# Patient Record
Sex: FEMALE | Race: WHITE | NOT HISPANIC OR LATINO | Employment: OTHER | URBAN - METROPOLITAN AREA
[De-identification: names, ages, dates, MRNs, and addresses within clinical notes are randomized per-mention and may not be internally consistent; named-entity substitution may affect disease eponyms.]

---

## 2023-01-17 ENCOUNTER — OFFICE VISIT (OUTPATIENT)
Dept: INTERNAL MEDICINE | Facility: CLINIC | Age: 68
End: 2023-01-17
Payer: MEDICARE

## 2023-01-17 VITALS
DIASTOLIC BLOOD PRESSURE: 80 MMHG | BODY MASS INDEX: 31.28 KG/M2 | TEMPERATURE: 97.8 F | RESPIRATION RATE: 20 BRPM | OXYGEN SATURATION: 98 % | HEART RATE: 87 BPM | HEIGHT: 64 IN | SYSTOLIC BLOOD PRESSURE: 130 MMHG | WEIGHT: 183.25 LBS

## 2023-01-17 DIAGNOSIS — M85.80 OSTEOPENIA, UNSPECIFIED LOCATION: ICD-10-CM

## 2023-01-17 DIAGNOSIS — Z00.00 ROUTINE HEALTH MAINTENANCE: Primary | ICD-10-CM

## 2023-01-17 DIAGNOSIS — Z12.31 BREAST CANCER SCREENING BY MAMMOGRAM: ICD-10-CM

## 2023-01-17 DIAGNOSIS — F33.41 RECURRENT MAJOR DEPRESSIVE DISORDER, IN PARTIAL REMISSION: ICD-10-CM

## 2023-01-17 DIAGNOSIS — Z13.6 SCREENING FOR ISCHEMIC HEART DISEASE: ICD-10-CM

## 2023-01-17 DIAGNOSIS — Z12.11 ENCOUNTER FOR COLORECTAL CANCER SCREENING: ICD-10-CM

## 2023-01-17 DIAGNOSIS — K21.9 GASTROESOPHAGEAL REFLUX DISEASE WITHOUT ESOPHAGITIS: ICD-10-CM

## 2023-01-17 DIAGNOSIS — Z23 ENCOUNTER FOR VACCINATION: ICD-10-CM

## 2023-01-17 DIAGNOSIS — I25.10 CORONARY ARTERY DISEASE INVOLVING NATIVE HEART WITHOUT ANGINA PECTORIS, UNSPECIFIED VESSEL OR LESION TYPE: ICD-10-CM

## 2023-01-17 DIAGNOSIS — Z78.0 ASYMPTOMATIC POSTMENOPAUSAL STATE: ICD-10-CM

## 2023-01-17 DIAGNOSIS — Z12.12 ENCOUNTER FOR COLORECTAL CANCER SCREENING: ICD-10-CM

## 2023-01-17 PROCEDURE — 2014F MENTAL STATUS ASSESS: CPT | Performed by: STUDENT IN AN ORGANIZED HEALTH CARE EDUCATION/TRAINING PROGRAM

## 2023-01-17 PROCEDURE — 99387 INIT PM E/M NEW PAT 65+ YRS: CPT | Performed by: STUDENT IN AN ORGANIZED HEALTH CARE EDUCATION/TRAINING PROGRAM

## 2023-01-17 PROCEDURE — 3008F BODY MASS INDEX DOCD: CPT | Performed by: STUDENT IN AN ORGANIZED HEALTH CARE EDUCATION/TRAINING PROGRAM

## 2023-01-17 RX ORDER — ASPIRIN 81 MG/1
81 TABLET ORAL DAILY
Qty: 90 TABLET | Refills: 3 | Status: SHIPPED | OUTPATIENT
Start: 2023-01-17

## 2023-01-17 RX ORDER — ESCITALOPRAM OXALATE 10 MG/1
10 TABLET ORAL DAILY
COMMUNITY
Start: 2022-10-25 | End: 2023-02-28 | Stop reason: SDUPTHER

## 2023-01-17 RX ORDER — PANTOPRAZOLE SODIUM 40 MG/1
40 TABLET, DELAYED RELEASE ORAL DAILY
COMMUNITY
Start: 2022-10-18 | End: 2023-02-28 | Stop reason: SDUPTHER

## 2023-01-17 RX ORDER — ATORVASTATIN CALCIUM 20 MG/1
20 TABLET, FILM COATED ORAL DAILY
COMMUNITY
Start: 2022-10-17 | End: 2023-02-28 | Stop reason: SDUPTHER

## 2023-01-17 RX ORDER — LATANOPROST 50 UG/ML
SOLUTION/ DROPS OPHTHALMIC
COMMUNITY
Start: 2022-12-27

## 2023-01-17 RX ORDER — DULOXETIN HYDROCHLORIDE 60 MG/1
60 CAPSULE, DELAYED RELEASE ORAL DAILY
COMMUNITY
Start: 2022-12-24

## 2023-01-17 RX ORDER — BENZONATATE 200 MG/1
CAPSULE ORAL
COMMUNITY
Start: 2023-01-10 | End: 2023-01-17

## 2023-01-17 RX ORDER — METHYLPREDNISOLONE 4 MG/1
TABLET ORAL
COMMUNITY
Start: 2023-01-10 | End: 2023-01-17

## 2023-01-17 RX ORDER — TRAZODONE HYDROCHLORIDE 150 MG/1
150 TABLET ORAL DAILY
COMMUNITY
Start: 2022-12-03 | End: 2023-02-28 | Stop reason: SDUPTHER

## 2023-01-17 RX ORDER — DEXTROMETHORPHAN HYDROBROMIDE AND PROMETHAZINE HYDROCHLORIDE 15; 6.25 MG/5ML; MG/5ML
SYRUP ORAL
COMMUNITY
Start: 2023-01-10 | End: 2023-01-17

## 2023-01-17 RX ORDER — ZOSTER VACCINE RECOMBINANT, ADJUVANTED 50 MCG/0.5
0.5 KIT INTRAMUSCULAR ONCE
Qty: 1 EACH | Refills: 1 | Status: CANCELLED | OUTPATIENT
Start: 2023-01-17 | End: 2023-01-17

## 2023-01-17 RX ORDER — CARVEDILOL 3.12 MG/1
3.12 TABLET ORAL 2 TIMES DAILY WITH MEALS
COMMUNITY
End: 2023-03-07

## 2023-01-17 NOTE — PATIENT INSTRUCTIONS
Health Maintenance, Female  Adopting a healthy lifestyle and getting preventive care can go a long way to promote health and wellness. Talk with your health care provider about what schedule of regular examinations is right for you. This is a good chance for you to check in with your provider about disease prevention and staying healthy.  In between checkups, there are plenty of things you can do on your own. Experts have done a lot of research about which lifestyle changes and preventive measures are most likely to keep you healthy. Ask your health care provider for more information.  Weight and diet  Eat a healthy diet  Be sure to include plenty of vegetables, fruits, low-fat dairy products, and lean protein.  Do not eat a lot of foods high in solid fats, added sugars, or salt.  Get regular exercise. This is one of the most important things you can do for your health.  Most adults should exercise for at least 150 minutes each week. The exercise should increase your heart rate and make you sweat (moderate-intensity exercise).  Most adults should also do strengthening exercises at least twice a week. This is in addition to the moderate-intensity exercise.     Maintain a healthy weight  Body mass index (BMI) is a measurement that can be used to identify possible weight problems. It estimates body fat based on height and weight. Your health care provider can help determine your BMI and help you achieve or maintain a healthy weight.  For females 20 years of age and older:  A BMI below 18.5 is considered underweight.  A BMI of 18.5 to 24.9 is normal.  A BMI of 25 to 29.9 is considered overweight.  A BMI of 30 and above is considered obese.     Watch levels of cholesterol and blood lipids  You should start having your blood tested for lipids and cholesterol at 20 years of age, then have this test every 5 years.  You may need to have your cholesterol levels checked more often if:  Your lipid or cholesterol levels are  high.  You are older than 50 years of age.  You are at high risk for heart disease.     Cancer screening  Lung Cancer  Lung cancer screening is recommended for adults 55-80 years old who are at high risk for lung cancer because of a history of smoking.  A yearly low-dose CT scan of the lungs is recommended for people who:  Currently smoke.  Have quit within the past 15 years.  Have at least a 30-pack-year history of smoking. A pack year is smoking an average of one pack of cigarettes a day for 1 year.  Yearly screening should continue until it has been 15 years since you quit.  Yearly screening should stop if you develop a health problem that would prevent you from having lung cancer treatment.     Breast Cancer  Practice breast self-awareness. This means understanding how your breasts normally appear and feel.  It also means doing regular breast self-exams. Let your health care provider know about any changes, no matter how small.  If you are in your 20s or 30s, you should have a clinical breast exam (CBE) by a health care provider every 1-3 years as part of a regular health exam.  If you are 40 or older, have a CBE every year. Also consider having a breast X-ray (mammogram) every year.  If you have a family history of breast cancer, talk to your health care provider about genetic screening.  If you are at high risk for breast cancer, talk to your health care provider about having an MRI and a mammogram every year.  Breast cancer gene (BRCA) assessment is recommended for women who have family members with BRCA-related cancers. BRCA-related cancers include:  Breast.  Ovarian.  Tubal.  Peritoneal cancers.  Results of the assessment will determine the need for genetic counseling and BRCA1 and BRCA2 testing.     Cervical Cancer  Your health care provider may recommend that you be screened regularly for cancer of the pelvic organs (ovaries, uterus, and vagina). This screening involves a pelvic examination, including  checking for microscopic changes to the surface of your cervix (Pap test). You may be encouraged to have this screening done every 3 years, beginning at age 21.  For women ages 30-65, health care providers may recommend pelvic exams and Pap testing every 3 years, or they may recommend the Pap and pelvic exam, combined with testing for human papilloma virus (HPV), every 5 years. Some types of HPV increase your risk of cervical cancer. Testing for HPV may also be done on women of any age with unclear Pap test results.  Other health care providers may not recommend any screening for nonpregnant women who are considered low risk for pelvic cancer and who do not have symptoms. Ask your health care provider if a screening pelvic exam is right for you.  If you have had past treatment for cervical cancer or a condition that could lead to cancer, you need Pap tests and screening for cancer for at least 20 years after your treatment. If Pap tests have been discontinued, your risk factors (such as having a new sexual partner) need to be reassessed to determine if screening should resume. Some women have medical problems that increase the chance of getting cervical cancer. In these cases, your health care provider may recommend more frequent screening and Pap tests.     Colorectal Cancer  This type of cancer can be detected and often prevented.  Routine colorectal cancer screening usually begins at 50 years of age and continues through 75 years of age.  Your health care provider may recommend screening at an earlier age if you have risk factors for colon cancer.  Your health care provider may also recommend using home test kits to check for hidden blood in the stool.  A small camera at the end of a tube can be used to examine your colon directly (sigmoidoscopy or colonoscopy). This is done to check for the earliest forms of colorectal cancer.  Routine screening usually begins at age 50.  Direct examination of the colon should  be repeated every 5-10 years through 75 years of age. However, you may need to be screened more often if early forms of precancerous polyps or small growths are found.     Skin Cancer  Check your skin from head to toe regularly.  Tell your health care provider about any new moles or changes in moles, especially if there is a change in a mole's shape or color.  Also tell your health care provider if you have a mole that is larger than the size of a pencil eraser.  Always use sunscreen. Apply sunscreen liberally and repeatedly throughout the day.  Protect yourself by wearing long sleeves, pants, a wide-brimmed hat, and sunglasses whenever you are outside.     Heart disease, diabetes, and high blood pressure  High blood pressure causes heart disease and increases the risk of stroke. High blood pressure is more likely to develop in:  People who have blood pressure in the high end of the normal range (130-139/85-89 mm Hg).  People who are overweight or obese.  People who are .  If you are 18-39 years of age, have your blood pressure checked every 3-5 years. If you are 40 years of age or older, have your blood pressure checked every year. You should have your blood pressure measured twice--once when you are at a hospital or clinic, and once when you are not at a hospital or clinic. Record the average of the two measurements. To check your blood pressure when you are not at a hospital or clinic, you can use:  An automated blood pressure machine at a pharmacy.  A home blood pressure monitor.  If you are between 55 years and 79 years old, ask your health care provider if you should take aspirin to prevent strokes.  Have regular diabetes screenings. This involves taking a blood sample to check your fasting blood sugar level.  If you are at a normal weight and have a low risk for diabetes, have this test once every three years after 45 years of age.  If you are overweight and have a high risk for diabetes,  consider being tested at a younger age or more often.  Preventing infection  Hepatitis B  If you have a higher risk for hepatitis B, you should be screened for this virus. You are considered at high risk for hepatitis B if:  You were born in a country where hepatitis B is common. Ask your health care provider which countries are considered high risk.  Your parents were born in a high-risk country, and you have not been immunized against hepatitis B (hepatitis B vaccine).  You have HIV or AIDS.  You use needles to inject street drugs.  You live with someone who has hepatitis B.  You have had sex with someone who has hepatitis B.  You get hemodialysis treatment.  You take certain medicines for conditions, including cancer, organ transplantation, and autoimmune conditions.     Hepatitis C  Blood testing is recommended for:  Everyone born from 1945 through 1965.  Anyone with known risk factors for hepatitis C.     Sexually transmitted infections (STIs)  You should be screened for sexually transmitted infections (STIs) including gonorrhea and chlamydia if:  You are sexually active and are younger than 24 years of age.  You are older than 24 years of age and your health care provider tells you that you are at risk for this type of infection.  Your sexual activity has changed since you were last screened and you are at an increased risk for chlamydia or gonorrhea. Ask your health care provider if you are at risk.  If you do not have HIV, but are at risk, it may be recommended that you take a prescription medicine daily to prevent HIV infection. This is called pre-exposure prophylaxis (PrEP). You are considered at risk if:  You are sexually active and do not regularly use condoms or know the HIV status of your partner(s).  You take drugs by injection.  You are sexually active with a partner who has HIV.     Talk with your health care provider about whether you are at high risk of being infected with HIV. If you choose to  begin PrEP, you should first be tested for HIV. You should then be tested every 3 months for as long as you are taking PrEP.  Pregnancy  If you are premenopausal and you may become pregnant, ask your health care provider about preconception counseling.  If you may become pregnant, take 400 to 800 micrograms (mcg) of folic acid every day.  If you want to prevent pregnancy, talk to your health care provider about birth control (contraception).  Osteoporosis and menopause  Osteoporosis is a disease in which the bones lose minerals and strength with aging. This can result in serious bone fractures. Your risk for osteoporosis can be identified using a bone density scan.  If you are 65 years of age or older, or if you are at risk for osteoporosis and fractures, ask your health care provider if you should be screened.  Ask your health care provider whether you should take a calcium or vitamin D supplement to lower your risk for osteoporosis.  Menopause may have certain physical symptoms and risks.  Hormone replacement therapy may reduce some of these symptoms and risks.  Talk to your health care provider about whether hormone replacement therapy is right for you.  Follow these instructions at home:  Schedule regular health, dental, and eye exams.  Stay current with your immunizations.  Do not use any tobacco products including cigarettes, chewing tobacco, or electronic cigarettes.  If you are pregnant, do not drink alcohol.  If you are breastfeeding, limit how much and how often you drink alcohol.  Limit alcohol intake to no more than 1 drink per day for nonpregnant women. One drink equals 12 ounces of beer, 5 ounces of wine, or 1½ ounces of hard liquor.  Do not use street drugs.  Do not share needles.  Ask your health care provider for help if you need support or information about quitting drugs.  Tell your health care provider if you often feel depressed.  Tell your health care provider if you have ever been abused or do  not feel safe at home.  This information is not intended to replace advice given to you by your health care provider. Make sure you discuss any questions you have with your health care provider.  Document Released: 07/02/2012 Document Revised: 05/25/2017 Document Reviewed: 09/20/2016  [employer] Interactive Patient Education © 2018 [employer] Healthy Eating  Following a healthy eating pattern may help you to achieve and maintain a healthy body weight, reduce the risk of chronic disease, and live a long and productive life. It is important to follow a healthy eating pattern at an appropriate calorie level for your body. Your nutritional needs should be met primarily through food by choosing a variety of nutrient-rich foods.  What are tips for following this plan?  Reading food labels  Read labels and choose the following:  Reduced or low sodium.  Juices with 100% fruit juice.  Foods with low saturated fats and high polyunsaturated and monounsaturated fats.  Foods with whole grains, such as whole wheat, cracked wheat, brown rice, and wild rice.  Whole grains that are fortified with folic acid. This is recommended for women who are pregnant or who want to become pregnant.  Read labels and avoid the following:  Foods with a lot of added sugars. These include foods that contain brown sugar, corn sweetener, corn syrup, dextrose, fructose, glucose, high-fructose corn syrup, honey, invert sugar, lactose, malt syrup, maltose, molasses, raw sugar, sucrose, trehalose, or turbinado sugar.  Do not eat more than the following amounts of added sugar per day:  6 teaspoons (25 g) for women.  9 teaspoons (38 g) for men.  Foods that contain processed or refined starches and grains.  Refined grain products, such as white flour, degermed cornmeal, white bread, and white rice.  Shopping  Choose nutrient-rich snacks, such as vegetables, whole fruits, and nuts. Avoid high-calorie and high-sugar snacks, such as potato chips, fruit snacks,  "and candy.  Use oil-based dressings and spreads on foods instead of solid fats such as butter, stick margarine, or cream cheese.  Limit pre-made sauces, mixes, and \"instant\" products such as flavored rice, instant noodles, and ready-made pasta.  Try more plant-protein sources, such as tofu, tempeh, black beans, edamame, lentils, nuts, and seeds.  Explore eating plans such as the Mediterranean diet or vegetarian diet.  Cooking  Use oil to sauté or stir-martinez foods instead of solid fats such as butter, stick margarine, or lard.  Try baking, boiling, grilling, or broiling instead of frying.  Remove the fatty part of meats before cooking.  Steam vegetables in water or broth.  Meal planning    At meals, imagine dividing your plate into fourths:  One-half of your plate is fruits and vegetables.  One-fourth of your plate is whole grains.  One-fourth of your plate is protein, especially lean meats, poultry, eggs, tofu, beans, or nuts.  Include low-fat dairy as part of your daily diet.     Lifestyle  Choose healthy options in all settings, including home, work, school, restaurants, or stores.  Prepare your food safely:  Wash your hands after handling raw meats.  Keep food preparation surfaces clean by regularly washing with hot, soapy water.  Keep raw meats separate from ready-to-eat foods, such as fruits and vegetables.  , meat, poultry, and eggs to the recommended internal temperature.  Store foods at safe temperatures. In general:  Keep cold foods at 40°F (4.4°C) or below.  Keep hot foods at 140°F (60°C) or above.  Keep your freezer at 0°F (-17.8°C) or below.  Foods are no longer safe to eat when they have been between the temperatures of 40°-140°F (4.4-60°C) for more than 2 hours.  What foods should I eat?  Fruits  Aim to eat 2 cup-equivalents of fresh, canned (in natural juice), or frozen fruits each day. Examples of 1 cup-equivalent of fruit include 1 small apple, 8 large strawberries, 1 cup canned fruit, ½ " cup dried fruit, or 1 cup 100% juice.  Vegetables  Aim to eat 2½-3 cup-equivalents of fresh and frozen vegetables each day, including different varieties and colors. Examples of 1 cup-equivalent of vegetables include 2 medium carrots, 2 cups raw, leafy greens, 1 cup chopped vegetable (raw or cooked), or 1 medium baked potato.  Grains  Aim to eat 6 ounce-equivalents of whole grains each day. Examples of 1 ounce-equivalent of grains include 1 slice of bread, 1 cup ready-to-eat cereal, 3 cups popcorn, or ½ cup cooked rice, pasta, or cereal.  Meats and other proteins  Aim to eat 5-6 ounce-equivalents of protein each day. Examples of 1 ounce-equivalent of protein include 1 egg, 1/2 cup nuts or seeds, or 1 tablespoon (16 g) peanut butter. A cut of meat or fish that is the size of a deck of cards is about 3-4 ounce-equivalents.  Of the protein you eat each week, try to have at least 8 ounces come from seafood. This includes salmon, trout, herring, and anchovies.  Dairy  Aim to eat 3 cup-equivalents of fat-free or low-fat dairy each day. Examples of 1 cup-equivalent of dairy include 1 cup (240 mL) milk, 8 ounces (250 g) yogurt, 1½ ounces (44 g) natural cheese, or 1 cup (240 mL) fortified soy milk.  Fats and oils  Aim for about 5 teaspoons (21 g) per day. Choose monounsaturated fats, such as canola and olive oils, avocados, peanut butter, and most nuts, or polyunsaturated fats, such as sunflower, corn, and soybean oils, walnuts, pine nuts, sesame seeds, sunflower seeds, and flaxseed.  Beverages  Aim for six 8-oz glasses of water per day. Limit coffee to three to five 8-oz cups per day.  Limit caffeinated beverages that have added calories, such as soda and energy drinks.  Limit alcohol intake to no more than 1 drink a day for nonpregnant women and 2 drinks a day for men. One drink equals 12 oz of beer (355 mL), 5 oz of wine (148 mL), or 1½ oz of hard liquor (44 mL).  Seasoning and other foods  Avoid adding excess amounts of  salt to your foods. Try flavoring foods with herbs and spices instead of salt.  Avoid adding sugar to foods.  Try using oil-based dressings, sauces, and spreads instead of solid fats.  This information is based on general [place] nutrition guidelines. For more information, visit choosemyplate.gov. Exact amounts may vary based on your nutrition needs.  Summary  A healthy eating plan may help you to maintain a healthy weight, reduce the risk of chronic diseases, and stay active throughout your life.  Plan your meals. Make sure you eat the right portions of a variety of nutrient-rich foods.  Try baking, boiling, grilling, or broiling instead of frying.  Choose healthy options in all settings, including home, work, school, restaurants, or stores.  This information is not intended to replace advice given to you by your health care provider. Make sure you discuss any questions you have with your health care provider.  Document Revised: 04/01/2019 Document Reviewed: 04/01/2019  [employer] Patient Education © 2021 [employer]    Exercising to Stay Healthy  To become healthy and stay healthy, it is recommended that you do moderate-intensity and vigorous-intensity exercise. You can tell that you are exercising at a moderate intensity if your heart starts beating faster and you start breathing faster but can still hold a conversation. You can tell that you are exercising at a vigorous intensity if you are breathing much harder and faster and cannot hold a conversation while exercising.  Exercising regularly is important. It has many health benefits, such as:  Improving overall fitness, flexibility, and endurance.  Increasing bone density.  Helping with weight control.  Decreasing body fat.  Increasing muscle strength.  Reducing stress and tension.  Improving overall health.  How often should I exercise?  Choose an activity that you enjoy, and set realistic goals. Your health care provider can help you make an activity plan that  works for you.  Exercise regularly as told by your health care provider. This may include:  Doing strength training two times a week, such as:  Lifting weights.  Using resistance bands.  Push-ups.  Sit-ups.  Yoga.  Doing a certain intensity of exercise for a given amount of time. Choose from these options:  A total of 150 minutes of moderate-intensity exercise every week.  A total of 75 minutes of vigorous-intensity exercise every week.  A mix of moderate-intensity and vigorous-intensity exercise every week.  Children, pregnant women, people who have not exercised regularly, people who are overweight, and older adults may need to talk with a health care provider about what activities are safe to do. If you have a medical condition, be sure to talk with your health care provider before you start a new exercise program.  What are some exercise ideas?    Moderate-intensity exercise ideas include:  Walking 1 mile (1.6 km) in about 15 minutes.  Biking.  Hiking.  Golfing.  Dancing.  Water aerobics.  Vigorous-intensity exercise ideas include:  Walking 4.5 miles (7.2 km) or more in about 1 hour.  Jogging or running 5 miles (8 km) in about 1 hour.  Biking 10 miles (16.1 km) or more in about 1 hour.  Lap swimming.  Roller-skating or in-line skating.  Cross-country skiing.  Vigorous competitive sports, such as football, basketball, and soccer.  Jumping rope.  Aerobic dancing.  What are some everyday activities that can help me to get exercise?  Yard work, such as:  Pushing a .  Raking and bagging leaves.  Washing your car.  Pushing a stroller.  Shoveling snow.  Gardening.  Washing windows or floors.  How can I be more active in my day-to-day activities?  Use stairs instead of an elevator.  Take a walk during your lunch break.  If you drive, park your car farther away from your work or school.  If you take public transportation, get off one stop early and walk the rest of the way.  Stand up or walk around during all  of your indoor phone calls.  Get up, stretch, and walk around every 30 minutes throughout the day.  Enjoy exercise with a friend. Support to continue exercising will help you keep a regular routine of activity.  What guidelines can I follow while exercising?  Before you start a new exercise program, talk with your health care provider.  Do not exercise so much that you hurt yourself, feel dizzy, or get very short of breath.  Wear comfortable clothes and wear shoes with good support.  Drink plenty of water while you exercise to prevent dehydration or heat stroke.  Work out until your breathing and your heartbeat get faster.  Where to find more information  [place] Department of Health and Human Services: www.hhs.gov  Centers for Disease Control and Prevention (CDC): www.cdc.gov  Summary  Exercising regularly is important. It will improve your overall fitness, flexibility, and endurance.  Regular exercise also will improve your overall health. It can help you control your weight, reduce stress, and improve your bone density.  Do not exercise so much that you hurt yourself, feel dizzy, or get very short of breath.  Before you start a new exercise program, talk with your health care provider.  This information is not intended to replace advice given to you by your health care provider. Make sure you discuss any questions you have with your health care provider.  Document Revised: 11/30/2018 Document Reviewed: 11/08/2018  [employer] Patient Education © 2021 [employer]       Advance Care Planning and Advance Directives      You make decisions on a daily basis - decisions about where you want to live, your career, your home, your life. Perhaps one of the most important decisions you face is your choice for future medical care. Take time to talk with your family and your healthcare team and start planning today.  Advance Care Planning is a process that can help you:  Understand possible future healthcare decisions in light of  your own experiences  Reflect on those decision in light of your goals and values  Discuss your decisions with those closest to you and the healthcare professionals that care for you  Make a plan by creating a document that reflects your wishes     Surrogate Decision Maker  In the event of a medical emergency, which has left you unable to communicate or to make your own decisions, you would need someone to make decisions for you.  It is important to discuss your preferences for medical treatment with this person while you are in good health.      Qualities of a surrogate decision maker:  Willing to take on this role and responsibility  Knows what you want for future medical care  Willing to follow your wishes even if they don't agree with them  Able to make difficult medical decisions under stressful circumstances     Advance Directives  These are legal documents you can create that will guide your healthcare team and decision maker(s) when needed. These documents can be stored in the electronic medical record.     Living Will - a legal document to guide your care if you have a terminal condition or a serious illness and are unable to communicate. States vary by statute in document names/types, but most forms may include one or more of the following:              -  Directions regarding life-prolonging treatments              -  Directions regarding artificially provided nutrition/hydration              -  Choosing a healthcare decision maker              -  Direction regarding organ/tissue donation     Durable Power of  for Healthcare - this document names an -in-fact to make medical decisions for you, but it may also allow this person to make personal and financial decisions for you. Please seek the advice of an  if you need this type of document.     **Advance Directives are not required and no one may discriminate against you if you do not sign one.     Medical Orders  Many states allow  specific forms/orders signed by your physician to record your wishes for medical treatment in your current state of health. This form, signed in personal communication with your physician, addresses resuscitation and other medical interventions that you may or may not want.        For more information or to schedule a time with a [facility] Advance Care Planning Facilitator contact: [facility].[email or web address]/ACP or call [phone] and someone will contact you directly.

## 2023-01-17 NOTE — PROGRESS NOTES
"    New Patient Office Visit      Date: 2023   Patient Name: Sarah Howard  : 1955   MRN: [id number]     Chief Complaint:    Chief Complaint   Patient presents with   • Establish Care       History of Present Illness: Sarah Howard is a 67 y.o. female who is here today to establish care.    HPI    Mrs. Howard is a 67-year-old female who presents as a new patient.    Congestion  The patient reports they changed insurance this year. She states her  was diagnosed with COVID-19 a10-days ago, 2023. She notes she started feeling bad a few days after, and then started feeling worse. She reports she did a at home test and it was negative. The patient states she has been sick for a week with \"head stuff.\" She notes another office did a telehealth visit, and prescribed her prednisone and cough syrup. The patient reports it did not really help. She states she was still coughing, but it has come up since.    History of breast cancer  She was diagnosed with breast cancer in , and was treated with chemotherapy and radiation. She states she did not have a mastectomy, but had 2 lumpectomies. She reports she had a reduction at the time. She states she had to have a complete abdominal hysterectomy. She reports they thought her cancer had spread, but this was a fibroid. She notes she received her chemotherapy at the [facility] in [place].  She states her chemotherapy  medications were Cytoxan and Taxol. She notes she does not follow with a oncologist anymore. She reports she does still have an annual mammogram. The patient states she went to have her mammogram done in at [facility], because she could barely feel it. She notes her last mammogram was 2022.     Coronary heart disease  The patient has a history of coronary heart disease. She reports she has not had a heart attack previously. She states she had to go the emergency room while she was in [place]. She notes " "she was informed she had failed her stress test. She reports she had to have a catheterization, and no blockages were found. She denies stents. She is on carvedilol 3.125mg  2 times a day and atorvastatin 20 mg 1 tablet a day. She states she just recently started  the atorvastatin in the summer, 2022.  She denies taking baby aspirin.      Depression  She notes she takes Cymbalta. The patient reports the Cymbalta has been working well for her. She states the last week she has felt \"poorly.\" She notes it is common this time of year. She reports she has taken other medication previously a while back.  She is taking Lexapro 10 mg. She has not tried talk therapy previously.    Gastroesophageal reflux disease  The patient takes pantoprazole  40 mg daily. She states it works well for her. She denies trouble swallowing. She notes she started gastroesophageal reflux disease in the last year. She reports she was having trouble swallowing, and it was painful. She had an upper endoscopy and colonoscopy done around 2017 or 2018. She denies any changes. She cannot remember where she had them done. She states she does not mind doing the fecal test, but does not want another colonoscopy. She notes she cannot take the prep.    Glaucoma  The patient reports she sees an ophthalmologist yearly. She states she see Dr. Woodard. She notes she has narrow angle glaucoma. She reports she takes latanoprost at nighttime. She notes she has an appointment on 02/09/2023. She had laser irrigotomies, one in each eye to remove fluid.    Osteopenia  The patient reports she had a DEXA scan 2 to 3 years ago. She denies taking vitamin D or calcium. She states she takes so many medications, she worries about interactions. Reports taking Centrum MV daily    Insomnia  She notes she takes Trazodone to help her sleep. Current dose is 150mg nightly as needed. Feels this workds well.     Synovial cyst s/p surgical removal  The patient reports she had a  " synovial cyst on her spine around her L5. She states it was pressing against her sciatic nerve. She notes she did physical therapy. She reports she tried injections, and it did not help. She notes she was needing to use a cane, so she had ACDF surgery [place] ..    Family history   She reports her mother had colon cancer. Her mother was diagnosed with colon cancer around the age of 48. Her father  of  heart valves. She notes he had mitral valve prolapse. She notes her mother had an enlarged heart. She reports her older brother has a pacemaker. She notes her children seem to be healthy.     Health Maintenance  She states she ad COVID-19 vaccine and 2 boosters. She notes she had her pneumococcal vaccine, shingles vaccine, and Tdap. She denies a history of abnormal pap smears. She is unsure is she has been screened for hepatitis C. She does not follow a dermatologist regularly. She just got new dental coverage. She notes she does not have the motivation to exercise. She states her  walks. She denies refills at this time. She is taking Centrum silver for women.    Subjective      Review of Systems:   Review of Systems    Past Medical History:   Past Medical History:   Diagnosis Date   • Cancer (HCC) Breast       • Coronary artery disease 10/2020   • Depression    • GERD (gastroesophageal reflux disease)    • Glaucoma    • Hyperlipidemia    • Hypertension    • Osteopenia        Past Surgical History:   Past Surgical History:   Procedure Laterality Date   • ADENOIDECTOMY  1972   • BREAST SURGERY  2 x 2010   • CARDIAC CATHETERIZATION     • COLONOSCOPY     • COSMETIC SURGERY  breast reduction       • EYE SURGERY  laser iridotomies       • HYSTERECTOMY     • SPINE SURGERY  2019   • TONSILLECTOMY     • TUBAL ABDOMINAL LIGATION         Family History:   Family History   Problem Relation Age of Onset   • Colon cancer Mother 48   • Stroke Mother    • Thyroid  disease Mother    • Heart disease Father         mitral valve disease   • Atrial fibrillation Brother        Social History:   Social History     Socioeconomic History   • Marital status:    Tobacco Use   • Smoking status: Never   • Smokeless tobacco: Never   Substance and Sexual Activity   • Alcohol use: Yes     Alcohol/week: 8.0 standard drinks     Types: 8 Glasses of wine per week   • Drug use: Not Currently     Types: Marijuana   • Sexual activity: Yes     Partners: Male       Medications:     Current Outpatient Medications:   •  atorvastatin (LIPITOR) 20 MG tablet, Take 20 mg by mouth Daily., Disp: , Rfl:   •  carvedilol (Coreg) 3.125 MG tablet, Take 3.125 mg by mouth 2 (Two) Times a Day With Meals., Disp: , Rfl:   •  DULoxetine (CYMBALTA) 60 MG capsule, Take 60 mg by mouth Daily., Disp: , Rfl:   •  escitalopram (LEXAPRO) 10 MG tablet, Take 10 mg by mouth Daily., Disp: , Rfl:   •  latanoprost (XALATAN) 0.005 % ophthalmic solution, , Disp: , Rfl:   •  pantoprazole (PROTONIX) 40 MG EC tablet, Take 40 mg by mouth Daily., Disp: , Rfl:   •  traZODone (DESYREL) 150 MG tablet, Take 150 mg by mouth Daily., Disp: , Rfl:   •  aspirin 81 MG EC tablet, Take 1 tablet by mouth Daily., Disp: 90 tablet, Rfl: 3    Allergies:   No Known Allergies    Immunizations:  Td/Tdap(Booster Q 10 yrs):  utd  Flu (Yearly):  utd  Pneumonia:   Immunization History   Administered Date(s) Administered   • COVID-19 (MODERNA) 1st, 2nd, 3rd Dose Only 11/08/2021   • Covid-19 (Pfizer) Gray Cap 06/24/2022   • Fluad Quad 65+ 10/25/2022   • Fluzone High-Dose 65+yrs 10/13/2021   • Pneumococcal Conjugate 13-Valent (PCV13) 10/13/2021   • Td 07/17/2022       Colorectal Screening:   due  Last Completed Colonoscopy     This patient has no relevant Health Maintenance data.        Pap:  NA s/p hysterectoomy  Last Completed Pap Smear     This patient has no relevant Health Maintenance data.        Mammogram:    Last Completed Mammogram           "MAMMOGRAM (Every 2 Years) Next due on 4/1/2024 04/01/2022  Done                 Bone Density/DEXA (Age 65 or high risk): will obtain records from prior, if none available will need dexa in future  Hep C (Age 18-79 once):  due    Lipid panel: due  The ASCVD Risk score (Gavino DALLAS, et al., 2019) failed to calculate for the following reasons:    Cannot find a previous HDL lab    Cannot find a previous total cholesterol lab    Dermatology: doesn't follow  Ophthalmologist: regularly  Dentist: regularly    Tobacco Use: Low Risk    • Smoking Tobacco Use: Never   • Smokeless Tobacco Use: Never   • Passive Exposure: Not on file       Social History     Substance and Sexual Activity   Alcohol Use Yes   • Alcohol/week: 8.0 standard drinks   • Types: 8 Glasses of wine per week       Social History     Substance and Sexual Activity   Drug Use Not Currently   • Types: Marijuana            PHQ-2 Depression Screening  PHQ-9 Total Score: 1     Objective     Physical Exam:  Vital Signs:   Vitals:    01/17/23 1318   BP: 130/80   BP Location: Left arm   Patient Position: Sitting   Cuff Size: Adult   Pulse: 87   Resp: 20   Temp: 97.8 °F (36.6 °C)   TempSrc: Temporal   SpO2: 98%   Weight: 83.1 kg (183 lb 4 oz)   Height: 162.6 cm (64\")   PainSc: 0-No pain     Body mass index is 31.45 kg/m².    Physical Exam  Vitals reviewed.   Constitutional:       General: She is not in acute distress.     Appearance: Normal appearance. She is normal weight. She is not ill-appearing or toxic-appearing.   HENT:      Head: Normocephalic and atraumatic.      Right Ear: External ear normal.      Left Ear: External ear normal.      Nose: Nose normal. No congestion.      Mouth/Throat:      Mouth: Mucous membranes are moist.      Pharynx: No oropharyngeal exudate or posterior oropharyngeal erythema.   Eyes:      General: No scleral icterus.     Extraocular Movements: Extraocular movements intact.      Pupils: Pupils are equal, round, and reactive to light. "   Cardiovascular:      Rate and Rhythm: Normal rate and regular rhythm.      Pulses: Normal pulses.      Heart sounds: Normal heart sounds.   Pulmonary:      Effort: Pulmonary effort is normal.      Breath sounds: Normal breath sounds.   Abdominal:      General: Abdomen is flat. Bowel sounds are normal.      Palpations: Abdomen is soft.   Musculoskeletal:         General: No swelling or tenderness. Normal range of motion.      Cervical back: Normal range of motion. No rigidity.      Right lower leg: No edema.      Left lower leg: No edema.   Lymphadenopathy:      Cervical: No cervical adenopathy.   Skin:     General: Skin is warm and dry.      Capillary Refill: Capillary refill takes less than 2 seconds.      Findings: No erythema or rash.   Neurological:      General: No focal deficit present.      Mental Status: She is alert and oriented to person, place, and time.   Psychiatric:         Mood and Affect: Mood normal.         Behavior: Behavior normal.         Judgment: Judgment normal.         Procedures    Results:     Labs:   No results found for: HGBA1C, CMP, CBCDIFFPANEL, CREAT, TSH     Imaging:   No valid procedures specified.     Assessment / Plan      Assessment/Plan:   Problem List Items Addressed This Visit        Cardiac and Vasculature    Coronary artery disease    Relevant Medications    carvedilol (Coreg) 3.125 MG tablet    aspirin 81 MG EC tablet       Gastrointestinal Abdominal     GERD (gastroesophageal reflux disease)    Relevant Medications    pantoprazole (PROTONIX) 40 MG EC tablet       Mental Health    Depression    Relevant Medications    DULoxetine (CYMBALTA) 60 MG capsule    escitalopram (LEXAPRO) 10 MG tablet    traZODone (DESYREL) 150 MG tablet    Other Relevant Orders    Ambulatory Referral to Behavioral Health       Musculoskeletal and Injuries    Osteopenia   Other Visit Diagnoses     Routine health maintenance    -  Primary     Fasting labs will be obtained at her convivence. She will  return to the clinic in 3 months.    Relevant Orders    CBC Auto Differential    Comprehensive Metabolic Panel    Hepatitis C Antibody    Screening for ischemic heart disease        Relevant Orders    Lipid Panel    Encounter for vaccination        Asymptomatic postmenopausal state        Breast cancer screening by mammogram        She will be referred for a mammogram.    Encounter for colorectal cancer screening        A colonoscopy will be obtained.    Relevant Orders    Ambulatory Referral For Screening Colonoscopy          Healthcare Maintenance:  Counseling provided based on age appropriate USPSTF guidelines.  BMI cannot be calculated due to outdated height or weight values.  Please input a current height/weight in Vitals and re-renter BMIFOLLOWUP in Note to pull in correct documentation based on BMI range.    Sarah Howard voices understanding and acceptance of this advice and will call back with any further questions or concerns. AVS with preventive healthcare tips printed for patient.         Follow Up:   Return in about 3 months (around 4/17/2023) for And schedule subsequent Medicare wellness exam, and 3 month recheck mood..    I spent 59 minutes caring for Sarah on this date of service. This time includes time spent by me in the following activities: preparing for the visit, obtaining and/or reviewing a separately obtained history, performing a medically appropriate examination and/or evaluation, ordering medications, tests, or procedures and documenting information in the medical record  .     She will return to the clinic in 3 months.    [facility] [place] [facility] [place]     Transcribed from ambient dictation for Dao Gomez MD by Geeta Jensen.  01/17/23   17:00 EST    Patient or patient representative verbalized consent to the visit recording.  I have personally performed the services described in this document as transcribed by the above individual, and it is both accurate and complete.

## 2023-01-24 ENCOUNTER — TELEPHONE (OUTPATIENT)
Dept: INTERNAL MEDICINE | Facility: CLINIC | Age: 68
End: 2023-01-24

## 2023-01-24 NOTE — TELEPHONE ENCOUNTER
Hub staff attempted to follow warm transfer process and was unsuccessful     Caller: Sarah Howard    Relationship to patient: Self    Best call back number: [phone]    Patient is needing: PT RETURNING CALL TO SCHEDULE SCOPE. REFERRAL 01/17/23

## 2023-02-03 ENCOUNTER — LAB (OUTPATIENT)
Dept: LAB | Facility: HOSPITAL | Age: 68
End: 2023-02-03
Payer: MEDICARE

## 2023-02-03 DIAGNOSIS — Z00.00 ROUTINE HEALTH MAINTENANCE: ICD-10-CM

## 2023-02-03 DIAGNOSIS — Z13.6 SCREENING FOR ISCHEMIC HEART DISEASE: ICD-10-CM

## 2023-02-03 LAB
ALBUMIN SERPL-MCNC: 4.2 G/DL (ref 3.5–5.2)
ALBUMIN/GLOB SERPL: 1.4 G/DL
ALP SERPL-CCNC: 107 U/L (ref 39–117)
ALT SERPL W P-5'-P-CCNC: 30 U/L (ref 1–33)
ANION GAP SERPL CALCULATED.3IONS-SCNC: 10.9 MMOL/L (ref 5–15)
AST SERPL-CCNC: 30 U/L (ref 1–32)
BILIRUB SERPL-MCNC: 0.5 MG/DL (ref 0–1.2)
BUN SERPL-MCNC: 11 MG/DL (ref 8–23)
BUN/CREAT SERPL: 12.1 (ref 7–25)
CALCIUM SPEC-SCNC: 9.2 MG/DL (ref 8.6–10.5)
CHLORIDE SERPL-SCNC: 105 MMOL/L (ref 98–107)
CHOLEST SERPL-MCNC: 160 MG/DL (ref 0–200)
CO2 SERPL-SCNC: 28.1 MMOL/L (ref 22–29)
CREAT SERPL-MCNC: 0.91 MG/DL (ref 0.57–1)
DEPRECATED RDW RBC AUTO: 39.3 FL (ref 37–54)
EGFRCR SERPLBLD CKD-EPI 2021: 68.9 ML/MIN/1.73
EOSINOPHIL # BLD MANUAL: 0.12 10*3/MM3 (ref 0–0.4)
EOSINOPHIL NFR BLD MANUAL: 2.1 % (ref 0.3–6.2)
ERYTHROCYTE [DISTWIDTH] IN BLOOD BY AUTOMATED COUNT: 11.4 % (ref 12.3–15.4)
GLOBULIN UR ELPH-MCNC: 3 GM/DL
GLUCOSE SERPL-MCNC: 104 MG/DL (ref 65–99)
HCT VFR BLD AUTO: 37.6 % (ref 34–46.6)
HCV AB SER DONR QL: NORMAL
HDLC SERPL-MCNC: 59 MG/DL (ref 40–60)
HGB BLD-MCNC: 13.1 G/DL (ref 12–15.9)
LDLC SERPL CALC-MCNC: 65 MG/DL (ref 0–100)
LDLC/HDLC SERPL: 0.96 {RATIO}
LYMPHOCYTES # BLD MANUAL: 3.04 10*3/MM3 (ref 0.7–3.1)
LYMPHOCYTES NFR BLD MANUAL: 11.5 % (ref 5–12)
MCH RBC QN AUTO: 33.4 PG (ref 26.6–33)
MCHC RBC AUTO-ENTMCNC: 34.8 G/DL (ref 31.5–35.7)
MCV RBC AUTO: 95.9 FL (ref 79–97)
MONOCYTES # BLD: 0.65 10*3/MM3 (ref 0.1–0.9)
NEUTROPHILS # BLD AUTO: 1.81 10*3/MM3 (ref 1.7–7)
NEUTROPHILS NFR BLD MANUAL: 32.3 % (ref 42.7–76)
NRBC SPEC MANUAL: 2.1 /100 WBC (ref 0–0.2)
PLAT MORPH BLD: NORMAL
PLATELET # BLD AUTO: 279 10*3/MM3 (ref 140–450)
PMV BLD AUTO: 11.7 FL (ref 6–12)
POTASSIUM SERPL-SCNC: 4.4 MMOL/L (ref 3.5–5.2)
PROT SERPL-MCNC: 7.2 G/DL (ref 6–8.5)
RBC # BLD AUTO: 3.92 10*6/MM3 (ref 3.77–5.28)
RBC MORPH BLD: NORMAL
SMUDGE CELLS BLD QL SMEAR: ABNORMAL
SODIUM SERPL-SCNC: 144 MMOL/L (ref 136–145)
TRIGL SERPL-MCNC: 222 MG/DL (ref 0–150)
VARIANT LYMPHS NFR BLD MANUAL: 54.2 % (ref 19.6–45.3)
VLDLC SERPL-MCNC: 36 MG/DL (ref 5–40)
WBC NRBC COR # BLD: 5.61 10*3/MM3 (ref 3.4–10.8)

## 2023-02-03 PROCEDURE — 86803 HEPATITIS C AB TEST: CPT

## 2023-02-03 PROCEDURE — 85007 BL SMEAR W/DIFF WBC COUNT: CPT

## 2023-02-03 PROCEDURE — 80061 LIPID PANEL: CPT

## 2023-02-03 PROCEDURE — 85025 COMPLETE CBC W/AUTO DIFF WBC: CPT

## 2023-02-03 PROCEDURE — 80053 COMPREHEN METABOLIC PANEL: CPT

## 2023-02-17 ENCOUNTER — TELEPHONE (OUTPATIENT)
Dept: INTERNAL MEDICINE | Facility: CLINIC | Age: 68
End: 2023-02-17
Payer: MEDICARE

## 2023-02-17 DIAGNOSIS — M85.80 OSTEOPENIA, UNSPECIFIED LOCATION: Primary | ICD-10-CM

## 2023-02-17 DIAGNOSIS — Z78.0 ASYMPTOMATIC POSTMENOPAUSAL STATE: ICD-10-CM

## 2023-02-17 NOTE — TELEPHONE ENCOUNTER
Reached Pt to triage any Sx. Pt stated she is due and PCP had suggested, so she will like for him to order please.

## 2023-02-23 NOTE — TELEPHONE ENCOUNTER
DEQUANM to return call to office #[phone]    Informed referral has been placed and to reach out with any further questions or concerns

## 2023-02-28 RX ORDER — TRAZODONE HYDROCHLORIDE 150 MG/1
150 TABLET ORAL DAILY
Qty: 90 TABLET | Refills: 0 | Status: SHIPPED | OUTPATIENT
Start: 2023-02-28

## 2023-02-28 RX ORDER — PANTOPRAZOLE SODIUM 40 MG/1
40 TABLET, DELAYED RELEASE ORAL DAILY
Qty: 90 TABLET | Refills: 0 | Status: SHIPPED | OUTPATIENT
Start: 2023-02-28

## 2023-02-28 RX ORDER — ESCITALOPRAM OXALATE 10 MG/1
10 TABLET ORAL DAILY
Qty: 90 TABLET | Refills: 0 | Status: SHIPPED | OUTPATIENT
Start: 2023-02-28

## 2023-02-28 RX ORDER — ATORVASTATIN CALCIUM 20 MG/1
20 TABLET, FILM COATED ORAL DAILY
Qty: 90 TABLET | Refills: 0 | Status: SHIPPED | OUTPATIENT
Start: 2023-02-28

## 2023-02-28 NOTE — TELEPHONE ENCOUNTER
Caller: Sarah Howard    Relationship: Self    Best call back number: [phone]    Requested Prescriptions:   Requested Prescriptions     Pending Prescriptions Disp Refills   • traZODone (DESYREL) 150 MG tablet       Sig: Take 1 tablet by mouth Daily.   • escitalopram (LEXAPRO) 10 MG tablet       Sig: Take 1 tablet by mouth Daily.   • atorvastatin (LIPITOR) 20 MG tablet 90 tablet      Sig: Take 1 tablet by mouth Daily.   • pantoprazole (PROTONIX) 40 MG EC tablet       Sig: Take 1 tablet by mouth Daily.        Pharmacy where request should be sent: [facility] - [place], [place] - [address] [place] [address] - [phone] [facility] [phone] FX     Additional details provided by patient: THE carvedilol (Coreg) 3.125 MG tablet IS NOT COVERED BY HER INSURANCE SHE WILL NEED A REPLACEMENT     Does the patient have less than a 3 day supply:  [] Yes  [x] No    Would you like a call back once the refill request has been completed: [] Yes [x] No    If the office needs to give you a call back, can they leave a voicemail: [x] Yes [] No    Venkatesh Alonso, PCT   02/28/23 13:44 EST

## 2023-03-07 ENCOUNTER — TELEPHONE (OUTPATIENT)
Dept: INTERNAL MEDICINE | Facility: CLINIC | Age: 68
End: 2023-03-07
Payer: MEDICARE

## 2023-03-07 DIAGNOSIS — I25.10 CORONARY ARTERY DISEASE INVOLVING NATIVE HEART WITHOUT ANGINA PECTORIS, UNSPECIFIED VESSEL OR LESION TYPE: Primary | ICD-10-CM

## 2023-03-07 RX ORDER — METOPROLOL SUCCINATE 25 MG/1
25 TABLET, EXTENDED RELEASE ORAL DAILY
Qty: 90 TABLET | Refills: 1 | Status: SHIPPED | OUTPATIENT
Start: 2023-03-07

## 2023-03-07 NOTE — TELEPHONE ENCOUNTER
Caller: Lee Sarah    Relationship: Self    Best call back number: [phone]    What medication are you requesting: SOMETHING TO REPLACE carvedilol (Coreg) 3.125 MG tablet    Have you had these symptoms before:    [x] Yes  [] No    Have you been treated for these symptoms before:   [x] Yes  [] No    If a prescription is needed, what is your preferred pharmacy and phone number:    [facility] - [place], [place] - [address] [facility] - [phone] [facility] [phone] FX     Additional notes: PATIENT SAID SHE SPOKE TO SOMEONE IN OFFICE ABOUT THIS PRESCRIPTION. SHE HAS NOT HEARD ANYTHING BACK. THE PATIENT'S INSURANCE WILL NOT COVER THIS MEDICATION AND IS NEEDING SOMETHING SENT IN TO REPLACE IT. 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     PLEASE CALL PATIENT TO LET HER KNOW WHAT MEDICATION IS SENT IN.

## 2023-03-21 ENCOUNTER — TELEPHONE (OUTPATIENT)
Dept: INTERNAL MEDICINE | Facility: CLINIC | Age: 68
End: 2023-03-21
Payer: MEDICARE

## 2023-03-21 DIAGNOSIS — Z12.31 ENCOUNTER FOR SCREENING MAMMOGRAM FOR MALIGNANT NEOPLASM OF BREAST: Primary | ICD-10-CM

## 2023-04-03 ENCOUNTER — TELEPHONE (OUTPATIENT)
Dept: INTERNAL MEDICINE | Facility: CLINIC | Age: 68
End: 2023-04-03

## 2023-04-03 ENCOUNTER — HOSPITAL ENCOUNTER (OUTPATIENT)
Facility: HOSPITAL | Age: 68
Setting detail: OBSERVATION
Discharge: HOME | End: 2023-04-05
Attending: EMERGENCY MEDICINE | Admitting: STUDENT IN AN ORGANIZED HEALTH CARE EDUCATION/TRAINING PROGRAM
Payer: MEDICARE

## 2023-04-03 ENCOUNTER — APPOINTMENT (OUTPATIENT)
Dept: GENERAL RADIOLOGY | Facility: HOSPITAL | Age: 68
End: 2023-04-03
Payer: MEDICARE

## 2023-04-03 DIAGNOSIS — Z86.79 HISTORY OF CORONARY ARTERY DISEASE: ICD-10-CM

## 2023-04-03 DIAGNOSIS — Z86.79 HISTORY OF HYPERTENSION: ICD-10-CM

## 2023-04-03 DIAGNOSIS — R07.9 CHEST PAIN, UNSPECIFIED TYPE: Primary | ICD-10-CM

## 2023-04-03 PROBLEM — R42 DIZZINESS: Status: ACTIVE | Noted: 2023-04-03

## 2023-04-03 PROBLEM — H40.9 GLAUCOMA: Status: ACTIVE | Noted: 2023-04-03

## 2023-04-03 PROBLEM — R06.09 DYSPNEA ON EXERTION: Status: ACTIVE | Noted: 2023-04-03

## 2023-04-03 PROBLEM — Z85.3 HISTORY OF BREAST CANCER: Status: ACTIVE | Noted: 2023-04-03

## 2023-04-03 LAB
ALBUMIN SERPL-MCNC: 4.3 G/DL (ref 3.5–5.2)
ALBUMIN/GLOB SERPL: 1.4 G/DL
ALP SERPL-CCNC: 98 U/L (ref 39–117)
ALT SERPL W P-5'-P-CCNC: 27 U/L (ref 1–33)
ANION GAP SERPL CALCULATED.3IONS-SCNC: 10 MMOL/L (ref 5–15)
AST SERPL-CCNC: 30 U/L (ref 1–32)
BASOPHILS # BLD AUTO: 0.05 10*3/MM3 (ref 0–0.2)
BASOPHILS NFR BLD AUTO: 0.7 % (ref 0–1.5)
BILIRUB SERPL-MCNC: 0.7 MG/DL (ref 0–1.2)
BUN SERPL-MCNC: 13 MG/DL (ref 8–23)
BUN/CREAT SERPL: 13.4 (ref 7–25)
CALCIUM SPEC-SCNC: 9.1 MG/DL (ref 8.6–10.5)
CHLORIDE SERPL-SCNC: 105 MMOL/L (ref 98–107)
CO2 SERPL-SCNC: 27 MMOL/L (ref 22–29)
CREAT SERPL-MCNC: 0.97 MG/DL (ref 0.57–1)
D DIMER PPP FEU-MCNC: <0.27 MCGFEU/ML (ref 0–0.68)
DEPRECATED RDW RBC AUTO: 42.5 FL (ref 37–54)
EGFRCR SERPLBLD CKD-EPI 2021: 63.8 ML/MIN/1.73
EOSINOPHIL # BLD AUTO: 0.09 10*3/MM3 (ref 0–0.4)
EOSINOPHIL NFR BLD AUTO: 1.3 % (ref 0.3–6.2)
ERYTHROCYTE [DISTWIDTH] IN BLOOD BY AUTOMATED COUNT: 11.4 % (ref 12.3–15.4)
GEN 5 2HR TROPONIN T REFLEX: <6 NG/L
GLOBULIN UR ELPH-MCNC: 3.1 GM/DL
GLUCOSE SERPL-MCNC: 119 MG/DL (ref 65–99)
HBA1C MFR BLD: 5.4 % (ref 4.8–5.6)
HCT VFR BLD AUTO: 41.9 % (ref 34–46.6)
HGB BLD-MCNC: 13.9 G/DL (ref 12–15.9)
HOLD SPECIMEN: NORMAL
IMM GRANULOCYTES # BLD AUTO: 0.01 10*3/MM3 (ref 0–0.05)
IMM GRANULOCYTES NFR BLD AUTO: 0.1 % (ref 0–0.5)
LIPASE SERPL-CCNC: 27 U/L (ref 13–60)
LYMPHOCYTES # BLD AUTO: 4.11 10*3/MM3 (ref 0.7–3.1)
LYMPHOCYTES NFR BLD AUTO: 57.5 % (ref 19.6–45.3)
MAGNESIUM SERPL-MCNC: 1.9 MG/DL (ref 1.6–2.4)
MCH RBC QN AUTO: 33.8 PG (ref 26.6–33)
MCHC RBC AUTO-ENTMCNC: 33.2 G/DL (ref 31.5–35.7)
MCV RBC AUTO: 101.9 FL (ref 79–97)
MONOCYTES # BLD AUTO: 0.79 10*3/MM3 (ref 0.1–0.9)
MONOCYTES NFR BLD AUTO: 11 % (ref 5–12)
NEUTROPHILS NFR BLD AUTO: 2.1 10*3/MM3 (ref 1.7–7)
NEUTROPHILS NFR BLD AUTO: 29.4 % (ref 42.7–76)
NRBC BLD AUTO-RTO: 0 /100 WBC (ref 0–0.2)
NT-PROBNP SERPL-MCNC: 40.5 PG/ML (ref 0–900)
PLAT MORPH BLD: NORMAL
PLATELET # BLD AUTO: 263 10*3/MM3 (ref 140–450)
PMV BLD AUTO: 10.9 FL (ref 6–12)
POTASSIUM SERPL-SCNC: 4.2 MMOL/L (ref 3.5–5.2)
PROT SERPL-MCNC: 7.4 G/DL (ref 6–8.5)
QT INTERVAL: 370 MS
QTC INTERVAL: 421 MS
RBC # BLD AUTO: 4.11 10*6/MM3 (ref 3.77–5.28)
RBC MORPH BLD: NORMAL
SODIUM SERPL-SCNC: 142 MMOL/L (ref 136–145)
TROPONIN T DELTA: NORMAL
TROPONIN T SERPL HS-MCNC: <6 NG/L
TROPONIN T SERPL HS-MCNC: <6 NG/L
TSH SERPL DL<=0.05 MIU/L-ACNC: 3 UIU/ML (ref 0.27–4.2)
WBC MORPH BLD: NORMAL
WBC NRBC COR # BLD: 7.15 10*3/MM3 (ref 3.4–10.8)
WHOLE BLOOD HOLD COAG: NORMAL
WHOLE BLOOD HOLD SPECIMEN: NORMAL

## 2023-04-03 PROCEDURE — 71045 X-RAY EXAM CHEST 1 VIEW: CPT

## 2023-04-03 PROCEDURE — 93005 ELECTROCARDIOGRAM TRACING: CPT | Performed by: EMERGENCY MEDICINE

## 2023-04-03 PROCEDURE — 99222 1ST HOSP IP/OBS MODERATE 55: CPT | Performed by: NURSE PRACTITIONER

## 2023-04-03 PROCEDURE — G0378 HOSPITAL OBSERVATION PER HR: HCPCS

## 2023-04-03 PROCEDURE — 84484 ASSAY OF TROPONIN QUANT: CPT | Performed by: EMERGENCY MEDICINE

## 2023-04-03 PROCEDURE — 84484 ASSAY OF TROPONIN QUANT: CPT | Performed by: NURSE PRACTITIONER

## 2023-04-03 PROCEDURE — 85025 COMPLETE CBC W/AUTO DIFF WBC: CPT | Performed by: EMERGENCY MEDICINE

## 2023-04-03 PROCEDURE — 25010000002 HEPARIN (PORCINE) PER 1000 UNITS: Performed by: NURSE PRACTITIONER

## 2023-04-03 PROCEDURE — 83036 HEMOGLOBIN GLYCOSYLATED A1C: CPT | Performed by: NURSE PRACTITIONER

## 2023-04-03 PROCEDURE — 96372 THER/PROPH/DIAG INJ SC/IM: CPT

## 2023-04-03 PROCEDURE — 85007 BL SMEAR W/DIFF WBC COUNT: CPT | Performed by: EMERGENCY MEDICINE

## 2023-04-03 PROCEDURE — 83880 ASSAY OF NATRIURETIC PEPTIDE: CPT | Performed by: EMERGENCY MEDICINE

## 2023-04-03 PROCEDURE — 93005 ELECTROCARDIOGRAM TRACING: CPT | Performed by: NURSE PRACTITIONER

## 2023-04-03 PROCEDURE — 93005 ELECTROCARDIOGRAM TRACING: CPT

## 2023-04-03 PROCEDURE — 84443 ASSAY THYROID STIM HORMONE: CPT | Performed by: NURSE PRACTITIONER

## 2023-04-03 PROCEDURE — 83735 ASSAY OF MAGNESIUM: CPT | Performed by: NURSE PRACTITIONER

## 2023-04-03 PROCEDURE — 85379 FIBRIN DEGRADATION QUANT: CPT | Performed by: NURSE PRACTITIONER

## 2023-04-03 PROCEDURE — 80053 COMPREHEN METABOLIC PANEL: CPT | Performed by: EMERGENCY MEDICINE

## 2023-04-03 PROCEDURE — 83690 ASSAY OF LIPASE: CPT | Performed by: EMERGENCY MEDICINE

## 2023-04-03 PROCEDURE — 36415 COLL VENOUS BLD VENIPUNCTURE: CPT

## 2023-04-03 PROCEDURE — 99284 EMERGENCY DEPT VISIT MOD MDM: CPT

## 2023-04-03 RX ORDER — BISACODYL 10 MG
10 SUPPOSITORY, RECTAL RECTAL DAILY PRN
Status: DISCONTINUED | OUTPATIENT
Start: 2023-04-03 | End: 2023-04-05 | Stop reason: HOSPADM

## 2023-04-03 RX ORDER — SODIUM CHLORIDE 9 MG/ML
40 INJECTION, SOLUTION INTRAVENOUS AS NEEDED
Status: DISCONTINUED | OUTPATIENT
Start: 2023-04-03 | End: 2023-04-05 | Stop reason: HOSPADM

## 2023-04-03 RX ORDER — ACETAMINOPHEN 650 MG/1
650 SUPPOSITORY RECTAL EVERY 4 HOURS PRN
Status: DISCONTINUED | OUTPATIENT
Start: 2023-04-03 | End: 2023-04-05 | Stop reason: HOSPADM

## 2023-04-03 RX ORDER — METOPROLOL SUCCINATE 25 MG/1
25 TABLET, EXTENDED RELEASE ORAL DAILY
Status: DISCONTINUED | OUTPATIENT
Start: 2023-04-04 | End: 2023-04-05 | Stop reason: HOSPADM

## 2023-04-03 RX ORDER — POLYETHYLENE GLYCOL 3350 17 G/17G
17 POWDER, FOR SOLUTION ORAL DAILY PRN
Status: DISCONTINUED | OUTPATIENT
Start: 2023-04-03 | End: 2023-04-05 | Stop reason: HOSPADM

## 2023-04-03 RX ORDER — LORAZEPAM 2 MG/ML
1 INJECTION INTRAMUSCULAR
Status: DISCONTINUED | OUTPATIENT
Start: 2023-04-03 | End: 2023-04-05 | Stop reason: HOSPADM

## 2023-04-03 RX ORDER — ATORVASTATIN CALCIUM 20 MG/1
20 TABLET, FILM COATED ORAL NIGHTLY
Status: DISCONTINUED | OUTPATIENT
Start: 2023-04-03 | End: 2023-04-05 | Stop reason: HOSPADM

## 2023-04-03 RX ORDER — ESCITALOPRAM OXALATE 10 MG/1
10 TABLET ORAL DAILY
Status: DISCONTINUED | OUTPATIENT
Start: 2023-04-04 | End: 2023-04-05 | Stop reason: HOSPADM

## 2023-04-03 RX ORDER — ACETAMINOPHEN 325 MG/1
650 TABLET ORAL EVERY 4 HOURS PRN
Status: DISCONTINUED | OUTPATIENT
Start: 2023-04-03 | End: 2023-04-05 | Stop reason: HOSPADM

## 2023-04-03 RX ORDER — ASPIRIN 81 MG/1
324 TABLET, CHEWABLE ORAL ONCE
Status: DISCONTINUED | OUTPATIENT
Start: 2023-04-03 | End: 2023-04-05 | Stop reason: HOSPADM

## 2023-04-03 RX ORDER — HEPARIN SODIUM 5000 [USP'U]/ML
5000 INJECTION, SOLUTION INTRAVENOUS; SUBCUTANEOUS EVERY 8 HOURS SCHEDULED
Status: DISCONTINUED | OUTPATIENT
Start: 2023-04-03 | End: 2023-04-05 | Stop reason: HOSPADM

## 2023-04-03 RX ORDER — LATANOPROST 50 UG/ML
1 SOLUTION/ DROPS OPHTHALMIC NIGHTLY
Status: DISCONTINUED | OUTPATIENT
Start: 2023-04-03 | End: 2023-04-05 | Stop reason: HOSPADM

## 2023-04-03 RX ORDER — BISACODYL 5 MG/1
5 TABLET, DELAYED RELEASE ORAL DAILY PRN
Status: DISCONTINUED | OUTPATIENT
Start: 2023-04-03 | End: 2023-04-05 | Stop reason: HOSPADM

## 2023-04-03 RX ORDER — ACETAMINOPHEN 160 MG/5ML
650 SOLUTION ORAL EVERY 4 HOURS PRN
Status: DISCONTINUED | OUTPATIENT
Start: 2023-04-03 | End: 2023-04-05 | Stop reason: HOSPADM

## 2023-04-03 RX ORDER — ASPIRIN 81 MG/1
81 TABLET ORAL DAILY
Status: DISCONTINUED | OUTPATIENT
Start: 2023-04-04 | End: 2023-04-05 | Stop reason: HOSPADM

## 2023-04-03 RX ORDER — LORAZEPAM 0.5 MG/1
0.5 TABLET ORAL
Status: DISCONTINUED | OUTPATIENT
Start: 2023-04-03 | End: 2023-04-05 | Stop reason: HOSPADM

## 2023-04-03 RX ORDER — DIPHENOXYLATE HYDROCHLORIDE AND ATROPINE SULFATE 2.5; .025 MG/1; MG/1
1 TABLET ORAL DAILY
Status: DISCONTINUED | OUTPATIENT
Start: 2023-04-04 | End: 2023-04-05 | Stop reason: HOSPADM

## 2023-04-03 RX ORDER — LORAZEPAM 1 MG/1
1 TABLET ORAL
Status: DISCONTINUED | OUTPATIENT
Start: 2023-04-03 | End: 2023-04-05 | Stop reason: HOSPADM

## 2023-04-03 RX ORDER — ATORVASTATIN CALCIUM 20 MG/1
20 TABLET, FILM COATED ORAL DAILY
Status: DISCONTINUED | OUTPATIENT
Start: 2023-04-04 | End: 2023-04-03

## 2023-04-03 RX ORDER — SODIUM CHLORIDE 0.9 % (FLUSH) 0.9 %
10 SYRINGE (ML) INJECTION AS NEEDED
Status: DISCONTINUED | OUTPATIENT
Start: 2023-04-03 | End: 2023-04-05 | Stop reason: HOSPADM

## 2023-04-03 RX ORDER — LORAZEPAM 2 MG/ML
0.5 INJECTION INTRAMUSCULAR
Status: DISCONTINUED | OUTPATIENT
Start: 2023-04-03 | End: 2023-04-05 | Stop reason: HOSPADM

## 2023-04-03 RX ORDER — SODIUM CHLORIDE 0.9 % (FLUSH) 0.9 %
10 SYRINGE (ML) INJECTION EVERY 12 HOURS SCHEDULED
Status: DISCONTINUED | OUTPATIENT
Start: 2023-04-03 | End: 2023-04-05 | Stop reason: HOSPADM

## 2023-04-03 RX ORDER — DULOXETIN HYDROCHLORIDE 60 MG/1
60 CAPSULE, DELAYED RELEASE ORAL DAILY
Status: DISCONTINUED | OUTPATIENT
Start: 2023-04-03 | End: 2023-04-05 | Stop reason: HOSPADM

## 2023-04-03 RX ORDER — ONDANSETRON 4 MG/1
4 TABLET, FILM COATED ORAL ONCE
Status: DISCONTINUED | OUTPATIENT
Start: 2023-04-03 | End: 2023-04-05 | Stop reason: HOSPADM

## 2023-04-03 RX ORDER — PANTOPRAZOLE SODIUM 40 MG/1
40 TABLET, DELAYED RELEASE ORAL
Status: DISCONTINUED | OUTPATIENT
Start: 2023-04-04 | End: 2023-04-05 | Stop reason: HOSPADM

## 2023-04-03 RX ORDER — FOLIC ACID 1 MG/1
1 TABLET ORAL DAILY
Status: DISCONTINUED | OUTPATIENT
Start: 2023-04-04 | End: 2023-04-05 | Stop reason: HOSPADM

## 2023-04-03 RX ORDER — AMOXICILLIN 250 MG
2 CAPSULE ORAL 2 TIMES DAILY
Status: DISCONTINUED | OUTPATIENT
Start: 2023-04-03 | End: 2023-04-05 | Stop reason: HOSPADM

## 2023-04-03 RX ADMIN — SENNOSIDES AND DOCUSATE SODIUM 2 TABLET: 50; 8.6 TABLET ORAL at 21:11

## 2023-04-03 RX ADMIN — ATORVASTATIN CALCIUM 20 MG: 20 TABLET, FILM COATED ORAL at 21:11

## 2023-04-03 RX ADMIN — TRAZODONE HYDROCHLORIDE 150 MG: 100 TABLET ORAL at 21:11

## 2023-04-03 RX ADMIN — HEPARIN SODIUM 5000 UNITS: 5000 INJECTION INTRAVENOUS; SUBCUTANEOUS at 21:10

## 2023-04-03 RX ADMIN — DULOXETINE 60 MG: 60 CAPSULE, DELAYED RELEASE ORAL at 21:11

## 2023-04-03 RX ADMIN — Medication 10 ML: at 21:13

## 2023-04-03 NOTE — Clinical Note
Level of Care: Telemetry [5]   Diagnosis: Chest pain, unspecified type [[id number]]   Admitting Physician: GYPSY HYLTON [[id number]]   Attending Physician: GYPSY HYLTON [[id number]]

## 2023-04-03 NOTE — ED PROVIDER NOTES
" EMERGENCY DEPARTMENT ENCOUNTER    Pt Name: Sarah Howard  MRN: [id number]  Pt :   1955  Room Number:    Date of encounter:  4/3/2023  PCP: Dao Gomez MD  ED Provider: MARCOS Starr    Historian: Patient      HPI:  Chief Complaint: Chest pain        Context: Sarah Howard is a 67 y.o. female who presents to the ED c/o awakening with chest pain this morning radiating to her anterior neck.  Patient has known history of coronary artery disease determined by heart cath imaging in  while in [place].  Patient has no history of stenting or heart surgery.  She has a history of hypertension which is controlled.  No history of diabetes or CVA or clots.  She does have a history of acid reflux which is well controlled on medication.  Her greatest pain experienced since awakening this morning is 8 out of 10.  Her current pain is 0 out of 10.  Patient states \"I just do not feel well.    Review of systems is negative for fever chills or recent illness.  Positive for chest pain and shortness of breath with acid reflux.  \"I cough a lot because of acid reflux\".  Patient denies any vomiting or diarrhea.  Positive for some nausea without diaphoresis.  No abdominal pain.  Positive for generalized weakness without orthostatic dizziness or syncope.  No neurosensory complaint of focal weakness      PAST MEDICAL HISTORY  Past Medical History:   Diagnosis Date   • Cancer Breast       • Coronary artery disease 10/2020   • Depression    • GERD (gastroesophageal reflux disease) 2016   • Glaucoma 2016   • Hyperlipidemia    • Hypertension    • Osteopenia          PAST SURGICAL HISTORY  Past Surgical History:   Procedure Laterality Date   • ADENOIDECTOMY  1972   • BREAST SURGERY  2 x 2010   • CARDIAC CATHETERIZATION     • COLONOSCOPY     • COSMETIC SURGERY  breast reduction       • EYE SURGERY  laser iridotomies    2019   • HYSTERECTOMY  2011   • SPINE SURGERY  2019   • TONSILLECTOMY  " 1972   • TUBAL ABDOMINAL LIGATION  1982         FAMILY HISTORY  Family History   Problem Relation Age of Onset   • Colon cancer Mother 48   • Stroke Mother    • Thyroid disease Mother    • Heart disease Father         mitral valve disease   • Atrial fibrillation Brother          SOCIAL HISTORY  Social History     Socioeconomic History   • Marital status:    Tobacco Use   • Smoking status: Never   • Smokeless tobacco: Never   Substance and Sexual Activity   • Alcohol use: Yes     Alcohol/week: 8.0 standard drinks     Types: 8 Glasses of wine per week   • Drug use: Not Currently     Types: Marijuana   • Sexual activity: Yes     Partners: Male         ALLERGIES  Patient has no known allergies.        REVIEW OF SYSTEMS  Review of Systems     All systems reviewed and negative except for those discussed in HPI.       PHYSICAL EXAM    I have reviewed the triage vital signs and nursing notes.    ED Triage Vitals [04/03/23 1430]   Temp Heart Rate Resp BP SpO2   98.7 °F (37.1 °C) 78 18 (!) 157/101 97 %      Temp src Heart Rate Source Patient Position BP Location FiO2 (%)   Oral Monitor Sitting Left arm --       Physical Exam  GENERAL:   Appears in no acute distress.  She is a very good historian.  She is slightly hypertensive.  HENT: Nares patent.  EYES: No scleral icterus.  CV: Regular rhythm, regular rate.  No JVD.  No peripheral edema  RESPIRATORY: Normal effort.  No audible wheezes, rales or rhonchi.  ABDOMEN: Soft, nontender  MUSCULOSKELETAL: No deformities.   NEURO: Alert, moves all extremities, follows commands.  SKIN: Warm, dry, no rash visualized.      LAB RESULTS  Recent Results (from the past 24 hour(s))   ECG 12 Lead ED Triage Standing Order; Chest Pain    Collection Time: 04/03/23  2:35 PM   Result Value Ref Range    QT Interval 370 ms    QTC Interval 421 ms   High Sensitivity Troponin T    Collection Time: 04/03/23  2:41 PM    Specimen: Blood   Result Value Ref Range    HS Troponin T <6 <10 ng/L    Comprehensive Metabolic Panel    Collection Time: 04/03/23  2:41 PM    Specimen: Blood   Result Value Ref Range    Glucose 119 (H) 65 - 99 mg/dL    BUN 13 8 - 23 mg/dL    Creatinine 0.97 0.57 - 1.00 mg/dL    Sodium 142 136 - 145 mmol/L    Potassium 4.2 3.5 - 5.2 mmol/L    Chloride 105 98 - 107 mmol/L    CO2 27.0 22.0 - 29.0 mmol/L    Calcium 9.1 8.6 - 10.5 mg/dL    Total Protein 7.4 6.0 - 8.5 g/dL    Albumin 4.3 3.5 - 5.2 g/dL    ALT (SGPT) 27 1 - 33 U/L    AST (SGOT) 30 1 - 32 U/L    Alkaline Phosphatase 98 39 - 117 U/L    Total Bilirubin 0.7 0.0 - 1.2 mg/dL    Globulin 3.1 gm/dL    A/G Ratio 1.4 g/dL    BUN/Creatinine Ratio 13.4 7.0 - 25.0    Anion Gap 10.0 5.0 - 15.0 mmol/L    eGFR 63.8 >60.0 mL/min/1.73   Lipase    Collection Time: 04/03/23  2:41 PM    Specimen: Blood   Result Value Ref Range    Lipase 27 13 - 60 U/L   BNP    Collection Time: 04/03/23  2:41 PM    Specimen: Blood   Result Value Ref Range    proBNP 40.5 0.0 - 900.0 pg/mL   Green Top (Gel)    Collection Time: 04/03/23  2:41 PM   Result Value Ref Range    Extra Tube Hold for add-ons.    Lavender Top    Collection Time: 04/03/23  2:41 PM   Result Value Ref Range    Extra Tube hold for add-on    Gold Top - SST    Collection Time: 04/03/23  2:41 PM   Result Value Ref Range    Extra Tube Hold for add-ons.    Light Blue Top    Collection Time: 04/03/23  2:41 PM   Result Value Ref Range    Extra Tube Hold for add-ons.    CBC Auto Differential    Collection Time: 04/03/23  2:41 PM    Specimen: Blood   Result Value Ref Range    WBC 7.15 3.40 - 10.80 10*3/mm3    RBC 4.11 3.77 - 5.28 10*6/mm3    Hemoglobin 13.9 12.0 - 15.9 g/dL    Hematocrit 41.9 34.0 - 46.6 %    .9 (H) 79.0 - 97.0 fL    MCH 33.8 (H) 26.6 - 33.0 pg    MCHC 33.2 31.5 - 35.7 g/dL    RDW 11.4 (L) 12.3 - 15.4 %    RDW-SD 42.5 37.0 - 54.0 fl    MPV 10.9 6.0 - 12.0 fL    Platelets 263 140 - 450 10*3/mm3    Neutrophil % 29.4 (L) 42.7 - 76.0 %    Lymphocyte % 57.5 (H) 19.6 - 45.3 %     Monocyte % 11.0 5.0 - 12.0 %    Eosinophil % 1.3 0.3 - 6.2 %    Basophil % 0.7 0.0 - 1.5 %    Immature Grans % 0.1 0.0 - 0.5 %    Neutrophils, Absolute 2.10 1.70 - 7.00 10*3/mm3    Lymphocytes, Absolute 4.11 (H) 0.70 - 3.10 10*3/mm3    Monocytes, Absolute 0.79 0.10 - 0.90 10*3/mm3    Eosinophils, Absolute 0.09 0.00 - 0.40 10*3/mm3    Basophils, Absolute 0.05 0.00 - 0.20 10*3/mm3    Immature Grans, Absolute 0.01 0.00 - 0.05 10*3/mm3    nRBC 0.0 0.0 - 0.2 /100 WBC   Scan Slide    Collection Time: 04/03/23  2:41 PM    Specimen: Blood   Result Value Ref Range    RBC Morphology Normal Normal    WBC Morphology Normal Normal    Platelet Morphology Normal Normal   ECG 12 Lead ED Triage Standing Order; Chest Pain    Collection Time: 04/03/23  4:38 PM   Result Value Ref Range    QT Interval 402 ms    QTC Interval 434 ms   High Sensitivity Troponin T 2Hr    Collection Time: 04/03/23  4:41 PM    Specimen: Blood   Result Value Ref Range    HS Troponin T <6 <10 ng/L    Troponin T Delta         If labs were ordered, I independently reviewed the results and considered them in treating the patient.        RADIOLOGY  XR Chest 1 View    Result Date: 4/3/2023  XR CHEST 1 VW Date of Exam: 4/3/2023 3:22 PM EDT Indication: Chest Pain Triage Protocol. Comparison: None available. FINDINGS: Mild chronic changes of the lung fields are present without focal airspace opacity. There is no significant pleural effusion or distinct pneumothorax. Normal heart and mediastinal contours. Lower cervical fusion hardware noted.     Mild chronic changes of the lung fields without evidence of acute cardiopulmonary abnormality. Electronically Signed: Nura Cote  4/3/2023 3:50 PM EDT  Workstation ID: [id number]      PROCEDURES    Procedures    ECG 12 Lead ED Triage Standing Order; Chest Pain   Preliminary Result   Test Reason : ED Triage Standing Order~   Blood Pressure :   */*   mmHG   Vent. Rate :  70 BPM     Atrial Rate :  70 BPM      P-R Int : 142 ms           QRS Dur :  86 ms       QT Int : 402 ms       P-R-T Axes :   8  14  20 degrees      QTc Int : 434 ms      Normal sinus rhythm   Normal ECG   When compared with ECG of 03-APR-2023 14:35, (Unconfirmed)   No significant change was found      Referred By: FRANTZ           Confirmed By:       ECG 12 Lead ED Triage Standing Order; Chest Pain   Final Result   Test Reason : ED Triage Standing Order~   Blood Pressure :   */*   mmHG   Vent. Rate :  78 BPM     Atrial Rate :  78 BPM      P-R Int : 152 ms          QRS Dur :  76 ms       QT Int : 370 ms       P-R-T Axes :   6   1  25 degrees      QTc Int : 421 ms      Normal sinus rhythm with sinus arrhythmia   no stemi   No previous ECGs available   Confirmed by LOUISA ANDRADE MD ([id number]) on 4/3/2023 5:35:24 PM      Referred By: ED MD           Confirmed By: LOUISA ANDRADE MD          MEDICATIONS GIVEN IN ER    Medications   sodium chloride 0.9 % flush 10 mL (has no administration in time range)   aspirin chewable tablet 324 mg (has no administration in time range)   ondansetron (ZOFRAN) tablet 4 mg (has no administration in time range)         MEDICAL DECISION MAKING, PROGRESS, and CONSULTS    All labs have been independently reviewed by me.  All radiology studies have been reviewed by me and the radiologist dictating the report.  All EKG's have been independently viewed and interpreted by me.        Orders placed during this visit:  Orders Placed This Encounter   Procedures   • XR Chest 1 View   • [place] Draw   • High Sensitivity Troponin T   • Comprehensive Metabolic Panel   • Lipase   • BNP   • CBC Auto Differential   • Scan Slide   • High Sensitivity Troponin T 2Hr   • NPO Diet NPO Type: Strict NPO   • Undress & Gown   • Cardiac Monitoring   • Continuous Pulse Oximetry   • Oxygen Therapy- Nasal Cannula; 2 LPM; Titrate for SPO2: equal to or greater than, 92%   • ECG 12 Lead ED Triage Standing Order; Chest Pain   • ECG 12 Lead ED Triage Standing Order; Chest Pain   • Insert  Peripheral IV   • Initiate Observation Status   • Tele Bed Request (RAPHAEL / MIMA ONLY)   • Tele Bed Request (RAPHAEL / MIMA ONLY)   • CBC & Differential   • Green Top (Gel)   • Lavender Top   • Gold Top - SST   • Gray Top   • Light Blue Top         Additional orders considered but not ordered:      ED Course:    Consultants:      ED Course as of 04/03/23 1750   Mon Apr 03, 2023   1723 Patient has a heart score of 5.  She has had 2 negative troponin.  Her chest pain is currently absent but she continues to feel unusually weary without other explanation. [MS]      ED Course User Index  [MS] Louise Esquivel, MARCOS                  AS OF 17:50 EDT VITALS:    BP - 145/85  HR - 77  TEMP - 98.7 °F (37.1 °C) (Oral)  O2 SATS - 97%                  DIAGNOSIS  Final diagnoses:   Chest pain, unspecified type   History of hypertension   History of coronary artery disease         DISPOSITION    Admitted        Please note that portions of this document were completed with voice recognition software.      Louise Esquivel APRN  04/03/23 1750

## 2023-04-03 NOTE — TELEPHONE ENCOUNTER
Pt called.     She said chest pain woke her up this morning.     She said the pain is traveling to her jaw. She said she has a history of coronary artery disease.    I advised patient to go to ER. [facility] will go to [facility].     Call her later to check on her.    Call patient  [phone]

## 2023-04-03 NOTE — H&P
[facility] Medicine Services  HISTORY AND PHYSICAL    Patient Name: Sarah Howard  : 1955  MRN: [id number]  Primary Care Physician: Dao Gomez MD  Date of admission: 4/3/2023    Subjective   Subjective     Chief Complaint:  Chest pain, dyspnea with exertion, dizziness    HPI:  Sarah Howard is a 67 y.o. female PMH CAD (ht cath 2020 in [place] reported clear), GERD, depression, regular alcohol use,  hx of breast cancer, glaucoma presenting with chest pain, dyspnea with exertion, dizziness that started today. Chest pain/pressure woke her from sleep this morning and lasted briefly. Pt rested in the bed for about another hour before getting up because she felt fatigued. She states she had fatigue and dyspnea with exertion yesterday. She reports dizziness and slight nauseated today.     Review of Systems   Constitutional: Positive for activity change and fatigue.   HENT: Negative.    Eyes: Negative.    Respiratory: Positive for shortness of breath. Negative for cough.    Cardiovascular: Positive for chest pain. Negative for palpitations and leg swelling.   Gastrointestinal: Positive for nausea. Negative for abdominal pain, constipation, diarrhea and vomiting.   Endocrine: Negative.    Genitourinary: Negative.    Musculoskeletal: Negative.    Skin: Negative.    Allergic/Immunologic: Negative.    Neurological: Positive for dizziness.   Hematological: Negative.    Psychiatric/Behavioral: Negative.       Personal History     Past Medical History:   Diagnosis Date   • Cancer Breast       • Coronary artery disease 10/2020   • Depression    • GERD (gastroesophageal reflux disease)    • Glaucoma    • Hyperlipidemia    • Hypertension    • Osteopenia              Past Surgical History:   Procedure Laterality Date   • ADENOIDECTOMY  1972   • BREAST SURGERY  2 x 2010   • CARDIAC CATHETERIZATION     • COLONOSCOPY     • COSMETIC SURGERY  breast reduction        • EYE SURGERY  laser iridotomies    2019   • HYSTERECTOMY  2011   • SPINE SURGERY  2019   • TONSILLECTOMY  1972   • TUBAL ABDOMINAL LIGATION  1982       Family History:  family history includes Atrial fibrillation in her brother; Colon cancer (age of onset: 48) in her mother; Heart disease in her father; Stroke in her mother; Thyroid disease in her mother.     Social History:  reports that she has never smoked. She has never used smokeless tobacco. She reports current alcohol use of about 8.0 standard drinks per week. She reports that she does not currently use drugs after having used the following drugs: Marijuana.  Social History     Social History Narrative   • Not on file       Medications:  DULoxetine, aspirin, atorvastatin, escitalopram, latanoprost, metoprolol succinate XL, pantoprazole, and traZODone    No Known Allergies    Objective   Objective     Vital Signs:   Temp:  [98.7 °F (37.1 °C)] 98.7 °F (37.1 °C)  Heart Rate:  [73-78] 75  Resp:  [18] 18  BP: (145-161)/() 161/93    Physical Exam  Vitals reviewed.   Constitutional:       General: She is not in acute distress.     Appearance: She is not ill-appearing.   HENT:      Head: Normocephalic and atraumatic.      Mouth/Throat:      Mouth: Mucous membranes are moist.   Eyes:      General: No scleral icterus.     Conjunctiva/sclera: Conjunctivae normal.   Cardiovascular:      Rate and Rhythm: Normal rate and regular rhythm.      Heart sounds: No murmur heard.    No friction rub. No gallop.   Pulmonary:      Breath sounds: Normal breath sounds.   Abdominal:      General: Bowel sounds are normal. There is no distension.      Tenderness: There is no abdominal tenderness.   Musculoskeletal:         General: Normal range of motion.      Cervical back: Neck supple.   Skin:     General: Skin is warm and dry.   Neurological:      General: No focal deficit present.      Mental Status: She is alert and oriented to person, place, and time.   Psychiatric:          Mood and Affect: Mood normal.         Behavior: Behavior normal.        Result Review:  I have personally reviewed the results from the time of this admission to 4/3/2023 18:21 EDT and agree with these findings:  [x]  Laboratory list / accordion  []  Microbiology  [x]  Radiology  [x]  EKG/Telemetry   []  Cardiology/Vascular   []  Pathology  []  Old records  []  Other:  Most notable findings include:   LAB RESULTS:      Lab 04/03/23  1441   WBC 7.15   HEMOGLOBIN 13.9   HEMATOCRIT 41.9   PLATELETS 263   NEUTROS ABS 2.10   IMMATURE GRANS (ABS) 0.01   LYMPHS ABS 4.11*   MONOS ABS 0.79   EOS ABS 0.09   .9*         Lab 04/03/23  1441   SODIUM 142   POTASSIUM 4.2   CHLORIDE 105   CO2 27.0   ANION GAP 10.0   BUN 13   CREATININE 0.97   EGFR 63.8   GLUCOSE 119*   CALCIUM 9.1         Lab 04/03/23  1441   TOTAL PROTEIN 7.4   ALBUMIN 4.3   GLOBULIN 3.1   ALT (SGPT) 27   AST (SGOT) 30   BILIRUBIN 0.7   ALK PHOS 98   LIPASE 27         Lab 04/03/23  1641 04/03/23  1441   PROBNP  --  40.5   HSTROP T <6 <6                 Brief Urine Lab Results     None        Microbiology Results (last 10 days)     ** No results found for the last 240 hours. **          XR Chest 1 View    Result Date: 4/3/2023  XR CHEST 1 VW Date of Exam: 4/3/2023 3:22 PM EDT Indication: Chest Pain Triage Protocol. Comparison: None available. FINDINGS: Mild chronic changes of the lung fields are present without focal airspace opacity. There is no significant pleural effusion or distinct pneumothorax. Normal heart and mediastinal contours. Lower cervical fusion hardware noted.     Impression: Mild chronic changes of the lung fields without evidence of acute cardiopulmonary abnormality. Electronically Signed: Nura Cote  4/3/2023 3:50 PM EDT  Workstation ID: [id number]          Assessment & Plan   Assessment & Plan       Chest pain, unspecified type    Mild depression    GERD without esophagitis    Coronary artery disease involving native coronary artery     Dyspnea on exertion    Dizziness    History of breast cancer    Glaucoma    Hospital Course to date:  Sarah Howard is a 67 y.o. female PMH CAD (ht cath 2020 in [place] reported clear), GERD, depression, regular alcohol use,  hx of breast cancer, glaucoma presenting with chest pain, dyspnea with exertion, dizziness, fatigue that started today.     Chest pain   Dizziness  - Troponin neg x2 with EKG NSR X2  - ECHO  - stress test  - Mg+, Lipid panel, TSH  - received  mg in the ED  - orthostatic Blood pressure   - consider cardiology consult if deemed necessary.   - Ht Cath in [place] 2020 pt reported as clear.     Dyspnea with exertion  - D-Dimer    CAD  HTN  - ASA, lipitor, metoprolol    Alcohol use  - CIWA protocol  - vitamins    Depression  Insomnia  - trazodone, duloxetine, lexapro    Glaucoma  - Xalantan gtt    DVT prophylaxis:  [facility]    CODE STATUS:    Level Of Support Discussed With: Patient  Code Status (Patient has no pulse and is not breathing): CPR (Attempt to Resuscitate)  Medical Interventions (Patient has pulse or is breathing): Full Support    Total time spent: 55 min  Time spent includes time reviewing chart, face-to-face time, counseling patient/family/caregiver, ordering medications/tests/procedures, communicating with other health care professionals, documenting clinical information in the electronic health record, and coordination of care.       Expected Discharge 04/05/2023    This note has been completed as part of a split-shared workflow.     Electronically signed by MARCOS Yo, 04/03/23, 6:18 PM EDT.

## 2023-04-04 ENCOUNTER — APPOINTMENT (OUTPATIENT)
Dept: CARDIOLOGY | Facility: HOSPITAL | Age: 68
End: 2023-04-04
Payer: MEDICARE

## 2023-04-04 LAB
ANION GAP SERPL CALCULATED.3IONS-SCNC: 13 MMOL/L (ref 5–15)
BH CV ECHO MEAS - AO MAX PG: 8.2 MMHG
BH CV ECHO MEAS - AO MEAN PG: 5 MMHG
BH CV ECHO MEAS - AO ROOT DIAM: 3 CM
BH CV ECHO MEAS - AO V2 MAX: 143 CM/SEC
BH CV ECHO MEAS - AO V2 VTI: 31.5 CM
BH CV ECHO MEAS - AVA(I,D): 2.21 CM2
BH CV ECHO MEAS - EDV(CUBED): 59.3 ML
BH CV ECHO MEAS - EDV(MOD-SP2): 83.7 ML
BH CV ECHO MEAS - EDV(MOD-SP4): 68.5 ML
BH CV ECHO MEAS - EF(MOD-BP): 56.8 %
BH CV ECHO MEAS - EF(MOD-SP2): 61.9 %
BH CV ECHO MEAS - EF(MOD-SP4): 57.1 %
BH CV ECHO MEAS - ESV(CUBED): 13.8 ML
BH CV ECHO MEAS - ESV(MOD-SP2): 31.9 ML
BH CV ECHO MEAS - ESV(MOD-SP4): 29.4 ML
BH CV ECHO MEAS - FS: 38.5 %
BH CV ECHO MEAS - IVS/LVPW: 1 CM
BH CV ECHO MEAS - IVSD: 0.9 CM
BH CV ECHO MEAS - LA DIMENSION: 3.8 CM
BH CV ECHO MEAS - LV MASS(C)D: 105.3 GRAMS
BH CV ECHO MEAS - LV MAX PG: 4.1 MMHG
BH CV ECHO MEAS - LV MEAN PG: 2 MMHG
BH CV ECHO MEAS - LV V1 MAX: 101 CM/SEC
BH CV ECHO MEAS - LV V1 VTI: 22.2 CM
BH CV ECHO MEAS - LVIDD: 3.9 CM
BH CV ECHO MEAS - LVIDS: 2.4 CM
BH CV ECHO MEAS - LVOT AREA: 3.1 CM2
BH CV ECHO MEAS - LVOT DIAM: 2 CM
BH CV ECHO MEAS - LVPWD: 0.9 CM
BH CV ECHO MEAS - MV A MAX VEL: 96.4 CM/SEC
BH CV ECHO MEAS - MV DEC TIME: 0.25 MSEC
BH CV ECHO MEAS - MV E MAX VEL: 81.8 CM/SEC
BH CV ECHO MEAS - MV E/A: 0.85
BH CV ECHO MEAS - PA ACC TIME: 0.18 SEC
BH CV ECHO MEAS - PA PR(ACCEL): -2 MMHG
BH CV ECHO MEAS - PA V2 MAX: 101 CM/SEC
BH CV ECHO MEAS - RAP SYSTOLE: 3 MMHG
BH CV ECHO MEAS - RVSP: 22 MMHG
BH CV ECHO MEAS - SV(LVOT): 69.7 ML
BH CV ECHO MEAS - SV(MOD-SP2): 51.8 ML
BH CV ECHO MEAS - SV(MOD-SP4): 39.1 ML
BH CV ECHO MEAS - TAPSE (>1.6): 1.7 CM
BH CV ECHO MEAS - TR MAX PG: 18.8 MMHG
BH CV ECHO MEAS - TR MAX VEL: 217 CM/SEC
BH CV XLRA - RV BASE: 2.5 CM
BH CV XLRA - RV LENGTH: 4.8 CM
BH CV XLRA - RV MID: 1.81 CM
BUN SERPL-MCNC: 12 MG/DL (ref 8–23)
BUN/CREAT SERPL: 15.2 (ref 7–25)
CALCIUM SPEC-SCNC: 9 MG/DL (ref 8.6–10.5)
CHLORIDE SERPL-SCNC: 105 MMOL/L (ref 98–107)
CHOLEST SERPL-MCNC: 151 MG/DL (ref 0–200)
CO2 SERPL-SCNC: 23 MMOL/L (ref 22–29)
CREAT SERPL-MCNC: 0.79 MG/DL (ref 0.57–1)
DEPRECATED RDW RBC AUTO: 42 FL (ref 37–54)
EGFRCR SERPLBLD CKD-EPI 2021: 81.6 ML/MIN/1.73
ERYTHROCYTE [DISTWIDTH] IN BLOOD BY AUTOMATED COUNT: 11.3 % (ref 12.3–15.4)
GLUCOSE SERPL-MCNC: 121 MG/DL (ref 65–99)
HCT VFR BLD AUTO: 40.9 % (ref 34–46.6)
HDLC SERPL-MCNC: 54 MG/DL (ref 40–60)
HGB BLD-MCNC: 13.5 G/DL (ref 12–15.9)
LDLC SERPL CALC-MCNC: 61 MG/DL (ref 0–100)
LDLC/HDLC SERPL: 0.97 {RATIO}
LEFT ATRIUM VOLUME INDEX: 25.7 ML/M2
MAXIMAL PREDICTED HEART RATE: 152 BPM
MCH RBC QN AUTO: 33.7 PG (ref 26.6–33)
MCHC RBC AUTO-ENTMCNC: 33 G/DL (ref 31.5–35.7)
MCV RBC AUTO: 102 FL (ref 79–97)
PLATELET # BLD AUTO: 225 10*3/MM3 (ref 140–450)
PMV BLD AUTO: 11.4 FL (ref 6–12)
POTASSIUM SERPL-SCNC: 3.9 MMOL/L (ref 3.5–5.2)
QT INTERVAL: 402 MS
QTC INTERVAL: 434 MS
RBC # BLD AUTO: 4.01 10*6/MM3 (ref 3.77–5.28)
SODIUM SERPL-SCNC: 141 MMOL/L (ref 136–145)
STRESS TARGET HR: 129 BPM
TRIGL SERPL-MCNC: 223 MG/DL (ref 0–150)
VLDLC SERPL-MCNC: 36 MG/DL (ref 5–40)
WBC NRBC COR # BLD: 6.11 10*3/MM3 (ref 3.4–10.8)

## 2023-04-04 PROCEDURE — G0378 HOSPITAL OBSERVATION PER HR: HCPCS

## 2023-04-04 PROCEDURE — 25010000002 HEPARIN (PORCINE) PER 1000 UNITS: Performed by: NURSE PRACTITIONER

## 2023-04-04 PROCEDURE — 93306 TTE W/DOPPLER COMPLETE: CPT | Performed by: INTERNAL MEDICINE

## 2023-04-04 PROCEDURE — 93010 ELECTROCARDIOGRAM REPORT: CPT | Performed by: INTERNAL MEDICINE

## 2023-04-04 PROCEDURE — 93306 TTE W/DOPPLER COMPLETE: CPT

## 2023-04-04 PROCEDURE — 85027 COMPLETE CBC AUTOMATED: CPT | Performed by: NURSE PRACTITIONER

## 2023-04-04 PROCEDURE — 96372 THER/PROPH/DIAG INJ SC/IM: CPT

## 2023-04-04 PROCEDURE — 99232 SBSQ HOSP IP/OBS MODERATE 35: CPT | Performed by: STUDENT IN AN ORGANIZED HEALTH CARE EDUCATION/TRAINING PROGRAM

## 2023-04-04 PROCEDURE — 80048 BASIC METABOLIC PNL TOTAL CA: CPT | Performed by: NURSE PRACTITIONER

## 2023-04-04 PROCEDURE — 80061 LIPID PANEL: CPT | Performed by: NURSE PRACTITIONER

## 2023-04-04 RX ADMIN — Medication 10 ML: at 09:00

## 2023-04-04 RX ADMIN — MULTIVITAMIN TABLET 1 TABLET: TABLET at 08:58

## 2023-04-04 RX ADMIN — ACETAMINOPHEN 325MG 650 MG: 325 TABLET ORAL at 06:39

## 2023-04-04 RX ADMIN — HEPARIN SODIUM 5000 UNITS: 5000 INJECTION INTRAVENOUS; SUBCUTANEOUS at 06:38

## 2023-04-04 RX ADMIN — METOPROLOL SUCCINATE 25 MG: 25 TABLET, EXTENDED RELEASE ORAL at 08:58

## 2023-04-04 RX ADMIN — SENNOSIDES AND DOCUSATE SODIUM 2 TABLET: 50; 8.6 TABLET ORAL at 20:38

## 2023-04-04 RX ADMIN — Medication 10 ML: at 20:39

## 2023-04-04 RX ADMIN — ATORVASTATIN CALCIUM 20 MG: 20 TABLET, FILM COATED ORAL at 20:38

## 2023-04-04 RX ADMIN — LATANOPROST 1 DROP: 50 SOLUTION OPHTHALMIC at 20:38

## 2023-04-04 RX ADMIN — ASPIRIN 81 MG: 81 TABLET, COATED ORAL at 08:59

## 2023-04-04 RX ADMIN — SENNOSIDES AND DOCUSATE SODIUM 2 TABLET: 50; 8.6 TABLET ORAL at 08:58

## 2023-04-04 RX ADMIN — PANTOPRAZOLE SODIUM 40 MG: 40 TABLET, DELAYED RELEASE ORAL at 06:38

## 2023-04-04 RX ADMIN — LATANOPROST 1 DROP: 50 SOLUTION OPHTHALMIC at 00:51

## 2023-04-04 RX ADMIN — HEPARIN SODIUM 5000 UNITS: 5000 INJECTION INTRAVENOUS; SUBCUTANEOUS at 21:11

## 2023-04-04 RX ADMIN — FOLIC ACID 1 MG: 1 TABLET ORAL at 08:59

## 2023-04-04 RX ADMIN — TRAZODONE HYDROCHLORIDE 150 MG: 100 TABLET ORAL at 20:38

## 2023-04-04 RX ADMIN — ESCITALOPRAM OXALATE 10 MG: 10 TABLET ORAL at 20:38

## 2023-04-04 RX ADMIN — HEPARIN SODIUM 5000 UNITS: 5000 INJECTION INTRAVENOUS; SUBCUTANEOUS at 16:16

## 2023-04-04 RX ADMIN — THIAMINE HCL TAB 100 MG 100 MG: 100 TAB at 08:58

## 2023-04-04 NOTE — PLAN OF CARE
Goal Outcome Evaluation:  Plan of Care Reviewed With: patient        Progress: improving    No c/o chest pain this shift. VSS, room air. Patient sates she just feels unwell at this time. Rest promoted. Standby assist to BR, steady on feet.

## 2023-04-04 NOTE — TELEPHONE ENCOUNTER
Spoke with the patient and she is having a stress test tomorrow morning.  She had an echo performed today.  Her lab and EKG has been normal.  She is currently admitted to the hospital.    She states she is feeling better and her chest pain has improved.  Her blood pressure has also decreased.  Patient will schedule a hospital follow up upon discharge.

## 2023-04-04 NOTE — PROGRESS NOTES
[facility] Medicine Services  PROGRESS NOTE    Patient Name: Sarah Howard  : 1955  MRN: [id number]    Date of Admission: 4/3/2023  Primary Care Physician: Dao Gomez MD    Subjective   Subjective     CC:  F/u CP    HPI:  Could not have stress test today bc of caffeine this am. Currently no reports of CP/dyspnea    ROS:  Gen- No fevers, chills  CV- No chest pain, palpitations  Resp- No cough, dyspnea  GI- No N/V/D, abd pain         Objective   Objective     Vital Signs:   Temp:  [97.7 °F (36.5 °C)-98.3 °F (36.8 °C)] 98.3 °F (36.8 °C)  Heart Rate:  [67-90] 69  Resp:  [11-18] 11  BP: (114-178)/() 139/93     Physical Exam:  Constitutional: No acute distress, awake, alert  HENT: NCAT, mucous membranes moist  Respiratory: Clear to auscultation bilaterally, respiratory effort normal   Cardiovascular: RRR, no murmurs, rubs, or gallops  Gastrointestinal: Positive bowel sounds, soft, nontender, nondistended  Musculoskeletal: No bilateral ankle edema  Psychiatric: Appropriate affect, cooperative  Neurologic: Oriented x 3, strength symmetric in all extremities, Cranial Nerves grossly intact to confrontation, speech clear  Skin: No rashes      Results Reviewed:  LAB RESULTS:      Lab 23  0655 23  1441   WBC 6.11 7.15   HEMOGLOBIN 13.5 13.9   HEMATOCRIT 40.9 41.9   PLATELETS 225 263   NEUTROS ABS  --  2.10   IMMATURE GRANS (ABS)  --  0.01   LYMPHS ABS  --  4.11*   MONOS ABS  --  0.79   EOS ABS  --  0.09   .0* 101.9*   D DIMER QUANT  --  <0.27         Lab 23  0655 23  1641 23  1441   SODIUM 141  --  142   POTASSIUM 3.9  --  4.2   CHLORIDE 105  --  105   CO2 23.0  --  27.0   ANION GAP 13.0  --  10.0   BUN 12  --  13   CREATININE 0.79  --  0.97   EGFR 81.6  --  63.8   GLUCOSE 121*  --  119*   CALCIUM 9.0  --  9.1   MAGNESIUM  --  1.9  --    HEMOGLOBIN A1C  --   --  5.40   TSH  --  3.000  --          Lab 23  1441   TOTAL PROTEIN 7.4   ALBUMIN  4.3   GLOBULIN 3.1   ALT (SGPT) 27   AST (SGOT) 30   BILIRUBIN 0.7   ALK PHOS 98   LIPASE 27         Lab 04/03/23  2224 04/03/23  1641 04/03/23  1441   PROBNP  --   --  40.5   HSTROP T <6 <6 <6         Lab 04/04/23  0655   CHOLESTEROL 151   LDL CHOL 61   HDL CHOL 54   TRIGLYCERIDES 223*             Brief Urine Lab Results     None          Microbiology Results Abnormal     None          XR Chest 1 View    Result Date: 4/3/2023  XR CHEST 1 VW Date of Exam: 4/3/2023 3:22 PM EDT Indication: Chest Pain Triage Protocol. Comparison: None available. FINDINGS: Mild chronic changes of the lung fields are present without focal airspace opacity. There is no significant pleural effusion or distinct pneumothorax. Normal heart and mediastinal contours. Lower cervical fusion hardware noted.     Impression: Mild chronic changes of the lung fields without evidence of acute cardiopulmonary abnormality. Electronically Signed: Nura Cote  4/3/2023 3:50 PM EDT  Workstation ID: [id number]          Current medications:  Scheduled Meds:aspirin, 324 mg, Oral, Once  aspirin, 81 mg, Oral, Daily  atorvastatin, 20 mg, Oral, Nightly  DULoxetine, 60 mg, Oral, Daily  escitalopram, 10 mg, Oral, Daily  thiamine, 100 mg, Oral, Daily   And  multivitamin, 1 tablet, Oral, Daily   And  folic acid, 1 mg, Oral, Daily  heparin (porcine), 5,000 Units, Subcutaneous, Q8H  latanoprost, 1 drop, Both Eyes, Nightly  metoprolol succinate XL, 25 mg, Oral, Daily  ondansetron, 4 mg, Oral, Once  pantoprazole, 40 mg, Oral, QAM AC  senna-docusate sodium, 2 tablet, Oral, BID  sodium chloride, 10 mL, Intravenous, Q12H  traZODone, 150 mg, Oral, Nightly      Continuous Infusions:   PRN Meds:.•  acetaminophen **OR** acetaminophen **OR** acetaminophen  •  senna-docusate sodium **AND** polyethylene glycol **AND** bisacodyl **AND** bisacodyl  •  LORazepam **OR** LORazepam **OR** LORazepam **OR** LORazepam **OR** LORazepam **OR** LORazepam  •  sodium chloride  •  sodium  chloride  •  sodium chloride    Assessment & Plan   Assessment & Plan     Active Hospital Problems    Diagnosis  POA   • **Chest pain, unspecified type [R07.9]  Yes   • Dyspnea on exertion [R06.09]  Unknown   • Dizziness [R42]  Unknown   • History of breast cancer [Z85.3]  Not Applicable   • Glaucoma [H40.9]  Unknown   • Mild depression [F32.A]  Unknown   • Coronary artery disease involving native coronary artery [I25.10]  Unknown   • GERD without esophagitis [K21.9]  Unknown      Resolved Hospital Problems   No resolved problems to display.        Brief Hospital Course to date:  Sarah Howard is a 67 y.o. female w PMH CAD (ht cath 2020 in [place] reported clear), GERD, depression, regular alcohol use,  hx of breast cancer, glaucoma presenting with chest pain, dyspnea with exertion, dizziness, fatigue. Admitted for chest pain r/o. Awaiting stress test     Chest pain   Dizziness  - Troponin neg x2 with EKG NSR X2  - ECHO shows normal EF, no significant valve stenosis or abnormality  - stress test tmrw am  - Mg+, Lipid panel shows LDL at goal, TSH is within normal limits  - received  mg in the ED  - orthostatic Blood pressure negative  - consider cardiology consult if deemed necessary.   - Ht Cath in [place] 2020 pt reported as clear.      Dyspnea with exertion  - D-Dimer is negative     CAD  HTN  - ASA, lipitor, metoprolol     Alcohol use  - CIWA protocol  - vitamins     Depression  Insomnia  - trazodone, duloxetine, lexapro     Glaucoma  - Xalantan   Expected Discharge Location and Transportation: home  Expected Discharge   Expected Discharge Date and Time     Expected Discharge Date Expected Discharge Time    Apr 5, 2023            DVT prophylaxis:  Medical DVT prophylaxis orders are present.          CODE STATUS:   Code Status and Medical Interventions:   Ordered at: 04/03/23 1813     Level Of Support Discussed With:    Patient     Code Status (Patient has no pulse and is not breathing):    CPR (Attempt to  Resuscitate)     Medical Interventions (Patient has pulse or is breathing):    Full Support       Maryanne Rodriguez MD  04/04/23

## 2023-04-04 NOTE — CASE MANAGEMENT/SOCIAL WORK
Discharge Planning Assessment  [facility]     Patient Name: Sarah Howard  MRN: [id number]  Today's Date: 4/4/2023    Admit Date: 4/3/2023    Plan: home   Discharge Needs Assessment     Row Name 04/04/23 1335       Living Environment    People in Home spouse    Current Living Arrangements home    Primary Care Provided by self    Provides Primary Care For no one    Family Caregiver if Needed spouse    Quality of Family Relationships helpful;involved;supportive    Able to Return to Prior Arrangements yes       Transition Planning    Patient/Family Anticipates Transition to home with family    Transportation Anticipated family or friend will provide       Discharge Needs Assessment    Equipment Currently Used at Home none               Discharge Plan     Row Name 04/04/23 1335       Plan    Plan home    Patient/Family in Agreement with Plan yes    Plan Comments I spoke with Mrs. Howard in room regarding discharge planning.  She resides in [facility] with her spouse.  She is independent with ADL's and does not use any DME.  She is not current with home health or PT.  I verified her insurance and she has prescription coverage.  Plan is home at discharge.    Final Discharge Disposition Code 01 - home or self-care              Continued Care and Services - Admitted Since 4/3/2023    Coordination has not been started for this encounter.       Expected Discharge Date and Time     Expected Discharge Date Expected Discharge Time    Apr 5, 2023          Demographic Summary     Row Name 04/04/23 1335       General Information    Admission Type inpatient    Reason for Consult discharge planning    Preferred Language English    General Information Comments PCP Dao Gomez               Functional Status     Row Name 04/04/23 1336       Functional Status    Usual Activity Tolerance good    Current Activity Tolerance good       Functional Status, IADL    Medications independent    Meal Preparation independent     Housekeeping independent    Laundry independent    Shopping independent               Psychosocial    No documentation.                Abuse/Neglect    No documentation.                Legal    No documentation.                Substance Abuse    No documentation.                Patient Forms    No documentation.                   Pretty Mccollum RN

## 2023-04-05 ENCOUNTER — APPOINTMENT (OUTPATIENT)
Dept: CARDIOLOGY | Facility: HOSPITAL | Age: 68
End: 2023-04-05
Payer: MEDICARE

## 2023-04-05 ENCOUNTER — READMISSION MANAGEMENT (OUTPATIENT)
Dept: CALL CENTER | Facility: HOSPITAL | Age: 68
End: 2023-04-05
Payer: MEDICARE

## 2023-04-05 VITALS
TEMPERATURE: 98.1 F | OXYGEN SATURATION: 98 % | BODY MASS INDEX: 30.99 KG/M2 | HEIGHT: 64 IN | DIASTOLIC BLOOD PRESSURE: 88 MMHG | RESPIRATION RATE: 18 BRPM | SYSTOLIC BLOOD PRESSURE: 138 MMHG | WEIGHT: 181.5 LBS | HEART RATE: 61 BPM

## 2023-04-05 PROBLEM — R42 DIZZINESS: Status: RESOLVED | Noted: 2023-04-03 | Resolved: 2023-04-05

## 2023-04-05 PROBLEM — R07.9 CHEST PAIN, UNSPECIFIED TYPE: Status: RESOLVED | Noted: 2023-04-03 | Resolved: 2023-04-05

## 2023-04-05 PROBLEM — R06.09 DYSPNEA ON EXERTION: Status: RESOLVED | Noted: 2023-04-03 | Resolved: 2023-04-05

## 2023-04-05 LAB
BH CV REST NUCLEAR ISOTOPE DOSE: 29.9 MCI
BH CV STRESS BP STAGE 1: NORMAL
BH CV STRESS BP STAGE 2: NORMAL
BH CV STRESS BP STAGE 4: NORMAL
BH CV STRESS COMMENTS STAGE 1: NORMAL
BH CV STRESS DOSE REGADENOSON STAGE 1: 0.4
BH CV STRESS DURATION MIN STAGE 1: 1
BH CV STRESS DURATION MIN STAGE 2: 1
BH CV STRESS DURATION MIN STAGE 3: 1
BH CV STRESS DURATION MIN STAGE 4: 1
BH CV STRESS DURATION SEC STAGE 1: 0
BH CV STRESS DURATION SEC STAGE 2: 0
BH CV STRESS DURATION SEC STAGE 3: 0
BH CV STRESS DURATION SEC STAGE 4: 0
BH CV STRESS HR STAGE 1: 65
BH CV STRESS HR STAGE 2: 88
BH CV STRESS HR STAGE 3: 84
BH CV STRESS HR STAGE 4: 85
BH CV STRESS NUCLEAR ISOTOPE DOSE: 29.8 MCI
BH CV STRESS O2 STAGE 1: 98
BH CV STRESS O2 STAGE 2: 98
BH CV STRESS O2 STAGE 3: 100
BH CV STRESS O2 STAGE 4: 100
BH CV STRESS PROTOCOL 1: NORMAL
BH CV STRESS RECOVERY BP: NORMAL MMHG
BH CV STRESS RECOVERY HR: 75 BPM
BH CV STRESS RECOVERY O2: 100 %
BH CV STRESS STAGE 1: 1
BH CV STRESS STAGE 2: 2
BH CV STRESS STAGE 3: 3
BH CV STRESS STAGE 4: 4
LV EF NUC BP: 71 %
MAXIMAL PREDICTED HEART RATE: 152 BPM
PERCENT MAX PREDICTED HR: 57.89 %
STRESS BASELINE BP: NORMAL MMHG
STRESS BASELINE HR: 62 BPM
STRESS O2 SAT REST: 99 %
STRESS PERCENT HR: 68 %
STRESS POST ESTIMATED WORKLOAD: 1 METS
STRESS POST EXERCISE DUR MIN: 4 MIN
STRESS POST EXERCISE DUR SEC: 0 SEC
STRESS POST O2 SAT PEAK: 100 %
STRESS POST PEAK BP: NORMAL MMHG
STRESS POST PEAK HR: 88 BPM
STRESS TARGET HR: 129 BPM

## 2023-04-05 PROCEDURE — 96372 THER/PROPH/DIAG INJ SC/IM: CPT

## 2023-04-05 PROCEDURE — 99239 HOSP IP/OBS DSCHRG MGMT >30: CPT | Performed by: STUDENT IN AN ORGANIZED HEALTH CARE EDUCATION/TRAINING PROGRAM

## 2023-04-05 PROCEDURE — 93018 CV STRESS TEST I&R ONLY: CPT | Performed by: INTERNAL MEDICINE

## 2023-04-05 PROCEDURE — G0378 HOSPITAL OBSERVATION PER HR: HCPCS

## 2023-04-05 PROCEDURE — 78431 MYOCRD IMG PET RST&STRS CT: CPT | Performed by: INTERNAL MEDICINE

## 2023-04-05 PROCEDURE — 25010000002 REGADENOSON 0.4 MG/5ML SOLUTION: Performed by: NURSE PRACTITIONER

## 2023-04-05 PROCEDURE — 25010000002 HEPARIN (PORCINE) PER 1000 UNITS: Performed by: NURSE PRACTITIONER

## 2023-04-05 PROCEDURE — A9555 RB82 RUBIDIUM: HCPCS | Performed by: NURSE PRACTITIONER

## 2023-04-05 PROCEDURE — 0 RUBIDIUM CHLORIDE: Performed by: NURSE PRACTITIONER

## 2023-04-05 PROCEDURE — 78431 MYOCRD IMG PET RST&STRS CT: CPT

## 2023-04-05 PROCEDURE — 93017 CV STRESS TEST TRACING ONLY: CPT

## 2023-04-05 RX ORDER — CAFFEINE CITRATE 20 MG/ML
60 SOLUTION INTRAVENOUS ONCE
Status: COMPLETED | OUTPATIENT
Start: 2023-04-05 | End: 2023-04-05

## 2023-04-05 RX ADMIN — MULTIVITAMIN TABLET 1 TABLET: TABLET at 09:42

## 2023-04-05 RX ADMIN — REGADENOSON 0.4 MG: 0.08 INJECTION, SOLUTION INTRAVENOUS at 08:58

## 2023-04-05 RX ADMIN — RUBIDIUM CHLORIDE RB-82 1 DOSE: 150 INJECTION, SOLUTION INTRAVENOUS at 08:59

## 2023-04-05 RX ADMIN — CAFFEINE CITRATE 60 MG: 20 INJECTION, SOLUTION INTRAVENOUS at 09:15

## 2023-04-05 RX ADMIN — DULOXETINE 60 MG: 60 CAPSULE, DELAYED RELEASE ORAL at 09:42

## 2023-04-05 RX ADMIN — ASPIRIN 81 MG: 81 TABLET, COATED ORAL at 09:41

## 2023-04-05 RX ADMIN — THIAMINE HCL TAB 100 MG 100 MG: 100 TAB at 09:41

## 2023-04-05 RX ADMIN — METOPROLOL SUCCINATE 25 MG: 25 TABLET, EXTENDED RELEASE ORAL at 09:42

## 2023-04-05 RX ADMIN — Medication 10 ML: at 09:43

## 2023-04-05 RX ADMIN — SENNOSIDES AND DOCUSATE SODIUM 2 TABLET: 50; 8.6 TABLET ORAL at 09:42

## 2023-04-05 RX ADMIN — PANTOPRAZOLE SODIUM 40 MG: 40 TABLET, DELAYED RELEASE ORAL at 07:30

## 2023-04-05 RX ADMIN — FOLIC ACID 1 MG: 1 TABLET ORAL at 09:41

## 2023-04-05 RX ADMIN — RUBIDIUM CHLORIDE RB-82 1 DOSE: 150 INJECTION, SOLUTION INTRAVENOUS at 08:47

## 2023-04-05 RX ADMIN — HEPARIN SODIUM 5000 UNITS: 5000 INJECTION INTRAVENOUS; SUBCUTANEOUS at 06:03

## 2023-04-05 NOTE — OUTREACH NOTE
Prep Survey    Flowsheet Row Responses   [facility] patient discharged from? [place]   Is LACE score < 7 ? Yes   Eligibility [facility]   Date of Admission 04/03/23   Date of Discharge 04/05/23   Discharge Disposition Home or Self Care   Discharge diagnosis Chest pain, unspecified type   Does the patient have one of the following disease processes/diagnoses(primary or secondary)? Other   Does the patient have Home health ordered? No   Is there a DME ordered? No   Prep survey completed? Yes          Saniya FITZPATRICK - Registered Nurse

## 2023-04-05 NOTE — DISCHARGE SUMMARY
[facility] Medicine Services  DISCHARGE SUMMARY    Patient Name: Sarah Howard  : 1955  MRN: [id number]    Date of Admission: 4/3/2023  4:10 PM  Date of Discharge:  2023  Primary Care Physician: Dao Gomez MD    Consults     No orders found from 3/5/2023 to 2023.          Hospital Course     Presenting Problem:   Chest pain, unspecified type [R07.9]    Active Hospital Problems    Diagnosis  POA   • **Chest pain, unspecified type [R07.9]  Yes   • Dyspnea on exertion [R06.09]  Unknown   • Dizziness [R42]  Unknown   • History of breast cancer [Z85.3]  Not Applicable   • Glaucoma [H40.9]  Unknown   • Mild depression [F32.A]  Unknown   • Coronary artery disease involving native coronary artery [I25.10]  Unknown   • GERD without esophagitis [K21.9]  Unknown      Resolved Hospital Problems   No resolved problems to display.          Hospital Course:  Sarah Howard is a 67 y.o. female  w PMH CAD (ht cath  in [place] reported clear), GERD, depression, regular alcohol use,  hx of breast cancer, glaucoma presenting with chest pain, dyspnea with exertion, dizziness, fatigue. Admitted for chest pain r/o. Had PET stress test on .     Chest pain   Dizziness  - Troponin neg x2 with EKG NSR X2  - ECHO shows normal EF, no significant valve stenosis or abnormality  - stress test was consistent w low risk study  - Mg+, Lipid panel shows LDL at goal, TSH is within normal limits  - continue asa, atorvastatin  - orthostatic Blood pressure negative  --f/u w PCP. Discussed controlling underlying HTN and other risk factors best for prevention of CAD. She has been having some elevated readings at home, suspect that this may be contributing       Dyspnea with exertion  - D-Dimer is negative     CAD  HTN  - ASA, lipitor, metoprolol     Alcohol use  - CIWA protocol  - vitamins     Depression  Insomnia  - trazodone, duloxetine, lexapro     Glaucoma  - Xalantan       Discharge Follow Up  Recommendations for outpatient labs/diagnostics:   PCP    Day of Discharge     HPI:   No acute overnight events    Review of Systems  Gen- No fevers, chills  CV- No chest pain, palpitations  Resp- No cough, dyspnea  GI- No N/V/D, abd pain        Vital Signs:   Temp:  [97.6 °F (36.4 °C)-98.3 °F (36.8 °C)] 97.6 °F (36.4 °C)  Heart Rate:  [60-76] 62  Resp:  [11-14] 14  BP: (126-147)/(81-93) 126/81      Physical Exam:  Constitutional: No acute distress, awake, alert  HENT: NCAT, mucous membranes moist  Respiratory: Clear to auscultation bilaterally, respiratory effort normal   Cardiovascular: RRR, no murmurs, rubs, or gallops  Gastrointestinal: Positive bowel sounds, soft, nontender, nondistended  Musculoskeletal: No bilateral ankle edema  Psychiatric: Appropriate affect, cooperative  Neurologic: Oriented x 3, strength symmetric in all extremities, Cranial Nerves grossly intact to confrontation, speech clear  Skin: No rashes      Pertinent  and/or Most Recent Results     LAB RESULTS:      Lab 04/04/23  0655 04/03/23  1441   WBC 6.11 7.15   HEMOGLOBIN 13.5 13.9   HEMATOCRIT 40.9 41.9   PLATELETS 225 263   NEUTROS ABS  --  2.10   IMMATURE GRANS (ABS)  --  0.01   LYMPHS ABS  --  4.11*   MONOS ABS  --  0.79   EOS ABS  --  0.09   .0* 101.9*   D DIMER QUANT  --  <0.27         Lab 04/04/23  0655 04/03/23  1641 04/03/23  1441   SODIUM 141  --  142   POTASSIUM 3.9  --  4.2   CHLORIDE 105  --  105   CO2 23.0  --  27.0   ANION GAP 13.0  --  10.0   BUN 12  --  13   CREATININE 0.79  --  0.97   EGFR 81.6  --  63.8   GLUCOSE 121*  --  119*   CALCIUM 9.0  --  9.1   MAGNESIUM  --  1.9  --    HEMOGLOBIN A1C  --   --  5.40   TSH  --  3.000  --          Lab 04/03/23  1441   TOTAL PROTEIN 7.4   ALBUMIN 4.3   GLOBULIN 3.1   ALT (SGPT) 27   AST (SGOT) 30   BILIRUBIN 0.7   ALK PHOS 98   LIPASE 27         Lab 04/03/23  2224 04/03/23  1641 04/03/23  1441   PROBNP  --   --  40.5   HSTROP T <6 <6 <6         Lab 04/04/23  0655   CHOLESTEROL  151   LDL CHOL 61   HDL CHOL 54   TRIGLYCERIDES 223*             Brief Urine Lab Results     None        Microbiology Results (last 10 days)     ** No results found for the last 240 hours. **          Adult Transthoracic Echo Complete W/ Cont if Necessary Per Protocol    Result Date: 4/4/2023  •  Left ventricular systolic function is normal. Calculated left ventricular EF = 56.8% •  The aortic valve exhibits sclerosis.     XR Chest 1 View    Result Date: 4/3/2023  XR CHEST 1 VW Date of Exam: 4/3/2023 3:22 PM EDT Indication: Chest Pain Triage Protocol. Comparison: None available. FINDINGS: Mild chronic changes of the lung fields are present without focal airspace opacity. There is no significant pleural effusion or distinct pneumothorax. Normal heart and mediastinal contours. Lower cervical fusion hardware noted.     Mild chronic changes of the lung fields without evidence of acute cardiopulmonary abnormality. Electronically Signed: Nura Cote  4/3/2023 3:50 PM EDT  Workstation ID: [id number]              Results for orders placed during the hospital encounter of 04/03/23    Adult Transthoracic Echo Complete W/ Cont if Necessary Per Protocol    Interpretation Summary  •  Left ventricular systolic function is normal. Calculated left ventricular EF = 56.8%  •  The aortic valve exhibits sclerosis.      Plan for Follow-up of Pending Labs/Results: no pending results    Discharge Details        Discharge Medications      ASK your doctor about these medications      Instructions Start Date   aspirin 81 MG EC tablet   81 mg, Oral, Daily      atorvastatin 20 MG tablet  Commonly known as: LIPITOR   20 mg, Oral, Daily      DULoxetine 60 MG capsule  Commonly known as: CYMBALTA   60 mg, Oral, Daily      escitalopram 10 MG tablet  Commonly known as: LEXAPRO   10 mg, Oral, Daily      latanoprost 0.005 % ophthalmic solution  Commonly known as: XALATAN   No dose, route, or frequency recorded.      metoprolol succinate XL 25 MG 24 hr  tablet  Commonly known as: Toprol XL   25 mg, Oral, Daily      pantoprazole 40 MG EC tablet  Commonly known as: PROTONIX   40 mg, Oral, Daily      traZODone 150 MG tablet  Commonly known as: DESYREL   150 mg, Oral, Daily             No Known Allergies      Discharge Disposition:  home    Diet:  Hospital:  Diet Order   Procedures   • Diet: Cardiac Diets; Healthy Heart (2-3 Na+); Texture: Regular Texture (IDDSI 7); Fluid Consistency: Thin (IDDSI 0)       Activity:  as tolerated    Restrictions or Other Recommendations:  none       CODE STATUS:    Code Status and Medical Interventions:   Ordered at: 04/03/23 1813     Level Of Support Discussed With:    Patient     Code Status (Patient has no pulse and is not breathing):    CPR (Attempt to Resuscitate)     Medical Interventions (Patient has pulse or is breathing):    Full Support       Future Appointments   Date Time Provider Department Center   4/13/2023  3:00 PM Josefa Francisco APRN [facility] COR   4/19/2023  2:00 PM [facility] D[facility] 1  [facility] DX BE [facility]   4/27/2023  2:15 PM Dao Gomez MD [facility] BRNCR [facility]   5/1/2023  2:00 PM [facility] MAMM 1  [facility] BR BE [place]   5/18/2023 11:30 AM Michael Magallon MD [facility]                 Maryanne Rodriguez MD  04/05/23      Time Spent on Discharge:  I spent  45  minutes on this discharge activity which included: face-to-face encounter with the patient, reviewing the data in the system, coordination of the care with the nursing staff as well as consultants, documentation, and entering orders.

## 2023-04-05 NOTE — CASE MANAGEMENT/SOCIAL WORK
Case Management Discharge Note      Final Note: CM met w/pt and spouse in room. Plan is home @ d/c. Spouse will transport. No d/c needs verbalized @ this time.         Selected Continued Care - Admitted Since 4/3/2023     Destination    No services have been selected for the patient.              Durable Medical Equipment    No services have been selected for the patient.              Dialysis/Infusion    No services have been selected for the patient.              Home Medical Care    No services have been selected for the patient.              Therapy    No services have been selected for the patient.              Community Resources    No services have been selected for the patient.              Community & DME    No services have been selected for the patient.                       Final Discharge Disposition Code: 01 - home or self-care

## 2023-04-06 ENCOUNTER — TRANSITIONAL CARE MANAGEMENT TELEPHONE ENCOUNTER (OUTPATIENT)
Dept: CALL CENTER | Facility: HOSPITAL | Age: 68
End: 2023-04-06
Payer: MEDICARE

## 2023-04-06 NOTE — OUTREACH NOTE
Call Center TCM Note    Flowsheet Row Responses   [facility] patient discharged from? [place]   Does the patient have one of the following disease processes/diagnoses(primary or secondary)? Other   TCM attempt successful? No   Unsuccessful attempts Attempt 1  [attempted patient and spouse per verbal release]          Minal Spivey RN    4/6/2023, 13:46 EDT

## 2023-04-06 NOTE — OUTREACH NOTE
Call Center TCM Note    Flowsheet Row Responses   [facility] patient discharged from? [place]   Does the patient have one of the following disease processes/diagnoses(primary or secondary)? Other   TCM attempt successful? No   Unsuccessful attempts Attempt 2  [attempted patient and spouse per verbal release]          Minal Spivey RN    4/6/2023, 14:28 EDT

## 2023-04-07 ENCOUNTER — TRANSITIONAL CARE MANAGEMENT TELEPHONE ENCOUNTER (OUTPATIENT)
Dept: CALL CENTER | Facility: HOSPITAL | Age: 68
End: 2023-04-07
Payer: MEDICARE

## 2023-04-07 LAB
QT INTERVAL: 428 MS
QTC INTERVAL: 455 MS

## 2023-04-07 NOTE — OUTREACH NOTE
Call Center TCM Note    Flowsheet Row Responses   [facility] patient discharged from? [place]   Does the patient have one of the following disease processes/diagnoses(primary or secondary)? Other   TCM attempt successful? Yes   Call start time 1418   Call end time 1419   Discharge diagnosis Chest pain, unspecified type   Meds reviewed with patient/caregiver? Yes   Does the patient have all medications ordered at discharge? N/A   Is the patient taking all medications as directed (includes completed medication regime)? Yes   Does the patient have an appointment with their PCP within 7 days of discharge? No   Nursing Interventions Patient declined scheduling/rescheduling appointment at this time   Has home health visited the patient within 72 hours of discharge? N/A   Psychosocial issues? No   Did the patient receive a copy of their discharge instructions? Yes   Nursing interventions Reviewed instructions with patient   What is the patient's perception of their health status since discharge? Improving   Is the patient/caregiver able to teach back signs and symptoms related to disease process for when to call PCP? Yes   Is the patient/caregiver able to teach back signs and symptoms related to disease process for when to call 911? Yes   Is the patient/caregiver able to teach back the hierarchy of who to call/visit for symptoms/problems? PCP, Specialist, Home health nurse, Urgent Care, ED, 911 Yes   If the patient is a current smoker, are they able to teach back resources for cessation? Not a smoker   TCM call completed? Yes   Call end time 1419   Would this patient benefit from a Referral to Amb Social Work? No   Is the patient interested in additional calls from an ambulatory ?  NOTE:  applies to high risk patients requiring additional follow-up. No          Kaitlynn Mason LPN    4/7/2023, 14:20 EDT

## 2023-04-13 ENCOUNTER — TELEMEDICINE (OUTPATIENT)
Dept: PSYCHIATRY | Facility: CLINIC | Age: 68
End: 2023-04-13
Payer: MEDICARE

## 2023-04-13 DIAGNOSIS — F33.0 MILD EPISODE OF RECURRENT MAJOR DEPRESSIVE DISORDER: Primary | ICD-10-CM

## 2023-04-13 PROCEDURE — 1160F RVW MEDS BY RX/DR IN RCRD: CPT | Performed by: NURSE PRACTITIONER

## 2023-04-13 PROCEDURE — 1159F MED LIST DOCD IN RCRD: CPT | Performed by: NURSE PRACTITIONER

## 2023-04-13 PROCEDURE — 90792 PSYCH DIAG EVAL W/MED SRVCS: CPT | Performed by: NURSE PRACTITIONER

## 2023-04-13 NOTE — PROGRESS NOTES
"This provider is located at the [facility] (through [facility]), [address], [address] using a secure [employer] Video Visit through [employer]. Patient is being seen remotely via telehealth at their home address [address] [place] [address], and stated they are in a secure environment for this session. The patient's condition being diagnosed/treated is appropriate for telemedicine. The provider identified herself as well as her credentials.   The patient, and/or patients guardian, consent to be seen remotely, and when consent is given they understand that the consent allows for patient identifiable information to be sent to a third party as needed.   They may refuse to be seen remotely at any time. The electronic data is encrypted and password protected, and the patient and/or guardian has been advised of the potential risks to privacy not withstanding such measures.    You have chosen to receive care through a telehealth visit.  Do you consent to use a video/audio connection for your medical care today? Yes    Patient identifiers utilized: Name and date of birth.    Patient verbally confirmed consent for today's encounter:  April 13, 2023    Telma Howard is a 67 y.o. female who presents today for initial evaluation     Chief Complaint:    Chief Complaint   Patient presents with   • Psychiatric Evaluation        History of Present Illness:    History of Present Illness          Psychosocial history:  Born: [facility]. She has been in [place] for 1 ½ years  Raised by: parents  Siblings: one brother; \"pretty good\" relationship but states it's also \"odd\" because they are so different and he is almost 10 years older than her and has been in [place]. Hasn't seen him since 1987.   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  Developmental delays/Special education classes: needed extra help but never received it. Reports she was not a good student. Her parents did not complete high school. She was very shy.   Impulsive or risky behavior: denies  Legal history/previous chargers or incarcerations: denies  Marital status:  x 47 years  Number of children: two   Self-harming behavior: denies  Suicidal attempts: attempted to OD in 2008, felt hopeless at the time with her daughter's TREVOR issues. She was drinking vodka at the time. Reports she was not addicted to alcohol at the time.   Currently drinking a bottle of wine per day and an occasional mixed drink. Has been doing so for the past 4 years.   Social support:    Medical hx: breast cancer survivor    Previous diagnoses: depression/anxiety  Inpatient psychiatric admissions: [facility] in [place], [place] after the suicide attempt  History of psychotherapy: never, but open to referral for psychotherapy  History of behavioral health treatment: by PCP, only saw a psychiatrist for a few weeks after the suicide attempt  Medications trialed: Paxil, Zoloft, Lexapro, Cymbalta, Trazodone, Ambien  Family mental health: denies    Symptom burden:  Sleep: 8-9 hours, does not feel rested always  Appetite: eats okay  Anxiety: denies currently   Paranoia: denies  Hallucinations: denies  Mood fluctuations: denies               The following portions of the patient's history were reviewed and updated as appropriate: allergies, current medications, past family history, past medical history, past social history, past surgical history and problem list.    Past Psychiatric History:  Began Treatment:treated for years by her PCP  Diagnoses:Depression  Psychiatrist:Denies  Therapist:open to  referral today  Admission History:see HPI  Medication Trials:see HPI  Self Harm: Denies  Suicide Attempts:one in , see HPI   Psychosis, Anxiety, Depression: Denies    Past Medical History:  Past Medical History:   Diagnosis Date   • Cancer Breast       • Coronary artery disease 10/2020   • Depression    • GERD (gastroesophageal reflux disease)    • Glaucoma    • Hyperlipidemia    • Hypertension    • Osteopenia        Substance Abuse History:   Types:Denies all, including illicit  Withdrawal Symptoms:Denies  Longest Period Sober:Not Applicable   AA: Not applicable     Social History:  Social History     Socioeconomic History   • Marital status:    Tobacco Use   • Smoking status: Never     Passive exposure: Never   • Smokeless tobacco: Never   Vaping Use   • Vaping Use: Never used   Substance and Sexual Activity   • Alcohol use: Yes     Alcohol/week: 8.0 standard drinks     Types: 8 Glasses of wine per week   • Drug use: Not Currently     Types: Marijuana   • Sexual activity: Yes     Partners: Male       Family History:  Family History   Problem Relation Age of Onset   • Colon cancer Mother 48   • Stroke Mother    • Thyroid disease Mother    • Heart disease Father         mitral valve disease   • Atrial fibrillation Brother        Past Surgical History:  Past Surgical History:   Procedure Laterality Date   • ADENOIDECTOMY     • BREAST SURGERY  2 x 2010   • CARDIAC CATHETERIZATION     • COLONOSCOPY     • COSMETIC SURGERY  breast reduction       • EYE SURGERY  laser iridotomies       • HYSTERECTOMY     • SPINE SURGERY     • TONSILLECTOMY     • TUBAL ABDOMINAL LIGATION         Problem List:  Patient Active Problem List   Diagnosis   • Mild depression   • Osteopenia   • GERD without esophagitis   • Coronary artery disease involving native coronary artery   • History of breast cancer   • Glaucoma       Allergy:   No Known Allergies     Current  Medications:   Current Outpatient Medications   Medication Sig Dispense Refill   • aspirin 81 MG EC tablet Take 1 tablet by mouth Daily. 90 tablet 3   • atorvastatin (LIPITOR) 20 MG tablet Take 1 tablet by mouth Daily. 90 tablet 0   • DULoxetine (CYMBALTA) 60 MG capsule Take 1 capsule by mouth Daily.     • escitalopram (LEXAPRO) 10 MG tablet Take 1 tablet by mouth Daily. 90 tablet 0   • latanoprost (XALATAN) 0.005 % ophthalmic solution      • metoprolol succinate XL (Toprol XL) 25 MG 24 hr tablet Take 1 tablet by mouth Daily. 90 tablet 1   • pantoprazole (PROTONIX) 40 MG EC tablet Take 1 tablet by mouth Daily. 90 tablet 0   • traZODone (DESYREL) 150 MG tablet Take 1 tablet by mouth Daily. 90 tablet 0     No current facility-administered medications for this visit.       Review of Systems:    Review of Systems   Psychiatric/Behavioral: Negative.    All other systems reviewed and are negative.        Physical Exam:   Physical Exam  Constitutional:       General: She is awake.      Appearance: Normal appearance. She is well-developed, well-groomed and normal weight.   Neurological:      Mental Status: She is alert and oriented to person, place, and time.   Psychiatric:         Attention and Perception: Attention and perception normal.         Mood and Affect: Mood and affect normal.         Speech: Speech normal.         Behavior: Behavior normal. Behavior is cooperative.         Thought Content: Thought content normal.         Cognition and Memory: Cognition and memory normal.         Judgment: Judgment normal.         Vitals:  There were no vitals taken for this visit. There is no height or weight on file to calculate BMI.  Due to extenuating circumstances and possible current health risks associated with the patient being present in a clinical setting (with current health restrictions in place in regards to possible COVID 19 transmission/exposure), the patient was seen remotely today via a [facility] Video Visit  through [employer].  Unable to obtain vital signs due to nature of remote visit.  Height stated at 64 inches.  Weight stated at 181 pounds.    Last 3 Blood Pressure Readings:  BP Readings from Last 3 Encounters:   04/05/23 138/88   01/17/23 130/80       PHQ-9 Score:   PHQ-9 Total Score:      DERICK-7 Score:   Feeling nervous, anxious or on edge: (P) Not at all  Not being able to stop or control worrying: (P) Several days  Worrying too much about different things: (P) Several days  Trouble Relaxing: (P) Not at all  Being so restless that it is hard to sit still: (P) Not at all  Feeling afraid as if something awful might happen: (P) Not at all  Becoming easily annoyed or irritable: (P) Not at all  DERICK 7 Total Score: (P) 2  If you checked any problems, how difficult have these problems made it for you to do your work, take care of things at home, or get along with other people: (P) Not difficult at all     Mental Status Exam:   Hygiene:   good  Cooperation:  Cooperative  Eye Contact:  Good  Psychomotor Behavior:  Appropriate  Affect:  Appropriate  Mood: normal  Hopelessness: Denies  Speech:  Normal  Thought Process:  Goal directed and Linear  Thought Content:  Mood congruent  Suicidal:  None  Homicidal:  None  Hallucinations:  None  Delusion:  None  Memory:  Intact  Orientation:  Person, Place, Time and Situation  Reliability:  good  Insight:  Good  Judgement:  Good  Impulse Control:  Good  Physical/Medical Issues:  No        Lab Results:   Admission on 04/03/2023, Discharged on 04/05/2023   Component Date Value Ref Range Status   • QT Interval 04/03/2023 370  ms Final   • QTC Interval 04/03/2023 421  ms Final   • QT Interval 04/03/2023 402  ms Final   • QTC Interval 04/03/2023 434  ms Final   • HS Troponin T 04/03/2023 <6  <10 ng/L Final   • Glucose 04/03/2023 119 (H)  65 - 99 mg/dL Final   • BUN 04/03/2023 13  8 - 23 mg/dL Final   • Creatinine 04/03/2023 0.97  0.57 - 1.00 mg/dL Final   • Sodium 04/03/2023 142  136 - 145  mmol/L Final   • Potassium 04/03/2023 4.2  3.5 - 5.2 mmol/L Final    Slight hemolysis detected by analyzer. Results may be affected.   • Chloride 04/03/2023 105  98 - 107 mmol/L Final   • CO2 04/03/2023 27.0  22.0 - 29.0 mmol/L Final   • Calcium 04/03/2023 9.1  8.6 - 10.5 mg/dL Final   • Total Protein 04/03/2023 7.4  6.0 - 8.5 g/dL Final   • Albumin 04/03/2023 4.3  3.5 - 5.2 g/dL Final   • ALT (SGPT) 04/03/2023 27  1 - 33 U/L Final   • AST (SGOT) 04/03/2023 30  1 - 32 U/L Final   • Alkaline Phosphatase 04/03/2023 98  39 - 117 U/L Final   • Total Bilirubin 04/03/2023 0.7  0.0 - 1.2 mg/dL Final   • Globulin 04/03/2023 3.1  gm/dL Final    Calculated Result   • A/G Ratio 04/03/2023 1.4  g/dL Final   • BUN/Creatinine Ratio 04/03/2023 13.4  7.0 - 25.0 Final   • Anion Gap 04/03/2023 10.0  5.0 - 15.0 mmol/L Final   • eGFR 04/03/2023 63.8  >60.0 mL/min/1.73 Final   • Lipase 04/03/2023 27  13 - 60 U/L Final   • proBNP 04/03/2023 40.5  0.0 - 900.0 pg/mL Final   • Extra Tube 04/03/2023 Hold for add-ons.   Final    Auto resulted.   • Extra Tube 04/03/2023 hold for add-on   Final    Auto resulted   • Extra Tube 04/03/2023 Hold for add-ons.   Final    Auto resulted.   • Extra Tube 04/03/2023 Hold for add-ons.   Final    Auto resulted.   • Extra Tube 04/03/2023 Hold for add-ons.   Final    Auto resulted   • WBC 04/03/2023 7.15  3.40 - 10.80 10*3/mm3 Final   • RBC 04/03/2023 4.11  3.77 - 5.28 10*6/mm3 Final   • Hemoglobin 04/03/2023 13.9  12.0 - 15.9 g/dL Final   • Hematocrit 04/03/2023 41.9  34.0 - 46.6 % Final   • MCV 04/03/2023 101.9 (H)  79.0 - 97.0 fL Final   • MCH 04/03/2023 33.8 (H)  26.6 - 33.0 pg Final   • MCHC 04/03/2023 33.2  31.5 - 35.7 g/dL Final   • RDW 04/03/2023 11.4 (L)  12.3 - 15.4 % Final   • RDW-SD 04/03/2023 42.5  37.0 - 54.0 fl Final   • MPV 04/03/2023 10.9  6.0 - 12.0 fL Final   • Platelets 04/03/2023 263  140 - 450 10*3/mm3 Final   • Neutrophil % 04/03/2023 29.4 (L)  42.7 - 76.0 % Final   • Lymphocyte %  04/03/2023 57.5 (H)  19.6 - 45.3 % Final   • Monocyte % 04/03/2023 11.0  5.0 - 12.0 % Final   • Eosinophil % 04/03/2023 1.3  0.3 - 6.2 % Final   • Basophil % 04/03/2023 0.7  0.0 - 1.5 % Final   • Immature Grans % 04/03/2023 0.1  0.0 - 0.5 % Final   • Neutrophils, Absolute 04/03/2023 2.10  1.70 - 7.00 10*3/mm3 Final   • Lymphocytes, Absolute 04/03/2023 4.11 (H)  0.70 - 3.10 10*3/mm3 Final   • Monocytes, Absolute 04/03/2023 0.79  0.10 - 0.90 10*3/mm3 Final   • Eosinophils, Absolute 04/03/2023 0.09  0.00 - 0.40 10*3/mm3 Final   • Basophils, Absolute 04/03/2023 0.05  0.00 - 0.20 10*3/mm3 Final   • Immature Grans, Absolute 04/03/2023 0.01  0.00 - 0.05 10*3/mm3 Final   • nRBC 04/03/2023 0.0  0.0 - 0.2 /100 WBC Final   • RBC Morphology 04/03/2023 Normal  Normal Final   • WBC Morphology 04/03/2023 Normal  Normal Final   • Platelet Morphology 04/03/2023 Normal  Normal Final   • HS Troponin T 04/03/2023 <6  <10 ng/L Final   • Troponin T Delta 04/03/2023    Final    Unable to calculate.   • TSH 04/03/2023 3.000  0.270 - 4.200 uIU/mL Final   • Magnesium 04/03/2023 1.9  1.6 - 2.4 mg/dL Final   • Hemoglobin A1C 04/03/2023 5.40  4.80 - 5.60 % Final   • D-Dimer, Quantitative 04/03/2023 <0.27  0.00 - 0.68 MCGFEU/mL Final   • HS Troponin T 04/03/2023 <6  <10 ng/L Final   • QT Interval 04/04/2023 428  ms Final   • QTC Interval 04/04/2023 455  ms Final   • Target HR (85%) 04/04/2023 129  bpm Final   • Max. Pred. HR (100%) 04/04/2023 152  bpm Final   • EF(MOD-bp) 04/04/2023 56.8  % Final   • LVIDd 04/04/2023 3.9  cm Final   • LVIDs 04/04/2023 2.40  cm Final   • IVSd 04/04/2023 0.90  cm Final   • LVPWd 04/04/2023 0.90  cm Final   • FS 04/04/2023 38.5  % Final   • IVS/LVPW 04/04/2023 1.00  cm Final   • ESV(cubed) 04/04/2023 13.8  ml Final   • EDV(cubed) 04/04/2023 59.3  ml Final   • LVOT area 04/04/2023 3.1  cm2 Final   • LV mass(C)d 04/04/2023 105.3  grams Final   • LVOT diam 04/04/2023 2.00  cm Final   • EDV(MOD-sp2) 04/04/2023 83.7  ml  Final   • EDV(MOD-sp4) 04/04/2023 68.5  ml Final   • ESV(MOD-sp2) 04/04/2023 31.9  ml Final   • ESV(MOD-sp4) 04/04/2023 29.4  ml Final   • SV(MOD-sp2) 04/04/2023 51.8  ml Final   • SV(MOD-sp4) 04/04/2023 39.1  ml Final   • EF(MOD-sp2) 04/04/2023 61.9  % Final   • EF(MOD-sp4) 04/04/2023 57.1  % Final   • MV E max brittany 04/04/2023 81.8  cm/sec Final   • MV A max brittany 04/04/2023 96.4  cm/sec Final   • MV dec time 04/04/2023 0.25  msec Final   • MV E/A 04/04/2023 0.85   Final   • LA ESV Index (BP) 04/04/2023 25.7  ml/m2 Final   • SV(LVOT) 04/04/2023 69.7  ml Final   • TAPSE (>1.6) 04/04/2023 1.70  cm Final   • LA dimension (2D)  04/04/2023 3.8  cm Final   • LV V1 max 04/04/2023 101.0  cm/sec Final   • LV V1 max PG 04/04/2023 4.1  mmHg Final   • LV V1 mean PG 04/04/2023 2.00  mmHg Final   • LV V1 VTI 04/04/2023 22.2  cm Final   • Ao pk brittany 04/04/2023 143.0  cm/sec Final   • Ao max PG 04/04/2023 8.2  mmHg Final   • Ao mean PG 04/04/2023 5.0  mmHg Final   • Ao V2 VTI 04/04/2023 31.5  cm Final   • TRICIA(I,D) 04/04/2023 2.21  cm2 Final   • TR max brittany 04/04/2023 217.0  cm/sec Final   • TR max PG 04/04/2023 18.8  mmHg Final   • PA V2 max 04/04/2023 101.0  cm/sec Final   • PA acc time 04/04/2023 0.18  sec Final   • PA pr(Accel) 04/04/2023 -2.00  mmHg Final   • Ao root diam 04/04/2023 3.0  cm Final   • RV Base 04/04/2023 2.50  cm Final   • RV Mid 04/04/2023 1.81  cm Final   • RV Length 04/04/2023 4.8  cm Final   • RVSP(TR) 04/04/2023 22  mmHg Final   • RAP systole 04/04/2023 3  mmHg Final   • Glucose 04/04/2023 121 (H)  65 - 99 mg/dL Final   • BUN 04/04/2023 12  8 - 23 mg/dL Final   • Creatinine 04/04/2023 0.79  0.57 - 1.00 mg/dL Final   • Sodium 04/04/2023 141  136 - 145 mmol/L Final   • Potassium 04/04/2023 3.9  3.5 - 5.2 mmol/L Final   • Chloride 04/04/2023 105  98 - 107 mmol/L Final   • CO2 04/04/2023 23.0  22.0 - 29.0 mmol/L Final   • Calcium 04/04/2023 9.0  8.6 - 10.5 mg/dL Final   • BUN/Creatinine Ratio 04/04/2023 15.2  7.0 -  25.0 Final   • Anion Gap 04/04/2023 13.0  5.0 - 15.0 mmol/L Final   • eGFR 04/04/2023 81.6  >60.0 mL/min/1.73 Final   • WBC 04/04/2023 6.11  3.40 - 10.80 10*3/mm3 Final   • RBC 04/04/2023 4.01  3.77 - 5.28 10*6/mm3 Final   • Hemoglobin 04/04/2023 13.5  12.0 - 15.9 g/dL Final   • Hematocrit 04/04/2023 40.9  34.0 - 46.6 % Final   • MCV 04/04/2023 102.0 (H)  79.0 - 97.0 fL Final   • MCH 04/04/2023 33.7 (H)  26.6 - 33.0 pg Final   • MCHC 04/04/2023 33.0  31.5 - 35.7 g/dL Final   • RDW 04/04/2023 11.3 (L)  12.3 - 15.4 % Final   • RDW-SD 04/04/2023 42.0  37.0 - 54.0 fl Final   • MPV 04/04/2023 11.4  6.0 - 12.0 fL Final   • Platelets 04/04/2023 225  140 - 450 10*3/mm3 Final   • Total Cholesterol 04/04/2023 151  0 - 200 mg/dL Final   • Triglycerides 04/04/2023 223 (H)  0 - 150 mg/dL Final   • HDL Cholesterol 04/04/2023 54  40 - 60 mg/dL Final   • LDL Cholesterol  04/04/2023 61  0 - 100 mg/dL Final   • VLDL Cholesterol 04/04/2023 36  5 - 40 mg/dL Final   • LDL/HDL Ratio 04/04/2023 0.97   Final   •  CV STRESS PROTOCOL 1 04/05/2023 Pharmacologic   Final   • Stage 1 04/05/2023 1   Final   • Duration Min Stage 1 04/05/2023 1   Final   • Duration Sec Stage 1 04/05/2023 0   Final   • Stress Dose Regadenoson Stage 1 04/05/2023 0.4   Final   • Stress Comments Stage 1 04/05/2023 10 sec bolus injection   Final   • Stage 2 04/05/2023 2   Final   • Duration Min Stage 2 04/05/2023 1   Final   • Duration Sec Stage 2 04/05/2023 0   Final   • Stage 3 04/05/2023 3   Final   • Duration Min Stage 3 04/05/2023 1   Final   • Duration Sec Stage 3 04/05/2023 0   Final   • Stage 4 04/05/2023 4   Final   • Duration Min Stage 4 04/05/2023 1   Final   • Duration Sec Stage 4 04/05/2023 0   Final   • Target HR (85%) 04/05/2023 129  bpm Final   • Max. Pred. HR (100%) 04/05/2023 152  bpm Final   • Exercise duration (min) 04/05/2023 4  min Final   • Exercise duration (sec) 04/05/2023 0  sec Final   • Estimated workload 04/05/2023 1.0  METS Final   •  Baseline HR 04/05/2023 62  bpm Final   • Baseline BP 04/05/2023 112/64  mmHg Final   • O2 sat rest 04/05/2023 99  % Final   • HR Stage 1 04/05/2023 65   Final   • BP Stage 1 04/05/2023 129/96   Final   • O2 Stage 1 04/05/2023 98   Final   • HR Stage 2 04/05/2023 88   Final   • BP Stage 2 04/05/2023 145/80   Final   • O2 Stage 2 04/05/2023 98   Final   • HR Stage 3 04/05/2023 84   Final   • O2 Stage 3 04/05/2023 100   Final   • HR Stage 4 04/05/2023 85   Final   • BP Stage 4 04/05/2023 161/102   Final   • O2 Stage 4 04/05/2023 100   Final   • O2 sat peak 04/05/2023 100  % Final   • Recovery HR 04/05/2023 75  bpm Final   • Recovery BP 04/05/2023 174/86  mmHg Final   • Recovery O2 04/05/2023 100  % Final   • Peak HR 04/05/2023 88  bpm Final   • Percent Max Pred HR 04/05/2023 57.89  % Final   • Percent Target HR 04/05/2023 68  % Final   • Peak BP 04/05/2023 176/70  mmHg Final   • Nuc Stress EF 04/05/2023 71  % Final   • BH CV REST NUCLEAR ISOTOPE DOSE 04/05/2023 29.9  mCi Final   • BH CV STRESS NUCLEAR ISOTOPE DOSE 04/05/2023 29.8  mCi Final   Lab on 02/03/2023   Component Date Value Ref Range Status   • WBC 02/03/2023 5.61  3.40 - 10.80 10*3/mm3 Final   • RBC 02/03/2023 3.92  3.77 - 5.28 10*6/mm3 Final   • Hemoglobin 02/03/2023 13.1  12.0 - 15.9 g/dL Final   • Hematocrit 02/03/2023 37.6  34.0 - 46.6 % Final   • MCV 02/03/2023 95.9  79.0 - 97.0 fL Final   • MCH 02/03/2023 33.4 (H)  26.6 - 33.0 pg Final   • MCHC 02/03/2023 34.8  31.5 - 35.7 g/dL Final   • RDW 02/03/2023 11.4 (L)  12.3 - 15.4 % Final   • RDW-SD 02/03/2023 39.3  37.0 - 54.0 fl Final   • MPV 02/03/2023 11.7  6.0 - 12.0 fL Final   • Platelets 02/03/2023 279  140 - 450 10*3/mm3 Final   • Glucose 02/03/2023 104 (H)  65 - 99 mg/dL Final   • BUN 02/03/2023 11  8 - 23 mg/dL Final   • Creatinine 02/03/2023 0.91  0.57 - 1.00 mg/dL Final   • Sodium 02/03/2023 144  136 - 145 mmol/L Final   • Potassium 02/03/2023 4.4  3.5 - 5.2 mmol/L Final   • Chloride 02/03/2023 105   98 - 107 mmol/L Final   • CO2 02/03/2023 28.1  22.0 - 29.0 mmol/L Final   • Calcium 02/03/2023 9.2  8.6 - 10.5 mg/dL Final   • Total Protein 02/03/2023 7.2  6.0 - 8.5 g/dL Final   • Albumin 02/03/2023 4.2  3.5 - 5.2 g/dL Final   • ALT (SGPT) 02/03/2023 30  1 - 33 U/L Final   • AST (SGOT) 02/03/2023 30  1 - 32 U/L Final   • Alkaline Phosphatase 02/03/2023 107  39 - 117 U/L Final   • Total Bilirubin 02/03/2023 0.5  0.0 - 1.2 mg/dL Final   • Globulin 02/03/2023 3.0  gm/dL Final   • A/G Ratio 02/03/2023 1.4  g/dL Final   • BUN/Creatinine Ratio 02/03/2023 12.1  7.0 - 25.0 Final   • Anion Gap 02/03/2023 10.9  5.0 - 15.0 mmol/L Final   • eGFR 02/03/2023 68.9  >60.0 mL/min/1.73 Final   • Total Cholesterol 02/03/2023 160  0 - 200 mg/dL Final   • Triglycerides 02/03/2023 222 (H)  0 - 150 mg/dL Final   • HDL Cholesterol 02/03/2023 59  40 - 60 mg/dL Final   • LDL Cholesterol  02/03/2023 65  0 - 100 mg/dL Final   • VLDL Cholesterol 02/03/2023 36  5 - 40 mg/dL Final   • LDL/HDL Ratio 02/03/2023 0.96   Final   • Hepatitis C Ab 02/03/2023 Non-Reactive  Non-Reactive Final   • Neutrophil % 02/03/2023 32.3 (L)  42.7 - 76.0 % Final   • Lymphocyte % 02/03/2023 54.2 (H)  19.6 - 45.3 % Final   • Monocyte % 02/03/2023 11.5  5.0 - 12.0 % Final   • Eosinophil % 02/03/2023 2.1  0.3 - 6.2 % Final   • Neutrophils Absolute 02/03/2023 1.81  1.70 - 7.00 10*3/mm3 Final   • Lymphocytes Absolute 02/03/2023 3.04  0.70 - 3.10 10*3/mm3 Final   • Monocytes Absolute 02/03/2023 0.65  0.10 - 0.90 10*3/mm3 Final   • Eosinophils Absolute 02/03/2023 0.12  0.00 - 0.40 10*3/mm3 Final   • nRBC 02/03/2023 2.1 (H)  0.0 - 0.2 /100 WBC Final   • RBC Morphology 02/03/2023 Normal  Normal Final   • Smudge Cells 02/03/2023 Slight/1+  None Seen Final   • Platelet Morphology 02/03/2023 Normal  Normal Final         Assessment & Plan   Assessment     ICD-10-CM ICD-9-CM   1. Mild episode of recurrent major depressive disorder  F33.0 296.31         Continue medications per  PCP  Patient reports medications do not need adjusted. She would like a referral for therapy as suggested by her PCP.     Ambulatory referral for therapy initiated today. Patient prefers in person sessions.   Patient is always welcome to see this provider in the future should she require medication adjustments or changes.     GOALS:  Short Term Goals: Patient will be compliant with medication, and patient will have no significant medication related side effects.  Patient will be engaged in psychotherapy as indicated.  Patient will report subjective improvement of symptoms.  Long term goals: To stabilize mood and treat/improve subjective symptoms, the patient will stay out of the hospital, the patient will be at an optimal level of functioning, and the patient will take all medications as prescribed.  The patient/guardian verbalized understanding and agreement with goals that were mutually set.      TREATMENT PLAN: Continue supportive psychotherapy efforts and medications as indicated.  Pharmacological and Non-Pharmacological treatment options discussed during today's visit. Patient/Guardian acknowledged and verbally consented with current treatment plan and was educated on the importance of compliance with treatment and follow-up appointments.      MEDICATION ISSUES:  Discussed medication options and treatment plan of prescribed medication as well as the risks, benefits, any black box warnings, and side effects including potential falls, possible impaired driving, and metabolic adversities among others. Patient is agreeable to call the office with any worsening of symptoms or onset of side effects, or if any concerns or questions arise.  The contact information for the office is made available to the patient. Patient is agreeable to call 911 or go to the nearest ER should they begin having any SI/HI, or if any urgent concerns arise. No medication side effects or related complaints today.     Medication Changes  During Visit:   There are no discontinued medications.   No orders of the defined types were placed in this encounter.      Follow Up Appointment:   Return if symptoms worsen or fail to improve.           This document has been electronically signed by MARCOS Marte  April 13, 2023 15:29 EDT    Dictated Utilizing Dragon Dictation: Part of this note may be an electronic transcription/translation of spoken language to printed text using the Dragon Dictation System.

## 2023-04-19 ENCOUNTER — HOSPITAL ENCOUNTER (OUTPATIENT)
Dept: BONE DENSITY | Facility: HOSPITAL | Age: 68
Discharge: HOME | End: 2023-04-19
Payer: MEDICARE

## 2023-04-19 DIAGNOSIS — Z78.0 ASYMPTOMATIC POSTMENOPAUSAL STATE: ICD-10-CM

## 2023-04-19 DIAGNOSIS — M85.80 OSTEOPENIA, UNSPECIFIED LOCATION: ICD-10-CM

## 2023-04-19 PROCEDURE — 77080 DXA BONE DENSITY AXIAL: CPT

## 2023-04-27 ENCOUNTER — OFFICE VISIT (OUTPATIENT)
Dept: INTERNAL MEDICINE | Facility: CLINIC | Age: 68
End: 2023-04-27
Payer: MEDICARE

## 2023-04-27 VITALS
WEIGHT: 180.25 LBS | BODY MASS INDEX: 30.77 KG/M2 | DIASTOLIC BLOOD PRESSURE: 80 MMHG | TEMPERATURE: 97.1 F | RESPIRATION RATE: 20 BRPM | OXYGEN SATURATION: 99 % | HEIGHT: 64 IN | SYSTOLIC BLOOD PRESSURE: 140 MMHG | HEART RATE: 72 BPM

## 2023-04-27 DIAGNOSIS — Z00.00 MEDICARE ANNUAL WELLNESS VISIT, SUBSEQUENT: Primary | ICD-10-CM

## 2023-04-27 DIAGNOSIS — R06.02 SHORTNESS OF BREATH: ICD-10-CM

## 2023-04-27 DIAGNOSIS — F10.90 ALCOHOL USE DISORDER: ICD-10-CM

## 2023-04-27 DIAGNOSIS — I10 PRIMARY HYPERTENSION: ICD-10-CM

## 2023-04-27 RX ORDER — NALTREXONE HYDROCHLORIDE 50 MG/1
50 TABLET, FILM COATED ORAL DAILY
Qty: 30 TABLET | Refills: 1 | Status: SHIPPED | OUTPATIENT
Start: 2023-04-27

## 2023-04-27 RX ORDER — ALBUTEROL SULFATE 90 UG/1
2 AEROSOL, METERED RESPIRATORY (INHALATION) EVERY 4 HOURS PRN
Qty: 18 G | Refills: 1 | Status: SHIPPED | OUTPATIENT
Start: 2023-04-27

## 2023-04-27 RX ORDER — LISINOPRIL 10 MG/1
10 TABLET ORAL DAILY
Qty: 90 TABLET | Refills: 0 | Status: SHIPPED | OUTPATIENT
Start: 2023-04-27

## 2023-04-27 RX ORDER — CYCLOSPORINE 0.5 MG/ML
EMULSION OPHTHALMIC
COMMUNITY
Start: 2023-02-16

## 2023-04-27 NOTE — PROGRESS NOTES
The ABCs of the Annual Wellness Visit  Subsequent Medicare Wellness Visit    Subjective    Sarah Howard is a 67 y.o. female who presents for a Subsequent Medicare Wellness Visit.    The following portions of the patient's history were reviewed and   updated as appropriate: allergies, current medications, past family history, past medical history, past social history, past surgical history and problem list.    Compared to one year ago, the patient feels her physical   health is worse.    Compared to one year ago, the patient feels her mental   health is worse.    Mood  Last visit continued on cymbalta, lexapro, trazodone, referred to behavioral health    Recent Hospitalizations:  This patient has had a [facility] admission record on file within the last 365 days.  Admitted 4/3-4/5/23 for chest pain, had low risk stress test. Echocardiogram normal (EF 56.8%), AV sclerosis without significant stenosis.    Current Medical Providers:  Patient Care Team:  Dao Gomez MD as PCP - General (Internal Medicine)   Josefa Francisco APRN: Behavioral Health    Outpatient Medications Prior to Visit   Medication Sig Dispense Refill   • aspirin 81 MG EC tablet Take 1 tablet by mouth Daily. 90 tablet 3   • atorvastatin (LIPITOR) 20 MG tablet Take 1 tablet by mouth Daily. 90 tablet 0   • DULoxetine (CYMBALTA) 60 MG capsule Take 1 capsule by mouth Daily.     • escitalopram (LEXAPRO) 10 MG tablet Take 1 tablet by mouth Daily. 90 tablet 0   • latanoprost (XALATAN) 0.005 % ophthalmic solution      • metoprolol succinate XL (Toprol XL) 25 MG 24 hr tablet Take 1 tablet by mouth Daily. 90 tablet 1   • pantoprazole (PROTONIX) 40 MG EC tablet Take 1 tablet by mouth Daily. 90 tablet 0   • Restasis 0.05 % ophthalmic emulsion      • traZODone (DESYREL) 150 MG tablet Take 1 tablet by mouth Daily. 90 tablet 0     No facility-administered medications prior to visit.       No opioid medication identified on active medication list. I have  "reviewed chart for other potential  high risk medication/s and harmful drug interactions in the elderly.          Aspirin is on active medication list. Aspirin use is indicated based on review of current medical condition/s. Pros and cons of this therapy have been discussed today. Benefits of this medication outweigh potential harm.  Patient has been encouraged to continue taking this medication.  .      Patient Active Problem List   Diagnosis   • Mild depression   • Osteopenia   • GERD without esophagitis   • Coronary artery disease involving native coronary artery   • History of breast cancer   • Glaucoma   • Primary hypertension   • Alcohol use disorder     Advance Care Planning   Advance Care Planning     Advance Directive is not on file.  ACP discussion was held with the patient during this visit. Patient does not have an advance directive, information provided.     Objective    Vitals:    04/27/23 1401 04/27/23 1453   BP: 160/70 140/80   BP Location: Left arm    Patient Position: Sitting    Cuff Size: Adult    Pulse: 72    Resp: 20    Temp: 97.1 °F (36.2 °C)    TempSrc: Temporal    SpO2:  99%   Weight: 81.8 kg (180 lb 4 oz)    Height: 162.6 cm (64\")    PainSc: 0-No pain      Estimated body mass index is 30.94 kg/m² as calculated from the following:    Height as of this encounter: 162.6 cm (64\").    Weight as of this encounter: 81.8 kg (180 lb 4 oz).    BMI is >= 30 and <35. (Class 1 Obesity). The following options were offered after discussion;: exercise counseling/recommendations and nutrition counseling/recommendations      Does the patient have evidence of cognitive impairment? No    Lab Results   Component Value Date    TRIG 223 (H) 04/04/2023    HDL 54 04/04/2023    LDL 61 04/04/2023    VLDL 36 04/04/2023    HGBA1C 5.40 04/03/2023        HEALTH RISK ASSESSMENT    Smoking Status:  Social History     Tobacco Use   Smoking Status Never   • Passive exposure: Never   Smokeless Tobacco Never     Alcohol " Consumption:  Social History     Substance and Sexual Activity   Alcohol Use Yes   • Alcohol/week: 14.0 standard drinks   • Types: 14 Glasses of wine per week     Fall Risk Screen:    DAWN Fall Risk Assessment was completed, and patient is at HIGH risk for falls. Assessment completed on:2023    Depression Screenin/27/2023     2:06 PM   PHQ-2/PHQ-9 Depression Screening   Little Interest or Pleasure in Doing Things 1-->several days   Feeling Down, Depressed or Hopeless 1-->several days   Trouble Falling or Staying Asleep, or Sleeping Too Much 2-->more than half the days   Feeling Tired or Having Little Energy 0-->not at all   Poor Appetite or Overeating 1-->several days   Feeling Bad about Yourself - or that You are a Failure or Have Let Yourself or Your Family Down 0-->not at all   Trouble Concentrating on Things, Such as Reading the Newspaper or Watching Television 1-->several days   Moving or Speaking So Slowly that Other People Could Have Noticed? Or the Opposite - Being So Fidgety 1-->several days   Thoughts that You Would be Better Off Dead or of Hurting Yourself in Some Way 0-->not at all   PHQ-9: Brief Depression Severity Measure Score 7   If You Checked Off Any Problems, How Difficult Have These Problems Made It For You to Do Your Work, Take Care of Things at Home, or Get Along with Other People? not difficult at all       Health Habits and Functional and Cognitive Screenin/27/2023     2:04 PM   Functional & Cognitive Status   Do you have difficulty preparing food and eating? No   Do you have difficulty bathing yourself, getting dressed or grooming yourself? No   Do you have difficulty using the toilet? No   Do you have difficulty moving around from place to place? No   Do you have trouble with steps or getting out of a bed or a chair? No   Current Diet Unhealthy Diet   Dental Exam Not up to date   Eye Exam Up to date   Exercise (times per week) 1 times per week   Current Exercises  Include Walking   Do you need help using the phone?  No   Are you deaf or do you have serious difficulty hearing?  No   Do you need help with transportation? No   Do you need help shopping? No   Do you need help preparing meals?  No   Do you need help with housework?  No   Do you need help with laundry? No   Do you need help taking your medications? No   Do you need help managing money? No   Do you ever drive or ride in a car without wearing a seat belt? No   Have you felt unusual stress, anger or loneliness in the last month? No   Who do you live with? Spouse   If you need help, do you have trouble finding someone available to you? No   Have you been bothered in the last four weeks by sexual problems? No   Do you have difficulty concentrating, remembering or making decisions? Yes       Age-appropriate Screening Schedule:  Refer to the list below for future screening recommendations based on patient's age, sex and/or medical conditions. Orders for these recommended tests are listed in the plan section. The patient has been provided with a written plan.    Health Maintenance   Topic Date Due   • COLORECTAL CANCER SCREENING  Never done   • COVID-19 Vaccine (5 - Booster for Moderna series) 04/29/2023 (Originally 8/19/2022)   • MAMMOGRAM  07/06/2023   • INFLUENZA VACCINE  08/01/2023   • LIPID PANEL  04/04/2024   • ANNUAL WELLNESS VISIT  04/27/2024   • DXA SCAN  04/19/2025   • TDAP/TD VACCINES (2 - Tdap) 07/17/2032   • HEPATITIS C SCREENING  Completed   • Pneumococcal Vaccine 65+  Completed   • ZOSTER VACCINE  Discontinued                  CMS Preventative Services Quick Reference  Risk Factors Identified During Encounter  Alcohol Misuse: Patient encouraged to limit alcohol use to no more than 1 standard alcoholic beverage per day. (12 ounce beer, 6 ounce wine, one shot liquor).   Depression/Dysphoria: Current medication is effective, no change recommended  Fall Risk-High or Moderate: Discussed Fall Prevention in the  "home and Information on Fall Prevention Shared in After Visit Summary  Immunizations Discussed/Encouraged: COVID19  Dental Screening Recommended  The above risks/problems have been discussed with the patient.  Pertinent information has been shared with the patient in the After Visit Summary.  An After Visit Summary and PPPS were made available to the patient.    Follow Up:   Next Medicare Wellness visit to be scheduled in 1 year.       Additional E&M Note during same encounter follows:  Patient has multiple medical problems which are significant and separately identifiable that require additional work above and beyond the Medicare Wellness Visit.      Chief Complaint  Medicare Wellness-subsequent    Subjective        The patient is here today for Medicare Wellness. She has agreed to use [facility] for today's visit.     Shortness of breath/Fatigue   The patient reports she has been experiencing dizziness and shortness of breath, onset 04/23/2023. She adds she feels \"really tired and weak\". The patient states she experiences hot flashes in addition to this. She states she cannot \"go on like this\". She adds she coughs \"all the time\". She denies a productive cough. The patient states she did receive radiation and chemotherapy when she was diagnosed with breast cancer. She denies experiencing shortness of breath prior. She denies wheezing, fever, chills, chest pain, rhinorrhea, and sore throat. She denies feeling anxious or stressed. She adds she has to catch her breath while brushing her teeth. She reports hot flashes, but is postmenopausal.     Hospital encounter  The patient notes she has had a recent hospital encounter from 04/03/2023 to 04/05/2023. She notes that her heart is \"fine\". She states she does not know \"what is wrong with her\", as she is unable to do \"anything\". She denied feeling these symptoms previously.     Laboratory work   The patient addressed some concerns regarding recent laboratory work. She reports " "she has been having high leukocytes, and low neutrophils, which has gone on for \"months\".     COVID-19 exposure   The patient's  had COVID-19 in 01/2023, but the patient denies ever receiving a diagnosis.     Allergies   The patient notes she has allergies but does not take medication for this.     Elevated blood pressure  The patient reports she has been receiving elevated blood pressure readings at the hospital and at her residence. She adds she thinks she has previously taken medication or this before. She denies previously taking lisinopril.     Alcohol abuse   The patient notes she has a \"big issue with alcohol\". She states she has done things in her residence she does not remember. She notes her  is beginning to become concerned regarding her health. She notes she is attempting to cut back on her alcohol use, but she feels happier when she is drinking. She adds she feels she is dependent on alcohol. She notes she averaged a bottle of wine and a couple of vodka mixed drinks. She notes in the last week, she has cut back on her alcohol use, and has drank a couple of bottles of wine in the last week. She denies attempting to cease alcohol use in the past, but notes she was not as dependent as she is now.     Health maintenance   The patient notes she has an upcoming mammogram on 05/01/2023. She notes she has an upcoming colonoscopy. She adds she recently had a DEXA scan performed. She refuses the new COVID-19 vaccination currently.         Sarah Howard is also being seen today for shortness of breath and dizziness.     Review of Systems   Constitutional: Positive for fatigue. Negative for chills and fever.   HENT: Negative for rhinorrhea and sore throat.    Respiratory: Positive for shortness of breath. Negative for cough and wheezing.    Cardiovascular: Negative for chest pain.   Endocrine: Positive for heat intolerance.   Neurological: Positive for dizziness.   Psychiatric/Behavioral: The patient " "is not nervous/anxious.        Objective   Vital Signs:  /70 (BP Location: Left arm, Patient Position: Sitting, Cuff Size: Adult)   Pulse 72   Temp 97.1 °F (36.2 °C) (Temporal)   Resp 20   Ht 162.6 cm (64\")   Wt 81.8 kg (180 lb 4 oz)   BMI 30.94 kg/m²     Physical Exam  Vitals reviewed.   Constitutional:       General: She is not in acute distress.     Appearance: Normal appearance. She is obese. She is not ill-appearing or toxic-appearing.   HENT:      Head: Normocephalic and atraumatic.      Right Ear: External ear normal.      Left Ear: External ear normal.      Nose: Nose normal. No congestion.      Mouth/Throat:      Mouth: Mucous membranes are moist.      Pharynx: No oropharyngeal exudate or posterior oropharyngeal erythema.   Eyes:      General: No scleral icterus.     Extraocular Movements: Extraocular movements intact.      Pupils: Pupils are equal, round, and reactive to light.   Cardiovascular:      Rate and Rhythm: Normal rate and regular rhythm.      Pulses: Normal pulses.      Heart sounds: Normal heart sounds. No murmur heard.    No friction rub. No gallop.   Pulmonary:      Effort: Pulmonary effort is normal. No respiratory distress.      Breath sounds: Normal breath sounds. No stridor. No wheezing, rhonchi or rales.      Comments: Patient's pulse ox was 99% at rest.  We walked for approximately 2 to 3 minutes and upon completion pulse ox remained 98%.  There is still no wheezing rhonchi or crackles after exertion.  Abdominal:      General: Abdomen is flat. There is no distension.      Palpations: Abdomen is soft.   Musculoskeletal:         General: No swelling or tenderness. Normal range of motion.      Cervical back: Normal range of motion. No rigidity.   Skin:     General: Skin is warm and dry.      Capillary Refill: Capillary refill takes less than 2 seconds.      Findings: No erythema or rash.   Neurological:      General: No focal deficit present.      Mental Status: She is alert " and oriented to person, place, and time.   Psychiatric:         Mood and Affect: Mood normal.         Behavior: Behavior normal.         Judgment: Judgment normal.          The following data was reviewed by: Dao Gomez MD on 04/27/2023:   I reviewed lipid panel, CBC, BMP, TSH, A1c, BNP, dated 4/3/2023 and 4/4/2023. I reviewed stress test dated 4/5/2023 which was normal stress test and echocardiogram dated 4/4/2023 with normal ejection fraction 56.8%. And aortic valve sclerosis. No evidence of aortic valve stenosis.       Assessment and Plan   Diagnoses and all orders for this visit:    1. Medicare annual wellness visit, subsequent (Primary)    2. Primary hypertension  -     lisinopril (PRINIVIL,ZESTRIL) 10 MG tablet; Take 1 tablet by mouth Daily. If BP remains elevated (>140/90) after one week, increase to two pills (20mg) daily.  Dispense: 90 tablet; Refill: 0    3. Alcohol use disorder  -     naltrexone (DEPADE) 50 MG tablet; Take 1 tablet by mouth Daily.  Dispense: 30 tablet; Refill: 1  -     Ambulatory Referral to Social Care Services (Amb Case Mgmt)    4. Shortness of breath  -     albuterol sulfate  (90 Base) MCG/ACT inhaler; Inhale 2 puffs Every 4 (Four) Hours As Needed for Wheezing.  Dispense: 18 g; Refill: 1      - Lab work performed on 04/03/2023 were reviewed with the patient during today's visit. All questions the patient had were answered. The patient will begin taking lisinopril. She was advised if she is receiving readings over 140/90 mmHg, to increase the dosage to 2 tablets, then recheck in 1 week. She will send me a message if she does not hear from Josefa Caballero in 1 to 2 weeks for talk therapy for depression and alcohol dependnce. The patient was advised on AA and the benefits of this program. She is willing to do the program, so I will refer her to a . The patient was counseled on risks of excessive drinking. She is interested in medication. I will prescribe naltrexone.  Patient was counseled on this medication. She will follow-up in 1 month. Patient voiced understanding.     Transcribed from ambient dictation for Dao Gomez MD by Misty Mejía.  04/27/23   17:01 EDT    Patient or patient representative verbalized consent to the visit recording.  I have personally performed the services described in this document as transcribed by the above individual, and it is both accurate and complete.    I spent 40 minutes caring for Sarah on this date of service. This time includes time spent by me in the following activities: preparing for the visit, reviewing tests, performing a medically appropriate examination and/or evaluation, counseling and educating the patient/family/caregiver, ordering medications, tests, or procedures and documenting information in the medical record    I spent 15 minutes on the separately reported service of . This time is not included in the time used to support the E/M service also reported today.         Follow Up   No follow-ups on file.  Patient was given instructions and counseling regarding her condition or for health maintenance advice. Please see specific information pulled into the AVS if appropriate.       Dao Gomez MD

## 2023-04-27 NOTE — PATIENT INSTRUCTIONS
Recommend taking 800IU of Vitamin D daily, 1000mg calcium daily.    Health Maintenance, Female  Adopting a healthy lifestyle and getting preventive care can go a long way to promote health and wellness. Talk with your health care provider about what schedule of regular examinations is right for you. This is a good chance for you to check in with your provider about disease prevention and staying healthy.  In between checkups, there are plenty of things you can do on your own. Experts have done a lot of research about which lifestyle changes and preventive measures are most likely to keep you healthy. Ask your health care provider for more information.  Weight and diet  Eat a healthy diet  Be sure to include plenty of vegetables, fruits, low-fat dairy products, and lean protein.  Do not eat a lot of foods high in solid fats, added sugars, or salt.  Get regular exercise. This is one of the most important things you can do for your health.  Most adults should exercise for at least 150 minutes each week. The exercise should increase your heart rate and make you sweat (moderate-intensity exercise).  Most adults should also do strengthening exercises at least twice a week. This is in addition to the moderate-intensity exercise.     Maintain a healthy weight  Body mass index (BMI) is a measurement that can be used to identify possible weight problems. It estimates body fat based on height and weight. Your health care provider can help determine your BMI and help you achieve or maintain a healthy weight.  For females 20 years of age and older:  A BMI below 18.5 is considered underweight.  A BMI of 18.5 to 24.9 is normal.  A BMI of 25 to 29.9 is considered overweight.  A BMI of 30 and above is considered obese.     Watch levels of cholesterol and blood lipids  You should start having your blood tested for lipids and cholesterol at 20 years of age, then have this test every 5 years.  You may need to have your cholesterol  levels checked more often if:  Your lipid or cholesterol levels are high.  You are older than 50 years of age.  You are at high risk for heart disease.     Cancer screening  Lung Cancer  Lung cancer screening is recommended for adults 55-80 years old who are at high risk for lung cancer because of a history of smoking.  A yearly low-dose CT scan of the lungs is recommended for people who:  Currently smoke.  Have quit within the past 15 years.  Have at least a 30-pack-year history of smoking. A pack year is smoking an average of one pack of cigarettes a day for 1 year.  Yearly screening should continue until it has been 15 years since you quit.  Yearly screening should stop if you develop a health problem that would prevent you from having lung cancer treatment.     Breast Cancer  Practice breast self-awareness. This means understanding how your breasts normally appear and feel.  It also means doing regular breast self-exams. Let your health care provider know about any changes, no matter how small.  If you are in your 20s or 30s, you should have a clinical breast exam (CBE) by a health care provider every 1-3 years as part of a regular health exam.  If you are 40 or older, have a CBE every year. Also consider having a breast X-ray (mammogram) every year.  If you have a family history of breast cancer, talk to your health care provider about genetic screening.  If you are at high risk for breast cancer, talk to your health care provider about having an MRI and a mammogram every year.  Breast cancer gene (BRCA) assessment is recommended for women who have family members with BRCA-related cancers. BRCA-related cancers include:  Breast.  Ovarian.  Tubal.  Peritoneal cancers.  Results of the assessment will determine the need for genetic counseling and BRCA1 and BRCA2 testing.     Cervical Cancer  Your health care provider may recommend that you be screened regularly for cancer of the pelvic organs (ovaries, uterus, and  vagina). This screening involves a pelvic examination, including checking for microscopic changes to the surface of your cervix (Pap test). You may be encouraged to have this screening done every 3 years, beginning at age 21.  For women ages 30-65, health care providers may recommend pelvic exams and Pap testing every 3 years, or they may recommend the Pap and pelvic exam, combined with testing for human papilloma virus (HPV), every 5 years. Some types of HPV increase your risk of cervical cancer. Testing for HPV may also be done on women of any age with unclear Pap test results.  Other health care providers may not recommend any screening for nonpregnant women who are considered low risk for pelvic cancer and who do not have symptoms. Ask your health care provider if a screening pelvic exam is right for you.  If you have had past treatment for cervical cancer or a condition that could lead to cancer, you need Pap tests and screening for cancer for at least 20 years after your treatment. If Pap tests have been discontinued, your risk factors (such as having a new sexual partner) need to be reassessed to determine if screening should resume. Some women have medical problems that increase the chance of getting cervical cancer. In these cases, your health care provider may recommend more frequent screening and Pap tests.     Colorectal Cancer  This type of cancer can be detected and often prevented.  Routine colorectal cancer screening usually begins at 50 years of age and continues through 75 years of age.  Your health care provider may recommend screening at an earlier age if you have risk factors for colon cancer.  Your health care provider may also recommend using home test kits to check for hidden blood in the stool.  A small camera at the end of a tube can be used to examine your colon directly (sigmoidoscopy or colonoscopy). This is done to check for the earliest forms of colorectal cancer.  Routine screening  usually begins at age 50.  Direct examination of the colon should be repeated every 5-10 years through 75 years of age. However, you may need to be screened more often if early forms of precancerous polyps or small growths are found.     Skin Cancer  Check your skin from head to toe regularly.  Tell your health care provider about any new moles or changes in moles, especially if there is a change in a mole's shape or color.  Also tell your health care provider if you have a mole that is larger than the size of a pencil eraser.  Always use sunscreen. Apply sunscreen liberally and repeatedly throughout the day.  Protect yourself by wearing long sleeves, pants, a wide-brimmed hat, and sunglasses whenever you are outside.     Heart disease, diabetes, and high blood pressure  High blood pressure causes heart disease and increases the risk of stroke. High blood pressure is more likely to develop in:  People who have blood pressure in the high end of the normal range (130-139/85-89 mm Hg).  People who are overweight or obese.  People who are .  If you are 18-39 years of age, have your blood pressure checked every 3-5 years. If you are 40 years of age or older, have your blood pressure checked every year. You should have your blood pressure measured twice--once when you are at a hospital or clinic, and once when you are not at a hospital or clinic. Record the average of the two measurements. To check your blood pressure when you are not at a hospital or clinic, you can use:  An automated blood pressure machine at a pharmacy.  A home blood pressure monitor.  If you are between 55 years and 79 years old, ask your health care provider if you should take aspirin to prevent strokes.  Have regular diabetes screenings. This involves taking a blood sample to check your fasting blood sugar level.  If you are at a normal weight and have a low risk for diabetes, have this test once every three years after 45 years of  age.  If you are overweight and have a high risk for diabetes, consider being tested at a younger age or more often.  Preventing infection  Hepatitis B  If you have a higher risk for hepatitis B, you should be screened for this virus. You are considered at high risk for hepatitis B if:  You were born in a country where hepatitis B is common. Ask your health care provider which countries are considered high risk.  Your parents were born in a high-risk country, and you have not been immunized against hepatitis B (hepatitis B vaccine).  You have HIV or AIDS.  You use needles to inject street drugs.  You live with someone who has hepatitis B.  You have had sex with someone who has hepatitis B.  You get hemodialysis treatment.  You take certain medicines for conditions, including cancer, organ transplantation, and autoimmune conditions.     Hepatitis C  Blood testing is recommended for:  Everyone born from 1945 through 1965.  Anyone with known risk factors for hepatitis C.     Sexually transmitted infections (STIs)  You should be screened for sexually transmitted infections (STIs) including gonorrhea and chlamydia if:  You are sexually active and are younger than 24 years of age.  You are older than 24 years of age and your health care provider tells you that you are at risk for this type of infection.  Your sexual activity has changed since you were last screened and you are at an increased risk for chlamydia or gonorrhea. Ask your health care provider if you are at risk.  If you do not have HIV, but are at risk, it may be recommended that you take a prescription medicine daily to prevent HIV infection. This is called pre-exposure prophylaxis (PrEP). You are considered at risk if:  You are sexually active and do not regularly use condoms or know the HIV status of your partner(s).  You take drugs by injection.  You are sexually active with a partner who has HIV.     Talk with your health care provider about whether you  are at high risk of being infected with HIV. If you choose to begin PrEP, you should first be tested for HIV. You should then be tested every 3 months for as long as you are taking PrEP.  Pregnancy  If you are premenopausal and you may become pregnant, ask your health care provider about preconception counseling.  If you may become pregnant, take 400 to 800 micrograms (mcg) of folic acid every day.  If you want to prevent pregnancy, talk to your health care provider about birth control (contraception).  Osteoporosis and menopause  Osteoporosis is a disease in which the bones lose minerals and strength with aging. This can result in serious bone fractures. Your risk for osteoporosis can be identified using a bone density scan.  If you are 65 years of age or older, or if you are at risk for osteoporosis and fractures, ask your health care provider if you should be screened.  Ask your health care provider whether you should take a calcium or vitamin D supplement to lower your risk for osteoporosis.  Menopause may have certain physical symptoms and risks.  Hormone replacement therapy may reduce some of these symptoms and risks.  Talk to your health care provider about whether hormone replacement therapy is right for you.  Follow these instructions at home:  Schedule regular health, dental, and eye exams.  Stay current with your immunizations.  Do not use any tobacco products including cigarettes, chewing tobacco, or electronic cigarettes.  If you are pregnant, do not drink alcohol.  If you are breastfeeding, limit how much and how often you drink alcohol.  Limit alcohol intake to no more than 1 drink per day for nonpregnant women. One drink equals 12 ounces of beer, 5 ounces of wine, or 1½ ounces of hard liquor.  Do not use street drugs.  Do not share needles.  Ask your health care provider for help if you need support or information about quitting drugs.  Tell your health care provider if you often feel  depressed.  Tell your health care provider if you have ever been abused or do not feel safe at home.  This information is not intended to replace advice given to you by your health care provider. Make sure you discuss any questions you have with your health care provider.  Document Released: 07/02/2012 Document Revised: 05/25/2017 Document Reviewed: 09/20/2016  [employer] Patient Education © 2018 [employer]    Advance Care Planning and Advance Directives      You make decisions on a daily basis - decisions about where you want to live, your career, your home, your life. Perhaps one of the most important decisions you face is your choice for future medical care. Take time to talk with your family and your healthcare team and start planning today.  Advance Care Planning is a process that can help you:  Understand possible future healthcare decisions in light of your own experiences  Reflect on those decision in light of your goals and values  Discuss your decisions with those closest to you and the healthcare professionals that care for you  Make a plan by creating a document that reflects your wishes     Surrogate Decision Maker  In the event of a medical emergency, which has left you unable to communicate or to make your own decisions, you would need someone to make decisions for you.  It is important to discuss your preferences for medical treatment with this person while you are in good health.      Qualities of a surrogate decision maker:  Willing to take on this role and responsibility  Knows what you want for future medical care  Willing to follow your wishes even if they don't agree with them  Able to make difficult medical decisions under stressful circumstances     Advance Directives  These are legal documents you can create that will guide your healthcare team and decision maker(s) when needed. These documents can be stored in the electronic medical record.     Living Will - a legal document to  guide your care if you have a terminal condition or a serious illness and are unable to communicate. States vary by statute in document names/types, but most forms may include one or more of the following:              -  Directions regarding life-prolonging treatments              -  Directions regarding artificially provided nutrition/hydration              -  Choosing a healthcare decision maker              -  Direction regarding organ/tissue donation     Durable Power of  for Healthcare - this document names an -in-fact to make medical decisions for you, but it may also allow this person to make personal and financial decisions for you. Please seek the advice of an  if you need this type of document.     **Advance Directives are not required and no one may discriminate against you if you do not sign one.     Medical Orders  Many states allow specific forms/orders signed by your physician to record your wishes for medical treatment in your current state of health. This form, signed in personal communication with your physician, addresses resuscitation and other medical interventions that you may or may not want.        For more information or to schedule a time with a [facility] Advance Care Planning Facilitator contact: [facility]/ACP or call [phone] and someone will contact you directly.     Fall Prevention in the Home, Adult  Falls can cause injuries and can happen to people of all ages. There are many things you can do to make your home safe and to help prevent falls. Ask for help when making these changes.  What actions can I take to prevent falls?  General Instructions  Use good lighting in all rooms. Replace any light bulbs that burn out.  Turn on the lights in dark areas. Use night-lights.  Keep items that you use often in easy-to-reach places. Lower the shelves around your home if needed.  Set up your furniture so you have a clear path. Avoid moving your furniture  around.  Do not have throw rugs or other things on the floor that can make you trip.  Avoid walking on wet floors.  If any of your floors are uneven, fix them.  Add color or contrast paint or tape to clearly brynn and help you see:  Grab bars or handrails.  First and last steps of staircases.  Where the edge of each step is.  If you use a stepladder:  Make sure that it is fully opened. Do not climb a closed stepladder.  Make sure the sides of the stepladder are locked in place.  Ask someone to hold the stepladder while you use it.  Know where your pets are when moving through your home.  What can I do in the bathroom?         Keep the floor dry. Clean up any water on the floor right away.  Remove soap buildup in the tub or shower.  Use nonskid mats or decals on the floor of the tub or shower.  Attach bath mats securely with double-sided, nonslip rug tape.  If you need to sit down in the shower, use a plastic, nonslip stool.  Install grab bars by the toilet and in the tub and shower. Do not use towel bars as grab bars.  What can I do in the bedroom?  Make sure that you have a light by your bed that is easy to reach.  Do not use any sheets or blankets for your bed that hang to the floor.  Have a firm chair with side arms that you can use for support when you get dressed.  What can I do in the kitchen?  Clean up any spills right away.  If you need to reach something above you, use a step stool with a grab bar.  Keep electrical cords out of the way.  Do not use floor polish or wax that makes floors slippery.  What can I do with my stairs?  Do not leave any items on the stairs.  Make sure that you have a light switch at the top and the bottom of the stairs.  Make sure that there are handrails on both sides of the stairs. Fix handrails that are broken or loose.  Install nonslip stair treads on all your stairs.  Avoid having throw rugs at the top or bottom of the stairs.  Choose a carpet that does not hide the edge of the  steps on the stairs.  Check carpeting to make sure that it is firmly attached to the stairs. Fix carpet that is loose or worn.  What can I do on the outside of my home?  Use bright outdoor lighting.  Fix the edges of walkways and driveways and fix any cracks.  Remove anything that might make you trip as you walk through a door, such as a raised step or threshold.  Trim any bushes or trees on paths to your home.  Check to see if handrails are loose or broken and that both sides of all steps have handrails.  Install guardrails along the edges of any raised decks and porches.  Clear paths of anything that can make you trip, such as tools or rocks.  Have leaves, snow, or ice cleared regularly.  Use sand or salt on paths during winter.  Clean up any spills in your garage right away. This includes grease or oil spills.  What other actions can I take?  Wear shoes that:  Have a low heel. Do not wear high heels.  Have rubber bottoms.  Feel good on your feet and fit well.  Are closed at the toe. Do not wear open-toe sandals.  Use tools that help you move around if needed. These include:  Canes.  Walkers.  Scooters.  Crutches.  Review your medicines with your doctor. Some medicines can make you feel dizzy. This can increase your chance of falling.  Ask your doctor what else you can do to help prevent falls.  Where to find more information  Centers for Disease Control and PreventionDAWN: www.cdc.gov  National [employer] on Aging: www.scott.nih.gov  Contact a doctor if:  You are afraid of falling at home.  You feel weak, drowsy, or dizzy at home.  You fall at home.  Summary  There are many simple things that you can do to make your home safe and to help prevent falls.  Ways to make your home safe include removing things that can make you trip and installing grab bars in the bathroom.  Ask for help when making these changes in your home.  This information is not intended to replace advice given to you by your health care  provider. Make sure you discuss any questions you have with your health care provider.  Document Revised: 07/21/2021 Document Reviewed: 07/21/2021  [employer] Patient Education © 2021 [employer]

## 2023-04-28 ENCOUNTER — REFERRAL TRIAGE (OUTPATIENT)
Dept: CASE MANAGEMENT | Facility: OTHER | Age: 68
End: 2023-04-28
Payer: MEDICARE

## 2023-05-01 ENCOUNTER — APPOINTMENT (OUTPATIENT)
Dept: OTHER | Facility: HOSPITAL | Age: 68
End: 2023-05-01
Payer: MEDICARE

## 2023-05-01 ENCOUNTER — HOSPITAL ENCOUNTER (OUTPATIENT)
Dept: MAMMOGRAPHY | Facility: HOSPITAL | Age: 68
Discharge: HOME | End: 2023-05-01
Payer: MEDICARE

## 2023-05-01 ENCOUNTER — PATIENT OUTREACH (OUTPATIENT)
Dept: CASE MANAGEMENT | Facility: OTHER | Age: 68
End: 2023-05-01
Payer: MEDICARE

## 2023-05-01 DIAGNOSIS — Z12.31 ENCOUNTER FOR SCREENING MAMMOGRAM FOR MALIGNANT NEOPLASM OF BREAST: ICD-10-CM

## 2023-05-01 PROCEDURE — 77063 BREAST TOMOSYNTHESIS BI: CPT

## 2023-05-01 PROCEDURE — 77067 SCR MAMMO BI INCL CAD: CPT

## 2023-05-01 NOTE — OUTREACH NOTE
Social Work Assessment  Questions/Answers    Flowsheet Row Most Recent Value   Referral Source physician   Reason for Consult community resources   Preferred Language English   People in Home spouse   Current Living Arrangements home   Primary Care Provided by self   Provides Primary Care For no one   Family Caregiver if Needed spouse   Employment Status retired   Source of Income none   Medications independent   Meal Preparation independent   Housekeeping independent   Laundry independent   Shopping independent      SDOH updated and reviewed with the patient during this program:  Financial Resource Strain: Low Risk    • Difficulty of Paying Living Expenses: Not hard at all      Food Insecurity: No Food Insecurity   • Worried About Running Out of Food in the Last Year: Never true   • Ran Out of Food in the Last Year: Never true      Transportation Needs: No Transportation Needs   • Lack of Transportation (Medical): No   • Lack of Transportation (Non-Medical): No      Housing Stability: Low Risk    • Unable to Pay for Housing in the Last Year: No   • Number of Places Lived in the Last Year: 1   • Unstable Housing in the Last Year: No   Patient Outreach    MAICOL contacted pt to discuss resources. Pt   interested in finding an Alcoholics Anonymous meeting. At pt request, MAICOL sent pt a link to find meetings near her. No other needs were expressed, so SW will close referral.    Chanelle GUTIERREZ -   Ambulatory Case Management    5/1/2023, 16:35 EDT

## 2023-05-12 RX ORDER — DULOXETIN HYDROCHLORIDE 60 MG/1
60 CAPSULE, DELAYED RELEASE ORAL DAILY
Qty: 90 CAPSULE | Refills: 1 | Status: SHIPPED | OUTPATIENT
Start: 2023-05-12

## 2023-05-15 RX ORDER — SODIUM PICOSULFATE, MAGNESIUM OXIDE, AND ANHYDROUS CITRIC ACID 10; 3.5; 12 MG/160ML; G/160ML; G/160ML
350 LIQUID ORAL TAKE AS DIRECTED
Qty: 320 ML | Refills: 0 | Status: SHIPPED | OUTPATIENT
Start: 2023-05-15

## 2023-05-18 ENCOUNTER — OUTSIDE FACILITY SERVICE (OUTPATIENT)
Dept: GASTROENTEROLOGY | Facility: CLINIC | Age: 68
End: 2023-05-18
Payer: MEDICARE

## 2023-05-18 ENCOUNTER — LAB REQUISITION (OUTPATIENT)
Dept: LAB | Facility: HOSPITAL | Age: 68
End: 2023-05-18
Payer: MEDICARE

## 2023-05-18 DIAGNOSIS — Z12.11 ENCOUNTER FOR SCREENING FOR MALIGNANT NEOPLASM OF COLON: ICD-10-CM

## 2023-05-18 PROCEDURE — 45385 COLONOSCOPY W/LESION REMOVAL: CPT | Performed by: INTERNAL MEDICINE

## 2023-05-18 PROCEDURE — 88305 TISSUE EXAM BY PATHOLOGIST: CPT

## 2023-05-19 LAB — REF LAB TEST METHOD: NORMAL

## 2023-05-25 ENCOUNTER — OFFICE VISIT (OUTPATIENT)
Dept: INTERNAL MEDICINE | Facility: CLINIC | Age: 68
End: 2023-05-25
Payer: MEDICARE

## 2023-05-25 VITALS
SYSTOLIC BLOOD PRESSURE: 130 MMHG | WEIGHT: 182.25 LBS | HEART RATE: 75 BPM | DIASTOLIC BLOOD PRESSURE: 80 MMHG | TEMPERATURE: 97.8 F | RESPIRATION RATE: 20 BRPM | BODY MASS INDEX: 31.28 KG/M2

## 2023-05-25 DIAGNOSIS — F10.90 ALCOHOL USE DISORDER: ICD-10-CM

## 2023-05-25 DIAGNOSIS — I10 PRIMARY HYPERTENSION: Primary | ICD-10-CM

## 2023-05-25 DIAGNOSIS — J30.9 ALLERGIC RHINITIS, UNSPECIFIED SEASONALITY, UNSPECIFIED TRIGGER: ICD-10-CM

## 2023-05-25 PROCEDURE — 80053 COMPREHEN METABOLIC PANEL: CPT | Performed by: STUDENT IN AN ORGANIZED HEALTH CARE EDUCATION/TRAINING PROGRAM

## 2023-05-25 RX ORDER — PEG-3350, SODIUM SULFATE, SODIUM CHLORIDE, POTASSIUM CHLORIDE, SODIUM ASCORBATE AND ASCORBIC ACID 7.5-2.691G
KIT ORAL
COMMUNITY
Start: 2023-05-16

## 2023-05-25 RX ORDER — FLUTICASONE PROPIONATE 50 MCG
2 SPRAY, SUSPENSION (ML) NASAL DAILY
Qty: 16 G | Refills: 5 | Status: SHIPPED | OUTPATIENT
Start: 2023-05-25 | End: 2023-05-25

## 2023-05-25 RX ORDER — LEVOCETIRIZINE DIHYDROCHLORIDE 5 MG/1
5 TABLET, FILM COATED ORAL EVERY EVENING
Qty: 90 TABLET | Refills: 1 | Status: SHIPPED | OUTPATIENT
Start: 2023-05-25

## 2023-05-25 RX ORDER — LEVOCETIRIZINE DIHYDROCHLORIDE 5 MG/1
5 TABLET, FILM COATED ORAL EVERY EVENING
Qty: 90 TABLET | Refills: 1 | Status: SHIPPED | OUTPATIENT
Start: 2023-05-25 | End: 2023-05-25

## 2023-05-25 RX ORDER — FLUTICASONE PROPIONATE 50 MCG
2 SPRAY, SUSPENSION (ML) NASAL DAILY
Qty: 16 G | Refills: 5 | Status: SHIPPED | OUTPATIENT
Start: 2023-05-25

## 2023-05-25 NOTE — PROGRESS NOTES
Follow Up Office Visit      Date: 2023   Patient Name: Sarah Howard  : 1955   MRN: [id number]     Chief Complaint:    Chief Complaint   Patient presents with   • Follow-up     4 week hypertension, SOB       History of Present Illness: Sarah Howard is a 67 y.o. female who is here today to follow up with the following problems.    HPI    Alcohol use  She reports that she is well. She has started going to alcohol anonymous meetings, and she is on day 24 of abstinence. She does not have a sponsor yet. She notes that this is a complete lifestyle change. She takes naltrexone and it makes her sleepy.     Allergic rhinitis  She endorses postnasal drainage, congestion, and cough. She has coughing spells during the day and is unable to sleep at night due to coughing. She has a tickle in her throat that makes her cough. She is taking Sinex and cough drops. She denies any sneezing, fever, or chills. She had issues with allergies years ago. She is unsure of what medication to take for her allergies. She tried Flonase in the past. She has a history of watery eyes, and she follows her ophthalmologist for this issue and has an appointment in the fall . She takes Restasis for her eyes.     Hypertension  Her blood pressure is 130/80 mmHg today. She is not checking it at home. She reports that she can feel when her blood pressure elevates. She takes lisinopril 1 tablet daily and tolerates it well. She denies any chest pain or palpitations.     Health maintenance  She had her colonoscopy last week. She is up to date with her bone density scan and mammogram. She takes aspirin and vitamin supplements daily.       Subjective      Review of Systems:   Review of Systems    I have reviewed the patients family history, social history, past medical history, past surgical history and have updated it as appropriate.     Medications:     Current Outpatient Medications:   •  albuterol sulfate  (90 Base) MCG/ACT  inhaler, Inhale 2 puffs Every 4 (Four) Hours As Needed for Wheezing., Disp: 18 g, Rfl: 1  •  aspirin 81 MG EC tablet, Take 1 tablet by mouth Daily., Disp: 90 tablet, Rfl: 3  •  atorvastatin (LIPITOR) 20 MG tablet, Take 1 tablet by mouth Daily., Disp: 90 tablet, Rfl: 0  •  DULoxetine (CYMBALTA) 60 MG capsule, Take 1 capsule by mouth Daily., Disp: 90 capsule, Rfl: 1  •  escitalopram (LEXAPRO) 10 MG tablet, Take 1 tablet by mouth Daily., Disp: 90 tablet, Rfl: 0  •  fluticasone (FLONASE) 50 MCG/ACT nasal spray, 2 sprays into the nostril(s) as directed by provider Daily., Disp: 16 g, Rfl: 5  •  latanoprost (XALATAN) 0.005 % ophthalmic solution, , Disp: , Rfl:   •  levocetirizine (XYZAL) 5 MG tablet, Take 1 tablet by mouth Every Evening., Disp: 90 tablet, Rfl: 1  •  lisinopril (PRINIVIL,ZESTRIL) 10 MG tablet, Take 1 tablet by mouth Daily. If BP remains elevated (>140/90) after one week, increase to two pills (20mg) daily., Disp: 90 tablet, Rfl: 0  •  metoprolol succinate XL (Toprol XL) 25 MG 24 hr tablet, Take 1 tablet by mouth Daily., Disp: 90 tablet, Rfl: 1  •  pantoprazole (PROTONIX) 40 MG EC tablet, Take 1 tablet by mouth Daily., Disp: 90 tablet, Rfl: 0  •  PEG-KCl-NaCl-NaSulf-Na Asc-C (MOVIPREP) 100 g reconstituted solution powder, , Disp: , Rfl:   •  Restasis 0.05 % ophthalmic emulsion, , Disp: , Rfl:   •  traZODone (DESYREL) 150 MG tablet, Take 1 tablet by mouth Daily., Disp: 90 tablet, Rfl: 0    Allergies:   No Known Allergies    Objective     Physical Exam: Please see above  Vital Signs:   Vitals:    05/25/23 1414   BP: 130/80   BP Location: Left arm   Patient Position: Sitting   Cuff Size: Adult   Pulse: 75   Resp: 20   Temp: 97.8 °F (36.6 °C)   TempSrc: Temporal   Weight: 82.7 kg (182 lb 4 oz)   PainSc: 0-No pain     Body mass index is 31.28 kg/m².  BMI is >= 30 and <35. (Class 1 Obesity). The following options were offered after discussion;: nutrition counseling/recommendations       Physical Exam  Vitals  reviewed.   Constitutional:       General: She is not in acute distress.     Appearance: Normal appearance. She is obese. She is not ill-appearing or toxic-appearing.   HENT:      Head: Normocephalic and atraumatic.      Right Ear: Tympanic membrane and external ear normal.      Left Ear: Tympanic membrane and external ear normal.      Nose: Congestion present. No rhinorrhea.      Comments: Inflamed nasal turbinates     Mouth/Throat:      Mouth: Mucous membranes are moist.      Pharynx: No oropharyngeal exudate or posterior oropharyngeal erythema.      Comments: Post nasal drip  Eyes:      General: No scleral icterus.     Extraocular Movements: Extraocular movements intact.      Pupils: Pupils are equal, round, and reactive to light.   Cardiovascular:      Rate and Rhythm: Normal rate and regular rhythm.      Pulses: Normal pulses.      Heart sounds: Normal heart sounds. No murmur heard.    No friction rub. No gallop.   Pulmonary:      Effort: Pulmonary effort is normal. No respiratory distress.      Breath sounds: Normal breath sounds. No stridor. No wheezing, rhonchi or rales.   Abdominal:      General: Abdomen is flat. There is no distension.   Musculoskeletal:         General: No swelling or tenderness. Normal range of motion.      Cervical back: Normal range of motion. No rigidity.   Lymphadenopathy:      Cervical: No cervical adenopathy.   Skin:     General: Skin is warm and dry.      Capillary Refill: Capillary refill takes less than 2 seconds.      Findings: No erythema or rash.   Neurological:      General: No focal deficit present.      Mental Status: She is alert and oriented to person, place, and time.   Psychiatric:         Mood and Affect: Mood normal.         Behavior: Behavior normal.         Judgment: Judgment normal.         Procedures    Results:   Labs:   Hemoglobin A1C   Date Value Ref Range Status   04/03/2023 5.40 4.80 - 5.60 % Final     TSH   Date Value Ref Range Status   04/03/2023 3.000  0.270 - 4.200 uIU/mL Final        Imaging:   No valid procedures specified.     Assessment / Plan      Assessment/Plan:   Problem List Items Addressed This Visit        Cardiac and Vasculature    Primary hypertension - Primary    Current Assessment & Plan     Hypertension is improving with treatment.  Continue current treatment regimen.  Blood pressure will be reassessed in 3 months.         Relevant Orders    Comprehensive metabolic panel       Mental Health    Alcohol use disorder    Current Assessment & Plan     Improving. Has not noticed change with naltrexone other than sleepiness. Continue AA. Ok to stop naltrexone at this time, but can resume if she notes increased cravings.        Other Visit Diagnoses     Allergic rhinitis, unspecified seasonality, unspecified trigger        Flonase and Xyzal were prescribed.       Relevant Medications    fluticasone (FLONASE) 50 MCG/ACT nasal spray    levocetirizine (XYZAL) 5 MG tablet            Follow Up:   Return in about 3 months (around 8/25/2023) for Recheck blood pressure, alcohol use..    Jaxon Serrano       Transcribed from ambient dictation for Dao Gomez MD by Jaxon Serrano.  05/25/23   15:26 EDT    Patient or patient representative verbalized consent to the visit recording.  I have personally performed the services described in this document as transcribed by the above individual, and it is both accurate and complete.

## 2023-05-25 NOTE — PATIENT INSTRUCTIONS
"Healthy Eating  Following a healthy eating pattern may help you to achieve and maintain a healthy body weight, reduce the risk of chronic disease, and live a long and productive life. It is important to follow a healthy eating pattern at an appropriate calorie level for your body. Your nutritional needs should be met primarily through food by choosing a variety of nutrient-rich foods.  What are tips for following this plan?  Reading food labels  Read labels and choose the following:  Reduced or low sodium.  Juices with 100% fruit juice.  Foods with low saturated fats and high polyunsaturated and monounsaturated fats.  Foods with whole grains, such as whole wheat, cracked wheat, brown rice, and wild rice.  Whole grains that are fortified with folic acid. This is recommended for women who are pregnant or who want to become pregnant.  Read labels and avoid the following:  Foods with a lot of added sugars. These include foods that contain brown sugar, corn sweetener, corn syrup, dextrose, fructose, glucose, high-fructose corn syrup, honey, invert sugar, lactose, malt syrup, maltose, molasses, raw sugar, sucrose, trehalose, or turbinado sugar.  Do not eat more than the following amounts of added sugar per day:  6 teaspoons (25 g) for women.  9 teaspoons (38 g) for men.  Foods that contain processed or refined starches and grains.  Refined grain products, such as white flour, degermed cornmeal, white bread, and white rice.  Shopping  Choose nutrient-rich snacks, such as vegetables, whole fruits, and nuts. Avoid high-calorie and high-sugar snacks, such as potato chips, fruit snacks, and candy.  Use oil-based dressings and spreads on foods instead of solid fats such as butter, stick margarine, or cream cheese.  Limit pre-made sauces, mixes, and \"instant\" products such as flavored rice, instant noodles, and ready-made pasta.  Try more plant-protein sources, such as tofu, tempeh, black beans, edamame, lentils, nuts, and " seeds.  Explore eating plans such as the Mediterranean diet or vegetarian diet.  Cooking  Use oil to sauté or stir-martinez foods instead of solid fats such as butter, stick margarine, or lard.  Try baking, boiling, grilling, or broiling instead of frying.  Remove the fatty part of meats before cooking.  Steam vegetables in water or broth.  Meal planning    At meals, imagine dividing your plate into fourths:  One-half of your plate is fruits and vegetables.  One-fourth of your plate is whole grains.  One-fourth of your plate is protein, especially lean meats, poultry, eggs, tofu, beans, or nuts.  Include low-fat dairy as part of your daily diet.     Lifestyle  Choose healthy options in all settings, including home, work, school, restaurants, or stores.  Prepare your food safely:  Wash your hands after handling raw meats.  Keep food preparation surfaces clean by regularly washing with hot, soapy water.  Keep raw meats separate from ready-to-eat foods, such as fruits and vegetables.  , meat, poultry, and eggs to the recommended internal temperature.  Store foods at safe temperatures. In general:  Keep cold foods at 40°F (4.4°C) or below.  Keep hot foods at 140°F (60°C) or above.  Keep your freezer at 0°F (-17.8°C) or below.  Foods are no longer safe to eat when they have been between the temperatures of 40°-140°F (4.4-60°C) for more than 2 hours.  What foods should I eat?  Fruits  Aim to eat 2 cup-equivalents of fresh, canned (in natural juice), or frozen fruits each day. Examples of 1 cup-equivalent of fruit include 1 small apple, 8 large strawberries, 1 cup canned fruit, ½ cup dried fruit, or 1 cup 100% juice.  Vegetables  Aim to eat 2½-3 cup-equivalents of fresh and frozen vegetables each day, including different varieties and colors. Examples of 1 cup-equivalent of vegetables include 2 medium carrots, 2 cups raw, leafy greens, 1 cup chopped vegetable (raw or cooked), or 1 medium baked potato.  Grains  Aim  to eat 6 ounce-equivalents of whole grains each day. Examples of 1 ounce-equivalent of grains include 1 slice of bread, 1 cup ready-to-eat cereal, 3 cups popcorn, or ½ cup cooked rice, pasta, or cereal.  Meats and other proteins  Aim to eat 5-6 ounce-equivalents of protein each day. Examples of 1 ounce-equivalent of protein include 1 egg, 1/2 cup nuts or seeds, or 1 tablespoon (16 g) peanut butter. A cut of meat or fish that is the size of a deck of cards is about 3-4 ounce-equivalents.  Of the protein you eat each week, try to have at least 8 ounces come from seafood. This includes salmon, trout, herring, and anchovies.  Dairy  Aim to eat 3 cup-equivalents of fat-free or low-fat dairy each day. Examples of 1 cup-equivalent of dairy include 1 cup (240 mL) milk, 8 ounces (250 g) yogurt, 1½ ounces (44 g) natural cheese, or 1 cup (240 mL) fortified soy milk.  Fats and oils  Aim for about 5 teaspoons (21 g) per day. Choose monounsaturated fats, such as canola and olive oils, avocados, peanut butter, and most nuts, or polyunsaturated fats, such as sunflower, corn, and soybean oils, walnuts, pine nuts, sesame seeds, sunflower seeds, and flaxseed.  Beverages  Aim for six 8-oz glasses of water per day. Limit coffee to three to five 8-oz cups per day.  Limit caffeinated beverages that have added calories, such as soda and energy drinks.  Limit alcohol intake to no more than 1 drink a day for nonpregnant women and 2 drinks a day for men. One drink equals 12 oz of beer (355 mL), 5 oz of wine (148 mL), or 1½ oz of hard liquor (44 mL).  Seasoning and other foods  Avoid adding excess amounts of salt to your foods. Try flavoring foods with herbs and spices instead of salt.  Avoid adding sugar to foods.  Try using oil-based dressings, sauces, and spreads instead of solid fats.  This information is based on general [place] nutrition guidelines. For more information, visit choosemyplate.gov. Exact amounts may vary based on your  nutrition needs.  Summary  A healthy eating plan may help you to maintain a healthy weight, reduce the risk of chronic diseases, and stay active throughout your life.  Plan your meals. Make sure you eat the right portions of a variety of nutrient-rich foods.  Try baking, boiling, grilling, or broiling instead of frying.  Choose healthy options in all settings, including home, work, school, restaurants, or stores.  This information is not intended to replace advice given to you by your health care provider. Make sure you discuss any questions you have with your health care provider.  Document Revised: 04/01/2019 Document Reviewed: 04/01/2019  Elsevier Patient Education © 2021 [employer]

## 2023-05-25 NOTE — ASSESSMENT & PLAN NOTE
Improving. Has not noticed change with naltrexone other than sleepiness. Continue AA. Ok to stop naltrexone at this time, but can resume if she notes increased cravings.

## 2023-05-26 LAB
ALBUMIN SERPL-MCNC: 4.5 G/DL (ref 3.5–5.2)
ALBUMIN/GLOB SERPL: 1.5 G/DL
ALP SERPL-CCNC: 108 U/L (ref 39–117)
ALT SERPL W P-5'-P-CCNC: 33 U/L (ref 1–33)
ANION GAP SERPL CALCULATED.3IONS-SCNC: 13 MMOL/L (ref 5–15)
AST SERPL-CCNC: 30 U/L (ref 1–32)
BILIRUB SERPL-MCNC: 0.6 MG/DL (ref 0–1.2)
BUN SERPL-MCNC: 15 MG/DL (ref 8–23)
BUN/CREAT SERPL: 16.7 (ref 7–25)
CALCIUM SPEC-SCNC: 9 MG/DL (ref 8.6–10.5)
CHLORIDE SERPL-SCNC: 103 MMOL/L (ref 98–107)
CO2 SERPL-SCNC: 25 MMOL/L (ref 22–29)
CREAT SERPL-MCNC: 0.9 MG/DL (ref 0.57–1)
EGFRCR SERPLBLD CKD-EPI 2021: 69.8 ML/MIN/1.73
GLOBULIN UR ELPH-MCNC: 3.1 GM/DL
GLUCOSE SERPL-MCNC: 98 MG/DL (ref 65–99)
POTASSIUM SERPL-SCNC: 4.2 MMOL/L (ref 3.5–5.2)
PROT SERPL-MCNC: 7.6 G/DL (ref 6–8.5)
SODIUM SERPL-SCNC: 141 MMOL/L (ref 136–145)

## 2023-06-02 ENCOUNTER — TELEPHONE (OUTPATIENT)
Dept: INTERNAL MEDICINE | Facility: CLINIC | Age: 68
End: 2023-06-02

## 2023-06-02 DIAGNOSIS — J01.00 ACUTE NON-RECURRENT MAXILLARY SINUSITIS: Primary | ICD-10-CM

## 2023-06-02 RX ORDER — AZITHROMYCIN 250 MG/1
TABLET, FILM COATED ORAL
Qty: 6 TABLET | Refills: 0 | Status: SHIPPED | OUTPATIENT
Start: 2023-06-02

## 2023-06-02 NOTE — TELEPHONE ENCOUNTER
Patient with persistent sinus congestion, fullness, drainage and cough. Has tried xyzal and flonase with no improvement. Due to duration of symptoms over 10 days will treat with Azithromycin for 5 days. Counseled on red flags and indications to return to clinic. All questions answered.     Discussed potential for singulair if symptoms not resolved with abx.    Dr. Gomez

## 2023-06-02 NOTE — TELEPHONE ENCOUNTER
Caller: Sarah Howard    Relationship: Self    Best call back number: [phone]    What medications are you currently taking:   Current Outpatient Medications on File Prior to Visit   Medication Sig Dispense Refill   • albuterol sulfate  (90 Base) MCG/ACT inhaler Inhale 2 puffs Every 4 (Four) Hours As Needed for Wheezing. 18 g 1   • aspirin 81 MG EC tablet Take 1 tablet by mouth Daily. 90 tablet 3   • atorvastatin (LIPITOR) 20 MG tablet Take 1 tablet by mouth Daily. 90 tablet 0   • DULoxetine (CYMBALTA) 60 MG capsule Take 1 capsule by mouth Daily. 90 capsule 1   • escitalopram (LEXAPRO) 10 MG tablet Take 1 tablet by mouth Daily. 90 tablet 0   • fluticasone (FLONASE) 50 MCG/ACT nasal spray 2 sprays into the nostril(s) as directed by provider Daily. 16 g 5   • latanoprost (XALATAN) 0.005 % ophthalmic solution      • levocetirizine (XYZAL) 5 MG tablet Take 1 tablet by mouth Every Evening. 90 tablet 1   • lisinopril (PRINIVIL,ZESTRIL) 10 MG tablet Take 1 tablet by mouth Daily. If BP remains elevated (>140/90) after one week, increase to two pills (20mg) daily. 90 tablet 0   • metoprolol succinate XL (Toprol XL) 25 MG 24 hr tablet Take 1 tablet by mouth Daily. 90 tablet 1   • pantoprazole (PROTONIX) 40 MG EC tablet Take 1 tablet by mouth Daily. 90 tablet 0   • PEG-KCl-NaCl-NaSulf-Na Asc-C (MOVIPREP) 100 g reconstituted solution powder      • Restasis 0.05 % ophthalmic emulsion      • traZODone (DESYREL) 150 MG tablet Take 1 tablet by mouth Daily. 90 tablet 0     No current facility-administered medications on file prior to visit.          When did you start taking these medications: [id number]    Which medication are you concerned about: levocetirizine (XYZAL) 5 MG tablet  fluticasone (FLONASE) 50 MCG/ACT nasal spray      Who prescribed you this medication: DR CHAVEZ    What are your concerns: PATIENT ADVISED SHE IS STILL COUGHING AND THIS IS KEEPING HER UP AT NIGHT; ALSO SHE IS STILL HAVING DRAINAGE AND ADVISED  THESE 2 MEDICATIONS ARE NOT REALLY HELPING HER. CAN SHE HAVE SOMETHING ELSE CALLED IN?     JOSR HUNTER [facility]    PLEASE CALL TO ADVISE

## 2023-06-13 ENCOUNTER — OFFICE VISIT (OUTPATIENT)
Dept: INTERNAL MEDICINE | Facility: CLINIC | Age: 68
End: 2023-06-13
Payer: MEDICARE

## 2023-06-13 VITALS
SYSTOLIC BLOOD PRESSURE: 130 MMHG | WEIGHT: 182.5 LBS | TEMPERATURE: 98.7 F | HEART RATE: 70 BPM | DIASTOLIC BLOOD PRESSURE: 70 MMHG | RESPIRATION RATE: 20 BRPM | BODY MASS INDEX: 31.33 KG/M2

## 2023-06-13 DIAGNOSIS — J30.9 ALLERGIC RHINITIS, UNSPECIFIED SEASONALITY, UNSPECIFIED TRIGGER: ICD-10-CM

## 2023-06-13 DIAGNOSIS — R05.3 CHRONIC COUGH: Primary | ICD-10-CM

## 2023-06-13 DIAGNOSIS — I10 PRIMARY HYPERTENSION: ICD-10-CM

## 2023-06-13 PROCEDURE — 99214 OFFICE O/P EST MOD 30 MIN: CPT | Performed by: STUDENT IN AN ORGANIZED HEALTH CARE EDUCATION/TRAINING PROGRAM

## 2023-06-13 PROCEDURE — 3078F DIAST BP <80 MM HG: CPT | Performed by: STUDENT IN AN ORGANIZED HEALTH CARE EDUCATION/TRAINING PROGRAM

## 2023-06-13 PROCEDURE — 1159F MED LIST DOCD IN RCRD: CPT | Performed by: STUDENT IN AN ORGANIZED HEALTH CARE EDUCATION/TRAINING PROGRAM

## 2023-06-13 PROCEDURE — 1160F RVW MEDS BY RX/DR IN RCRD: CPT | Performed by: STUDENT IN AN ORGANIZED HEALTH CARE EDUCATION/TRAINING PROGRAM

## 2023-06-13 PROCEDURE — 3075F SYST BP GE 130 - 139MM HG: CPT | Performed by: STUDENT IN AN ORGANIZED HEALTH CARE EDUCATION/TRAINING PROGRAM

## 2023-06-13 RX ORDER — OLMESARTAN MEDOXOMIL 20 MG/1
20 TABLET ORAL DAILY
Qty: 90 TABLET | Refills: 0 | Status: SHIPPED | OUTPATIENT
Start: 2023-06-13

## 2023-06-13 NOTE — ASSESSMENT & PLAN NOTE
This is chronic, this is worsening. Will obtain CXR today. Will change lisinopril to ARB. Will send to ENT for eval. If no improvement will consider CT chest, PFT's, sputum testing (MAC?)

## 2023-06-13 NOTE — ASSESSMENT & PLAN NOTE
Hypertension is improving with treatment.  Medication changes per orders.  Blood pressure will be reassessed at the next regular appointment. Start olmesartan 20mg daily in place of lisinopril due to concern for ACE-I induced cough

## 2023-06-13 NOTE — PROGRESS NOTES
"    Follow Up Office Visit      Date: 2023   Patient Name: Sarah Howard  : 1955   MRN: [id number]     Chief Complaint:    Chief Complaint   Patient presents with    Cough     Persistent        History of Present Illness: Sarah Howard is a 67 y.o. female who is here today for cough.    Cough  The patient reports cough onset since Winter 2022. She states she has taken Mucinex and Flonase. She states she visited ENT \"a long time ago\". She denies fever, chills, productive cough, and wheezing. The patient denies waking with a sore taste in her mouth. The patient is compliant with taking lisinopril and Protonix. She reports fatigue. The patient reports secondhand smoke exposure during her adolescent, and that her  smokes cigars.     Patient was seen in clinic on 2023 at which time she endorsed postnasal drainage, congestion and cough.  At that time she was started on Flonase and Xyzal.  She currently takes pantoprazole 40 mg daily for reflux.  Ws previously treated with Zpack.      Subjective      Review of Systems:   Review of Systems   Constitutional:  Positive for fatigue. Negative for chills and fever.   Respiratory:  Positive for cough. Negative for wheezing.      I have reviewed the patients family history, social history, past medical history, past surgical history and have updated it as appropriate.     Medications:     Current Outpatient Medications:     albuterol sulfate  (90 Base) MCG/ACT inhaler, Inhale 2 puffs Every 4 (Four) Hours As Needed for Wheezing., Disp: 18 g, Rfl: 1    aspirin 81 MG EC tablet, Take 1 tablet by mouth Daily., Disp: 90 tablet, Rfl: 3    atorvastatin (LIPITOR) 20 MG tablet, Take 1 tablet by mouth Daily., Disp: 90 tablet, Rfl: 0    DULoxetine (CYMBALTA) 60 MG capsule, Take 1 capsule by mouth Daily., Disp: 90 capsule, Rfl: 1    escitalopram (LEXAPRO) 10 MG tablet, Take 1 tablet by mouth Daily., Disp: 90 tablet, Rfl: 0    fluticasone (FLONASE) 50 MCG/ACT " nasal spray, 2 sprays into the nostril(s) as directed by provider Daily., Disp: 16 g, Rfl: 5    latanoprost (XALATAN) 0.005 % ophthalmic solution, , Disp: , Rfl:     levocetirizine (XYZAL) 5 MG tablet, Take 1 tablet by mouth Every Evening., Disp: 90 tablet, Rfl: 1    metoprolol succinate XL (Toprol XL) 25 MG 24 hr tablet, Take 1 tablet by mouth Daily., Disp: 90 tablet, Rfl: 1    pantoprazole (PROTONIX) 40 MG EC tablet, Take 1 tablet by mouth Daily., Disp: 90 tablet, Rfl: 0    PEG-KCl-NaCl-NaSulf-Na Asc-C (MOVIPREP) 100 g reconstituted solution powder, , Disp: , Rfl:     Restasis 0.05 % ophthalmic emulsion, , Disp: , Rfl:     traZODone (DESYREL) 150 MG tablet, Take 1 tablet by mouth Daily., Disp: 90 tablet, Rfl: 0    olmesartan (BENICAR) 20 MG tablet, Take 1 tablet by mouth Daily., Disp: 90 tablet, Rfl: 0    Allergies:   No Known Allergies    Objective     Physical Exam: Please see above  Vital Signs:   Vitals:    06/13/23 1402   BP: 130/70   BP Location: Left arm   Patient Position: Sitting   Cuff Size: Adult   Pulse: 70   Resp: 20   Temp: 98.7 °F (37.1 °C)   TempSrc: Temporal   Weight: 82.8 kg (182 lb 8 oz)   PainSc: 0-No pain     Body mass index is 31.33 kg/m².    Physical Exam  Vitals reviewed.   Constitutional:       General: She is not in acute distress.     Appearance: Normal appearance. She is obese. She is not ill-appearing or toxic-appearing.   HENT:      Head: Normocephalic and atraumatic.      Right Ear: External ear normal.      Left Ear: External ear normal.      Nose: Congestion present. No rhinorrhea.      Comments: Inflamed nasal turbinates, similar to prior     Mouth/Throat:      Mouth: Mucous membranes are moist.      Pharynx: No oropharyngeal exudate or posterior oropharyngeal erythema.   Eyes:      General: No scleral icterus.     Extraocular Movements: Extraocular movements intact.      Pupils: Pupils are equal, round, and reactive to light.   Cardiovascular:      Rate and Rhythm: Normal rate  and regular rhythm.      Pulses: Normal pulses.      Heart sounds: Normal heart sounds. No murmur heard.    No friction rub. No gallop.   Pulmonary:      Effort: Pulmonary effort is normal. No respiratory distress.      Breath sounds: Normal breath sounds. No stridor. No wheezing, rhonchi or rales.   Abdominal:      General: Abdomen is flat. There is no distension.   Musculoskeletal:         General: No swelling or tenderness. Normal range of motion.      Cervical back: Normal range of motion. No rigidity.   Lymphadenopathy:      Cervical: No cervical adenopathy.   Skin:     General: Skin is warm and dry.      Capillary Refill: Capillary refill takes less than 2 seconds.      Findings: No erythema or rash.   Neurological:      General: No focal deficit present.      Mental Status: She is alert and oriented to person, place, and time.   Psychiatric:         Mood and Affect: Mood normal.         Behavior: Behavior normal.         Judgment: Judgment normal.       Procedures    Results:   Labs:   Hemoglobin A1C   Date Value Ref Range Status   04/03/2023 5.40 4.80 - 5.60 % Final     TSH   Date Value Ref Range Status   04/03/2023 3.000 0.270 - 4.200 uIU/mL Final        Imaging:   No valid procedures specified.     Assessment / Plan      Assessment/Plan:   Problem List Items Addressed This Visit          Cardiac and Vasculature    Primary hypertension    Overview     Lisinopril stopped 6/13/23 due to cough         Current Assessment & Plan     Hypertension is improving with treatment.  Medication changes per orders.  Blood pressure will be reassessed at the next regular appointment. Start olmesartan 20mg daily in place of lisinopril due to concern for ACE-I induced cough         Relevant Medications    olmesartan (BENICAR) 20 MG tablet       Pulmonary and Pneumonias    Chronic cough - Primary    Overview     Present since 1-2/2023. Treated with PPI, failed albuterol trial, treated with zpack. Optimized allergies with xyzal  and flonase 5/2023.         Current Assessment & Plan     This is chronic, this is worsening. Will obtain CXR today. Will change lisinopril to ARB. Will send to ENT for eval. If no improvement will consider CT chest, PFT's, sputum testing (MAC?)         Relevant Orders    XR Chest PA & Lateral     Other Visit Diagnoses       Allergic rhinitis, unspecified seasonality, unspecified trigger        Relevant Orders    Ambulatory Referral to ENT (Otolaryngology)          - Patient was advised to discontinue use of lisinopril. She was advised to begin Benicar. I will place an order for a chest x-ray. I will refer the patient to ENT. I will prescribe the patient an albuterol inhaler.     Transcribed from ambient dictation for Dao Gomez MD by Misty Mejía.  06/13/23   14:57 EDT    Patient or patient representative verbalized consent to the visit recording.  I have personally performed the services described in this document as transcribed by the above individual, and it is both accurate and complete.      Follow Up:   Return if symptoms worsen or fail to improve.    Dao Gomez MD  [facility] [place]

## 2023-06-14 RX ORDER — TRAZODONE HYDROCHLORIDE 150 MG/1
150 TABLET ORAL DAILY
Qty: 90 TABLET | Refills: 0 | Status: SHIPPED | OUTPATIENT
Start: 2023-06-14

## 2023-06-15 ENCOUNTER — HOSPITAL ENCOUNTER (OUTPATIENT)
Dept: GENERAL RADIOLOGY | Facility: HOSPITAL | Age: 68
Discharge: HOME | End: 2023-06-15
Admitting: STUDENT IN AN ORGANIZED HEALTH CARE EDUCATION/TRAINING PROGRAM
Payer: MEDICARE

## 2023-06-15 DIAGNOSIS — R05.3 CHRONIC COUGH: ICD-10-CM

## 2023-06-15 PROCEDURE — 71046 X-RAY EXAM CHEST 2 VIEWS: CPT

## 2023-07-23 DIAGNOSIS — I10 PRIMARY HYPERTENSION: ICD-10-CM

## 2023-07-24 RX ORDER — LISINOPRIL 10 MG/1
TABLET ORAL
Qty: 90 TABLET | Refills: 0 | Status: SHIPPED | OUTPATIENT
Start: 2023-07-24

## 2023-08-08 DIAGNOSIS — I25.10 CORONARY ARTERY DISEASE INVOLVING NATIVE HEART WITHOUT ANGINA PECTORIS, UNSPECIFIED VESSEL OR LESION TYPE: ICD-10-CM

## 2023-08-09 RX ORDER — METOPROLOL SUCCINATE 25 MG/1
25 TABLET, EXTENDED RELEASE ORAL DAILY
Qty: 90 TABLET | Refills: 1 | Status: SHIPPED | OUTPATIENT
Start: 2023-08-09

## 2023-08-25 ENCOUNTER — OFFICE VISIT (OUTPATIENT)
Dept: INTERNAL MEDICINE | Facility: CLINIC | Age: 68
End: 2023-08-25
Payer: MEDICARE

## 2023-08-25 VITALS
RESPIRATION RATE: 18 BRPM | HEART RATE: 72 BPM | DIASTOLIC BLOOD PRESSURE: 56 MMHG | WEIGHT: 194.8 LBS | HEIGHT: 64 IN | TEMPERATURE: 97.8 F | BODY MASS INDEX: 33.26 KG/M2 | SYSTOLIC BLOOD PRESSURE: 112 MMHG

## 2023-08-25 DIAGNOSIS — J01.00 ACUTE NON-RECURRENT MAXILLARY SINUSITIS: ICD-10-CM

## 2023-08-25 DIAGNOSIS — R73.9 HYPERGLYCEMIA, UNSPECIFIED: ICD-10-CM

## 2023-08-25 DIAGNOSIS — E66.09 CLASS 1 OBESITY DUE TO EXCESS CALORIES WITHOUT SERIOUS COMORBIDITY WITH BODY MASS INDEX (BMI) OF 33.0 TO 33.9 IN ADULT: ICD-10-CM

## 2023-08-25 DIAGNOSIS — K21.9 GERD WITHOUT ESOPHAGITIS: ICD-10-CM

## 2023-08-25 DIAGNOSIS — R05.3 CHRONIC COUGH: Primary | ICD-10-CM

## 2023-08-25 DIAGNOSIS — I10 PRIMARY HYPERTENSION: ICD-10-CM

## 2023-08-25 PROBLEM — E66.811 CLASS 1 OBESITY DUE TO EXCESS CALORIES WITHOUT SERIOUS COMORBIDITY WITH BODY MASS INDEX (BMI) OF 33.0 TO 33.9 IN ADULT: Status: ACTIVE | Noted: 2023-08-25

## 2023-08-25 LAB
ALBUMIN SERPL-MCNC: 4.1 G/DL (ref 3.5–5.2)
ALBUMIN/GLOB SERPL: 1.3 G/DL
ALP SERPL-CCNC: 107 U/L (ref 39–117)
ALT SERPL W P-5'-P-CCNC: 27 U/L (ref 1–33)
ANION GAP SERPL CALCULATED.3IONS-SCNC: 13.5 MMOL/L (ref 5–15)
AST SERPL-CCNC: 23 U/L (ref 1–32)
BASOPHILS # BLD AUTO: 0.05 10*3/MM3 (ref 0–0.2)
BASOPHILS NFR BLD AUTO: 0.7 % (ref 0–1.5)
BILIRUB SERPL-MCNC: 0.5 MG/DL (ref 0–1.2)
BUN SERPL-MCNC: 15 MG/DL (ref 8–23)
BUN/CREAT SERPL: 14.9 (ref 7–25)
CALCIUM SPEC-SCNC: 9.2 MG/DL (ref 8.6–10.5)
CHLORIDE SERPL-SCNC: 106 MMOL/L (ref 98–107)
CO2 SERPL-SCNC: 21.5 MMOL/L (ref 22–29)
CREAT SERPL-MCNC: 1.01 MG/DL (ref 0.57–1)
DEPRECATED RDW RBC AUTO: 40.3 FL (ref 37–54)
EGFRCR SERPLBLD CKD-EPI 2021: 60.8 ML/MIN/1.73
EOSINOPHIL # BLD AUTO: 0.18 10*3/MM3 (ref 0–0.4)
EOSINOPHIL NFR BLD AUTO: 2.5 % (ref 0.3–6.2)
ERYTHROCYTE [DISTWIDTH] IN BLOOD BY AUTOMATED COUNT: 11.3 % (ref 12.3–15.4)
GLOBULIN UR ELPH-MCNC: 3.2 GM/DL
GLUCOSE SERPL-MCNC: 109 MG/DL (ref 65–99)
HCT VFR BLD AUTO: 36.4 % (ref 34–46.6)
HGB BLD-MCNC: 12.5 G/DL (ref 12–15.9)
IMM GRANULOCYTES # BLD AUTO: 0.02 10*3/MM3 (ref 0–0.05)
IMM GRANULOCYTES NFR BLD AUTO: 0.3 % (ref 0–0.5)
LYMPHOCYTES # BLD AUTO: 3.96 10*3/MM3 (ref 0.7–3.1)
LYMPHOCYTES NFR BLD AUTO: 54.2 % (ref 19.6–45.3)
MCH RBC QN AUTO: 33.4 PG (ref 26.6–33)
MCHC RBC AUTO-ENTMCNC: 34.3 G/DL (ref 31.5–35.7)
MCV RBC AUTO: 97.3 FL (ref 79–97)
MONOCYTES # BLD AUTO: 0.72 10*3/MM3 (ref 0.1–0.9)
MONOCYTES NFR BLD AUTO: 9.8 % (ref 5–12)
NEUTROPHILS NFR BLD AUTO: 2.38 10*3/MM3 (ref 1.7–7)
NEUTROPHILS NFR BLD AUTO: 32.5 % (ref 42.7–76)
NRBC BLD AUTO-RTO: 0.1 /100 WBC (ref 0–0.2)
PLATELET # BLD AUTO: 267 10*3/MM3 (ref 140–450)
PMV BLD AUTO: 12.1 FL (ref 6–12)
POTASSIUM SERPL-SCNC: 4.5 MMOL/L (ref 3.5–5.2)
PROT SERPL-MCNC: 7.3 G/DL (ref 6–8.5)
RBC # BLD AUTO: 3.74 10*6/MM3 (ref 3.77–5.28)
SODIUM SERPL-SCNC: 141 MMOL/L (ref 136–145)
WBC NRBC COR # BLD: 7.31 10*3/MM3 (ref 3.4–10.8)

## 2023-08-25 PROCEDURE — 85025 COMPLETE CBC W/AUTO DIFF WBC: CPT | Performed by: STUDENT IN AN ORGANIZED HEALTH CARE EDUCATION/TRAINING PROGRAM

## 2023-08-25 PROCEDURE — 80053 COMPREHEN METABOLIC PANEL: CPT | Performed by: STUDENT IN AN ORGANIZED HEALTH CARE EDUCATION/TRAINING PROGRAM

## 2023-08-25 PROCEDURE — 83036 HEMOGLOBIN GLYCOSYLATED A1C: CPT | Performed by: STUDENT IN AN ORGANIZED HEALTH CARE EDUCATION/TRAINING PROGRAM

## 2023-08-25 RX ORDER — AMOXICILLIN AND CLAVULANATE POTASSIUM 875; 125 MG/1; MG/1
1 TABLET, FILM COATED ORAL 2 TIMES DAILY
Qty: 20 TABLET | Refills: 0 | Status: SHIPPED | OUTPATIENT
Start: 2023-08-25 | End: 2023-09-04

## 2023-08-25 RX ORDER — PANTOPRAZOLE SODIUM 40 MG/1
40 TABLET, DELAYED RELEASE ORAL 2 TIMES DAILY
Qty: 90 TABLET | Refills: 0 | Status: SHIPPED | OUTPATIENT
Start: 2023-08-25

## 2023-08-25 RX ORDER — IPRATROPIUM BROMIDE 42 UG/1
SPRAY, METERED NASAL
COMMUNITY
Start: 2023-07-27

## 2023-08-25 NOTE — ASSESSMENT & PLAN NOTE
Hypertension is improving with treatment.  Continue current treatment regimen.  Weight loss.  Regular aerobic exercise.  Blood pressure will be reassessed in 3 months.  Counseled to NOT take lisinopril, discontinued previously. Continue metoprolol and olmesartan.

## 2023-08-25 NOTE — PATIENT INSTRUCTIONS
Healthy Delicious Recipes from [employer]: https://Bionaturispt.org/  [place] Plant Based Meal Recipes: https://Tres Amigas/  Heart Healthy Recipes from [employer]: https://abcardio.org/wp-content/uploads/2020/06/ABC_Cookbook.pdf  [place] Healthy Living Guide: https://www.[facility].[place].[facility]/nutritionsource/2022/01/06/healthy-living-guide-4345-4670/  SNAP Cookbook for Budgets: http://onpaymio.net/dss/documents/good-and-cheap.pdf         Healthy Eating  Following a healthy eating pattern may help you to achieve and maintain a healthy body weight, reduce the risk of chronic disease, and live a long and productive life. It is important to follow a healthy eating pattern at an appropriate calorie level for your body. Your nutritional needs should be met primarily through food by choosing a variety of nutrient-rich foods.  What are tips for following this plan?  Reading food labels  Read labels and choose the following:  Reduced or low sodium.  Juices with 100% fruit juice.  Foods with low saturated fats and high polyunsaturated and monounsaturated fats.  Foods with whole grains, such as whole wheat, cracked wheat, brown rice, and wild rice.  Whole grains that are fortified with folic acid. This is recommended for women who are pregnant or who want to become pregnant.  Read labels and avoid the following:  Foods with a lot of added sugars. These include foods that contain brown sugar, corn sweetener, corn syrup, dextrose, fructose, glucose, high-fructose corn syrup, honey, invert sugar, lactose, malt syrup, maltose, molasses, raw sugar, sucrose, trehalose, or turbinado sugar.  Do not eat more than the following amounts of added sugar per day:  6 teaspoons (25 g) for women.  9 teaspoons (38 g) for men.  Foods that contain processed or refined starches and grains.  Refined grain products, such as white flour, degermed cornmeal, white bread, and white rice.  Shopping  Choose nutrient-rich  "snacks, such as vegetables, whole fruits, and nuts. Avoid high-calorie and high-sugar snacks, such as potato chips, fruit snacks, and candy.  Use oil-based dressings and spreads on foods instead of solid fats such as butter, stick margarine, or cream cheese.  Limit pre-made sauces, mixes, and \"instant\" products such as flavored rice, instant noodles, and ready-made pasta.  Try more plant-protein sources, such as tofu, tempeh, black beans, edamame, lentils, nuts, and seeds.  Explore eating plans such as the Mediterranean diet or vegetarian diet.  Cooking  Use oil to saut‚ or stir-martinez foods instead of solid fats such as butter, stick margarine, or lard.  Try baking, boiling, grilling, or broiling instead of frying.  Remove the fatty part of meats before cooking.  Steam vegetables in water or broth.  Meal planning    At meals, imagine dividing your plate into fourths:  One-half of your plate is fruits and vegetables.  One-fourth of your plate is whole grains.  One-fourth of your plate is protein, especially lean meats, poultry, eggs, tofu, beans, or nuts.  Include low-fat dairy as part of your daily diet.     Lifestyle  Choose healthy options in all settings, including home, work, school, restaurants, or stores.  Prepare your food safely:  Wash your hands after handling raw meats.  Keep food preparation surfaces clean by regularly washing with hot, soapy water.  Keep raw meats separate from ready-to-eat foods, such as fruits and vegetables.  , meat, poultry, and eggs to the recommended internal temperature.  Store foods at safe temperatures. In general:  Keep cold foods at 40øF (4.4øC) or below.  Keep hot foods at 140øF (60øC) or above.  Keep your freezer at 0øF (-17.8øC) or below.  Foods are no longer safe to eat when they have been between the temperatures of 40ø-140øF (4.4-60øC) for more than 2 hours.  What foods should I eat?  Fruits  Aim to eat 2 cup-equivalents of fresh, canned (in natural juice), or " frozen fruits each day. Examples of 1 cup-equivalent of fruit include 1 small apple, 8 large strawberries, 1 cup canned fruit, « cup dried fruit, or 1 cup 100% juice.  Vegetables  Aim to eat 2«-3 cup-equivalents of fresh and frozen vegetables each day, including different varieties and colors. Examples of 1 cup-equivalent of vegetables include 2 medium carrots, 2 cups raw, leafy greens, 1 cup chopped vegetable (raw or cooked), or 1 medium baked potato.  Grains  Aim to eat 6 ounce-equivalents of whole grains each day. Examples of 1 ounce-equivalent of grains include 1 slice of bread, 1 cup ready-to-eat cereal, 3 cups popcorn, or « cup cooked rice, pasta, or cereal.  Meats and other proteins  Aim to eat 5-6 ounce-equivalents of protein each day. Examples of 1 ounce-equivalent of protein include 1 egg, 1/2 cup nuts or seeds, or 1 tablespoon (16 g) peanut butter. A cut of meat or fish that is the size of a deck of cards is about 3-4 ounce-equivalents.  Of the protein you eat each week, try to have at least 8 ounces come from seafood. This includes salmon, trout, herring, and anchovies.  Dairy  Aim to eat 3 cup-equivalents of fat-free or low-fat dairy each day. Examples of 1 cup-equivalent of dairy include 1 cup (240 mL) milk, 8 ounces (250 g) yogurt, 1« ounces (44 g) natural cheese, or 1 cup (240 mL) fortified soy milk.  Fats and oils  Aim for about 5 teaspoons (21 g) per day. Choose monounsaturated fats, such as canola and olive oils, avocados, peanut butter, and most nuts, or polyunsaturated fats, such as sunflower, corn, and soybean oils, walnuts, pine nuts, sesame seeds, sunflower seeds, and flaxseed.  Beverages  Aim for six 8-oz glasses of water per day. Limit coffee to three to five 8-oz cups per day.  Limit caffeinated beverages that have added calories, such as soda and energy drinks.  Limit alcohol intake to no more than 1 drink a day for nonpregnant women and 2 drinks a day for men. One drink equals 12 oz of  beer (355 mL), 5 oz of wine (148 mL), or 1« oz of hard liquor (44 mL).  Seasoning and other foods  Avoid adding excess amounts of salt to your foods. Try flavoring foods with herbs and spices instead of salt.  Avoid adding sugar to foods.  Try using oil-based dressings, sauces, and spreads instead of solid fats.  This information is based on general [place] nutrition guidelines. For more information, visit choosemyplate.gov. Exact amounts may vary based on your nutrition needs.  Summary  A healthy eating plan may help you to maintain a healthy weight, reduce the risk of chronic diseases, and stay active throughout your life.  Plan your meals. Make sure you eat the right portions of a variety of nutrient-rich foods.  Try baking, boiling, grilling, or broiling instead of frying.  Choose healthy options in all settings, including home, work, school, restaurants, or stores.  This information is not intended to replace advice given to you by your health care provider. Make sure you discuss any questions you have with your health care provider.  Document Revised: 04/01/2019 Document Reviewed: 04/01/2019  Elsevier Patient Education c 2021 [employer]

## 2023-08-25 NOTE — PROGRESS NOTES
Follow Up Office Visit      Date: 2023   Patient Name: Sarah Howard  : 1955   MRN: [id number]     Chief Complaint:    Chief Complaint   Patient presents with    Hypertension     Follow up       History of Present Illness: Sarah Howard is a 67 y.o. female who is here today to follow up with the following.    HPI  Chronic cough  Patient last seen in clinic on 2023 for chronic cough.  At that time symptoms were only a dry cough.  Denied fever chills productive cough or wheezing.  Currently taking pantoprazole 40 mg daily for reflux.  Previous prescribed Flonase and Xyzal.  Previously treated with Z-Luisito.  Switch from lisinopril to olmesartan.  Chest x-ray obtained at last clinic appointment, which was within normal limits.  Referred to ENT.    She was recently seen in clinic on 2023 by Dr. Greg House.  I personally reviewed this note.  She had a CT scan of her sinuses on 2023 which showed a moderately large middle turbinate carlos a bullosa with no evidence of chronic sinusitis.  She had IV AT allergy testing which showed total IgE of 74, testing was negative.  Mycoplasma antibodies were ordered.  It was recommended that she start taking her pantoprazole 1 hour before her evening meal.  Recommended starting Atrovent nasal spray twice daily.  Recommended follow-up in 2 months.    Cough  The patient reports seeing Dr. Mayes and Dr. House with ENT with no improvement. She was told that she has vasomotor rhinitis. She thinks she has a sinus infection. Her symptoms started on 2023 with a sore throat, frequent cough, sinus pressure, and postnasal drainage. Her cough worsens at night. She denies any fever, chills, productive cough, wheezing, or dyspnea. Despite taking cough drops, her cough was consistent throughout the day on 2023. Currently she takes Atrovent and discontinued Flonase and Xyzal. She had a chest x-ray that was normal.     Hypertension  She reports  discontinuing lisinopril due to a cough. She denies any improvement with her cough. She currently takes olmesartan. Not taking lisionpril, confirmed.    GERD  She notes that she forgot to take pantoprazole for a few days, and during that time she was experiencing heartburn. She is unsure if her cough worsened during that time.     Fatigue  The patient complains of constant fatigue. She reports not feeling well for this entire year. She denies having any motivation to do things. She gained weight since her last visit. She discontinued consuming alcohol and reports consuming more sweets.       Subjective      Review of Systems:   Review of Systems    I have reviewed the patients family history, social history, past medical history, past surgical history and have updated it as appropriate.     Medications:     Current Outpatient Medications:     albuterol sulfate  (90 Base) MCG/ACT inhaler, Inhale 2 puffs Every 4 (Four) Hours As Needed for Wheezing., Disp: 18 g, Rfl: 1    aspirin 81 MG EC tablet, Take 1 tablet by mouth Daily., Disp: 90 tablet, Rfl: 3    atorvastatin (LIPITOR) 20 MG tablet, Take 1 tablet by mouth Daily., Disp: 90 tablet, Rfl: 0    DULoxetine (CYMBALTA) 60 MG capsule, Take 1 capsule by mouth Daily., Disp: 90 capsule, Rfl: 1    escitalopram (LEXAPRO) 10 MG tablet, Take 1 tablet by mouth Daily., Disp: 90 tablet, Rfl: 0    ipratropium (ATROVENT) 0.06 % nasal spray, , Disp: , Rfl:     latanoprost (XALATAN) 0.005 % ophthalmic solution, , Disp: , Rfl:     metoprolol succinate XL (Toprol XL) 25 MG 24 hr tablet, Take 1 tablet by mouth Daily., Disp: 90 tablet, Rfl: 1    olmesartan (BENICAR) 20 MG tablet, Take 1 tablet by mouth Daily., Disp: 90 tablet, Rfl: 0    pantoprazole (PROTONIX) 40 MG EC tablet, Take 1 tablet by mouth 2 (Two) Times a Day. Take 2 times daily for 30 days, then decrease to one time daily., Disp: 90 tablet, Rfl: 0    traZODone (DESYREL) 150 MG tablet, Take 1 tablet by mouth Daily., Disp:  "90 tablet, Rfl: 0    amoxicillin-clavulanate (AUGMENTIN) 875-125 MG per tablet, Take 1 tablet by mouth 2 (Two) Times a Day for 10 days., Disp: 20 tablet, Rfl: 0    Allergies:   No Known Allergies    Objective     Physical Exam: Please see above  Vital Signs:   Vitals:    08/25/23 1315   BP: 112/56   BP Location: Right arm   Patient Position: Sitting   Cuff Size: Adult   Pulse: 72   Resp: 18   Temp: 97.8 øF (36.6 øC)   TempSrc: Infrared   Weight: 88.4 kg (194 lb 12.8 oz)   Height: 162.6 cm (64\")   PainSc: 0-No pain     Body mass index is 33.44 kg/mý.          Physical Exam  Vitals reviewed.   Constitutional:       General: She is not in acute distress.     Appearance: Normal appearance. She is obese. She is not ill-appearing or toxic-appearing.   HENT:      Head: Normocephalic and atraumatic.      Right Ear: External ear normal.      Left Ear: External ear normal.      Nose: Nose normal. Congestion present.      Mouth/Throat:      Mouth: Mucous membranes are moist.      Pharynx: No oropharyngeal exudate or posterior oropharyngeal erythema.   Eyes:      General: No scleral icterus.     Extraocular Movements: Extraocular movements intact.      Pupils: Pupils are equal, round, and reactive to light.   Cardiovascular:      Rate and Rhythm: Normal rate and regular rhythm.      Pulses: Normal pulses.      Heart sounds: Normal heart sounds. No murmur heard.    No friction rub. No gallop.   Pulmonary:      Effort: Pulmonary effort is normal. No respiratory distress.      Breath sounds: Normal breath sounds. No stridor. No wheezing, rhonchi or rales.   Abdominal:      General: Abdomen is flat.      Palpations: Abdomen is soft.   Musculoskeletal:         General: No swelling or tenderness. Normal range of motion.      Cervical back: Normal range of motion. No rigidity.   Lymphadenopathy:      Cervical: No cervical adenopathy.   Skin:     General: Skin is warm and dry.      Capillary Refill: Capillary refill takes less than 2 " seconds.      Findings: No erythema or rash.   Neurological:      General: No focal deficit present.      Mental Status: She is alert and oriented to person, place, and time.   Psychiatric:         Mood and Affect: Mood normal.         Behavior: Behavior normal.         Judgment: Judgment normal.       Procedures    Results:   Labs:   Hemoglobin A1C   Date Value Ref Range Status   04/03/2023 5.40 4.80 - 5.60 % Final     TSH   Date Value Ref Range Status   04/03/2023 3.000 0.270 - 4.200 uIU/mL Final        Imaging:   No valid procedures specified.     Assessment / Plan      Assessment/Plan:   Problem List Items Addressed This Visit          Cardiac and Vasculature    Primary hypertension    Overview     Lisinopril stopped 6/13/23 due to cough         Current Assessment & Plan     Hypertension is improving with treatment.  Continue current treatment regimen.  Weight loss.  Regular aerobic exercise.  Blood pressure will be reassessed in 3 months.  Counseled to NOT take lisinopril, discontinued previously. Continue metoprolol and olmesartan.          Relevant Orders    Comprehensive metabolic panel (Completed)       Endocrine and Metabolic    Class 1 obesity due to excess calories without serious comorbidity with body mass index (BMI) of 33.0 to 33.9 in adult    Relevant Orders    Hemoglobin A1c (Completed)       Gastrointestinal Abdominal     GERD without esophagitis    Relevant Medications    pantoprazole (PROTONIX) 40 MG EC tablet       Pulmonary and Pneumonias    Chronic cough - Primary    Overview     Present since 1-2/2023. Treated with PPI, failed albuterol trial, treated with zpack. Optimized allergies with xyzal and flonase 5/2023.  - CXR wnl on 6/13/23  - Saw ENT (Dr. House) CT sinus normal, negative IV AT allergy testing, Mycoplasma Ab pending. Recommended changing to atrovent nasal spray  - felt symptoms worsened after stopping protonix by accident         Current Assessment & Plan     This is chronic,  this is unchagned. Will obtain CT chest today.  Will send to PUlmonology for eval. If no improvement will consider sputum testing (MAC?). Will treat with augmentin x10 due to sinus infection symptoms.   - increase PPI to BID and monitor for improvement         Relevant Medications    pantoprazole (PROTONIX) 40 MG EC tablet    Other Relevant Orders    CT Chest Without Contrast    Ambulatory Referral to Pulmonology    CBC w AUTO Differential (Completed)     Other Visit Diagnoses       Hyperglycemia, unspecified        Relevant Orders    Hemoglobin A1c (Completed)    Acute non-recurrent maxillary sinusitis        Relevant Medications    amoxicillin-clavulanate (AUGMENTIN) 875-125 MG per tablet              Follow Up:   Return in about 3 months (around 11/25/2023) for Recheck cough and high blood pressure..        Dao Gomez MD   [facility] [place]    Transcribed from ambient dictation for Dao Gomez MD by Jaxon Serrano.  08/25/23   16:02 EDT    Patient or patient representative verbalized consent to the visit recording.  I have personally performed the services described in this document as transcribed by the above individual, and it is both accurate and complete.

## 2023-08-25 NOTE — ASSESSMENT & PLAN NOTE
This is chronic, this is unchagned. Will obtain CT chest today.  Will send to PUlmonology for eval. If no improvement will consider sputum testing (MAC?). Will treat with augmentin x10 due to sinus infection symptoms.   - increase PPI to BID and monitor for improvement

## 2023-08-26 LAB — HBA1C MFR BLD: 5.5 % (ref 4.8–5.6)

## 2023-08-28 RX ORDER — DULOXETIN HYDROCHLORIDE 60 MG/1
60 CAPSULE, DELAYED RELEASE ORAL DAILY
Qty: 90 CAPSULE | Refills: 1 | Status: SHIPPED | OUTPATIENT
Start: 2023-08-28

## 2023-09-09 DIAGNOSIS — I10 PRIMARY HYPERTENSION: ICD-10-CM

## 2023-09-11 RX ORDER — OLMESARTAN MEDOXOMIL 20 MG/1
20 TABLET ORAL DAILY
Qty: 90 TABLET | Refills: 0 | Status: SHIPPED | OUTPATIENT
Start: 2023-09-11

## 2023-09-14 ENCOUNTER — HOSPITAL ENCOUNTER (OUTPATIENT)
Dept: CT IMAGING | Facility: HOSPITAL | Age: 68
Discharge: HOME | End: 2023-09-14
Admitting: STUDENT IN AN ORGANIZED HEALTH CARE EDUCATION/TRAINING PROGRAM
Payer: MEDICARE

## 2023-09-14 DIAGNOSIS — R05.3 CHRONIC COUGH: ICD-10-CM

## 2023-09-14 PROCEDURE — 71250 CT THORAX DX C-: CPT

## 2023-09-15 NOTE — PROGRESS NOTES
Your results are either normal or show only some minor abnormalities. 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 Good news, but also doesn't give us an answer for why this cough is still lingering.  Please continue your care as discussed at your last visit.      Dr. Gomez

## 2023-09-28 RX ORDER — TRAZODONE HYDROCHLORIDE 150 MG/1
150 TABLET ORAL DAILY
Qty: 90 TABLET | Refills: 0 | Status: SHIPPED | OUTPATIENT
Start: 2023-09-28

## 2023-10-09 ENCOUNTER — OFFICE VISIT (OUTPATIENT)
Dept: PULMONOLOGY | Facility: CLINIC | Age: 68
End: 2023-10-09
Payer: MEDICARE

## 2023-10-09 VITALS
HEIGHT: 64 IN | HEART RATE: 97 BPM | DIASTOLIC BLOOD PRESSURE: 74 MMHG | OXYGEN SATURATION: 97 % | SYSTOLIC BLOOD PRESSURE: 108 MMHG | WEIGHT: 196 LBS | TEMPERATURE: 98.2 F | BODY MASS INDEX: 33.46 KG/M2

## 2023-10-09 DIAGNOSIS — R05.3 CHRONIC COUGH: Primary | ICD-10-CM

## 2023-10-09 DIAGNOSIS — K21.9 GERD WITHOUT ESOPHAGITIS: ICD-10-CM

## 2023-10-09 NOTE — PROGRESS NOTES
New Patient Pulmonary Office Visit      Patient Name: Sarah Howard    Referring Physician: Dao Gomez MD    Chief Complaint:    Chief Complaint   Patient presents with    Cough       History of Present Illness: Sarah Howard is a 67 y.o. female who is here today to establish care with Pulmonary.  Patient has a past medical history significant for depression, hypertension, and GERD.  He was referred to pulmonary for evaluation of a chronic cough.  She notes that cough has been present since January.  Dry in nature.  Worse at night.  They have already switched her off her lisinopril.  She did find that the reflux medications have actually made some benefit.  She has also tried multiple allergy medicines without any benefit.  Was seen by ENT.  No other acute complaints.    Review of Systems:   Review of Systems   Constitutional:  Negative for chills, fatigue and fever.   HENT:  Negative for congestion and voice change.    Eyes:  Negative for blurred vision.   Respiratory:  Negative for cough, shortness of breath and wheezing.    Cardiovascular:  Negative for chest pain.   Skin:  Negative for dry skin.   Hematological:  Negative for adenopathy.   Psychiatric/Behavioral:  Negative for agitation and depressed mood.        Past Medical History:   Past Medical History:   Diagnosis Date    Breast cancer     3/2010, right breast    Cancer Breast    2010    Coronary artery disease 10/2020    Depression     Drug therapy     2010    GERD (gastroesophageal reflux disease) 2016    Glaucoma 2016    Hx of radiation therapy     2010    Hyperlipidemia 2022    Hypertension 2020    Osteopenia 2009       Past Surgical History:   Past Surgical History:   Procedure Laterality Date    ADENOIDECTOMY  1972    BREAST BIOPSY Right     3/2010    BREAST LUMPECTOMY Right     x2, 2010    BREAST SURGERY  2 x 2010    CARDIAC CATHETERIZATION  2020    COLONOSCOPY  2018    COSMETIC SURGERY  breast reduction    2010    EYE SURGERY  laser  iridotomies    2019    HYSTERECTOMY  2011    REDUCTION MAMMAPLASTY Bilateral     2010 at time of lumpectomy    SPINE SURGERY  2019    TONSILLECTOMY  1972    TUBAL ABDOMINAL LIGATION  1982       Family History:   Family History   Problem Relation Age of Onset    Colon cancer Mother 48    Stroke Mother     Thyroid disease Mother     Anxiety disorder Mother     Depression Mother     Heart disease Father         mitral valve disease    Atrial fibrillation Brother     Breast cancer Neg Hx     Ovarian cancer Neg Hx        Social History:   Social History     Socioeconomic History    Marital status:    Tobacco Use    Smoking status: Never     Passive exposure: Never    Smokeless tobacco: Never   Vaping Use    Vaping Use: Never used   Substance and Sexual Activity    Alcohol use: Not Currently    Drug use: Never     Types: Marijuana    Sexual activity: Not Currently     Partners: Male     Birth control/protection: Post-menopausal, Tubal ligation, Hysterectomy       Medications:     Current Outpatient Medications:     atorvastatin (LIPITOR) 20 MG tablet, Take 1 tablet by mouth Daily., Disp: 90 tablet, Rfl: 0    DULoxetine (CYMBALTA) 60 MG capsule, Take 1 capsule by mouth Daily., Disp: 90 capsule, Rfl: 1    escitalopram (LEXAPRO) 10 MG tablet, Take 1 tablet by mouth Daily., Disp: 90 tablet, Rfl: 0    latanoprost (XALATAN) 0.005 % ophthalmic solution, , Disp: , Rfl:     metoprolol succinate XL (Toprol XL) 25 MG 24 hr tablet, Take 1 tablet by mouth Daily., Disp: 90 tablet, Rfl: 1    olmesartan (BENICAR) 20 MG tablet, TAKE 1 TABLET BY MOUTH DAILY, Disp: 90 tablet, Rfl: 0    pantoprazole (PROTONIX) 40 MG EC tablet, Take 1 tablet by mouth 2 (Two) Times a Day. Take 2 times daily for 30 days, then decrease to one time daily., Disp: 90 tablet, Rfl: 0    traZODone (DESYREL) 150 MG tablet, Take 1 tablet by mouth Daily., Disp: 90 tablet, Rfl: 0    aspirin 81 MG EC tablet, Take 1 tablet by mouth Daily., Disp: 90 tablet, Rfl:  "3    Allergies:   No Known Allergies    Physical Exam:  Vital Signs:   Vitals:    10/09/23 1125   BP: 108/74   BP Location: Left arm   Patient Position: Sitting   Cuff Size: Adult   Pulse: 97   Temp: 98.2 øF (36.8 øC)   TempSrc: Infrared   SpO2: 97%  Comment: room air at rest   Weight: 88.9 kg (196 lb)   Height: 162.6 cm (64.02\")       Physical Exam  Vitals and nursing note reviewed.   Constitutional:       General: She is not in acute distress.     Appearance: She is well-developed and normal weight. She is not ill-appearing or toxic-appearing.   HENT:      Head: Normocephalic and atraumatic.   Cardiovascular:      Rate and Rhythm: Normal rate and regular rhythm.      Pulses: Normal pulses.      Heart sounds: Normal heart sounds. No murmur heard.     No friction rub. No gallop.   Pulmonary:      Effort: Pulmonary effort is normal. No respiratory distress.      Breath sounds: Normal breath sounds. No wheezing, rhonchi or rales.   Musculoskeletal:      Right lower leg: No edema.      Left lower leg: No edema.   Skin:     General: Skin is warm and dry.   Neurological:      Mental Status: She is alert and oriented to person, place, and time.         Immunization History   Administered Date(s) Administered    COVID-19 (MODERNA) 1st,2nd,3rd Dose Monovalent 03/07/2021, 04/04/2021, 11/08/2021    Covid-19 (Pfizer) Gray Cap Monovalent 06/24/2022    Fluad Quad 65+ 10/25/2022    Fluzone High-Dose 65+yrs 10/13/2021    Pneumococcal Conjugate 13-Valent (PCV13) 10/13/2021    Pneumococcal Polysaccharide (PPSV23) 01/01/2020    Td (TDVAX) 07/17/2022       Results Review:   - I personally reviewed the pts imaging from CT scan of the chest from 9/14/2023 which shows no concerning nodules, masses, or infiltrates.  Normal CT scan of the chest.  - I personally reviewed the pts PFT from 10/9/2023 shows no obstruction or restriction with a normal DLCO  - I personally reviewed the pts Echo report from 4/4/2023 which showed a normal EF  - I " personally reviewed the pts stress test result from 4/5/2023 which showed normal myocardial perfusion with a normal EF and considered to be a low risk study.    Assessment / Plan:   Diagnoses and all orders for this visit:    1. Chronic cough (Primary)  2. GERD without esophagitis  -We discussed that the most common causes of chronic cough being asthma, allergy, and reflux.  The CT of the chest shows no signs of any causes of cough.  PFTs are entirely normal.  She has no other signs of asthma.  I did discuss with her doing a methacholine challenge but she does not want to pursue that at this time.  We will focus on reflux as noted below for now.  - I will go ahead and start the patient on Protonix 40 mg twice daily.    - I instructed the patient to take it on an empty stomach 30 minutes prior to eating.  Patient was also given education sheet on dietary and lifestyle modifications.  I will have the patient implement the instructions for the next 6 weeks.    - If the symptoms do not resolve, then we will consider further testing with a esophageal pH probe study, modified barium swallow and possible GI evaluation.      Follow Up:   Return in about 2 months (around 12/9/2023).     MARILYN Pinto, DO  Pulmonary and Critical Care Medicine  Note Electronically Signed    Part of this note may be an electronic transcription/translation of spoken language to printed text using the Dragon Dictation System.

## 2023-10-12 RX ORDER — ESCITALOPRAM OXALATE 10 MG/1
10 TABLET ORAL DAILY
Qty: 90 TABLET | Refills: 0 | Status: SHIPPED | OUTPATIENT
Start: 2023-10-12

## 2023-10-12 RX ORDER — ATORVASTATIN CALCIUM 20 MG/1
20 TABLET, FILM COATED ORAL DAILY
Qty: 90 TABLET | Refills: 0 | Status: SHIPPED | OUTPATIENT
Start: 2023-10-12

## 2023-11-07 ENCOUNTER — HOSPITAL ENCOUNTER (OUTPATIENT)
Dept: GENERAL RADIOLOGY | Facility: HOSPITAL | Age: 68
Discharge: HOME | End: 2023-11-07
Admitting: STUDENT IN AN ORGANIZED HEALTH CARE EDUCATION/TRAINING PROGRAM
Payer: MEDICARE

## 2023-11-07 ENCOUNTER — OFFICE VISIT (OUTPATIENT)
Dept: INTERNAL MEDICINE | Facility: CLINIC | Age: 68
End: 2023-11-07
Payer: MEDICARE

## 2023-11-07 VITALS
DIASTOLIC BLOOD PRESSURE: 80 MMHG | SYSTOLIC BLOOD PRESSURE: 128 MMHG | TEMPERATURE: 97.8 F | WEIGHT: 201 LBS | BODY MASS INDEX: 34.48 KG/M2 | RESPIRATION RATE: 20 BRPM

## 2023-11-07 DIAGNOSIS — M25.562 ACUTE PAIN OF LEFT KNEE: Primary | ICD-10-CM

## 2023-11-07 DIAGNOSIS — M25.562 ACUTE PAIN OF LEFT KNEE: ICD-10-CM

## 2023-11-07 PROCEDURE — 1159F MED LIST DOCD IN RCRD: CPT | Performed by: STUDENT IN AN ORGANIZED HEALTH CARE EDUCATION/TRAINING PROGRAM

## 2023-11-07 PROCEDURE — 73560 X-RAY EXAM OF KNEE 1 OR 2: CPT

## 2023-11-07 PROCEDURE — 1160F RVW MEDS BY RX/DR IN RCRD: CPT | Performed by: STUDENT IN AN ORGANIZED HEALTH CARE EDUCATION/TRAINING PROGRAM

## 2023-11-07 PROCEDURE — 3074F SYST BP LT 130 MM HG: CPT | Performed by: STUDENT IN AN ORGANIZED HEALTH CARE EDUCATION/TRAINING PROGRAM

## 2023-11-07 PROCEDURE — 99214 OFFICE O/P EST MOD 30 MIN: CPT | Performed by: STUDENT IN AN ORGANIZED HEALTH CARE EDUCATION/TRAINING PROGRAM

## 2023-11-07 PROCEDURE — 3079F DIAST BP 80-89 MM HG: CPT | Performed by: STUDENT IN AN ORGANIZED HEALTH CARE EDUCATION/TRAINING PROGRAM

## 2023-11-07 RX ORDER — IPRATROPIUM BROMIDE 42 UG/1
SPRAY, METERED NASAL
COMMUNITY
Start: 2023-10-12

## 2023-11-07 RX ORDER — MELOXICAM 7.5 MG/1
7.5 TABLET ORAL DAILY
Qty: 30 TABLET | Refills: 0 | Status: SHIPPED | OUTPATIENT
Start: 2023-11-07

## 2023-11-07 NOTE — PROGRESS NOTES
"    Follow Up Office Visit      Date: 2023   Patient Name: Sarah Howard  : 1955   MRN: [id number]     Chief Complaint:    Chief Complaint   Patient presents with    Knee Pain     Left knee        History of Present Illness: Sarah Howard is a 68 y.o. female who is here today for knee pain.    HPI    Left knee pain  The patient has a history of a left torn meniscus. Surgery was not an option due to \"decreased blood flow\". Patient states that her bilateral knees are weakening, and she experiences \"clicking and popping\". She endorses mild swelling as well as pain in her middle-left knee and shin bone that started on 2023. Ambulating and bending her left knee are painful. She has intermittent pain behind her left knee. She is unsure of any injury or trauma and denies any bruising or using ice therapy. Bending her left knee increases her pain. Patient is taking ibuprofen for pain. Compression sleeves worsened her pain. Denies her left knee \"giving out\". Her right knee locks up when she is driving. Reports that her right hip is also bothersome. Laying on her sides is painful, and she sleeps in a recliner.     Cough  Patient recently saw Dr. Pinto due to a cough. She reports that her cough has improved.     Subjective      Review of Systems:   Review of Systems    I have reviewed the patients family history, social history, past medical history, past surgical history and have updated it as appropriate.     Medications:     Current Outpatient Medications:     aspirin 81 MG EC tablet, Take 1 tablet by mouth Daily., Disp: 90 tablet, Rfl: 3    atorvastatin (LIPITOR) 20 MG tablet, Take 1 tablet by mouth Daily., Disp: 90 tablet, Rfl: 0    DULoxetine (CYMBALTA) 60 MG capsule, Take 1 capsule by mouth Daily., Disp: 90 capsule, Rfl: 1    escitalopram (LEXAPRO) 10 MG tablet, Take 1 tablet by mouth Daily., Disp: 90 tablet, Rfl: 0    ipratropium (ATROVENT) 0.06 % nasal spray, , Disp: , Rfl:     latanoprost " (XALATAN) 0.005 % ophthalmic solution, , Disp: , Rfl:     metoprolol succinate XL (Toprol XL) 25 MG 24 hr tablet, Take 1 tablet by mouth Daily., Disp: 90 tablet, Rfl: 1    olmesartan (BENICAR) 20 MG tablet, TAKE 1 TABLET BY MOUTH DAILY, Disp: 90 tablet, Rfl: 0    pantoprazole (PROTONIX) 40 MG EC tablet, Take 1 tablet by mouth 2 (Two) Times a Day. Take 2 times daily for 30 days, then decrease to one time daily., Disp: 90 tablet, Rfl: 0    traZODone (DESYREL) 150 MG tablet, Take 1 tablet by mouth Daily., Disp: 90 tablet, Rfl: 0    meloxicam (Mobic) 7.5 MG tablet, Take 1 tablet by mouth Daily., Disp: 30 tablet, Rfl: 0    Allergies:   No Known Allergies    Objective     Physical Exam: Please see above  Vital Signs:   Vitals:    11/07/23 1502   BP: 128/80   BP Location: Left arm   Patient Position: Sitting   Cuff Size: Adult   Resp: 20   Temp: 97.8 °F (36.6 °C)   TempSrc: Temporal   Weight: 91.2 kg (201 lb)   PainSc:   6   PainLoc: Knee     Body mass index is 34.48 kg/m².    Physical Exam  Vitals reviewed.   Constitutional:       General: She is not in acute distress.     Appearance: Normal appearance. She is obese. She is not ill-appearing or toxic-appearing.   HENT:      Head: Normocephalic and atraumatic.      Right Ear: External ear normal.      Left Ear: External ear normal.      Nose: Nose normal. No congestion.      Mouth/Throat:      Mouth: Mucous membranes are moist.   Eyes:      General: No scleral icterus.     Extraocular Movements: Extraocular movements intact.      Pupils: Pupils are equal, round, and reactive to light.   Cardiovascular:      Rate and Rhythm: Normal rate and regular rhythm.      Pulses: Normal pulses.   Pulmonary:      Effort: Pulmonary effort is normal. No respiratory distress.   Abdominal:      General: Abdomen is flat. There is no distension.   Musculoskeletal:         General: Swelling present. No tenderness, deformity or signs of injury.      Cervical back: Normal range of motion. No  rigidity.      Right knee: No swelling, effusion, erythema, ecchymosis or lacerations. Normal range of motion. No tenderness. No medial joint line, lateral joint line, MCL, LCL, ACL or PCL tenderness.      Instability Tests: Anterior Lachman test negative.      Left knee: Swelling and effusion (medial > Latera) present. No erythema, ecchymosis, lacerations, bony tenderness or crepitus. Decreased range of motion (2/2 pain, able to flex to 90 degress. full extension). Tenderness (along medial joint line) present over the medial joint line. No lateral joint line, MCL, LCL, ACL, PCL or patellar tendon tenderness. Abnormal meniscus. Normal alignment.      Instability Tests: Anterior Lachman test negative. Medial Maximilian test positive. Lateral Maximilian test negative.      Right lower leg: Normal.      Comments: Click with medial Maximilian   Skin:     General: Skin is warm and dry.      Capillary Refill: Capillary refill takes less than 2 seconds.      Findings: No bruising, erythema or rash.   Neurological:      General: No focal deficit present.      Mental Status: She is alert and oriented to person, place, and time.   Psychiatric:         Mood and Affect: Mood normal.         Behavior: Behavior normal.         Judgment: Judgment normal.         Procedures    Results:   Labs:   Hemoglobin A1C   Date Value Ref Range Status   08/25/2023 5.50 4.80 - 5.60 % Final     TSH   Date Value Ref Range Status   04/03/2023 3.000 0.270 - 4.200 uIU/mL Final        Imaging:   No valid procedures specified.     Assessment / Plan      Assessment/Plan:   Problem List Items Addressed This Visit    None  Visit Diagnoses       Acute pain of left knee    -  Primary    Advised to rest, keep the left knee elevated, use ice therapy, and try compression sleeves. Patient can alternate Tylenol and meloxicam for pain.    Relevant Medications    meloxicam (Mobic) 7.5 MG tablet    Other Relevant Orders    XR Knee 1 or 2 View Bilateral    Ambulatory  Referral to Orthopedic Surgery        This is acute on chronic right knee pain. This is worsening. Will obtain plain films today to rule out any fractures and evaluate for OA. Will treat symptomatically with mobic daily prn. Recommend tylenol as well. Discussed RICE therapy. Will refer to orthopedics for further evaluation and consideration of steroid injection.       Follow Up:   Return if symptoms worsen or fail to improve.    Dao Gomez MD  [facility]    Transcribed from ambient dictation for Dao Gomez MD by Jaxon Serrano.  11/07/23   16:10 EST    Patient or patient representative verbalized consent to the visit recording.  I have personally performed the services described in this document as transcribed by the above individual, and it is both accurate and complete.

## 2023-11-08 NOTE — PROGRESS NOTES
"Good news, no fractures. There is some evidence of moderate osteoarthritis, aka \"wear and tear\" arthritis. Please continue your care as discussed at your last visit.      Hope you feel better!  Dr. Gomez"

## 2023-11-09 ENCOUNTER — OFFICE VISIT (OUTPATIENT)
Age: 68
End: 2023-11-09
Payer: MEDICARE

## 2023-11-09 VITALS
WEIGHT: 196 LBS | HEIGHT: 65 IN | SYSTOLIC BLOOD PRESSURE: 122 MMHG | BODY MASS INDEX: 32.65 KG/M2 | DIASTOLIC BLOOD PRESSURE: 90 MMHG

## 2023-11-09 DIAGNOSIS — M17.12 PRIMARY OSTEOARTHRITIS OF LEFT KNEE: Primary | ICD-10-CM

## 2023-11-09 DIAGNOSIS — S83.242A ACUTE MEDIAL MENISCUS TEAR OF LEFT KNEE, INITIAL ENCOUNTER: ICD-10-CM

## 2023-11-09 DIAGNOSIS — M25.462 EFFUSION OF LEFT KNEE: ICD-10-CM

## 2023-11-09 RX ORDER — TRIAMCINOLONE ACETONIDE 40 MG/ML
80 INJECTION, SUSPENSION INTRA-ARTICULAR; INTRAMUSCULAR
Status: COMPLETED | OUTPATIENT
Start: 2023-11-09 | End: 2023-11-09

## 2023-11-09 RX ORDER — BUPIVACAINE HYDROCHLORIDE 5 MG/ML
4 INJECTION, SOLUTION EPIDURAL; INTRACAUDAL
Status: COMPLETED | OUTPATIENT
Start: 2023-11-09 | End: 2023-11-09

## 2023-11-09 RX ORDER — LIDOCAINE HYDROCHLORIDE 10 MG/ML
4 INJECTION, SOLUTION EPIDURAL; INFILTRATION; INTRACAUDAL; PERINEURAL
Status: COMPLETED | OUTPATIENT
Start: 2023-11-09 | End: 2023-11-09

## 2023-11-09 RX ADMIN — BUPIVACAINE HYDROCHLORIDE 4 ML: 5 INJECTION, SOLUTION EPIDURAL; INTRACAUDAL at 09:21

## 2023-11-09 RX ADMIN — TRIAMCINOLONE ACETONIDE 80 MG: 40 INJECTION, SUSPENSION INTRA-ARTICULAR; INTRAMUSCULAR at 09:21

## 2023-11-09 RX ADMIN — LIDOCAINE HYDROCHLORIDE 4 ML: 10 INJECTION, SOLUTION EPIDURAL; INFILTRATION; INTRACAUDAL; PERINEURAL at 09:21

## 2023-11-09 NOTE — PROGRESS NOTES
Procedure   - Large Joint Arthrocentesis: L knee on 11/9/2023 9:21 AM  Indications: pain  Details: 21 G needle, ultrasound-guided anterolateral approach  Medications: 4 mL bupivacaine (PF) 0.5 %; 4 mL lidocaine PF 1% 1 %; 80 mg triamcinolone acetonide 40 MG/ML  Outcome: tolerated well, no immediate complications  Procedure, treatment alternatives, risks and benefits explained, specific risks discussed. Consent was given by the patient. Immediately prior to procedure a time out was called to verify the correct patient, procedure, equipment, support staff and site/side marked as required. Patient was prepped and draped in the usual sterile fashion.

## 2023-11-09 NOTE — PROGRESS NOTES
[facility] – [place] [facility] Office Visit     Office Visit       Date: 11/09/2023   Patient Name: Sarah Howard  MRN: [id number]  YOB: 1955    Referring Physician: No ref. provider found     Chief Complaint:   Chief Complaint   Patient presents with    Left Knee - Pain       History of Present Illness:   Sarah Howard is a 68 y.o. female who presents with left knee pain for 6 day(s). Onset atraumatic and gradual in nature. Pain is localized to the medial joint line and is a 8/10 on the pain scale. Pain is described as aching and burning. Associated symptoms include pain, swelling, popping, and stiffness. The pain is worse with walking, climbing stairs, sleeping, and lying on affected side; resting and ice make it better. Previous treatments have included: bracing and NSAIDS since symptom onset. Although some transient relief was reported with these interventions, these conservative measures have failed and symptoms have persisted. The patient is limited in daily activities and has had a significant decrease in quality of life as a result. She denies fevers, chills, or constitutional symptoms.    Subjective   Review of Systems: Review of Systems   Constitutional:  Negative for chills, fever, unexpected weight gain and unexpected weight loss.   HENT:  Negative for congestion, postnasal drip and rhinorrhea.    Eyes:  Negative for blurred vision.   Respiratory:  Negative for shortness of breath.    Cardiovascular:  Negative for leg swelling.   Gastrointestinal:  Negative for abdominal pain, nausea and vomiting.   Genitourinary:  Negative for difficulty urinating.   Musculoskeletal:  Positive for arthralgias. Negative for gait problem, joint swelling and myalgias.   Skin:  Negative for skin lesions and wound.   Neurological:  Negative for dizziness, weakness, light-headedness and numbness.   Hematological:  Does not bruise/bleed easily.   Psychiatric/Behavioral:   Negative for depressed mood.         I have reviewed the following portions of the patient's history:History of Present Illness and review of systems.    Past Medical History:   Past Medical History:   Diagnosis Date    Breast cancer     3/2010, right breast    Cancer Breast    2010    Cervical disc disorder ACDF 5/2020    Coronary artery disease 10/2020    Depression     Drug therapy     2010    GERD (gastroesophageal reflux disease) 2016    Glaucoma 2016    Hx of radiation therapy     2010    Hyperlipidemia 2022    Hypertension 2020    Osteopenia 2009    Tear of meniscus of knee        Past Surgical History:   Past Surgical History:   Procedure Laterality Date    ADENOIDECTOMY  1972    BACK SURGERY  2018    BREAST BIOPSY Right     3/2010    BREAST LUMPECTOMY Right     x2, 2010    BREAST SURGERY  2 x 2010    CARDIAC CATHETERIZATION  2020    COLONOSCOPY  2018    COSMETIC SURGERY  breast reduction    2010    EYE SURGERY  laser iridotomies    2019    HYSTERECTOMY  2011    NECK SURGERY      REDUCTION MAMMAPLASTY Bilateral     2010 at time of lumpectomy    SPINE SURGERY  2019    TONSILLECTOMY  1972    TUBAL ABDOMINAL LIGATION  1982       Family History:   Family History   Problem Relation Age of Onset    Colon cancer Mother 48    Stroke Mother     Thyroid disease Mother     Anxiety disorder Mother     Depression Mother     Cancer Mother     Diabetes Mother     Heart disease Father         mitral valve disease    Atrial fibrillation Brother     Breast cancer Neg Hx     Ovarian cancer Neg Hx        Social History:   Social History     Socioeconomic History    Marital status:    Tobacco Use    Smoking status: Never     Passive exposure: Never    Smokeless tobacco: Never   Vaping Use    Vaping Use: Never used   Substance and Sexual Activity    Alcohol use: Not Currently    Drug use: Never     Types: Marijuana    Sexual activity: Not Currently     Partners: Male     Birth control/protection: Post-menopausal, Tubal  "ligation, Hysterectomy       Medications:   Current Outpatient Medications:     aspirin 81 MG EC tablet, Take 1 tablet by mouth Daily., Disp: 90 tablet, Rfl: 3    atorvastatin (LIPITOR) 20 MG tablet, Take 1 tablet by mouth Daily., Disp: 90 tablet, Rfl: 0    DULoxetine (CYMBALTA) 60 MG capsule, Take 1 capsule by mouth Daily., Disp: 90 capsule, Rfl: 1    escitalopram (LEXAPRO) 10 MG tablet, Take 1 tablet by mouth Daily., Disp: 90 tablet, Rfl: 0    ipratropium (ATROVENT) 0.06 % nasal spray, , Disp: , Rfl:     latanoprost (XALATAN) 0.005 % ophthalmic solution, , Disp: , Rfl:     meloxicam (Mobic) 7.5 MG tablet, Take 1 tablet by mouth Daily., Disp: 30 tablet, Rfl: 0    metoprolol succinate XL (Toprol XL) 25 MG 24 hr tablet, Take 1 tablet by mouth Daily., Disp: 90 tablet, Rfl: 1    olmesartan (BENICAR) 20 MG tablet, TAKE 1 TABLET BY MOUTH DAILY, Disp: 90 tablet, Rfl: 0    pantoprazole (PROTONIX) 40 MG EC tablet, Take 1 tablet by mouth 2 (Two) Times a Day. Take 2 times daily for 30 days, then decrease to one time daily., Disp: 90 tablet, Rfl: 0    traZODone (DESYREL) 150 MG tablet, Take 1 tablet by mouth Daily., Disp: 90 tablet, Rfl: 0    Allergies: No Known Allergies    I reviewed the patient's chief complaint, history of present illness, review of systems, past medical history, surgical history, family history, social history, medications and allergy list.     Objective    Vital Signs:   Vitals:    11/09/23 0854   BP: 122/90   BP Location: Left arm   Patient Position: Sitting   Cuff Size: Adult   Weight: 88.9 kg (196 lb)   Height: 165.1 cm (65\")     Body mass index is 32.62 kg/m². BMI is >= 30 and <35. (Class 1 Obesity). The following options were offered after discussion;: exercise counseling/recommendations and nutrition counseling/recommendations     Patient reports that she is a non-smoker and has not ever been a smoker.  This behavior was applauded and she was encouraged to continue in smoking cessation.  We will " continue to monitor at subsequent visits.     Ortho Exam:  Constitutional: General Appearance: healthy-appearing, NAD, and normal body habitus.   Gait and Station: Appearance: limp and antalgic gait and ambulates with no assistive devices.   Skin: Right Lower Extremity: normal. Left Lower Extremity: normal.   Psychiatric: Orientation: oriented to time, place, and person. Mood and Affect: normal mood and affect and active and alert.   Left knee exam:   There is swelling and effusion but no warmth or erythema of the knee.    The skin is clear.    Overall the alignment of the knee is varus   The patient has 5/5 strength with ankle plantar flexion, dorsiflexion, inversion, eversion, and great toe extension.    Sensation is grossly present to light touch in the superficial peroneal, deep peroneal, and tibial nerve distributions.    The dorsalis pedis pulse is 2+ and the foot is well perfused with brisk capillary refill.   Active ROM is 0° to 110°.   Passive ROM is 0° to 110°.   The knee shows no gross instability to varus/valgus stress testing, anterior/posterior drawer sign, and Lachman testing.    There is tenderness to palpation over the medial joint line. Patellar grind test is positive.   Hip ROM is full and painless.  right knee exam:   Normal knee contour.   No evidence of swelling or effusion. Full range of motion.    Results Review:   Imaging Results (Last 24 Hours)       Procedure Component Value Units Date/Time    US Guided Injection [[id number]] Resulted: 11/09/23 0925     Updated: 11/09/23 0925        I personally reviewed the radiographs of the bilateral knees.  No acute fracture or dislocation.  Degenerative changes noted in the medial joint space of the knee.    Procedures    Assessment / Plan    Assessment/Plan:   Diagnoses and all orders for this visit:    1. Primary osteoarthritis of left knee (Primary)  -     US Guided Injection    2. Effusion of left knee    3. Acute medial meniscus tear of left knee,  initial encounter    Other orders  -     - Large Joint Arthrocentesis: L knee    Left knee pain that has worsened over the last 1 week without any known mechanism of injury. She localizes pain along the medial joint line.  She has a previous history in the remote past of meniscus tear that was not operated on.  This is not causing her any problems until recently.  She saw her primary care physician earlier in the week he started her on meloxicam and Tylenol.  This is helped with her pain and swelling.  However, she is still acutely tender and swollen on exam today.  Her  recently had surgery and she will be the primary caretaker.  Therefore, recommend that we inject the knee with corticosteroid today to give her a short-term answer to her problems.  This will allow her to become functional soon as possible.  In the meantime, continue with meloxicam and Tylenol.  She should ice the knee.  Continue to work on her range of motion.    Previous documentation reviewed: 11/7/23-office visit- Dao Gomez MD.    Previous imaging studies reviewed: 11/7/2023-radiographs of the bilateral knees.    Previous laboratory results reviewed: 8/25/2023-hemoglobin A1c 5.50%.    Follow Up:   Return in about 4 weeks (around 12/7/2023) for Recheck.      Roldan Ayers MD  [facility] [place] [facility]

## 2023-11-15 DIAGNOSIS — I25.10 CORONARY ARTERY DISEASE INVOLVING NATIVE HEART WITHOUT ANGINA PECTORIS, UNSPECIFIED VESSEL OR LESION TYPE: ICD-10-CM

## 2023-11-16 RX ORDER — METOPROLOL SUCCINATE 25 MG/1
25 TABLET, EXTENDED RELEASE ORAL DAILY
Qty: 90 TABLET | Refills: 1 | Status: SHIPPED | OUTPATIENT
Start: 2023-11-16

## 2023-11-27 ENCOUNTER — OFFICE VISIT (OUTPATIENT)
Dept: INTERNAL MEDICINE | Facility: CLINIC | Age: 68
End: 2023-11-27
Payer: MEDICARE

## 2023-11-27 VITALS
BODY MASS INDEX: 32.78 KG/M2 | HEART RATE: 72 BPM | SYSTOLIC BLOOD PRESSURE: 128 MMHG | WEIGHT: 197 LBS | TEMPERATURE: 97.8 F | RESPIRATION RATE: 20 BRPM | DIASTOLIC BLOOD PRESSURE: 80 MMHG

## 2023-11-27 DIAGNOSIS — I10 PRIMARY HYPERTENSION: Primary | ICD-10-CM

## 2023-11-27 DIAGNOSIS — R05.3 CHRONIC COUGH: ICD-10-CM

## 2023-11-27 PROCEDURE — 3079F DIAST BP 80-89 MM HG: CPT | Performed by: STUDENT IN AN ORGANIZED HEALTH CARE EDUCATION/TRAINING PROGRAM

## 2023-11-27 PROCEDURE — 1160F RVW MEDS BY RX/DR IN RCRD: CPT | Performed by: STUDENT IN AN ORGANIZED HEALTH CARE EDUCATION/TRAINING PROGRAM

## 2023-11-27 PROCEDURE — 1159F MED LIST DOCD IN RCRD: CPT | Performed by: STUDENT IN AN ORGANIZED HEALTH CARE EDUCATION/TRAINING PROGRAM

## 2023-11-27 PROCEDURE — 3074F SYST BP LT 130 MM HG: CPT | Performed by: STUDENT IN AN ORGANIZED HEALTH CARE EDUCATION/TRAINING PROGRAM

## 2023-11-27 PROCEDURE — 99214 OFFICE O/P EST MOD 30 MIN: CPT | Performed by: STUDENT IN AN ORGANIZED HEALTH CARE EDUCATION/TRAINING PROGRAM

## 2023-11-27 NOTE — ASSESSMENT & PLAN NOTE
Chronic, improving with GERD management. Continue protonix as prescribed and f/u with pulmonology as scheduled.

## 2023-11-27 NOTE — ASSESSMENT & PLAN NOTE
Hypertension is improving with treatment.  Continue current treatment regimen.  Weight loss.  Regular aerobic exercise.  Blood pressure will be reassessed at the next regular appointment.  Continue metoprolol and olmesartan.

## 2023-11-27 NOTE — PROGRESS NOTES
Follow Up Office Visit      Date: 2023   Patient Name: Sarah Howard  : 1955   MRN: [id number]     Chief Complaint:    Chief Complaint   Patient presents with    Follow-up     3 MONTH COUGH, HYPERTENSION        History of Present Illness: Sarah Howard is a 68 y.o. female who is here today to follow up with cough and high blood pressure.    HPI  HTN  Metoprolol 25mg daily  Olmesartan 20mg daily    Cough  I personally reviewed note dated 10/9/2023 by Dr. Donald Pinto of pulmonology.  At that time recommended Protonix 40 mg twice daily.  Recommended follow-up in 2 months.  If no improvement will consider esophageal pH probe modified barium and possible GI evaluation.    Knee pain  Saw Dr. Treviño on 23 for joint injection of left knee. She received a cortisone injection in her knee which has improved her symptoms.  She has a follow-up appointment on 2023. She has 2 bone spurs, osteoarthritis, and a torn meniscus    Hypertension  She has been checking her blood pressure at home. She denies any leg swelling or chest pain. She is on metoprolol 25 mg once a day and Benicar once a day.    Cough  Her cough has improved. She cancelled her appointment with her ENT. She has not taken Protonix 2 times a day.     Medication  She continues to use metoprolol 25 mg a day and Benicar.       Subjective      Review of Systems:   Review of Systems    I have reviewed the patients family history, social history, past medical history, past surgical history and have updated it as appropriate.     Medications:     Current Outpatient Medications:     aspirin 81 MG EC tablet, Take 1 tablet by mouth Daily., Disp: 90 tablet, Rfl: 3    atorvastatin (LIPITOR) 20 MG tablet, Take 1 tablet by mouth Daily., Disp: 90 tablet, Rfl: 0    DULoxetine (CYMBALTA) 60 MG capsule, Take 1 capsule by mouth Daily., Disp: 90 capsule, Rfl: 1    escitalopram (LEXAPRO) 10 MG tablet, Take 1 tablet by mouth Daily., Disp: 90 tablet,  Rfl: 0    ipratropium (ATROVENT) 0.06 % nasal spray, , Disp: , Rfl:     latanoprost (XALATAN) 0.005 % ophthalmic solution, , Disp: , Rfl:     meloxicam (Mobic) 7.5 MG tablet, Take 1 tablet by mouth Daily., Disp: 30 tablet, Rfl: 0    metoprolol succinate XL (Toprol XL) 25 MG 24 hr tablet, Take 1 tablet by mouth Daily., Disp: 90 tablet, Rfl: 1    olmesartan (BENICAR) 20 MG tablet, TAKE 1 TABLET BY MOUTH DAILY, Disp: 90 tablet, Rfl: 0    pantoprazole (PROTONIX) 40 MG EC tablet, Take 1 tablet by mouth 2 (Two) Times a Day. Take 2 times daily for 30 days, then decrease to one time daily., Disp: 90 tablet, Rfl: 0    traZODone (DESYREL) 150 MG tablet, Take 1 tablet by mouth Daily., Disp: 90 tablet, Rfl: 0    Allergies:   No Known Allergies    Objective     Physical Exam: Please see above  Vital Signs:   Vitals:    11/27/23 1130   BP: 128/80   BP Location: Left arm   Patient Position: Sitting   Cuff Size: Adult   Pulse: 72   Resp: 20   Temp: 97.8 °F (36.6 °C)   TempSrc: Temporal   Weight: 89.4 kg (197 lb)   PainSc: 0-No pain     Body mass index is 32.78 kg/m².          Physical Exam  Vitals reviewed.   Constitutional:       General: She is not in acute distress.     Appearance: Normal appearance. She is obese. She is not ill-appearing or toxic-appearing.   HENT:      Head: Normocephalic and atraumatic.      Right Ear: External ear normal.      Left Ear: External ear normal.      Nose: Nose normal. No congestion.      Mouth/Throat:      Mouth: Mucous membranes are moist.   Eyes:      General: No scleral icterus.     Extraocular Movements: Extraocular movements intact.      Pupils: Pupils are equal, round, and reactive to light.   Cardiovascular:      Rate and Rhythm: Normal rate and regular rhythm.      Pulses: Normal pulses.      Heart sounds: Normal heart sounds. No murmur heard.     No friction rub. No gallop.   Pulmonary:      Effort: Pulmonary effort is normal. No respiratory distress.      Breath sounds: Normal breath  sounds. No stridor. No wheezing, rhonchi or rales.   Abdominal:      General: Abdomen is flat. There is no distension.      Palpations: Abdomen is soft.   Musculoskeletal:         General: No swelling or tenderness. Normal range of motion.      Cervical back: Normal range of motion. No rigidity.   Skin:     General: Skin is warm and dry.      Capillary Refill: Capillary refill takes less than 2 seconds.      Findings: No erythema or rash.   Neurological:      General: No focal deficit present.      Mental Status: She is alert and oriented to person, place, and time.   Psychiatric:         Mood and Affect: Mood normal.         Behavior: Behavior normal.         Judgment: Judgment normal.         Procedures    Results:   Labs:   Hemoglobin A1C   Date Value Ref Range Status   08/25/2023 5.50 4.80 - 5.60 % Final     TSH   Date Value Ref Range Status   04/03/2023 3.000 0.270 - 4.200 uIU/mL Final        Imaging:   No valid procedures specified.     Assessment / Plan      Assessment/Plan:   Problem List Items Addressed This Visit       Primary hypertension - Primary    Overview     Lisinopril stopped 6/13/23 due to cough         Current Assessment & Plan     Hypertension is improving with treatment.  Continue current treatment regimen.  Weight loss.  Regular aerobic exercise.  Blood pressure will be reassessed at the next regular appointment.  Continue metoprolol and olmesartan.          Chronic cough    Overview     Present since 1-2/2023. Treated with PPI, failed albuterol trial, treated with zpack. Optimized allergies with xyzal and flonase 5/2023.  - CXR wnl on 6/13/23  - Saw ENT (Dr. House) CT sinus normal, negative IV AT allergy testing, Mycoplasma Ab pending. Recommended changing to atrovent nasal spray  - felt symptoms worsened after stopping protonix by accident         Current Assessment & Plan     Chronic, improving with GERD management. Continue protonix as prescribed and f/u with pulmonology as  scheduled.              Follow Up:   Return in about 5 months (around 5/1/2024) for Medicare Wellness, fasting labs..      Dao Gomez MD   [facility]  Transcribed from ambient dictation for Dao Gomez MD by Renetta Smith.  11/27/23   12:31 EST    Patient or patient representative verbalized consent to the visit recording.  I have personally performed the services described in this document as transcribed by the above individual, and it is both accurate and complete.

## 2023-12-09 DIAGNOSIS — F33.41 RECURRENT MAJOR DEPRESSIVE DISORDER, IN PARTIAL REMISSION: Primary | ICD-10-CM

## 2023-12-11 DIAGNOSIS — I10 PRIMARY HYPERTENSION: ICD-10-CM

## 2023-12-11 RX ORDER — OLMESARTAN MEDOXOMIL 20 MG/1
20 TABLET ORAL DAILY
Qty: 90 TABLET | Refills: 0 | Status: SHIPPED | OUTPATIENT
Start: 2023-12-11

## 2023-12-12 RX ORDER — DULOXETIN HYDROCHLORIDE 60 MG/1
60 CAPSULE, DELAYED RELEASE ORAL DAILY
Qty: 90 CAPSULE | Refills: 1 | Status: SHIPPED | OUTPATIENT
Start: 2023-12-12

## 2023-12-12 RX ORDER — LATANOPROST 50 UG/ML
SOLUTION/ DROPS OPHTHALMIC
OUTPATIENT
Start: 2023-12-12

## 2023-12-14 ENCOUNTER — OFFICE VISIT (OUTPATIENT)
Age: 68
End: 2023-12-14
Payer: MEDICARE

## 2023-12-14 VITALS
HEIGHT: 65 IN | BODY MASS INDEX: 32.84 KG/M2 | SYSTOLIC BLOOD PRESSURE: 138 MMHG | WEIGHT: 197.09 LBS | DIASTOLIC BLOOD PRESSURE: 80 MMHG

## 2023-12-14 DIAGNOSIS — M17.12 PRIMARY OSTEOARTHRITIS OF LEFT KNEE: Primary | ICD-10-CM

## 2023-12-14 DIAGNOSIS — S83.242D ACUTE MEDIAL MENISCUS TEAR OF LEFT KNEE, SUBSEQUENT ENCOUNTER: ICD-10-CM

## 2023-12-14 NOTE — PROGRESS NOTES
[facility] [place] [facility] [place] Up Visit     Office Follow Up      Date: 12/14/2023   Patient Name: Sarah Howard  MRN: [id number]  YOB: 1955    Referring Physician: No ref. provider found     Chief Complaint:   Chief Complaint   Patient presents with    Follow-up     5 week recheck - Primary osteoarthritis of left knee; Left knee effusion; Acute medial meniscus tear of left knee       History of Present Illness: Sarah Howard is a 68 y.o. female who is here today for follow up on left knee pain from primary knee osteoarthritis and medial meniscus tear.  At last visit, we injected the left knee with corticosteroid.  Symptoms have improved.  Pain is down to a 3/10.  She is taking Tylenol and meloxicam intermittently.  No further injury since last visit.    Subjective   Review of Systems: Review of Systems   Constitutional:  Negative for chills, fever, unexpected weight gain and unexpected weight loss.   HENT:  Negative for congestion, postnasal drip and rhinorrhea.    Eyes:  Negative for blurred vision.   Respiratory:  Negative for shortness of breath.    Cardiovascular:  Negative for leg swelling.   Gastrointestinal:  Negative for abdominal pain, nausea and vomiting.   Genitourinary:  Negative for difficulty urinating.   Musculoskeletal:  Positive for arthralgias. Negative for gait problem, joint swelling and myalgias.   Skin:  Negative for skin lesions and wound.   Neurological:  Negative for dizziness, weakness, light-headedness and numbness.   Hematological:  Does not bruise/bleed easily.   Psychiatric/Behavioral:  Negative for depressed mood.    All other systems reviewed and are negative.       Medications:   Current Outpatient Medications:     aspirin 81 MG EC tablet, Take 1 tablet by mouth Daily., Disp: 90 tablet, Rfl: 3    atorvastatin (LIPITOR) 20 MG tablet, Take 1 tablet by mouth Daily., Disp: 90 tablet, Rfl: 0    DULoxetine (CYMBALTA) 60 MG  "capsule, Take 1 capsule by mouth Daily., Disp: 90 capsule, Rfl: 1    escitalopram (LEXAPRO) 10 MG tablet, Take 1 tablet by mouth Daily., Disp: 90 tablet, Rfl: 0    ipratropium (ATROVENT) 0.06 % nasal spray, , Disp: , Rfl:     latanoprost (XALATAN) 0.005 % ophthalmic solution, , Disp: , Rfl:     metoprolol succinate XL (Toprol XL) 25 MG 24 hr tablet, Take 1 tablet by mouth Daily., Disp: 90 tablet, Rfl: 1    olmesartan (BENICAR) 20 MG tablet, TAKE 1 TABLET BY MOUTH DAILY, Disp: 90 tablet, Rfl: 0    pantoprazole (PROTONIX) 40 MG EC tablet, Take 1 tablet by mouth 2 (Two) Times a Day. Take 2 times daily for 30 days, then decrease to one time daily., Disp: 90 tablet, Rfl: 0    traZODone (DESYREL) 150 MG tablet, Take 1 tablet by mouth Daily., Disp: 90 tablet, Rfl: 0    meloxicam (Mobic) 7.5 MG tablet, Take 1 tablet by mouth Daily. (Patient not taking: Reported on 12/14/2023), Disp: 30 tablet, Rfl: 0    Allergies: No Known Allergies    I have reviewed and updated the patient's chief complaint, history of present illness, review of systems, past medical history, surgical history, family history, social history, medications and allergy list as appropriate.     Objective    Vital Signs:   Vitals:    12/14/23 1410   BP: 138/80   Weight: 89.4 kg (197 lb 1.5 oz)   Height: 165.1 cm (65\")     Body mass index is 32.8 kg/m². BMI is >= 30 and <35. (Class 1 Obesity). The following options were offered after discussion;: exercise counseling/recommendations and nutrition counseling/recommendations     Patient reports that she is a non-smoker and has not ever been a smoker.  This behavior was applauded and she was encouraged to continue in smoking cessation.  We will continue to monitor at subsequent visits.    Ortho Exam:  Left knee: No erythema, ecchymosis, swelling.  Tender to palpation over the medial joint line-much improved since last visit.  Full range of motion in flexion extension but pain is experienced with deep knee flexion.  " She can get to 0 degrees extension, 130 degrees in flexion.  Stable to varus and valgus stress.  Negative anterior posterior drawer.  Negative Maximilian's.  Sensation intact to light touch.  5/5 strength.  2+ posterior tibial pulse.    Results Review:   Imaging Results (Last 24 Hours)       ** No results found for the last 24 hours. **            Procedures    Assessment / Plan    Assessment/Plan:   Diagnoses and all orders for this visit:    1. Primary osteoarthritis of left knee (Primary)    2. Acute medial meniscus tear of left knee, subsequent encounter    Interval improvement in symptoms from knee arthritis and meniscus tear after corticosteroid injection.  Has full range of motion and strength.  Ligamentously stable on exam.  Recommend that she continue with activities as desired.  Continue meloxicam as needed.  I gave her a handout on Voltaren gel and recommend that she apply it to her knee before bed since nighttime gives her the most symptoms.  I am happy to see her back in the future if her symptoms flare or for other orthopedic issues but follow-up moving forward will be as needed.    Follow Up:   Return if symptoms worsen or fail to improve.      Roldan Ayers MD  [facility] [place] [facility]

## 2024-01-08 RX ORDER — TRAZODONE HYDROCHLORIDE 150 MG/1
150 TABLET ORAL DAILY
Qty: 90 TABLET | Refills: 0 | Status: SHIPPED | OUTPATIENT
Start: 2024-01-08

## 2024-02-02 ENCOUNTER — TELEMEDICINE (OUTPATIENT)
Dept: PSYCHIATRY | Facility: CLINIC | Age: 69
End: 2024-02-02
Payer: MEDICARE

## 2024-02-02 DIAGNOSIS — F41.1 GENERALIZED ANXIETY DISORDER: ICD-10-CM

## 2024-02-02 DIAGNOSIS — F33.1 MAJOR DEPRESSIVE DISORDER, RECURRENT EPISODE, MODERATE: Primary | ICD-10-CM

## 2024-02-02 NOTE — PROGRESS NOTES
"This provider is located at [facility], [address], [place], [place], [address], using a secure MyChart Video Visit through [employer]. Patient is being seen remotely via telehealth at their home address is located in [place]. Patient stated they are in a secure environment for this session. The patient's condition being diagnosed and treated is appropriate for telemedicine. The provider identified themself as well as their credentials. The patient, or  patient's legal guardian consent to be seen remotely, and when consent is given they understand that the consent allows for patient identifiable information to be sent to a third party as needed. They may refuse to be seen remotely at any time. The electronic data is encrypted and password protected, and the patient's or  legal guardian has been advised of the potential risks to privacy not withstanding such measures.   PT Identifiers used: Name and .    You have chosen to receive care through a telehealth visit.  Do you consent to use a video/audio connection for your medical care today? Yes     Subjective   Sarah Howard is a 68 y.o. female who presents today for initial evaluation .  Client present to this initial evaluation.  She denies any current suicidal thoughts, but expresses that she does have thoughts that if god decided that it was her time to go that she would be \"ok with that\". Therapist explored this further and client adamantly and convincingly denies that she is experiencing thoughts of taking her own life. She states that recently her depression has been \"really bad\".She states that she feels down and sad, finds herself not wanting to do anything.She reports that her  has taken over many of the house hold responsibilities but state that she had open heart surgery and worries about this.She states that she feels very lonely and isolated.She states that in  she did have thoughts of suicide.She described that due to " "some ongoing issues with family she was feeling very overwhelmed and as though things were hopeless, she reports that she began drinking and took trazadone. Her  was not home at the time, and she called friends who were able to get to the house.An ambulance was called and she was taken to the hospital and had her stomach pumped.The physician caring for her asked her if she had the opportunity would she try again and she told him yes, and he sent her to a mental health facility where she stayed for several days.She is adamant that she is not in that space again, but finds herself once again struggling very hard with depression, lack of energy.  \"I just don't want to feel this way anymore, I don't want to die\".  Client shared that there are many things going on that cause her distress, her daughter's best friend just recently  of overdose, her son is currently on probation, her daughter was recently diagnosised with Lupes, and resigned from her job.Client also reports that in May of 2023 she quit drinking, but started drinking again in 2023.  She expresses that drinking makes her feel better.she identifies this as a problem and wants to work on managing her depression with different coping strategies       Time In: 10:50 EST   Time out: 11:45 EST  Name of PCP: Dao Gomez MD   Referral source: Josefa Francisco, APRN  [address] [place] [address]     Chief Complaint: Depression and anxiety       Patient adamantly and convincingly denies current suicidal or homicidal ideation or perceptual disturbance.    Childhood Experiences:   Has patient experienced a major accident or tragic events as a child? no       Has patient experienced any other significant life events or trauma (such as verbal, physical, sexual abuse)? no      Significant Life Events:  Has patient been through or witnessed a divorce? She states that her daughter has been thru a divorce.Client states that she " carries a lot of anger and resentment towards this man because he hurt her daugher      Has patient experienced a death / loss of relationship? Both parents are .Her father  in , he was 67 years old.  Her mom  about 6 years after the client's father       Has patient experienced a major accident or tragic events? yes      Has patient experienced any other significant life events or trauma (such as verbal, physical, sexual abuse)? no    Social History:   Social History     Socioeconomic History    Marital status:    Tobacco Use    Smoking status: Never     Passive exposure: Never    Smokeless tobacco: Never   Vaping Use    Vaping Use: Never used   Substance and Sexual Activity    Alcohol use: Not Currently    Drug use: Never     Types: Marijuana    Sexual activity: Not Currently     Partners: Male     Birth control/protection: Post-menopausal, Tubal ligation, Hysterectomy     Marital Status:     Patient's current living situation: Patient lives with spouse and their cat ( who is 15 years old)    Support system:  her , sister in law    Difficulty getting along with peers: No, she states that she is very much a people pleaser and a nurturer    Difficulty making new friendships: no    Difficulty maintaining friendships: no, but states that many of her close friends live in other states so she does feel a little isolated    Close with family members: yes    Religous: Client reports that she believes in God, but states that she does not feel as close to God as she wants to right now. 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  She is not currently employed      Legal History:  The patient has no significant history of legal issues.    Past Medical History:  Past Medical History:   Diagnosis Date    Breast cancer     3/2010, right breast    Cancer Breast    2010    Cervical disc disorder ACDF 5/2020    Coronary artery disease 10/2020    Depression     Drug therapy     2010    GERD (gastroesophageal reflux disease) 2016    Glaucoma 2016    Hx of radiation therapy     2010    Hyperlipidemia 2022    Hypertension 2020    Osteopenia 2009    Tear of meniscus of knee        Past Surgical History:  Past Surgical History:   Procedure Laterality Date    ADENOIDECTOMY  1972    BACK SURGERY  2018    BREAST BIOPSY Right     3/2010    BREAST LUMPECTOMY Right     x2, 2010    BREAST SURGERY  2 x 2010    CARDIAC CATHETERIZATION  2020    COLONOSCOPY  2018    COSMETIC SURGERY  breast reduction    2010    EYE SURGERY  laser iridotomies    2019    HYSTERECTOMY  2011    NECK SURGERY      REDUCTION MAMMAPLASTY Bilateral     2010 at time of lumpectomy    SPINE SURGERY  2019    TONSILLECTOMY  1972    TUBAL ABDOMINAL LIGATION  1982         History of  psychiatric treatment or hospitalization: Yes, describe: The client reports that she was hospitalized in 2009 due to a suicide attempt.she denies any ongoing outpatient therapy since that time.  Client and therapist discussed referral for medication management curently, client is open to this referrral      Allergy:   No Known Allergies     Current Medications:   Current Outpatient Medications   Medication Sig Dispense Refill    aspirin 81 MG EC tablet Take 1 tablet by mouth Daily. 90 tablet 3    atorvastatin (LIPITOR) 20 MG tablet Take 1 tablet by mouth Daily. 90 tablet 0    DULoxetine (CYMBALTA) 60 MG capsule Take 1 capsule by mouth Daily. 90 capsule 1    escitalopram (LEXAPRO) 10 MG tablet Take 1 tablet by mouth Daily. 90 tablet 0    ipratropium (ATROVENT) 0.06 % nasal  spray       latanoprost (XALATAN) 0.005 % ophthalmic solution       meloxicam (Mobic) 7.5 MG tablet Take 1 tablet by mouth Daily. (Patient not taking: Reported on 12/14/2023) 30 tablet 0    metoprolol succinate XL (Toprol XL) 25 MG 24 hr tablet Take 1 tablet by mouth Daily. 90 tablet 1    olmesartan (BENICAR) 20 MG tablet TAKE 1 TABLET BY MOUTH DAILY 90 tablet 0    pantoprazole (PROTONIX) 40 MG EC tablet Take 1 tablet by mouth 2 (Two) Times a Day. Take 2 times daily for 30 days, then decrease to one time daily. 90 tablet 0    traZODone (DESYREL) 150 MG tablet Take 1 tablet by mouth Daily. 90 tablet 0     No current facility-administered medications for this visit.         Family History:  Family History   Problem Relation Age of Onset    Colon cancer Mother 48    Stroke Mother     Thyroid disease Mother     Anxiety disorder Mother     Depression Mother     Cancer Mother     Diabetes Mother     Heart disease Father         mitral valve disease    Atrial fibrillation Brother     Breast cancer Neg Hx     Ovarian cancer Neg Hx        Problem List:  Patient Active Problem List   Diagnosis    Mild depression    Osteopenia    GERD without esophagitis    Coronary artery disease involving native coronary artery    History of breast cancer    Glaucoma    Primary hypertension    Alcohol use disorder    Chronic cough    Class 1 obesity due to excess calories without serious comorbidity with body mass index (BMI) of 33.0 to 33.9 in adult         History of Substance Use:   Patient answered Yes to experiencing two or more of the following problems related to substance use: using more than intended or over longer period than intended; difficulty quitting or cutting back use; spending a great deal of time obtaining, using, or recovering from using; craving or strong desire or urge to use;  work and/or school problems; financial problems; family problems; using in dangerous situations; physical or mental health problems; relapse;  feelings of guilt or remorse about use; times when used and/or drank alone; needing to use more in order to achieve the desired effect; illness or withdrawal when stopping or cutting back use; using to relieve or avoid getting ill or developing withdrawal symptoms; and black outs and/or memory issues when using.        Substance Age Frequency Amount Method Last use   Nicotine        Alcohol  Daily      Marijuana        Benzo        Pain Pills        Cocaine        Meth        Heroin        Suboxone        Synthetics/Other:            SUICIDE RISK ASSESSMENT/CSSRS  1. Does patient have thoughts of suicide? no  2. Does patient have intent for suicide? no  3. Does patient have a current plan for suicide? no  4. History of suicide attempts: yes  5. Family history of suicide or attempts: no  6. History of violent behaviors towards others or property or thoughts of committing suicide: yes  7. History of sexual aggression toward others: no  8. Access to firearms or weapons: no    PHQ-Score Total:  PHQ-9 Total Score: (P) 18    DERICK-7 Score Total:  Over the last two weeks, how often have you been bothered by the following problems?  Feeling nervous, anxious or on edge: (P) More than half the days  Not being able to stop or control worrying: (P) Several days  Worrying too much about different things: (P) Several days  Trouble Relaxing: (P) Not at all  Being so restless that it is hard to sit still: (P) Not at all  Becoming easily annoyed or irritable: (P) More than half the days  Feeling afraid as if something awful might happen: (P) Not at all  DERICK 7 Total Score: (P) 6  If you checked any problems, how difficult have these problems made it for you to do your work, take care of things at home, or get along with other people: (P) Somewhat difficult        Mental Status Exam:   Hygiene:   good  Cooperation:  Cooperative  Eye Contact:  Good  Psychomotor Behavior:  Appropriate  Affect:  Appropriate  Mood: sad, depressed, and  anxious  Hopelessness: 3  Speech:  Normal  Thought Process:  Goal directed and Linear  Thought Content:  Normal  Suicidal:  None  Homicidal:  None  Hallucinations:  None  Delusion:  None  Memory:  Intact  Orientation:  Person, Place, Time, and Situation  Reliability:  fair  Insight:  Fair  Judgement:  Fair  Impulse Control:  Fair    Impression/Formulation:    Patient appeared alert and oriented.  Patient is voluntarily requesting to begin outpatient therapy at [facility].  Patient is receptive to assistance with maintaining a stable lifestyle.  Patient presents with history of No chief complaint on file.     Patient is agreeable to attend routine therapy sessions.  Patient expressed desire to maintain stability and participate in the therapeutic process.        Assessment and Plan: Client to begin outpatient individual therapy to improve functioning and improve coping strategies.she is also agreeable to referral for medication management    Visit Diagnoses:    ICD-10-CM ICD-9-CM   1. Major depressive disorder, recurrent episode, moderate  F33.1 296.32   2. Generalized anxiety disorder  F41.1 300.02        Functional Status: Moderate impairment     Prognosis: Guarded with Ongoing Treatment    Return in about 1 week (around 2/9/2024).      Treatment Plan: Continue supportive psychotherapy efforts and medications as indicated. Obtain release of information for current treatment team for continuity of care as needed. Patient will adhere to medication regimen as prescribed and report any side effects. Patient will contact this office, call 911 or present to the nearest emergency room should suicidal or homicidal ideations occur.    Short Term Goals: Patient will be compliant with medication, and patient will have no significant medication related side effects.  Patient will be engaged in psychotherapy as indicated.  Patient will report subjective improvement of symptoms.    Long Term  Goals: To stabilize mood and treat/improve subjective symptoms, the patient will stay out of the hospital, the patient will be at an optimal level of functioning, and the patient will take all medications as prescribed.The patient verbalized understanding and agreement with goals that were mutually set.    Crisis Plan:    If symptoms/behaviors persist, patient will present to the nearest hospital for an assessment. Advised patient of [facility] 24/7 assessment services.         This document has been electronically signed by Chanelle Goodson LCSW  February 2, 2024 10:50 EST     Part of this note may be an electronic transcription/translation of spoken language to printed text using the Dragon Dictation System.

## 2024-02-05 ENCOUNTER — TELEMEDICINE (OUTPATIENT)
Dept: PSYCHIATRY | Facility: CLINIC | Age: 69
End: 2024-02-05
Payer: MEDICARE

## 2024-02-05 DIAGNOSIS — F33.1 MAJOR DEPRESSIVE DISORDER, RECURRENT EPISODE, MODERATE: Primary | ICD-10-CM

## 2024-02-05 DIAGNOSIS — F41.1 GENERALIZED ANXIETY DISORDER: ICD-10-CM

## 2024-02-05 PROCEDURE — 90837 PSYTX W PT 60 MINUTES: CPT | Performed by: SOCIAL WORKER

## 2024-02-05 NOTE — PROGRESS NOTES
Date: 2024  Time In: 07:56 EST  Time out: 8:52 EST      This provider is located at [facility], [address] [place] [address], [facility], using a secure A[employer] Video Visit through [employer]. Patient is being seen remotely via telehealth at their home address is located in [place]. Patient stated they are in a secure environment for this session. The patient's condition being diagnosed and treated is appropriate for telemedicine. The provider identified themself as well as their credentials. The patient, or  patient's legal guardian consent to be seen remotely, and when consent is given they understand that the consent allows for patient identifiable information to be sent to a third party as needed. They may refuse to be seen remotely at any time. The electronic data is encrypted and password protected, and the patient's or  legal guardian has been advised of the potential risks to privacy not withstanding such measures.   PT Identifiers used: Name and .    You have chosen to receive care through a telehealth visit.  Do you consent to use a video/audio connection for your medical care today? Yes     Subjective   Sarah Howard is a 68 y.o. female who presents today for follow up    Chief Complaint: Depression and anxiety      History of Present Illness:   Client reports that it was a difficult weekend.She states that in addition to her daughter's friend who  last week, her daughter lost another close friend this weekend. Client expressed that she feels very sad about her daughter's losses.She states that the mother of the one friend has decided not have any type of service so friends will be unable to say their goodbyes.She states that her daughter stayed up late in the night Friday night talking with the client about her situation.She states that she continues to be very worried about her daughter's health and has encouraged her daughter to reach out to the daughter's  physician Client and therapist discussed the ongoing nature of her depression and anxiety and discussed causes (environmental, personality, physical health issues, and genetics) of the depression and anxiety    Clinical Maneuvering/Intervention:  On a scale of 0-10 ( with 10 being the worst)  Anxiety:  5/10  Depression:  6or7/10        Assisted patient in processing above session content; acknowledged and normalized patient’s thoughts, feelings, and concerns.  Rationalized patient thought process regarding concerns presented at session.  Discussed triggers associated with patient's  anxiety , depression , and grief Also discussed coping skills for patient to implement such as  identification and understanding of  triggers and causes     Allowed patient to freely discuss issues without interruption or judgment. Provided safe, confidential environment to facilitate the development of positive therapeutic relationship and encourage open, honest communication. Assisted patient in identifying risk factors which would indicate the need for higher level of care including thoughts to harm self or others and/or self-harming behavior and encouraged patient to contact this office, call 911, or present to the nearest emergency room should any of these events occur. Discussed crisis intervention services and means to access. Patient adamantly and convincingly denies current suicidal or homicidal ideation or perceptual disturbance.    Assessment:     Patient appears to maintain relative stability as compared to their baseline.  However, patient continues to struggle with Depression and anxiety which continues to cause impairment in important areas of functioning.  A result, they can be reasonably expected to continue to benefit from treatment and would likely be at increased risk for decompensation otherwise.      PHQ-Score Total:  PHQ-9 Total Score:      DERICK-7 Score Total:         Mental Status Exam:   Hygiene:    good  Cooperation:  Cooperative  Eye Contact:  Good  Psychomotor Behavior:  Appropriate  Affect:  Full range  Mood: sad, depressed, and anxious  Speech:  Normal  Thought Process:  Goal directed and Linear  Thought Content:  Normal  Suicidal:  None  Homicidal:  None  Hallucinations:  None  Delusion:  None  Memory:  Intact  Orientation:  Person, Place, Time, and Situation  Reliability:  good  Insight:  Fair  Judgement:  Fair  Impulse Control:  Fair  Physical/Medical Issues:   No changes from last session         Patient's Support Network Includes:   and daughter    Functional Status: Moderate impairment     Progress toward goal: Not at goal    Prognosis: Fair with Ongoing Treatment         Plan:    Patient will continue in individual outpatient therapy with focus on improved functioning and coping skills, maintaining stability, and avoiding decompensation and the need for higher level of care.    Patient will adhere to medication regimen as prescribed and report any side effects. Patient will contact this office, call 911 or present to the nearest emergency room should suicidal or homicidal ideations occur. Provide Cognitive Behavioral Therapy and Solution Focused Therapy to improve functioning, maintain stability, and avoid decompensation and the need for higher level of care.     Return in about 2 weeks (around 2/19/2024).      VISIT DIAGNOSIS:    Diagnosis Plan   1. Major depressive disorder, recurrent episode, moderate        2. Generalized anxiety disorder               This document has been electronically signed by Chanelle Goodson LCSW  February 5, 2024      Part of this note may be an electronic transcription/translation of spoken language to printed text using the Dragon Dictation System.

## 2024-02-12 ENCOUNTER — TELEMEDICINE (OUTPATIENT)
Dept: PSYCHIATRY | Facility: CLINIC | Age: 69
End: 2024-02-12
Payer: MEDICARE

## 2024-02-12 DIAGNOSIS — R05.3 CHRONIC COUGH: ICD-10-CM

## 2024-02-12 DIAGNOSIS — F33.1 MAJOR DEPRESSIVE DISORDER, RECURRENT EPISODE, MODERATE: Primary | ICD-10-CM

## 2024-02-12 DIAGNOSIS — F41.1 GENERALIZED ANXIETY DISORDER: ICD-10-CM

## 2024-02-12 DIAGNOSIS — K21.9 GERD WITHOUT ESOPHAGITIS: ICD-10-CM

## 2024-02-12 PROCEDURE — 90792 PSYCH DIAG EVAL W/MED SRVCS: CPT | Performed by: NURSE PRACTITIONER

## 2024-02-12 PROCEDURE — 1160F RVW MEDS BY RX/DR IN RCRD: CPT | Performed by: NURSE PRACTITIONER

## 2024-02-12 PROCEDURE — 1159F MED LIST DOCD IN RCRD: CPT | Performed by: NURSE PRACTITIONER

## 2024-02-12 RX ORDER — ESCITALOPRAM OXALATE 20 MG/1
20 TABLET ORAL DAILY
Qty: 30 TABLET | Refills: 1 | Status: SHIPPED | OUTPATIENT
Start: 2024-02-12

## 2024-02-13 RX ORDER — PANTOPRAZOLE SODIUM 40 MG/1
40 TABLET, DELAYED RELEASE ORAL 2 TIMES DAILY
Qty: 90 TABLET | Refills: 0 | Status: SHIPPED | OUTPATIENT
Start: 2024-02-13

## 2024-02-19 ENCOUNTER — TELEMEDICINE (OUTPATIENT)
Dept: PSYCHIATRY | Facility: CLINIC | Age: 69
End: 2024-02-19
Payer: MEDICARE

## 2024-02-19 DIAGNOSIS — F41.1 GENERALIZED ANXIETY DISORDER: ICD-10-CM

## 2024-02-19 DIAGNOSIS — F33.1 MAJOR DEPRESSIVE DISORDER, RECURRENT EPISODE, MODERATE: Primary | ICD-10-CM

## 2024-02-19 PROCEDURE — 90837 PSYTX W PT 60 MINUTES: CPT | Performed by: SOCIAL WORKER

## 2024-02-19 NOTE — PROGRESS NOTES
"Date: 2024  Time In: 12:28 EST  Time out: 13:22 EST      This provider is located at [facility], [facility], [place], [place], [facility], using a secure [employer] Video Visit through [employer]. Patient is being seen remotely via telehealth at their home address is located in [place]. Patient stated they are in a secure environment for this session. The patient's condition being diagnosed and treated is appropriate for telemedicine. The provider identified themself as well as their credentials. The patient, or  patient's legal guardian consent to be seen remotely, and when consent is given they understand that the consent allows for patient identifiable information to be sent to a third party as needed. They may refuse to be seen remotely at any time. The electronic data is encrypted and password protected, and the patient's or  legal guardian has been advised of the potential risks to privacy not withstanding such measures.   PT Identifiers used: Name and .    You have chosen to receive care through a telehealth visit.  Do you consent to use a video/audio connection for your medical care today? Yes     Subjective   Sarah Howard is a 68 y.o. female who presents today for follow up    Chief Complaint: Depression and Anxiety     History of Present Illness: Client discussed her relationship with her daughter.Client reports that she and her  have always tried to help their kids out. She shared that her sonis currently doing pretty well on his own.However, her daughter is needing a lot of help financially and emotionally from the client and her .Client shared that while she loves her , she sometimes feels overwhelmed by the demands of helping them, and fears that she and her  will always have to support them.She described the impact of these thoughts on her mood.Client discussed that she is aware that she is a \"people pleaser\" and in some ways has enabled " her children.She wants to be able to empower her children to be able to take care of themselves      Clinical Maneuvering/Intervention:  On a scale of 0-10 ( with 10 being the worst)  Anxiety: 3 or 4/10 ( situationally today it has been higher around an 8 or 9 out of 10)  Depression:          Assisted patient in processing above session content; acknowledged and normalized patient’s thoughts, feelings, and concerns.  Rationalized patient thought process regarding concerns presented at session.  Discussed triggers associated with patient's  anxiety  and depression  Also discussed coping skills for patient to implement such as self care  and positive self talk     Allowed patient to freely discuss issues without interruption or judgment. Provided safe, confidential environment to facilitate the development of positive therapeutic relationship and encourage open, honest communication. Assisted patient in identifying risk factors which would indicate the need for higher level of care including thoughts to harm self or others and/or self-harming behavior and encouraged patient to contact this office, call 911, or present to the nearest emergency room should any of these events occur. Discussed crisis intervention services and means to access. Patient adamantly and convincingly denies current suicidal or homicidal ideation or perceptual disturbance.    Assessment:     Patient appears to maintain relative stability as compared to their baseline.  However, patient continues to struggle with Anxiety and Depression which continues to cause impairment in important areas of functioning.  A result, they can be reasonably expected to continue to benefit from treatment and would likely be at increased risk for decompensation otherwise.      PHQ-Score Total:  PHQ-9 Total Score:      DERICK-7 Score Total:         Mental Status Exam:   Hygiene:   good  Cooperation:  Cooperative  Eye Contact:  Good  Psychomotor Behavior:   Appropriate  Affect:  Appropriate  Mood: sad, depressed, and anxious  Speech:  Normal  Thought Process:  Goal directed and Linear  Thought Content:  Normal  Suicidal:  None  Homicidal:  None  Hallucinations:  None  Delusion:  None  Memory:  Intact  Orientation:  Person, Place, Time, and Situation  Reliability:  good  Insight:  Fair  Judgement:  Good  Impulse Control:  Good  Physical/Medical Issues:  Yes ongoing health issues        Patient's Support Network Includes:  , son, children, and friends    Functional Status: Moderate impairment     Progress toward goal: Not at goal    Prognosis: Fair with Ongoing Treatment         Plan:    Patient will continue in individual outpatient therapy with focus on improved functioning and coping skills, maintaining stability, and avoiding decompensation and the need for higher level of care.    Patient will adhere to medication regimen as prescribed and report any side effects. Patient will contact this office, call 911 or present to the nearest emergency room should suicidal or homicidal ideations occur. Provide Cognitive Behavioral Therapy and Solution Focused Therapy to improve functioning, maintain stability, and avoid decompensation and the need for higher level of care.     Return in about 1 week (around 2/26/2024).      VISIT DIAGNOSIS:    Diagnosis Plan   1. Major depressive disorder, recurrent episode, moderate        2. Generalized anxiety disorder               This document has been electronically signed by Chanelle Goodson LCSW  February 19, 2024      Part of this note may be an electronic transcription/translation of spoken language to printed text using the Dragon Dictation System.

## 2024-02-22 RX ORDER — ATORVASTATIN CALCIUM 20 MG/1
20 TABLET, FILM COATED ORAL DAILY
Qty: 90 TABLET | Refills: 0 | Status: SHIPPED | OUTPATIENT
Start: 2024-02-22

## 2024-02-25 DIAGNOSIS — K21.9 GERD WITHOUT ESOPHAGITIS: ICD-10-CM

## 2024-02-25 DIAGNOSIS — R05.3 CHRONIC COUGH: ICD-10-CM

## 2024-02-26 ENCOUNTER — TELEMEDICINE (OUTPATIENT)
Dept: PSYCHIATRY | Facility: CLINIC | Age: 69
End: 2024-02-26
Payer: MEDICARE

## 2024-02-26 DIAGNOSIS — F41.1 GENERALIZED ANXIETY DISORDER: ICD-10-CM

## 2024-02-26 DIAGNOSIS — F33.1 MAJOR DEPRESSIVE DISORDER, RECURRENT EPISODE, MODERATE: Primary | ICD-10-CM

## 2024-02-26 PROCEDURE — 90837 PSYTX W PT 60 MINUTES: CPT | Performed by: SOCIAL WORKER

## 2024-02-26 RX ORDER — PANTOPRAZOLE SODIUM 40 MG/1
40 TABLET, DELAYED RELEASE ORAL 2 TIMES DAILY
Qty: 90 TABLET | Refills: 0 | OUTPATIENT
Start: 2024-02-26

## 2024-02-26 NOTE — PROGRESS NOTES
Date: 2024  Time In: 12:25 EST  Time out: 13:24 EST      This provider is located at [facility], [facility], [place], [place], [facility], using a secure MyChart Video Visit through [employer]. Patient is being seen remotely via telehealth at their home address is located in [place]. Patient stated they are in a secure environment for this session. The patient's condition being diagnosed and treated is appropriate for telemedicine. The provider identified themself as well as their credentials. The patient, or  patient's legal guardian consent to be seen remotely, and when consent is given they understand that the consent allows for patient identifiable information to be sent to a third party as needed. They may refuse to be seen remotely at any time. The electronic data is encrypted and password protected, and the patient's or  legal guardian has been advised of the potential risks to privacy not withstanding such measures.   PT Identifiers used: Name and .    You have chosen to receive care through a telehealth visit.  Do you consent to use a video/audio connection for your medical care today? Yes     Subjective   Sarah Howard is a 68 y.o. female who presents today for follow up. Client reports that she has seen some improvement in her depression, and got out of the house for awhile on Friday to do some things that she enjoys.She states that she was very sore the next day, but still overall enjoyed it.Client and therapist discussed several different areas of self care and discussed boundaries in session     Chief Complaint: Anxiety and Depression     History of Present Illness: Client and therapist discussed how things have been going      Clinical Maneuvering/Intervention:  On a scale of 0-10 ( with 10 being the worst)  Anxiety 5/10  Depression: 5/10 ( but reports that the depression seems to come and go now and feels that she is seeing improvement in this  Sleep:  showing  improvement, reports that she has some chronic pain issues which keep her awake at times  Appetite:  No current changes        Assisted patient in processing above session content; acknowledged and normalized patient’s thoughts, feelings, and concerns.  Rationalized patient thought process regarding concerns presented at session.  Discussed triggers associated with patient's  anxiety  and depression  Also discussed coping skills for patient to implement such as increasing activity , self care , and positive self talk   And some discussion of boundaries  Allowed patient to freely discuss issues without interruption or judgment. Provided safe, confidential environment to facilitate the development of positive therapeutic relationship and encourage open, honest communication. Assisted patient in identifying risk factors which would indicate the need for higher level of care including thoughts to harm self or others and/or self-harming behavior and encouraged patient to contact this office, call 911, or present to the nearest emergency room should any of these events occur. Discussed crisis intervention services and means to access. Patient adamantly and convincingly denies current suicidal or homicidal ideation or perceptual disturbance.    Assessment:     Patient appears to maintain relative stability as compared to their baseline.  However, patient continues to struggle with Anxiety and Depression which continues to cause impairment in important areas of functioning.  A result, they can be reasonably expected to continue to benefit from treatment and would likely be at increased risk for decompensation otherwise.      PHQ-Score Total:  PHQ-9 Total Score:      DERICK-7 Score Total:         Mental Status Exam:   Hygiene:   good  Cooperation:  Cooperative  Eye Contact:  Good  Psychomotor Behavior:  Appropriate  Affect:  Appropriate  Mood: sad, depressed, and anxious  Speech:  Normal  Thought Process:  Goal directed and  Linear  Thought Content:  Normal  Suicidal:  None  Homicidal:  None  Hallucinations:  None  Delusion:  None  Memory:  Intact  Orientation:  Person, Place, Time, and Situation  Reliability:  good  Insight:  Good  Judgement:  Good  Impulse Control:  Good  Physical/Medical Issues:   ongoing health issues        Patient's Support Network Includes:  , daughter, son, and friends    Functional Status: Moderate impairment     Progress toward goal: Not at goal    Prognosis: Fair with Ongoing Treatment         Plan:    Patient will continue in individual outpatient therapy with focus on improved functioning and coping skills, maintaining stability, and avoiding decompensation and the need for higher level of care.    Patient will adhere to medication regimen as prescribed and report any side effects. Patient will contact this office, call 911 or present to the nearest emergency room should suicidal or homicidal ideations occur. Provide Cognitive Behavioral Therapy and Solution Focused Therapy to improve functioning, maintain stability, and avoid decompensation and the need for higher level of care.     Return in about 1 week (around 3/4/2024).      VISIT DIAGNOSIS:    Diagnosis Plan   1. Major depressive disorder, recurrent episode, moderate        2. Generalized anxiety disorder               This document has been electronically signed by Chanelle Goodson LCSW  February 26, 2024      Part of this note may be an electronic transcription/translation of spoken language to printed text using the Dragon Dictation System.

## 2024-02-27 ENCOUNTER — TELEPHONE (OUTPATIENT)
Dept: INTERNAL MEDICINE | Facility: CLINIC | Age: 69
End: 2024-02-27
Payer: MEDICARE

## 2024-02-27 DIAGNOSIS — R05.3 CHRONIC COUGH: ICD-10-CM

## 2024-02-27 DIAGNOSIS — K21.9 GERD WITHOUT ESOPHAGITIS: ICD-10-CM

## 2024-02-27 RX ORDER — PANTOPRAZOLE SODIUM 40 MG/1
40 TABLET, DELAYED RELEASE ORAL 2 TIMES DAILY
Qty: 16 TABLET | Refills: 0 | Status: SHIPPED | OUTPATIENT
Start: 2024-02-27 | End: 2024-03-04

## 2024-02-27 NOTE — TELEPHONE ENCOUNTER
Caller: [facility] [phone] - [place] [address] [place]  AT [facility] [address] - [phone]  - [phone] FX    Relationship: Pharmacy    Best call back number: [phone]     Which medication are you concerned about: pantoprazole (PROTONIX) 40 MG EC tablet     What are your concerns: PHARMACY STATES IT SHOWS TO TAKE ONE TABLET TWICE A DAY FOR 30 DAYS BUT THE MEDICATION WAS ONLY CALLED IN FOR 16 TABLETS SO THEY WOULD LIKE TO KNOW IF THIS CAN BE CHANGED.

## 2024-02-27 NOTE — TELEPHONE ENCOUNTER
Believe this was because her full script is mail in and she requested a few days worth? If this is not the case let me know

## 2024-02-27 NOTE — TELEPHONE ENCOUNTER
Caller: Sarah Howard    Relationship: Self    Best call back number: [phone]     Requested Prescriptions:   -PANTOPRAZOLE (PROTONIX)   SHORT TERM PRESCRIPTION    Pharmacy where request should be sent: [facility] [id number] - [address]  AT [facility] & [address] - [phone] PH - [phone] FX     Last office visit with prescribing clinician: 11/27/2023   Last telemedicine visit with prescribing clinician: Visit date not found   Next office visit with prescribing clinician: 4/29/2024     Additional details provided by patient: PATIENT HAS BEEN COMPLETELY OUT OF THIS MEDICATION FOR OVER A WEEK.    PATIENT ADVISED THAT THERE WAS AN ISSUE WITH [facility] AND THEY ARE JUST NOW MAILING OUT HER FULL PRESCRIPTION.    SINCE THIS WILL TAKE APPROXIMATELY 5-8 DAYS, PATIENT IS REQUESTING A SHORT TERM SUPPLY BE CALLED IN LOCALLY TO [facility].    PLEASE NOTIFY PATIENT WHEN THIS PRESCRIPTION HAS BEEN CALLED IN OR IF THERE ARE ANY QUESTIONS/CONCERNS.     Leidy Harris Rep   02/27/24 12:59 EST

## 2024-03-04 ENCOUNTER — TELEPHONE (OUTPATIENT)
Dept: INTERNAL MEDICINE | Facility: CLINIC | Age: 69
End: 2024-03-04
Payer: MEDICARE

## 2024-03-04 DIAGNOSIS — K21.9 GERD WITHOUT ESOPHAGITIS: ICD-10-CM

## 2024-03-04 DIAGNOSIS — R05.3 CHRONIC COUGH: ICD-10-CM

## 2024-03-04 PROBLEM — H25.13 AGE-RELATED NUCLEAR CATARACT OF BOTH EYES: Status: ACTIVE | Noted: 2024-03-04

## 2024-03-04 RX ORDER — PANTOPRAZOLE SODIUM 40 MG/1
40 TABLET, DELAYED RELEASE ORAL DAILY
Qty: 90 TABLET | Refills: 3 | Status: SHIPPED | OUTPATIENT
Start: 2024-03-04

## 2024-03-04 RX ORDER — PANTOPRAZOLE SODIUM 40 MG/1
TABLET, DELAYED RELEASE ORAL
Qty: 16 TABLET | Refills: 0 | Status: SHIPPED | OUTPATIENT
Start: 2024-03-04 | End: 2024-03-04 | Stop reason: SDUPTHER

## 2024-03-04 NOTE — TELEPHONE ENCOUNTER
Pharmacy called requesting clarification on medication pantoprazole. Script was sent in for 1 tablet twice a day for 30 days and then decrease to 1 daily but was sent with only 16 tablets   [Post-hospitalization from ___ Hospital] : Post-hospitalization from [unfilled] Hospital [Admitted on: ___] : The patient was admitted on [unfilled] [Discharged on ___] : discharged on [unfilled] [Discharge Summary] : discharge summary [Pertinent Labs] : pertinent labs [Radiology Findings] : radiology findings [Discharge Med List] : discharge medication list [Med Reconciliation] : medication reconciliation has been completed [Patient Contacted By: ____] : and contacted by [unfilled] [FreeTextEntry2] : She had extensive surgery in December including hysterectomy, removal of tubes and ovaries, sigmoid and rectal excision, small bowel excision, excision of left ureter and bladder with ureter reimplantation and colostomy.  She had bleeding during the surgery and it had to be stopped and she had extensive blood transfusions.  She was released home on 1/5 with oral diabetes medication.  She hadn't been taking any medication for diabetes prior to the surgery.  Once she was discharge her family noticed that she was acting strangely and her blood sugar was very low and she was readmitted for monitoring.  Since coming home her sugar has been in the  range.  She is having pain radiating into her legs and was prescribed tramadol but didn't start it yet.  She is back on the venlafaxine as per the instructions that I gave to her daughter yesterday.  Her BP has been normal when checked by the vising nurses and she hasn't been taking the lisinopril.

## 2024-03-07 ENCOUNTER — TELEMEDICINE (OUTPATIENT)
Dept: PSYCHIATRY | Facility: CLINIC | Age: 69
End: 2024-03-07
Payer: MEDICARE

## 2024-03-07 DIAGNOSIS — F41.1 GENERALIZED ANXIETY DISORDER: ICD-10-CM

## 2024-03-07 DIAGNOSIS — F33.1 MAJOR DEPRESSIVE DISORDER, RECURRENT EPISODE, MODERATE: Primary | ICD-10-CM

## 2024-03-07 NOTE — PROGRESS NOTES
Date: March 10, 2024  Time In: 15:55 EST  Time out: 16:50 EST      This provider is located at [facility], [facility] [place], [place], [facility], using a secure [address] Video Visit through [employer]. Patient is being seen remotely via telehealth at their home address is located in [place]. Patient stated they are in a secure environment for this session. The patient's condition being diagnosed and treated is appropriate for telemedicine. The provider identified themself as well as their credentials. The patient, or  patient's legal guardian consent to be seen remotely, and when consent is given they understand that the consent allows for patient identifiable information to be sent to a third party as needed. They may refuse to be seen remotely at any time. The electronic data is encrypted and password protected, and the patient's or  legal guardian has been advised of the potential risks to privacy not withstanding such measures.   PT Identifiers used: Name and .    You have chosen to receive care through a telehealth visit.  Do you consent to use a video/audio connection for your medical care today? Yes     Subjective   Sarah Howard is a 68 y.o. female who presents today for follow up    Chief Complaint: Anxiety     History of Present Illness: Client discussed how things have been going since last session. She reports that following her and her  sitting with their daughter and discussed expectations with her, her daughter has shown some progress in looking at things to help herself.  Client reports that this seems to give her hope about her daughter's situation, and helps decrease her sense of anxiety.  However, client reports that she notices that she is falling into a pattern of drinking every evening.  She states that currently it is only one or two drinks each night.  She states that she feels that it breaks up the night, and expresses that she believes that she drinks out of  boredom.  Client and therapist discussed about strategies for managing this and client to consider prior to next session and monitor      Clinical Maneuvering/Intervention:  On a scale of 0-10 ( with 10 being the worst)  Anxiety: 3or4/10  Depression:  4/10  Sleep:  Slightly improved  Appetite:  No current issues reported        Assisted patient in processing above session content; acknowledged and normalized patient’s thoughts, feelings, and concerns.  Rationalized patient thought process regarding concerns presented at session.  Discussed triggers associated with patient's  anxiety  and depression  Also discussed coping skills for patient to implement such as mindfulness , self care , positive self talk , and boundaries    Allowed patient to freely discuss issues without interruption or judgment. Provided safe, confidential environment to facilitate the development of positive therapeutic relationship and encourage open, honest communication. Assisted patient in identifying risk factors which would indicate the need for higher level of care including thoughts to harm self or others and/or self-harming behavior and encouraged patient to contact this office, call 911, or present to the nearest emergency room should any of these events occur. Discussed crisis intervention services and means to access. Patient adamantly and convincingly denies current suicidal or homicidal ideation or perceptual disturbance.    Assessment:     Patient appears to maintain relative stability as compared to their baseline.  However, patient continues to struggle with Anxiety which continues to cause impairment in important areas of functioning.  A result, they can be reasonably expected to continue to benefit from treatment and would likely be at increased risk for decompensation otherwise.      PHQ-Score Total:  PHQ-9 Total Score:      DERICK-7 Score Total:         Mental Status Exam:   Hygiene:   good  Cooperation:  Cooperative  Eye  Contact:  Good  Psychomotor Behavior:  Appropriate  Affect:  Appropriate  Mood: normal  Speech:  Normal  Thought Process:  Goal directed  Thought Content:  Mood congruent  Suicidal:  None  Homicidal:  None  Hallucinations:  None  Delusion:  None  Memory:  Intact  Orientation:  Person, Place, Time, and Situation  Reliability:  fair  Insight:  Fair  Judgement:  Fair  Impulse Control:  Fair  Physical/Medical Issues:   No changes since last session         Patient's Support Network Includes:   and adult children    Functional Status: Moderate impairment     Progress toward goal: Not at goal    Prognosis: Fair with Ongoing Treatment         Plan:    Patient will continue in individual outpatient therapy with focus on improved functioning and coping skills, maintaining stability, and avoiding decompensation and the need for higher level of care.    Patient will adhere to medication regimen as prescribed and report any side effects. Patient will contact this office, call 911 or present to the nearest emergency room should suicidal or homicidal ideations occur. Provide Cognitive Behavioral Therapy and Solution Focused Therapy to improve functioning, maintain stability, and avoid decompensation and the need for higher level of care.     Return in about 1 week (around 3/14/2024).      VISIT DIAGNOSIS:    Diagnosis Plan   1. Major depressive disorder, recurrent episode, moderate        2. Generalized anxiety disorder               This document has been electronically signed by Chanelle Goodson LCSW  March 10, 2024      Part of this note may be an electronic transcription/translation of spoken language to printed text using the Dragon Dictation System.

## 2024-03-11 ENCOUNTER — TELEMEDICINE (OUTPATIENT)
Dept: PSYCHIATRY | Facility: CLINIC | Age: 69
End: 2024-03-11
Payer: MEDICARE

## 2024-03-11 DIAGNOSIS — F33.1 MAJOR DEPRESSIVE DISORDER, RECURRENT EPISODE, MODERATE: Primary | ICD-10-CM

## 2024-03-11 DIAGNOSIS — F41.1 GENERALIZED ANXIETY DISORDER: ICD-10-CM

## 2024-03-11 DIAGNOSIS — F10.90 ALCOHOL USE DISORDER: ICD-10-CM

## 2024-03-11 NOTE — PROGRESS NOTES
Date: 2024  Time In: 15:54 EDT  Time out: 16:56 EDT      This provider is located at [facility], [facility] [place], [place], [facility], using a secure [employer] Video Visit through [employer]. Patient is being seen remotely via telehealth at their home address is located in [place]. Patient stated they are in a secure environment for this session. The patient's condition being diagnosed and treated is appropriate for telemedicine. The provider identified themself as well as their credentials. The patient, or  patient's legal guardian consent to be seen remotely, and when consent is given they understand that the consent allows for patient identifiable information to be sent to a third party as needed. They may refuse to be seen remotely at any time. The electronic data is encrypted and password protected, and the patient's or  legal guardian has been advised of the potential risks to privacy not withstanding such measures.   PT Identifiers used: Name and .    You have chosen to receive care through a telehealth visit.  Do you consent to use a video/audio connection for your medical care today? Yes     Subjective   Sarah Howard is a 68 y.o. female who presents today for follow up    Chief Complaint: Anxiety and depression , alcohol use     History of Present Illness: Client discussed how things have been since last session. She reports that her sister in law visited over the weekend, and she really enjoyed the visit. She states that they were really able to get out and do a few things together that the client enjoys.  Client discussed her drinking, she reports that she has been drinking 2 to 3 drinks each night. She state that she is struggling to limit herself, but finds that when her sister in law was here and she was staying busy she was more distracted and did not drink at all.  Client and therapist discussed stages of change and worked to identify where client wa in the  process. She identifies that this is an issue and that she wants to be able to have more control.  Client states that she has gone periods thru out her life without drinking.  She feels that some part of her drinking is not having anything to do and also feels that the drinking helps numb some ofher anxious thinking.  Client and tehrapist processed and discussed how to manage things moving forward      Clinical Maneuvering/Intervention:  On a scale of 0-10 ( with 10 being the worst)  Anxiety: 4/10  Depression:    Sleep:  Client reports that she is having some problems falling asleep  Appetite:  No issues        Assisted patient in processing above session content; acknowledged and normalized patient’s thoughts, feelings, and concerns.  Rationalized patient thought process regarding concerns presented at session.  Discussed triggers associated with patient's  anxiety  and depression , alcohol use  Also discussed coping skills for patient to implement such as self care  and positive self talk     Allowed patient to freely discuss issues without interruption or judgment. Provided safe, confidential environment to facilitate the development of positive therapeutic relationship and encourage open, honest communication. Assisted patient in identifying risk factors which would indicate the need for higher level of care including thoughts to harm self or others and/or self-harming behavior and encouraged patient to contact this office, call 911, or present to the nearest emergency room should any of these events occur. Discussed crisis intervention services and means to access. Patient adamantly and convincingly denies current suicidal or homicidal ideation or perceptual disturbance.    Assessment:     Patient appears to maintain relative stability as compared to their baseline.  However, patient continues to struggle with  Anxiety and Depression which continues to cause impairment in important areas of functioning.  A  result, they can be reasonably expected to continue to benefit from treatment and would likely be at increased risk for decompensation otherwise.      PHQ-Score Total:  PHQ-9 Total Score:      DERICK-7 Score Total:         Mental Status Exam:   Hygiene:   good  Cooperation:  Cooperative  Eye Contact:  Good  Psychomotor Behavior:  Appropriate  Affect:  Appropriate  Mood: normal  Speech:  Normal  Thought Process:  Goal directed and Linear  Thought Content:  Mood congruent  Suicidal:  None  Homicidal:  None  Hallucinations:  None  Delusion:  None  Memory:  Intact  Orientation:  Person, Place, Time, and Situation  Reliability:  good  Insight:  Good  Judgement:  Good  Impulse Control:  Good  Physical/Medical Issues:   No changes since last session       Patient's Support Network Includes:  , children, and extended family    Functional Status: Moderate impairment     Progress toward goal: Not at goal    Prognosis: Guarded with Ongoing Treatment        Plan:    Patient will continue in individual outpatient therapy with focus on improved functioning and coping skills, maintaining stability, and avoiding decompensation and the need for higher level of care.    Patient will adhere to medication regimen as prescribed and report any side effects. Patient will contact this office, call 911 or present to the nearest emergency room should suicidal or homicidal ideations occur. Provide Cognitive Behavioral Therapy and Solution Focused Therapy to improve functioning, maintain stability, and avoid decompensation and the need for higher level of care.     Return in about 2 weeks (around 3/25/2024).      VISIT DIAGNOSIS:    Diagnosis Plan   1. Major depressive disorder, recurrent episode, moderate        2. Generalized anxiety disorder        3. Alcohol use disorder               This document has been electronically signed by Chaenlle Goodson LCSW  March 12, 2024      Part of this note may be an electronic  transcription/translation of spoken language to printed text using the Dragon Dictation System.

## 2024-03-14 DIAGNOSIS — I10 PRIMARY HYPERTENSION: ICD-10-CM

## 2024-03-14 RX ORDER — OLMESARTAN MEDOXOMIL 20 MG/1
20 TABLET ORAL DAILY
Qty: 90 TABLET | Refills: 0 | Status: SHIPPED | OUTPATIENT
Start: 2024-03-14

## 2024-03-17 DIAGNOSIS — I25.10 CORONARY ARTERY DISEASE INVOLVING NATIVE HEART WITHOUT ANGINA PECTORIS, UNSPECIFIED VESSEL OR LESION TYPE: ICD-10-CM

## 2024-03-18 RX ORDER — TRAZODONE HYDROCHLORIDE 150 MG/1
150 TABLET ORAL DAILY
Qty: 90 TABLET | Refills: 0 | Status: SHIPPED | OUTPATIENT
Start: 2024-03-18

## 2024-03-18 RX ORDER — TRAZODONE HYDROCHLORIDE 150 MG/1
150 TABLET ORAL DAILY
Qty: 90 TABLET | Refills: 0 | OUTPATIENT
Start: 2024-03-18

## 2024-03-18 RX ORDER — METOPROLOL SUCCINATE 25 MG/1
25 TABLET, EXTENDED RELEASE ORAL DAILY
Qty: 90 TABLET | Refills: 1 | Status: SHIPPED | OUTPATIENT
Start: 2024-03-18

## 2024-03-18 RX ORDER — ATORVASTATIN CALCIUM 20 MG/1
20 TABLET, FILM COATED ORAL DAILY
Qty: 90 TABLET | Refills: 0 | Status: SHIPPED | OUTPATIENT
Start: 2024-03-18

## 2024-03-21 ENCOUNTER — TELEMEDICINE (OUTPATIENT)
Dept: PSYCHIATRY | Facility: CLINIC | Age: 69
End: 2024-03-21
Payer: MEDICARE

## 2024-03-21 DIAGNOSIS — F41.1 GENERALIZED ANXIETY DISORDER: ICD-10-CM

## 2024-03-21 DIAGNOSIS — F33.1 MAJOR DEPRESSIVE DISORDER, RECURRENT EPISODE, MODERATE: Primary | ICD-10-CM

## 2024-03-26 DIAGNOSIS — F33.41 RECURRENT MAJOR DEPRESSIVE DISORDER, IN PARTIAL REMISSION: ICD-10-CM

## 2024-03-26 RX ORDER — DULOXETIN HYDROCHLORIDE 60 MG/1
60 CAPSULE, DELAYED RELEASE ORAL DAILY
Qty: 90 CAPSULE | Refills: 1 | Status: SHIPPED | OUTPATIENT
Start: 2024-03-26

## 2024-04-08 ENCOUNTER — TELEMEDICINE (OUTPATIENT)
Dept: PSYCHIATRY | Facility: CLINIC | Age: 69
End: 2024-04-08
Payer: MEDICARE

## 2024-04-08 DIAGNOSIS — F33.1 MAJOR DEPRESSIVE DISORDER, RECURRENT EPISODE, MODERATE: Primary | ICD-10-CM

## 2024-04-08 DIAGNOSIS — F41.1 GENERALIZED ANXIETY DISORDER: ICD-10-CM

## 2024-04-08 PROCEDURE — 90837 PSYTX W PT 60 MINUTES: CPT | Performed by: SOCIAL WORKER

## 2024-04-08 RX ORDER — TRAZODONE HYDROCHLORIDE 150 MG/1
150 TABLET ORAL DAILY
Qty: 90 TABLET | Refills: 0 | Status: SHIPPED | OUTPATIENT
Start: 2024-04-08

## 2024-04-08 NOTE — PROGRESS NOTES
Date: 2024  Time In: 13:30 EDT  Time out: 14:23 EDT      This provider is located at [facility], [facility] [place], [place], [facility], using a secure [employer] Video Visit through [employer]. Patient is being seen remotely via telehealth at their home address is located in [place]. Patient stated they are in a secure environment for this session. The patient's condition being diagnosed and treated is appropriate for telemedicine. The provider identified themself as well as their credentials. The patient, or  patient's legal guardian consent to be seen remotely, and when consent is given they understand that the consent allows for patient identifiable information to be sent to a third party as needed. They may refuse to be seen remotely at any time. The electronic data is encrypted and password protected, and the patient's or  legal guardian has been advised of the potential risks to privacy not withstanding such measures.   PT Identifiers used: Name and .    You have chosen to receive care through a telehealth visit.  Do you consent to use a video/audio connection for your medical care today? Yes     Subjective   Sarah Howard is a 68 y.o. female who presents today for follow up    Chief Complaint: Anxiety, and depression      History of Present Illness: client discussed how things have been since last session.  She reports some ongoing issues with anxiety and depression.  She states that most recently things have been stable as she and her  have recently gone on a trip together.  Client shared that she feels that this was a good trip for her.  She reports that her anxiety continues to be a little higher in terms of her kids.  Her daughter continues to wait to hear back results from her physician about her health and client expresses that having some knowledge will help her with that situation.She discussed that she has not heard much from her son but knows that he is  working and that helps her to feel a little more comfortable in terms of him.  Client also is working on turning her dining room into a little room for herself, she is working on getting it set up to put and do things that she enjoys.She states that it has been helpful to her to have something to focus and work on.      Clinical Maneuvering/Intervention:  On a scale of 0-10 ( with 10 being the worst)  Anxiety: 4/10  Depression:  4/10  Sleep: Some issues with waking up in the middle of the night but going back to sleep most nights pretty well  Appetite: No current issues reported      Assisted patient in processing above session content; acknowledged and normalized patient’s thoughts, feelings, and concerns.  Rationalized patient thought process regarding concerns presented at session.  Discussed triggers associated with patient's  anxiety  and depression  Also discussed coping skills for patient to implement such as increasing activity , self care , and positive self talk     Allowed patient to freely discuss issues without interruption or judgment. Provided safe, confidential environment to facilitate the development of positive therapeutic relationship and encourage open, honest communication. Assisted patient in identifying risk factors which would indicate the need for higher level of care including thoughts to harm self or others and/or self-harming behavior and encouraged patient to contact this office, call 911, or present to the nearest emergency room should any of these events occur. Discussed crisis intervention services and means to access. Patient adamantly and convincingly denies current suicidal or homicidal ideation or perceptual disturbance.    Assessment:     Patient appears to maintain relative stability as compared to their baseline.  However, patient continues to struggle with anxiety and depression  which continues to cause impairment in important areas of functioning.  A result, they can be  reasonably expected to continue to benefit from treatment and would likely be at increased risk for decompensation otherwise.      PHQ-Score Total:  PHQ-9 Total Score:      DERICK-7 Score Total:         Mental Status Exam:   Hygiene:   good  Cooperation:  Cooperative  Eye Contact:  Good  Psychomotor Behavior:  Appropriate  Affect:  Appropriate  Mood: depressed and anxious  Speech:  Normal  Thought Process:  Goal directed and Linear  Thought Content:  Mood congruent  Suicidal:  None  Homicidal:  None  Hallucinations:  None  Delusion:  None  Memory:  Intact  Orientation:  Person, Place, Time, and Situation  Reliability:  good  Insight:  Good  Judgement:  Good  Impulse Control:  Good  Physical/Medical Issues:   No changes reported        Patient's Support Network Includes:  , daughter, extended family, and freinds    Functional Status: Moderate impairment     Progress toward goal: Not at goal    Prognosis: Fair with Ongoing Treatment         Plan:    Patient will continue in individual outpatient therapy with focus on improved functioning and coping skills, maintaining stability, and avoiding decompensation and the need for higher level of care.    Patient will adhere to medication regimen as prescribed and report any side effects. Patient will contact this office, call 911 or present to the nearest emergency room should suicidal or homicidal ideations occur. Provide Cognitive Behavioral Therapy and Solution Focused Therapy to improve functioning, maintain stability, and avoid decompensation and the need for higher level of care.     Return in about 2 weeks (around 4/22/2024).      VISIT DIAGNOSIS:    Diagnosis Plan   1. Major depressive disorder, recurrent episode, moderate        2. Generalized anxiety disorder               This document has been electronically signed by Chanelle Goodson LCSW  April 8, 2024      Part of this note may be an electronic transcription/translation of spoken language to printed text  using the Dragon Dictation System.

## 2024-04-17 DIAGNOSIS — F33.1 MAJOR DEPRESSIVE DISORDER, RECURRENT EPISODE, MODERATE: ICD-10-CM

## 2024-04-17 DIAGNOSIS — F41.1 GENERALIZED ANXIETY DISORDER: ICD-10-CM

## 2024-04-17 RX ORDER — ESCITALOPRAM OXALATE 20 MG/1
20 TABLET ORAL DAILY
Qty: 30 TABLET | Refills: 1 | Status: SHIPPED | OUTPATIENT
Start: 2024-04-17

## 2024-04-29 ENCOUNTER — HOSPITAL ENCOUNTER (OUTPATIENT)
Dept: GENERAL RADIOLOGY | Facility: HOSPITAL | Age: 69
Discharge: HOME | End: 2024-04-29
Admitting: STUDENT IN AN ORGANIZED HEALTH CARE EDUCATION/TRAINING PROGRAM
Payer: MEDICARE

## 2024-04-29 ENCOUNTER — OFFICE VISIT (OUTPATIENT)
Dept: INTERNAL MEDICINE | Facility: CLINIC | Age: 69
End: 2024-04-29
Payer: MEDICARE

## 2024-04-29 VITALS
RESPIRATION RATE: 20 BRPM | HEIGHT: 65 IN | DIASTOLIC BLOOD PRESSURE: 80 MMHG | HEART RATE: 79 BPM | WEIGHT: 198.25 LBS | SYSTOLIC BLOOD PRESSURE: 126 MMHG | TEMPERATURE: 97 F | BODY MASS INDEX: 33.03 KG/M2

## 2024-04-29 DIAGNOSIS — Z12.31 BREAST CANCER SCREENING BY MAMMOGRAM: ICD-10-CM

## 2024-04-29 DIAGNOSIS — G89.29 CHRONIC LOW BACK PAIN WITHOUT SCIATICA, UNSPECIFIED BACK PAIN LATERALITY: ICD-10-CM

## 2024-04-29 DIAGNOSIS — M54.50 CHRONIC LOW BACK PAIN WITHOUT SCIATICA, UNSPECIFIED BACK PAIN LATERALITY: ICD-10-CM

## 2024-04-29 DIAGNOSIS — I10 PRIMARY HYPERTENSION: ICD-10-CM

## 2024-04-29 DIAGNOSIS — R61 HYPERHIDROSIS: ICD-10-CM

## 2024-04-29 DIAGNOSIS — I25.10 CORONARY ARTERY DISEASE INVOLVING NATIVE CORONARY ARTERY OF NATIVE HEART WITHOUT ANGINA PECTORIS: ICD-10-CM

## 2024-04-29 DIAGNOSIS — H25.13 AGE-RELATED NUCLEAR CATARACT OF BOTH EYES: ICD-10-CM

## 2024-04-29 DIAGNOSIS — E66.09 CLASS 1 OBESITY DUE TO EXCESS CALORIES WITHOUT SERIOUS COMORBIDITY WITH BODY MASS INDEX (BMI) OF 33.0 TO 33.9 IN ADULT: ICD-10-CM

## 2024-04-29 DIAGNOSIS — R79.9 ABNORMAL FINDING OF BLOOD CHEMISTRY, UNSPECIFIED: ICD-10-CM

## 2024-04-29 DIAGNOSIS — Z00.00 ROUTINE HEALTH MAINTENANCE: ICD-10-CM

## 2024-04-29 DIAGNOSIS — Z00.00 ENCOUNTER FOR MEDICARE ANNUAL WELLNESS EXAM: Primary | ICD-10-CM

## 2024-04-29 LAB
ALBUMIN SERPL-MCNC: 4.5 G/DL (ref 3.5–5.2)
ALBUMIN/GLOB SERPL: 1.7 G/DL
ALP SERPL-CCNC: 91 U/L (ref 39–117)
ALT SERPL W P-5'-P-CCNC: 30 U/L (ref 1–33)
ANION GAP SERPL CALCULATED.3IONS-SCNC: 13 MMOL/L (ref 5–15)
AST SERPL-CCNC: 25 U/L (ref 1–32)
BILIRUB SERPL-MCNC: 0.8 MG/DL (ref 0–1.2)
BUN SERPL-MCNC: 14 MG/DL (ref 8–23)
BUN/CREAT SERPL: 15.7 (ref 7–25)
CALCIUM SPEC-SCNC: 9.3 MG/DL (ref 8.6–10.5)
CHLORIDE SERPL-SCNC: 106 MMOL/L (ref 98–107)
CHOLEST SERPL-MCNC: 146 MG/DL (ref 0–200)
CO2 SERPL-SCNC: 23 MMOL/L (ref 22–29)
CREAT SERPL-MCNC: 0.89 MG/DL (ref 0.57–1)
DEPRECATED RDW RBC AUTO: 41 FL (ref 37–54)
EGFRCR SERPLBLD CKD-EPI 2021: 70.3 ML/MIN/1.73
EOSINOPHIL # BLD MANUAL: 0.06 10*3/MM3 (ref 0–0.4)
EOSINOPHIL NFR BLD MANUAL: 1 % (ref 0.3–6.2)
ERYTHROCYTE [DISTWIDTH] IN BLOOD BY AUTOMATED COUNT: 11.5 % (ref 12.3–15.4)
GLOBULIN UR ELPH-MCNC: 2.6 GM/DL
GLUCOSE SERPL-MCNC: 107 MG/DL (ref 65–99)
HBA1C MFR BLD: 5.9 % (ref 4.8–5.6)
HCT VFR BLD AUTO: 39 % (ref 34–46.6)
HDLC SERPL-MCNC: 54 MG/DL (ref 40–60)
HGB BLD-MCNC: 13.3 G/DL (ref 12–15.9)
LDLC SERPL CALC-MCNC: 68 MG/DL (ref 0–100)
LDLC/HDLC SERPL: 1.18 {RATIO}
LYMPHOCYTES # BLD MANUAL: 2.83 10*3/MM3 (ref 0.7–3.1)
LYMPHOCYTES NFR BLD MANUAL: 8.2 % (ref 5–12)
MCH RBC QN AUTO: 33.3 PG (ref 26.6–33)
MCHC RBC AUTO-ENTMCNC: 34.1 G/DL (ref 31.5–35.7)
MCV RBC AUTO: 97.5 FL (ref 79–97)
MONOCYTES # BLD: 0.49 10*3/MM3 (ref 0.1–0.9)
NEUTROPHILS # BLD AUTO: 2.58 10*3/MM3 (ref 1.7–7)
NEUTROPHILS NFR BLD MANUAL: 43.3 % (ref 42.7–76)
NRBC BLD AUTO-RTO: 0 /100 WBC (ref 0–0.2)
PLAT MORPH BLD: NORMAL
PLATELET # BLD AUTO: 241 10*3/MM3 (ref 140–450)
PMV BLD AUTO: 11.5 FL (ref 6–12)
POTASSIUM SERPL-SCNC: 5 MMOL/L (ref 3.5–5.2)
PROT SERPL-MCNC: 7.1 G/DL (ref 6–8.5)
RBC # BLD AUTO: 4 10*6/MM3 (ref 3.77–5.28)
RBC MORPH BLD: NORMAL
SODIUM SERPL-SCNC: 142 MMOL/L (ref 136–145)
T4 FREE SERPL-MCNC: 1.07 NG/DL (ref 0.93–1.7)
TRIGL SERPL-MCNC: 141 MG/DL (ref 0–150)
TSH SERPL DL<=0.05 MIU/L-ACNC: 2.77 UIU/ML (ref 0.27–4.2)
VARIANT LYMPHS NFR BLD MANUAL: 2.1 % (ref 0–5)
VARIANT LYMPHS NFR BLD MANUAL: 45.4 % (ref 19.6–45.3)
VLDLC SERPL-MCNC: 24 MG/DL (ref 5–40)
WBC MORPH BLD: NORMAL
WBC NRBC COR # BLD AUTO: 5.95 10*3/MM3 (ref 3.4–10.8)

## 2024-04-29 PROCEDURE — 80053 COMPREHEN METABOLIC PANEL: CPT | Performed by: STUDENT IN AN ORGANIZED HEALTH CARE EDUCATION/TRAINING PROGRAM

## 2024-04-29 PROCEDURE — 3079F DIAST BP 80-89 MM HG: CPT | Performed by: STUDENT IN AN ORGANIZED HEALTH CARE EDUCATION/TRAINING PROGRAM

## 2024-04-29 PROCEDURE — 83036 HEMOGLOBIN GLYCOSYLATED A1C: CPT | Performed by: STUDENT IN AN ORGANIZED HEALTH CARE EDUCATION/TRAINING PROGRAM

## 2024-04-29 PROCEDURE — 99214 OFFICE O/P EST MOD 30 MIN: CPT | Performed by: STUDENT IN AN ORGANIZED HEALTH CARE EDUCATION/TRAINING PROGRAM

## 2024-04-29 PROCEDURE — 1170F FXNL STATUS ASSESSED: CPT | Performed by: STUDENT IN AN ORGANIZED HEALTH CARE EDUCATION/TRAINING PROGRAM

## 2024-04-29 PROCEDURE — 72114 X-RAY EXAM L-S SPINE BENDING: CPT

## 2024-04-29 PROCEDURE — 85007 BL SMEAR W/DIFF WBC COUNT: CPT | Performed by: STUDENT IN AN ORGANIZED HEALTH CARE EDUCATION/TRAINING PROGRAM

## 2024-04-29 PROCEDURE — 1160F RVW MEDS BY RX/DR IN RCRD: CPT | Performed by: STUDENT IN AN ORGANIZED HEALTH CARE EDUCATION/TRAINING PROGRAM

## 2024-04-29 PROCEDURE — 80061 LIPID PANEL: CPT | Performed by: STUDENT IN AN ORGANIZED HEALTH CARE EDUCATION/TRAINING PROGRAM

## 2024-04-29 PROCEDURE — 85025 COMPLETE CBC W/AUTO DIFF WBC: CPT | Performed by: STUDENT IN AN ORGANIZED HEALTH CARE EDUCATION/TRAINING PROGRAM

## 2024-04-29 PROCEDURE — 1159F MED LIST DOCD IN RCRD: CPT | Performed by: STUDENT IN AN ORGANIZED HEALTH CARE EDUCATION/TRAINING PROGRAM

## 2024-04-29 PROCEDURE — 3074F SYST BP LT 130 MM HG: CPT | Performed by: STUDENT IN AN ORGANIZED HEALTH CARE EDUCATION/TRAINING PROGRAM

## 2024-04-29 PROCEDURE — 84443 ASSAY THYROID STIM HORMONE: CPT | Performed by: STUDENT IN AN ORGANIZED HEALTH CARE EDUCATION/TRAINING PROGRAM

## 2024-04-29 PROCEDURE — 84439 ASSAY OF FREE THYROXINE: CPT | Performed by: STUDENT IN AN ORGANIZED HEALTH CARE EDUCATION/TRAINING PROGRAM

## 2024-04-29 PROCEDURE — G0439 PPPS, SUBSEQ VISIT: HCPCS | Performed by: STUDENT IN AN ORGANIZED HEALTH CARE EDUCATION/TRAINING PROGRAM

## 2024-04-29 RX ORDER — CYCLOSPORINE 0.5 MG/ML
1 EMULSION OPHTHALMIC 2 TIMES DAILY
COMMUNITY

## 2024-04-29 NOTE — ASSESSMENT & PLAN NOTE
Noted on prior cath. No history of stent or MI. Recent lexiscan normal. Continue atrovastatin 20mg daily.

## 2024-04-29 NOTE — PATIENT INSTRUCTIONS
Health Maintenance, Female  Adopting a healthy lifestyle and getting preventive care can go a long way to promote health and wellness. Talk with your health care provider about what schedule of regular examinations is right for you. This is a good chance for you to check in with your provider about disease prevention and staying healthy.  In between checkups, there are plenty of things you can do on your own. Experts have done a lot of research about which lifestyle changes and preventive measures are most likely to keep you healthy. Ask your health care provider for more information.  Weight and diet  Eat a healthy diet  Be sure to include plenty of vegetables, fruits, low-fat dairy products, and lean protein.  Do not eat a lot of foods high in solid fats, added sugars, or salt.  Get regular exercise. This is one of the most important things you can do for your health.  Most adults should exercise for at least 150 minutes each week. The exercise should increase your heart rate and make you sweat (moderate-intensity exercise).  Most adults should also do strengthening exercises at least twice a week. This is in addition to the moderate-intensity exercise.     Maintain a healthy weight  Body mass index (BMI) is a measurement that can be used to identify possible weight problems. It estimates body fat based on height and weight. Your health care provider can help determine your BMI and help you achieve or maintain a healthy weight.  For females 20 years of age and older:  A BMI below 18.5 is considered underweight.  A BMI of 18.5 to 24.9 is normal.  A BMI of 25 to 29.9 is considered overweight.  A BMI of 30 and above is considered obese.     Watch levels of cholesterol and blood lipids  You should start having your blood tested for lipids and cholesterol at 20 years of age, then have this test every 5 years.  You may need to have your cholesterol levels checked more often if:  Your lipid or cholesterol levels are  high.  You are older than 50 years of age.  You are at high risk for heart disease.     Cancer screening  Lung Cancer  Lung cancer screening is recommended for adults 55-80 years old who are at high risk for lung cancer because of a history of smoking.  A yearly low-dose CT scan of the lungs is recommended for people who:  Currently smoke.  Have quit within the past 15 years.  Have at least a 30-pack-year history of smoking. A pack year is smoking an average of one pack of cigarettes a day for 1 year.  Yearly screening should continue until it has been 15 years since you quit.  Yearly screening should stop if you develop a health problem that would prevent you from having lung cancer treatment.     Breast Cancer  Practice breast self-awareness. This means understanding how your breasts normally appear and feel.  It also means doing regular breast self-exams. Let your health care provider know about any changes, no matter how small.  If you are in your 20s or 30s, you should have a clinical breast exam (CBE) by a health care provider every 1-3 years as part of a regular health exam.  If you are 40 or older, have a CBE every year. Also consider having a breast X-ray (mammogram) every year.  If you have a family history of breast cancer, talk to your health care provider about genetic screening.  If you are at high risk for breast cancer, talk to your health care provider about having an MRI and a mammogram every year.  Breast cancer gene (BRCA) assessment is recommended for women who have family members with BRCA-related cancers. BRCA-related cancers include:  Breast.  Ovarian.  Tubal.  Peritoneal cancers.  Results of the assessment will determine the need for genetic counseling and BRCA1 and BRCA2 testing.     Cervical Cancer  Your health care provider may recommend that you be screened regularly for cancer of the pelvic organs (ovaries, uterus, and vagina). This screening involves a pelvic examination, including  checking for microscopic changes to the surface of your cervix (Pap test). You may be encouraged to have this screening done every 3 years, beginning at age 21.  For women ages 30-65, health care providers may recommend pelvic exams and Pap testing every 3 years, or they may recommend the Pap and pelvic exam, combined with testing for human papilloma virus (HPV), every 5 years. Some types of HPV increase your risk of cervical cancer. Testing for HPV may also be done on women of any age with unclear Pap test results.  Other health care providers may not recommend any screening for nonpregnant women who are considered low risk for pelvic cancer and who do not have symptoms. Ask your health care provider if a screening pelvic exam is right for you.  If you have had past treatment for cervical cancer or a condition that could lead to cancer, you need Pap tests and screening for cancer for at least 20 years after your treatment. If Pap tests have been discontinued, your risk factors (such as having a new sexual partner) need to be reassessed to determine if screening should resume. Some women have medical problems that increase the chance of getting cervical cancer. In these cases, your health care provider may recommend more frequent screening and Pap tests.     Colorectal Cancer  This type of cancer can be detected and often prevented.  Routine colorectal cancer screening usually begins at 50 years of age and continues through 75 years of age.  Your health care provider may recommend screening at an earlier age if you have risk factors for colon cancer.  Your health care provider may also recommend using home test kits to check for hidden blood in the stool.  A small camera at the end of a tube can be used to examine your colon directly (sigmoidoscopy or colonoscopy). This is done to check for the earliest forms of colorectal cancer.  Routine screening usually begins at age 50.  Direct examination of the colon should  be repeated every 5-10 years through 75 years of age. However, you may need to be screened more often if early forms of precancerous polyps or small growths are found.     Skin Cancer  Check your skin from head to toe regularly.  Tell your health care provider about any new moles or changes in moles, especially if there is a change in a mole's shape or color.  Also tell your health care provider if you have a mole that is larger than the size of a pencil eraser.  Always use sunscreen. Apply sunscreen liberally and repeatedly throughout the day.  Protect yourself by wearing long sleeves, pants, a wide-brimmed hat, and sunglasses whenever you are outside.     Heart disease, diabetes, and high blood pressure  High blood pressure causes heart disease and increases the risk of stroke. High blood pressure is more likely to develop in:  People who have blood pressure in the high end of the normal range (130-139/85-89 mm Hg).  People who are overweight or obese.  People who are .  If you are 18-39 years of age, have your blood pressure checked every 3-5 years. If you are 40 years of age or older, have your blood pressure checked every year. You should have your blood pressure measured twice--once when you are at a hospital or clinic, and once when you are not at a hospital or clinic. Record the average of the two measurements. To check your blood pressure when you are not at a hospital or clinic, you can use:  An automated blood pressure machine at a pharmacy.  A home blood pressure monitor.  If you are between 55 years and 79 years old, ask your health care provider if you should take aspirin to prevent strokes.  Have regular diabetes screenings. This involves taking a blood sample to check your fasting blood sugar level.  If you are at a normal weight and have a low risk for diabetes, have this test once every three years after 45 years of age.  If you are overweight and have a high risk for diabetes,  consider being tested at a younger age or more often.  Preventing infection  Hepatitis B  If you have a higher risk for hepatitis B, you should be screened for this virus. You are considered at high risk for hepatitis B if:  You were born in a country where hepatitis B is common. Ask your health care provider which countries are considered high risk.  Your parents were born in a high-risk country, and you have not been immunized against hepatitis B (hepatitis B vaccine).  You have HIV or AIDS.  You use needles to inject street drugs.  You live with someone who has hepatitis B.  You have had sex with someone who has hepatitis B.  You get hemodialysis treatment.  You take certain medicines for conditions, including cancer, organ transplantation, and autoimmune conditions.     Hepatitis C  Blood testing is recommended for:  Everyone born from 1945 through 1965.  Anyone with known risk factors for hepatitis C.     Sexually transmitted infections (STIs)  You should be screened for sexually transmitted infections (STIs) including gonorrhea and chlamydia if:  You are sexually active and are younger than 24 years of age.  You are older than 24 years of age and your health care provider tells you that you are at risk for this type of infection.  Your sexual activity has changed since you were last screened and you are at an increased risk for chlamydia or gonorrhea. Ask your health care provider if you are at risk.  If you do not have HIV, but are at risk, it may be recommended that you take a prescription medicine daily to prevent HIV infection. This is called pre-exposure prophylaxis (PrEP). You are considered at risk if:  You are sexually active and do not regularly use condoms or know the HIV status of your partner(s).  You take drugs by injection.  You are sexually active with a partner who has HIV.     Talk with your health care provider about whether you are at high risk of being infected with HIV. If you choose to  begin PrEP, you should first be tested for HIV. You should then be tested every 3 months for as long as you are taking PrEP.  Pregnancy  If you are premenopausal and you may become pregnant, ask your health care provider about preconception counseling.  If you may become pregnant, take 400 to 800 micrograms (mcg) of folic acid every day.  If you want to prevent pregnancy, talk to your health care provider about birth control (contraception).  Osteoporosis and menopause  Osteoporosis is a disease in which the bones lose minerals and strength with aging. This can result in serious bone fractures. Your risk for osteoporosis can be identified using a bone density scan.  If you are 65 years of age or older, or if you are at risk for osteoporosis and fractures, ask your health care provider if you should be screened.  Ask your health care provider whether you should take a calcium or vitamin D supplement to lower your risk for osteoporosis.  Menopause may have certain physical symptoms and risks.  Hormone replacement therapy may reduce some of these symptoms and risks.  Talk to your health care provider about whether hormone replacement therapy is right for you.  Follow these instructions at home:  Schedule regular health, dental, and eye exams.  Stay current with your immunizations.  Do not use any tobacco products including cigarettes, chewing tobacco, or electronic cigarettes.  If you are pregnant, do not drink alcohol.  If you are breastfeeding, limit how much and how often you drink alcohol.  Limit alcohol intake to no more than 1 drink per day for nonpregnant women. One drink equals 12 ounces of beer, 5 ounces of wine, or 1½ ounces of hard liquor.  Do not use street drugs.  Do not share needles.  Ask your health care provider for help if you need support or information about quitting drugs.  Tell your health care provider if you often feel depressed.  Tell your health care provider if you have ever been abused or do  not feel safe at home.  This information is not intended to replace advice given to you by your health care provider. Make sure you discuss any questions you have with your health care provider.  Document Released: 07/02/2012 Document Revised: 05/25/2017 Document Reviewed: 09/20/2016  [employer] Patient Education © 2018 [employer] Healthy Eating  Following a healthy eating pattern may help you to achieve and maintain a healthy body weight, reduce the risk of chronic disease, and live a long and productive life. It is important to follow a healthy eating pattern at an appropriate calorie level for your body. Your nutritional needs should be met primarily through food by choosing a variety of nutrient-rich foods.  What are tips for following this plan?  Reading food labels  Read labels and choose the following:  Reduced or low sodium.  Juices with 100% fruit juice.  Foods with low saturated fats and high polyunsaturated and monounsaturated fats.  Foods with whole grains, such as whole wheat, cracked wheat, brown rice, and wild rice.  Whole grains that are fortified with folic acid. This is recommended for women who are pregnant or who want to become pregnant.  Read labels and avoid the following:  Foods with a lot of added sugars. These include foods that contain brown sugar, corn sweetener, corn syrup, dextrose, fructose, glucose, high-fructose corn syrup, honey, invert sugar, lactose, malt syrup, maltose, molasses, raw sugar, sucrose, trehalose, or turbinado sugar.  Do not eat more than the following amounts of added sugar per day:  6 teaspoons (25 g) for women.  9 teaspoons (38 g) for men.  Foods that contain processed or refined starches and grains.  Refined grain products, such as white flour, degermed cornmeal, white bread, and white rice.  Shopping  Choose nutrient-rich snacks, such as vegetables, whole fruits, and nuts. Avoid high-calorie and high-sugar snacks, such as potato chips, fruit snacks,  "and candy.  Use oil-based dressings and spreads on foods instead of solid fats such as butter, stick margarine, or cream cheese.  Limit pre-made sauces, mixes, and \"instant\" products such as flavored rice, instant noodles, and ready-made pasta.  Try more plant-protein sources, such as tofu, tempeh, black beans, edamame, lentils, nuts, and seeds.  Explore eating plans such as the Mediterranean diet or vegetarian diet.  Cooking  Use oil to sauté or stir-martinez foods instead of solid fats such as butter, stick margarine, or lard.  Try baking, boiling, grilling, or broiling instead of frying.  Remove the fatty part of meats before cooking.  Steam vegetables in water or broth.  Meal planning    At meals, imagine dividing your plate into fourths:  One-half of your plate is fruits and vegetables.  One-fourth of your plate is whole grains.  One-fourth of your plate is protein, especially lean meats, poultry, eggs, tofu, beans, or nuts.  Include low-fat dairy as part of your daily diet.     Lifestyle  Choose healthy options in all settings, including home, work, school, restaurants, or stores.  Prepare your food safely:  Wash your hands after handling raw meats.  Keep food preparation surfaces clean by regularly washing with hot, soapy water.  Keep raw meats separate from ready-to-eat foods, such as fruits and vegetables.  , meat, poultry, and eggs to the recommended internal temperature.  Store foods at safe temperatures. In general:  Keep cold foods at 40°F (4.4°C) or below.  Keep hot foods at 140°F (60°C) or above.  Keep your freezer at 0°F (-17.8°C) or below.  Foods are no longer safe to eat when they have been between the temperatures of 40°-140°F (4.4-60°C) for more than 2 hours.  What foods should I eat?  Fruits  Aim to eat 2 cup-equivalents of fresh, canned (in natural juice), or frozen fruits each day. Examples of 1 cup-equivalent of fruit include 1 small apple, 8 large strawberries, 1 cup canned fruit, ½ " cup dried fruit, or 1 cup 100% juice.  Vegetables  Aim to eat 2½-3 cup-equivalents of fresh and frozen vegetables each day, including different varieties and colors. Examples of 1 cup-equivalent of vegetables include 2 medium carrots, 2 cups raw, leafy greens, 1 cup chopped vegetable (raw or cooked), or 1 medium baked potato.  Grains  Aim to eat 6 ounce-equivalents of whole grains each day. Examples of 1 ounce-equivalent of grains include 1 slice of bread, 1 cup ready-to-eat cereal, 3 cups popcorn, or ½ cup cooked rice, pasta, or cereal.  Meats and other proteins  Aim to eat 5-6 ounce-equivalents of protein each day. Examples of 1 ounce-equivalent of protein include 1 egg, 1/2 cup nuts or seeds, or 1 tablespoon (16 g) peanut butter. A cut of meat or fish that is the size of a deck of cards is about 3-4 ounce-equivalents.  Of the protein you eat each week, try to have at least 8 ounces come from seafood. This includes salmon, trout, herring, and anchovies.  Dairy  Aim to eat 3 cup-equivalents of fat-free or low-fat dairy each day. Examples of 1 cup-equivalent of dairy include 1 cup (240 mL) milk, 8 ounces (250 g) yogurt, 1½ ounces (44 g) natural cheese, or 1 cup (240 mL) fortified soy milk.  Fats and oils  Aim for about 5 teaspoons (21 g) per day. Choose monounsaturated fats, such as canola and olive oils, avocados, peanut butter, and most nuts, or polyunsaturated fats, such as sunflower, corn, and soybean oils, walnuts, pine nuts, sesame seeds, sunflower seeds, and flaxseed.  Beverages  Aim for six 8-oz glasses of water per day. Limit coffee to three to five 8-oz cups per day.  Limit caffeinated beverages that have added calories, such as soda and energy drinks.  Limit alcohol intake to no more than 1 drink a day for nonpregnant women and 2 drinks a day for men. One drink equals 12 oz of beer (355 mL), 5 oz of wine (148 mL), or 1½ oz of hard liquor (44 mL).  Seasoning and other foods  Avoid adding excess amounts of  salt to your foods. Try flavoring foods with herbs and spices instead of salt.  Avoid adding sugar to foods.  Try using oil-based dressings, sauces, and spreads instead of solid fats.  This information is based on general [place] nutrition guidelines. For more information, visit choosemyplate.gov. Exact amounts may vary based on your nutrition needs.  Summary  A healthy eating plan may help you to maintain a healthy weight, reduce the risk of chronic diseases, and stay active throughout your life.  Plan your meals. Make sure you eat the right portions of a variety of nutrient-rich foods.  Try baking, boiling, grilling, or broiling instead of frying.  Choose healthy options in all settings, including home, work, school, restaurants, or stores.  This information is not intended to replace advice given to you by your health care provider. Make sure you discuss any questions you have with your health care provider.  Document Revised: 04/01/2019 Document Reviewed: 04/01/2019  [employer] Patient Education © 2021 [employer]    Exercising to Stay Healthy  To become healthy and stay healthy, it is recommended that you do moderate-intensity and vigorous-intensity exercise. You can tell that you are exercising at a moderate intensity if your heart starts beating faster and you start breathing faster but can still hold a conversation. You can tell that you are exercising at a vigorous intensity if you are breathing much harder and faster and cannot hold a conversation while exercising.  Exercising regularly is important. It has many health benefits, such as:  Improving overall fitness, flexibility, and endurance.  Increasing bone density.  Helping with weight control.  Decreasing body fat.  Increasing muscle strength.  Reducing stress and tension.  Improving overall health.  How often should I exercise?  Choose an activity that you enjoy, and set realistic goals. Your health care provider can help you make an activity plan that  works for you.  Exercise regularly as told by your health care provider. This may include:  Doing strength training two times a week, such as:  Lifting weights.  Using resistance bands.  Push-ups.  Sit-ups.  Yoga.  Doing a certain intensity of exercise for a given amount of time. Choose from these options:  A total of 150 minutes of moderate-intensity exercise every week.  A total of 75 minutes of vigorous-intensity exercise every week.  A mix of moderate-intensity and vigorous-intensity exercise every week.  Children, pregnant women, people who have not exercised regularly, people who are overweight, and older adults may need to talk with a health care provider about what activities are safe to do. If you have a medical condition, be sure to talk with your health care provider before you start a new exercise program.  What are some exercise ideas?    Moderate-intensity exercise ideas include:  Walking 1 mile (1.6 km) in about 15 minutes.  Biking.  Hiking.  Golfing.  Dancing.  Water aerobics.  Vigorous-intensity exercise ideas include:  Walking 4.5 miles (7.2 km) or more in about 1 hour.  Jogging or running 5 miles (8 km) in about 1 hour.  Biking 10 miles (16.1 km) or more in about 1 hour.  Lap swimming.  Roller-skating or in-line skating.  Cross-country skiing.  Vigorous competitive sports, such as football, basketball, and soccer.  Jumping rope.  Aerobic dancing.  What are some everyday activities that can help me to get exercise?  Yard work, such as:  Pushing a .  Raking and bagging leaves.  Washing your car.  Pushing a stroller.  Shoveling snow.  Gardening.  Washing windows or floors.  How can I be more active in my day-to-day activities?  Use stairs instead of an elevator.  Take a walk during your lunch break.  If you drive, park your car farther away from your work or school.  If you take public transportation, get off one stop early and walk the rest of the way.  Stand up or walk around during all  of your indoor phone calls.  Get up, stretch, and walk around every 30 minutes throughout the day.  Enjoy exercise with a friend. Support to continue exercising will help you keep a regular routine of activity.  What guidelines can I follow while exercising?  Before you start a new exercise program, talk with your health care provider.  Do not exercise so much that you hurt yourself, feel dizzy, or get very short of breath.  Wear comfortable clothes and wear shoes with good support.  Drink plenty of water while you exercise to prevent dehydration or heat stroke.  Work out until your breathing and your heartbeat get faster.  Where to find more information  [place] Department of Health and Human Services: www.hhs.gov  Centers for Disease Control and Prevention (CDC): www.cdc.gov  Summary  Exercising regularly is important. It will improve your overall fitness, flexibility, and endurance.  Regular exercise also will improve your overall health. It can help you control your weight, reduce stress, and improve your bone density.  Do not exercise so much that you hurt yourself, feel dizzy, or get very short of breath.  Before you start a new exercise program, talk with your health care provider.  This information is not intended to replace advice given to you by your health care provider. Make sure you discuss any questions you have with your health care provider.  Document Revised: 11/30/2018 Document Reviewed: 11/08/2018  [employer] Patient Education © 2021 [employer]    Fall Prevention in the Home, Adult  Falls can cause injuries and can happen to people of all ages. There are many things you can do to make your home safe and to help prevent falls. Ask for help when making these changes.  What actions can I take to prevent falls?  General Instructions  Use good lighting in all rooms. Replace any light bulbs that burn out.  Turn on the lights in dark areas. Use night-lights.  Keep items that you use often in easy-to-reach  places. Lower the shelves around your home if needed.  Set up your furniture so you have a clear path. Avoid moving your furniture around.  Do not have throw rugs or other things on the floor that can make you trip.  Avoid walking on wet floors.  If any of your floors are uneven, fix them.  Add color or contrast paint or tape to clearly brynn and help you see:  Grab bars or handrails.  First and last steps of staircases.  Where the edge of each step is.  If you use a stepladder:  Make sure that it is fully opened. Do not climb a closed stepladder.  Make sure the sides of the stepladder are locked in place.  Ask someone to hold the stepladder while you use it.  Know where your pets are when moving through your home.  What can I do in the bathroom?         Keep the floor dry. Clean up any water on the floor right away.  Remove soap buildup in the tub or shower.  Use nonskid mats or decals on the floor of the tub or shower.  Attach bath mats securely with double-sided, nonslip rug tape.  If you need to sit down in the shower, use a plastic, nonslip stool.  Install grab bars by the toilet and in the tub and shower. Do not use towel bars as grab bars.  What can I do in the bedroom?  Make sure that you have a light by your bed that is easy to reach.  Do not use any sheets or blankets for your bed that hang to the floor.  Have a firm chair with side arms that you can use for support when you get dressed.  What can I do in the kitchen?  Clean up any spills right away.  If you need to reach something above you, use a step stool with a grab bar.  Keep electrical cords out of the way.  Do not use floor polish or wax that makes floors slippery.  What can I do with my stairs?  Do not leave any items on the stairs.  Make sure that you have a light switch at the top and the bottom of the stairs.  Make sure that there are handrails on both sides of the stairs. Fix handrails that are broken or loose.  Install nonslip stair treads on  all your stairs.  Avoid having throw rugs at the top or bottom of the stairs.  Choose a carpet that does not hide the edge of the steps on the stairs.  Check carpeting to make sure that it is firmly attached to the stairs. Fix carpet that is loose or worn.  What can I do on the outside of my home?  Use bright outdoor lighting.  Fix the edges of walkways and driveways and fix any cracks.  Remove anything that might make you trip as you walk through a door, such as a raised step or threshold.  Trim any bushes or trees on paths to your home.  Check to see if handrails are loose or broken and that both sides of all steps have handrails.  Install guardrails along the edges of any raised decks and porches.  Clear paths of anything that can make you trip, such as tools or rocks.  Have leaves, snow, or ice cleared regularly.  Use sand or salt on paths during winter.  Clean up any spills in your garage right away. This includes grease or oil spills.  What other actions can I take?  Wear shoes that:  Have a low heel. Do not wear high heels.  Have rubber bottoms.  Feel good on your feet and fit well.  Are closed at the toe. Do not wear open-toe sandals.  Use tools that help you move around if needed. These include:  Canes.  Walkers.  Scooters.  Crutches.  Review your medicines with your doctor. Some medicines can make you feel dizzy. This can increase your chance of falling.  Ask your doctor what else you can do to help prevent falls.  Where to find more information  Centers for Disease Control and PreventionDAWN: www.cdc.gov  [employer] [place] on Aging: www.scott.nih.gov  Contact a doctor if:  You are afraid of falling at home.  You feel weak, drowsy, or dizzy at home.  You fall at home.  Summary  There are many simple things that you can do to make your home safe and to help prevent falls.  Ways to make your home safe include removing things that can make you trip and installing grab bars in the bathroom.  Ask for help  when making these changes in your home.  This information is not intended to replace advice given to you by your health care provider. Make sure you discuss any questions you have with your health care provider.  Document Revised: 07/21/2021 Document Reviewed: 07/21/2021  [employer] Patient Education © 2021 [employer]       Advance Care Planning and Advance Directives      You make decisions on a daily basis - decisions about where you want to live, your career, your home, your life. Perhaps one of the most important decisions you face is your choice for future medical care. Take time to talk with your family and your healthcare team and start planning today.  Advance Care Planning is a process that can help you:  Understand possible future healthcare decisions in light of your own experiences  Reflect on those decision in light of your goals and values  Discuss your decisions with those closest to you and the healthcare professionals that care for you  Make a plan by creating a document that reflects your wishes     Surrogate Decision Maker  In the event of a medical emergency, which has left you unable to communicate or to make your own decisions, you would need someone to make decisions for you.  It is important to discuss your preferences for medical treatment with this person while you are in good health.      Qualities of a surrogate decision maker:  Willing to take on this role and responsibility  Knows what you want for future medical care  Willing to follow your wishes even if they don't agree with them  Able to make difficult medical decisions under stressful circumstances     Advance Directives  These are legal documents you can create that will guide your healthcare team and decision maker(s) when needed. These documents can be stored in the electronic medical record.     Living Will - a legal document to guide your care if you have a terminal condition or a serious illness and are unable to  communicate. States vary by statute in document names/types, but most forms may include one or more of the following:              -  Directions regarding life-prolonging treatments              -  Directions regarding artificially provided nutrition/hydration              -  Choosing a healthcare decision maker              -  Direction regarding organ/tissue donation     Durable Power of  for Healthcare - this document names an -in-fact to make medical decisions for you, but it may also allow this person to make personal and financial decisions for you. Please seek the advice of an  if you need this type of document.     **Advance Directives are not required and no one may discriminate against you if you do not sign one.     Medical Orders  Many states allow specific forms/orders signed by your physician to record your wishes for medical treatment in your current state of health. This form, signed in personal communication with your physician, addresses resuscitation and other medical interventions that you may or may not want.        For more information or to schedule a time with a [facility] Advance Care Planning Facilitator contact: [email or web address] or call [phone] and someone will contact you directly.

## 2024-04-29 NOTE — ASSESSMENT & PLAN NOTE
Hypertension is improving with treatment.  Continue current treatment regimen.  Weight loss.  Regular aerobic exercise.  Blood pressure will be reassessed at the next regular appointment.  Continue metoprolol 25mg daily and olmesartan 20mg daily.

## 2024-04-29 NOTE — PROGRESS NOTES
The ABCs of the Annual Wellness Visit  Subsequent Medicare Wellness Visit    Subjective    Sarah Howard is a 68 y.o. female who presents for a Subsequent Medicare Wellness Visit.    The following portions of the patient's history were reviewed and   updated as appropriate: allergies, current medications, past family history, past medical history, past social history, past surgical history, and problem list.    Compared to one year ago, the patient feels her physical   health is worse.    Compared to one year ago, the patient feels her mental   health is better.    Recent Hospitalizations:  She was not admitted to the hospital during the last year.       Current Medical Providers:  Patient Care Team:  Dao oGmez MD as PCP - General (Internal Medicine)  Yrn Acuña MD as Consulting Physician (Ophthalmology)  Mayuri Covington APRN as Nurse Practitioner (Behavioral Health)  Chanelle Goodson LCSW as  (Behavioral Health)    Outpatient Medications Prior to Visit   Medication Sig Dispense Refill    atorvastatin (LIPITOR) 20 MG tablet Take 1 tablet by mouth Daily. 90 tablet 0    DULoxetine (CYMBALTA) 60 MG capsule Take 1 capsule by mouth Daily. 90 capsule 1    escitalopram (LEXAPRO) 20 MG tablet TAKE 1 TABLET BY MOUTH DAILY 30 tablet 1    ipratropium (ATROVENT) 0.06 % nasal spray       latanoprost (XALATAN) 0.005 % ophthalmic solution       metoprolol succinate XL (Toprol XL) 25 MG 24 hr tablet Take 1 tablet by mouth Daily. 90 tablet 1    olmesartan (BENICAR) 20 MG tablet TAKE 1 TABLET BY MOUTH DAILY 90 tablet 0    pantoprazole (PROTONIX) 40 MG EC tablet Take 1 tablet by mouth Daily. 90 tablet 3    traZODone (DESYREL) 150 MG tablet TAKE 1 TABLET BY MOUTH DAILY 90 tablet 0     No facility-administered medications prior to visit.       No opioid medication identified on active medication list. I have reviewed chart for other potential  high risk medication/s and harmful drug  "interactions in the elderly.        Aspirin is not on active medication list.  Aspirin use is not indicated based on review of current medical condition/s. Risk of harm outweighs potential benefits.  .     Patient Active Problem List   Diagnosis    Mild depression    Osteopenia    GERD without esophagitis    Coronary artery disease involving native coronary artery    History of breast cancer    Glaucoma    Primary hypertension    Alcohol use disorder    Chronic cough    Class 1 obesity due to excess calories without serious comorbidity with body mass index (BMI) of 33.0 to 33.9 in adult    Age-related nuclear cataract of both eyes     Advance Care Planning   Advance Care Planning     Advance Directive is not on file.  ACP discussion was held with the patient during this visit. Patient does not have an advance directive, information provided.     Objective    Vitals:    04/29/24 0955   BP: 126/80   BP Location: Left arm   Patient Position: Sitting   Cuff Size: Adult   Pulse: 79   Resp: 20   Temp: 97 °F (36.1 °C)   TempSrc: Temporal   Weight: 89.9 kg (198 lb 4 oz)   Height: 164 cm (64.57\")   PainSc:   2   PainLoc: Back     Estimated body mass index is 33.43 kg/m² as calculated from the following:    Height as of this encounter: 164 cm (64.57\").    Weight as of this encounter: 89.9 kg (198 lb 4 oz).           Does the patient have evidence of cognitive impairment? No          HEALTH RISK ASSESSMENT    Smoking Status:  Social History     Tobacco Use   Smoking Status Never    Passive exposure: Never   Smokeless Tobacco Never     Alcohol Consumption:  Social History     Substance and Sexual Activity   Alcohol Use Yes    Alcohol/week: 16.0 standard drinks of alcohol    Types: 8 Glasses of wine, 8 Drinks containing 0.5 oz of alcohol per week    Comment: drinking alcohol makes me feel happier     Fall Risk Screen:    STEADI Fall Risk Assessment was completed, and patient is at LOW risk for falls.Assessment completed " on:2024    Depression Screenin/29/2024     9:56 AM   PHQ-2/PHQ-9 Depression Screening   Little Interest or Pleasure in Doing Things 0-->not at all   Feeling Down, Depressed or Hopeless 0-->not at all   PHQ-9: Brief Depression Severity Measure Score 0       Health Habits and Functional and Cognitive Screenin/22/2024    10:53 AM   Functional & Cognitive Status   Do you have difficulty preparing food and eating? No   Do you have difficulty bathing yourself, getting dressed or grooming yourself? No   Do you have difficulty using the toilet? No   Do you have difficulty moving around from place to place? No   Do you have trouble with steps or getting out of a bed or a chair? No   Current Diet Other   Dental Exam Not up to date   Eye Exam Up to date   Exercise (times per week) 2 times per week   Do you need help using the phone?  No   Are you deaf or do you have serious difficulty hearing?  No   Do you need help to go to places out of walking distance? No   Do you need help shopping? No   Do you need help preparing meals?  No   Do you need help with housework?  No   Do you need help with laundry? No   Do you need help taking your medications? No   Do you need help managing money? No   Do you ever drive or ride in a car without wearing a seat belt? No   Have you felt unusual stress, anger or loneliness in the last month? No   Who do you live with? Spouse   If you need help, do you have trouble finding someone available to you? No   Have you been bothered in the last four weeks by sexual problems? No   Do you have difficulty concentrating, remembering or making decisions? Yes       Age-appropriate Screening Schedule:  Refer to the list below for future screening recommendations based on patient's age, sex and/or medical conditions. Orders for these recommended tests are listed in the plan section. The patient has been provided with a written plan.    Health Maintenance   Topic Date Due    RSV Vaccine -  Adults (1 - 1-dose 60+ series) Never done    COVID-19 Vaccine (5 - 2023-24 season) 09/01/2023    LIPID PANEL  04/04/2024    ANNUAL WELLNESS VISIT  04/27/2024    MAMMOGRAM  05/01/2024    INFLUENZA VACCINE  08/01/2024    BMI FOLLOWUP  11/09/2024    DXA SCAN  04/19/2025    COLORECTAL CANCER SCREENING  05/18/2028    TDAP/TD VACCINES (2 - Tdap) 07/17/2032    HEPATITIS C SCREENING  Completed    Pneumococcal Vaccine 65+  Completed    ZOSTER VACCINE  Discontinued                  CMS Preventative Services Quick Reference  Risk Factors Identified During Encounter  Alcohol Misuse: Patient encouraged to limit alcohol use to no more than 1 standard alcoholic beverage per day. (12 ounce beer, 6 ounce wine, one shot liquor)  Chronic Pain: Natural history and expected course discussed. Questions answered.  OTC analgesics as needed. Proper dosing schedule discussed.   Depression/Dysphoria: Current medication is effective, no change recommended  Glaucoma or Family History of Glaucoma:  Ophthalmology Appointment Recommended and Condition Stable with Current Medications  Immunizations Discussed/Encouraged: COVID19 and RSV (Respiratory Syncytial Virus)  Inactivity/Sedentary: Patient was advised to exercise at least 150 minutes a week per CDC recommendations.  Dental Screening Recommended  Vision Screening Recommended  The above risks/problems have been discussed with the patient.  Pertinent information has been shared with the patient in the After Visit Summary.  An After Visit Summary and PPPS were made available to the patient.    Follow Up:   Next Medicare Wellness visit to be scheduled in 1 year.       Additional E&M Note during same encounter follows:  Patient has multiple medical problems which are significant and separately identifiable that require additional work above and beyond the Medicare Wellness Visit.      Chief Complaint  Medicare Wellness-subsequent    Subjective        HPI  Sarah Lee is also being seen today for  the following problems    History of Present Illness  The patient presents for evaluation of multiple medical concerns.    The patient reports experiencing discomfort in her knees, hips, shoulders, and lower back, a condition she has been grappling with for several years. Despite her efforts to limit her use of over-the-counter pain relievers such as ibuprofen or Advil, she resorts to Voltaren gel, which provides some relief. She hypothesizes that her symptoms may be indicative of spondylolysis, given her history of ACDF, but notes that the pain has recently intensified. She denies any radiating pain into her legs or sciatic nerve pain, describing the pain as a dull ache.    The patient reports excessive sweating, particularly during minimal physical activity, and occasionally during sleep. She also experiences overheating during winter, necessitating the use of blankets. She maintains an active lifestyle, including shopping, and notes that her current weight is the heaviest she has ever weighed. She denies any correlation between her sweating and the initiation of any new medications.    The patient acknowledges a slight decline in her physical health compared to a year ago, despite her overall good health. Her current medication regimen is effective, and her mental health has significantly improved. She denies any hospitalizations in the past year. Her current healthcare providers include Mayuri Schilling and Chanelle Goodson. She has ceased seeing Dr. Pnito, the pulmonary team. She was previously on baby aspirin, which she discontinued several years ago. She has not completed any advanced care planning forms. She acknowledges some difficulty with concentration, memory, and decision-making, which she attributes to her depression, but notes an improvement in her condition. She expresses interest in undergoing a memory test. She consumes approximately 2 alcoholic beverages daily.    Reports good compliance with  "olmesartan 20 mg daily and metoprolol 25 mg daily with good control of her blood pressure.  Denies any chest pain or palpitations.  Tolerating atorvastatin 20 mg once daily for cholesterol well.       Objective   Vital Signs:  /80 (BP Location: Left arm, Patient Position: Sitting, Cuff Size: Adult)   Pulse 79   Temp 97 °F (36.1 °C) (Temporal)   Resp 20   Ht 164 cm (64.57\")   Wt 89.9 kg (198 lb 4 oz)   BMI 33.43 kg/m²     Physical Exam  Vitals reviewed.   Constitutional:       General: She is not in acute distress.     Appearance: Normal appearance. She is obese. She is not ill-appearing or toxic-appearing.   HENT:      Head: Normocephalic and atraumatic.      Right Ear: External ear normal.      Left Ear: External ear normal.      Nose: Nose normal. No congestion.      Mouth/Throat:      Mouth: Mucous membranes are moist.   Eyes:      General: No scleral icterus.     Extraocular Movements: Extraocular movements intact.      Pupils: Pupils are equal, round, and reactive to light.   Cardiovascular:      Rate and Rhythm: Normal rate and regular rhythm.      Pulses: Normal pulses.      Heart sounds: Normal heart sounds. No murmur heard.     No friction rub. No gallop.   Pulmonary:      Effort: Pulmonary effort is normal. No respiratory distress.      Breath sounds: Normal breath sounds. No stridor. No wheezing, rhonchi or rales.   Abdominal:      General: Abdomen is flat. Bowel sounds are normal. There is no distension.      Palpations: Abdomen is soft.      Tenderness: There is no abdominal tenderness. There is no guarding.   Musculoskeletal:         General: Tenderness (along paraspinal muscles on right lumbar region. NEgative straight leg raise b/l) present. No swelling. Normal range of motion.      Cervical back: Normal range of motion and neck supple. No rigidity or tenderness.   Lymphadenopathy:      Cervical: No cervical adenopathy.   Skin:     General: Skin is warm and dry.      Capillary Refill: " Capillary refill takes less than 2 seconds.      Findings: No erythema or rash.   Neurological:      General: No focal deficit present.      Mental Status: She is alert and oriented to person, place, and time.   Psychiatric:         Mood and Affect: Mood normal.         Behavior: Behavior normal.         Judgment: Judgment normal.                         Assessment and Plan   Diagnoses and all orders for this visit:    1. Encounter for Medicare annual wellness exam (Primary)    2. Coronary artery disease involving native coronary artery of native heart without angina pectoris  Assessment & Plan:  Noted on prior cath. No history of stent or MI. Recent lexiscan normal. Continue atrovastatin 20mg daily.     Orders:  -     Lipid Panel; Future  -     Lipid Panel    3. Primary hypertension  Assessment & Plan:  Hypertension is improving with treatment.  Continue current treatment regimen.  Weight loss.  Regular aerobic exercise.  Blood pressure will be reassessed at the next regular appointment.  Continue metoprolol 25mg daily and olmesartan 20mg daily.       4. Age-related nuclear cataract of both eyes    5. Breast cancer screening by mammogram  -     Mammo Screening Digital Tomosynthesis Bilateral With CAD; Future    6. Routine health maintenance  -     CBC Auto Differential; Future  -     Comprehensive Metabolic Panel; Future  -     CBC Auto Differential  -     Comprehensive Metabolic Panel    7. Class 1 obesity due to excess calories without serious comorbidity with body mass index (BMI) of 33.0 to 33.9 in adult  -     Hemoglobin A1c; Future  -     Hemoglobin A1c    8. Chronic low back pain without sciatica, unspecified back pain laterality  -     XR Spine Lumbar Complete With Flex & Ext; Future    9. Hyperhidrosis  -     TSH; Future  -     T4, free; Future  -     TSH  -     T4, free    10. Abnormal finding of blood chemistry, unspecified  -     Hemoglobin A1c; Future  -     Hemoglobin A1c      Assessment & Plan  1.  Back pain.  An x-ray of the lower back will be ordered to further investigate the cause of the pain. Counsled on OTC analgesics, heating pad. Offered PT, patient prefers to monitor    2. Excessive sweating.  The excessive sweating could be a symptom of menopause versus med sideeffect or hyperthyroidism. Laboratory tests will be conducted to assess thyroid function.    3. Memory issues.  The patient scored 30 out of 30 on her memory test today, which does not indicate any memory issues.    Follow-up  The patient is scheduled for a follow-up visit in 6 months, or earlier if necessary.             Follow Up   Return in about 6 months (around 10/29/2024) for Recheck hypertension, low back pain.  Patient was given instructions and counseling regarding her condition or for health maintenance advice. Please see specific information pulled into the AVS if appropriate.     Jonathan Gomez MD

## 2024-05-01 ENCOUNTER — TELEMEDICINE (OUTPATIENT)
Dept: PSYCHIATRY | Facility: CLINIC | Age: 69
End: 2024-05-01
Payer: MEDICARE

## 2024-05-01 DIAGNOSIS — F41.1 GENERALIZED ANXIETY DISORDER: ICD-10-CM

## 2024-05-01 DIAGNOSIS — F33.1 MAJOR DEPRESSIVE DISORDER, RECURRENT EPISODE, MODERATE: Primary | ICD-10-CM

## 2024-05-01 NOTE — PROGRESS NOTES
Date: May 1, 2024  Time In: 14:25 EDT  Time out: 15:17 EDT      This provider is located at [facility], [facility] [place], [place], [facility], using a secure [employer] Video Visit through [employer]. Patient is being seen remotely via telehealth at their home address is located in [place]. Patient stated they are in a secure environment for this session. The patient's condition being diagnosed and treated is appropriate for telemedicine. The provider identified themself as well as their credentials. The patient, or  patient's legal guardian consent to be seen remotely, and when consent is given they understand that the consent allows for patient identifiable information to be sent to a third party as needed. They may refuse to be seen remotely at any time. The electronic data is encrypted and password protected, and the patient's or  legal guardian has been advised of the potential risks to privacy not withstanding such measures.   PT Identifiers used: Name and .    You have chosen to receive care through a telehealth visit.  Do you consent to use a video/audio connection for your medical care today? Yes     Subjective   Sarah Howard is a 68 y.o. female who presents today for follow up    Chief Complaint: anxiety and depression      History of Present Illness: Client reports that her anxiety has been a little high. She states that she still finds herself very worried about her daughter. Her daughter continues to have health issues and is having some more tests and lab work in seeking clarification of a diagnosis. She states that her daughter is still not working and therefore the client and her  are continuing to pay for everything for her.Client states that her  is very frustrated about the situation, but the client reports that he waits until they get ready and are in bed to discuss what is upsetting for him.      Clinical Maneuvering/Intervention:    On a scale 0-10 ( with  10 being the worst)  Anxiety: 5/10  Depression: 5/10    Sleep: No current concerns with sleep    Appetite: No changes from last session       Assisted patient in processing above session content; acknowledged and normalized patient’s thoughts, feelings, and concerns.  Rationalized patient thought process regarding concerns presented at session.  Discussed triggers associated with patient's  anxiety  and depression  Also discussed coping skills for patient to implement such as self care     Allowed patient to freely discuss issues without interruption or judgment. Provided safe, confidential environment to facilitate the development of positive therapeutic relationship and encourage open, honest communication. Assisted patient in identifying risk factors which would indicate the need for higher level of care including thoughts to harm self or others and/or self-harming behavior and encouraged patient to contact this office, call 911, or present to the nearest emergency room should any of these events occur. Discussed crisis intervention services and means to access. Patient adamantly and convincingly denies current suicidal or homicidal ideation or perceptual disturbance.    Assessment:     Patient appears to maintain relative stability as compared to their baseline.  However, patient continues to struggle with anxiety and depression  which continues to cause impairment in important areas of functioning.  A result, they can be reasonably expected to continue to benefit from treatment and would likely be at increased risk for decompensation otherwise.      PHQ-Score Total:  PHQ-9 Total Score:      DERICK-7 Score Total:         Mental Status Exam:   Hygiene:   good  Cooperation:  Cooperative  Eye Contact:  Good  Psychomotor Behavior:  Appropriate  Affect:  Appropriate  Mood: depressed and anxious  Speech:  Normal  Thought Process:  Goal directed and Linear  Thought Content:  Mood congruent  Suicidal:  None  Homicidal:   None  Hallucinations:  None  Delusion:  None  Memory:  Intact  Orientation:  Person, Place, Time, and Situation  Reliability:  good  Insight:  Good  Judgement:  Good  Impulse Control:  Good  Physical/Medical Issues:   No current changes         Patient's Support Network Includes:      Functional Status: Moderate impairment     Progress toward goal: Not at goal    Prognosis: Fair with Ongoing Treatment         Plan:    Patient will continue in individual outpatient therapy with focus on improved functioning and coping skills, maintaining stability, and avoiding decompensation and the need for higher level of care.    Patient will adhere to medication regimen as prescribed and report any side effects. Patient will contact this office, call 911 or present to the nearest emergency room should suicidal or homicidal ideations occur. Provide Cognitive Behavioral Therapy and Solution Focused Therapy to improve functioning, maintain stability, and avoid decompensation and the need for higher level of care.     Return in about 3 weeks (around 5/22/2024).      VISIT DIAGNOSIS:    Diagnosis Plan   1. Major depressive disorder, recurrent episode, moderate        2. Generalized anxiety disorder               This document has been electronically signed by Chanelle Goodson LCSW  May 1, 2024

## 2024-05-20 ENCOUNTER — TELEMEDICINE (OUTPATIENT)
Dept: PSYCHIATRY | Facility: CLINIC | Age: 69
End: 2024-05-20
Payer: MEDICARE

## 2024-05-20 DIAGNOSIS — F33.1 MAJOR DEPRESSIVE DISORDER, RECURRENT EPISODE, MODERATE: Primary | ICD-10-CM

## 2024-05-20 DIAGNOSIS — F41.1 GENERALIZED ANXIETY DISORDER: ICD-10-CM

## 2024-05-20 NOTE — PROGRESS NOTES
Date: May 21, 2024  Time In: 15:30 EDT  Time out: 14:23 EDT      This provider is located at [facility], [facility] [place], [place], [facility], using a secure [employer] Video Visit through [employer]. Patient is being seen remotely via telehealth at their home address is located in [place]. Patient stated they are in a secure environment for this session. The patient's condition being diagnosed and treated is appropriate for telemedicine. The provider identified themself as well as their credentials. The patient, or  patient's legal guardian consent to be seen remotely, and when consent is given they understand that the consent allows for patient identifiable information to be sent to a third party as needed. They may refuse to be seen remotely at any time. The electronic data is encrypted and password protected, and the patient's or  legal guardian has been advised of the potential risks to privacy not withstanding such measures.   PT Identifiers used: Name and .    You have chosen to receive care through a telehealth visit.  Do you consent to use a video/audio connection for your medical care today? Yes     Subjective   Sarah Howard is a 68 y.o. female who presents today for follow up    Chief Complaint: anxiety and depression     History of Present Illness: client reports increased stress over the last few weeks.  She had to take her daughter to ER, and her daughter ended up being admitted to the hospital.  While it was stressful, they were able to get to an understanding of what has been causing the daughter's health issues.  Client reports that the MD informed them that drinking (alcohol) was the issues.  Client reports daughter currently staying with the patient and her .  Client and therapist processed this, and discussed healthy boundaries versus emmeshment with her daughter.  Client is working towards being supportive of her daughter but encouraging as much independence as  she can with her daughter      Clinical Maneuvering/Intervention:    On a scale 0-10 ( with 10 being the worst)  Anxiety: 7/10  Depression: 5/10    Sleep:  disrupted    Appetite: No current eating issues      Assisted patient in processing above session content; acknowledged and normalized patient’s thoughts, feelings, and concerns.  Rationalized patient thought process regarding concerns presented at session.  Discussed triggers associated with patient's  anxiety  and depression  Also discussed coping skills for patient to implement such as  boundaries    Allowed patient to freely discuss issues without interruption or judgment. Provided safe, confidential environment to facilitate the development of positive therapeutic relationship and encourage open, honest communication. Assisted patient in identifying risk factors which would indicate the need for higher level of care including thoughts to harm self or others and/or self-harming behavior and encouraged patient to contact this office, call 911, or present to the nearest emergency room should any of these events occur. Discussed crisis intervention services and means to access. Patient adamantly and convincingly denies current suicidal or homicidal ideation or perceptual disturbance.    Assessment:     Patient appears to maintain relative stability as compared to their baseline.  However, patient continues to struggle with anxiety and depressin  which continues to cause impairment in important areas of functioning.  A result, they can be reasonably expected to continue to benefit from treatment and would likely be at increased risk for decompensation otherwise.      PHQ-Score Total:  PHQ-9 Total Score:      DERICK-7 Score Total:         Mental Status Exam:   Hygiene:   good  Cooperation:  Cooperative  Eye Contact:  Good  Psychomotor Behavior:  Appropriate  Affect:  Appropriate  Mood: sad, depressed, and anxious  Speech:  Normal  Thought Process:  Goal directed and  Linear  Thought Content:  Mood congruent  Suicidal:  None  Homicidal:  None  Hallucinations:  None  Delusion:  None  Memory:   client reports that her memory and word finding abilities have been poor today  Orientation:  Person, Place, Time, and Situation  Reliability:  good  Insight:  Fair  Judgement:  Good  Impulse Control:  Good  Physical/Medical Issues:  ongoing health issues       Patient's Support Network Includes:  , daughter, son, extended family, and friends    Functional Status: Moderate impairment     Progress toward goal: Not at goal    Prognosis: Fair with Ongoing Treatment         Plan:    Patient will continue in individual outpatient therapy with focus on improved functioning and coping skills, maintaining stability, and avoiding decompensation and the need for higher level of care.    Patient will adhere to medication regimen as prescribed and report any side effects. Patient will contact this office, call 911 or present to the nearest emergency room should suicidal or homicidal ideations occur. Provide Cognitive Behavioral Therapy and Solution Focused Therapy to improve functioning, maintain stability, and avoid decompensation and the need for higher level of care.     Return in about 3 weeks (around 6/10/2024).      VISIT DIAGNOSIS:    Diagnosis Plan   1. Major depressive disorder, recurrent episode, moderate        2. Generalized anxiety disorder               This document has been electronically signed by Chanelle Goodson LCSW  May 21, 2024      Part of this note may be an electronic transcription/translation of spoken language to printed text using the Dragon Dictation System.

## 2024-06-10 ENCOUNTER — TELEMEDICINE (OUTPATIENT)
Dept: PSYCHIATRY | Facility: CLINIC | Age: 69
End: 2024-06-10
Payer: MEDICARE

## 2024-06-10 DIAGNOSIS — F33.1 MAJOR DEPRESSIVE DISORDER, RECURRENT EPISODE, MODERATE: Primary | ICD-10-CM

## 2024-06-10 DIAGNOSIS — F41.1 GENERALIZED ANXIETY DISORDER: ICD-10-CM

## 2024-06-10 PROCEDURE — 90834 PSYTX W PT 45 MINUTES: CPT | Performed by: SOCIAL WORKER

## 2024-06-10 NOTE — PROGRESS NOTES
Date: Luly 10, 2024  Time In: 13:29 EDT  Time out: 14;20 EDT      This provider is located at [facility], [facility] [place], [place], [facility], using a secure [employer] Video Visit through [employer]. Patient is being seen remotely via telehealth at their home address is located in [place]. Patient stated they are in a secure environment for this session. The patient's condition being diagnosed and treated is appropriate for telemedicine. The provider identified themself as well as their credentials. The patient, or  patient's legal guardian consent to be seen remotely, and when consent is given they understand that the consent allows for patient identifiable information to be sent to a third party as needed. They may refuse to be seen remotely at any time. The electronic data is encrypted and password protected, and the patient's or  legal guardian has been advised of the potential risks to privacy not withstanding such measures.   PT Identifiers used: Name and .    You have chosen to receive care through a telehealth visit.  Do you consent to use a video/audio connection for your medical care today? Yes     Subjective   Sarah Howard is a 68 y.o. female who presents today for follow up    Chief Complaint: depression and anxiety     History of Present Illness: Client discussed current stressors. She finds herself struggling with energy and fatigue. She has not been motivated to do things that she usually enjoys.  However, discussed current strategies for behavioral activation      Clinical Maneuvering/Intervention:    On a scale 0-10 ( with 10 being the worst)  Anxiety: 4/10  Depression: 4/10    Sleep: No current concerns with sleep    Appetite: No current eating issues      Assisted patient in processing above session content; acknowledged and normalized patient’s thoughts, feelings, and concerns.  Rationalized patient thought process regarding concerns presented at session.  Discussed  triggers associated with patient's  anxiety  and depression  Also discussed coping skills for patient to implement such as increasing activity  and self care     Allowed patient to freely discuss issues without interruption or judgment. Provided safe, confidential environment to facilitate the development of positive therapeutic relationship and encourage open, honest communication. Assisted patient in identifying risk factors which would indicate the need for higher level of care including thoughts to harm self or others and/or self-harming behavior and encouraged patient to contact this office, call 911, or present to the nearest emergency room should any of these events occur. Discussed crisis intervention services and means to access. Patient adamantly and convincingly denies current suicidal or homicidal ideation or perceptual disturbance.    Assessment:     Patient appears to maintain relative stability as compared to their baseline.  However, patient continues to struggle with anxiety and depression which continues to cause impairment in important areas of functioning.  A result, they can be reasonably expected to continue to benefit from treatment and would likely be at increased risk for decompensation otherwise.           Mental Status Exam:   Hygiene:   good  Cooperation:  Cooperative  Eye Contact:  Good  Psychomotor Behavior:  Appropriate  Affect:  Full range  Mood: depressed and anxious  Speech:  Normal  Thought Process:  Goal directed and Linear  Thought Content:  Normal and Mood congruent  Suicidal:  None  Homicidal:  None  Hallucinations:  None  Delusion:  None  Memory:  Intact  Orientation:  Person, Place, Time, and Situation  Reliability:  good  Insight:  Good  Judgement:  Good  Impulse Control:  Good  Physical/Medical Issues:   No current changes        Patient's Support Network Includes:  , daughter, and son    Functional Status: Moderate impairment     Progress toward goal: Not at  goal    Prognosis: Fair with Ongoing Treatment         Plan:    Patient will continue in individual outpatient therapy with focus on improved functioning and coping skills, maintaining stability, and avoiding decompensation and the need for higher level of care.    Patient will adhere to medication regimen as prescribed and report any side effects. Patient will contact this office, call 911 or present to the nearest emergency room should suicidal or homicidal ideations occur. Provide Cognitive Behavioral Therapy and Solution Focused Therapy to improve functioning, maintain stability, and avoid decompensation and the need for higher level of care.     Return in about 2 weeks (around 6/24/2024).      VISIT DIAGNOSIS:    Diagnosis Plan   1. Major depressive disorder, recurrent episode, moderate        2. Generalized anxiety disorder               This document has been electronically signed by Chanelle Goodson LCSW  Luly 10, 2024      Part of this note may be an electronic transcription/translation of spoken language to printed text using the Dragon Dictation System.

## 2024-06-13 ENCOUNTER — TELEPHONE (OUTPATIENT)
Dept: INTERNAL MEDICINE | Facility: CLINIC | Age: 69
End: 2024-06-13
Payer: MEDICARE

## 2024-06-13 DIAGNOSIS — F33.41 RECURRENT MAJOR DEPRESSIVE DISORDER, IN PARTIAL REMISSION: ICD-10-CM

## 2024-06-13 NOTE — TELEPHONE ENCOUNTER
Please call patient and notify that there was a recall for duloxetine (cymbalta) delayed release capsules manufactured by [employer]. I would recommend calling her pharmacy to see if she received a batch that was recalled. For more information she can call toll free to the [employer] at [phone].     Dr. Gomez

## 2024-06-13 NOTE — TELEPHONE ENCOUNTER
Patient has been called, unable to leave message,  full     Relay:   Please call patient and notify that there was a recall for duloxetine (cymbalta) delayed release capsules manufactured by [employer]. I would recommend calling her pharmacy to see if she received a batch that was recalled. For more information she can call toll free to the [employer] at [phone].      Dr. Gomez

## 2024-06-14 RX ORDER — DULOXETIN HYDROCHLORIDE 60 MG/1
60 CAPSULE, DELAYED RELEASE ORAL DAILY
Qty: 90 CAPSULE | Refills: 1 | Status: SHIPPED | OUTPATIENT
Start: 2024-06-14

## 2024-06-14 NOTE — TELEPHONE ENCOUNTER
Spoke with pt who states hesitancy re; continuing medication due to researched hx of manufacturing issues. Pt is agreeable to start an alternative medication instead of Duloxetine. Would like call back when sent. Pt did note concerns re: medications with increased risk of cancers.    Pt advised to follow up with BH as needed re: symptoms as she feels she is doing well with Lexapro.

## 2024-06-14 NOTE — TELEPHONE ENCOUNTER
THE PATIENT IS CALLING BACK IN SHE STATES THAT SHE TALKED TO [facility] AND THE MEDICATION THAT SHE RECEIVED IS IN THE RECALL BATCH THE PATIENT STATES THAT SHE HAS BEEN TAKING THE MEDICATION AS PRESCRIBED MARYELLEN AND OF HER LAST REFILL SHE HAS TAKEN 54 PILLS OUT OF 90 SHE WOULD LIKE TO KNOW WHAT THE DOCTOR SUGGESTS THAT SHE DO PLEASE CALL PATIENT     CALL [phone]

## 2024-06-14 NOTE — TELEPHONE ENCOUNTER
Name: Sarah Howard    Relationship: Self        HUB PROVIDED THE RELAY MESSAGE FROM THE OFFICE   PATIENT VOICED UNDERSTANDING AND HAS NO FURTHER QUESTIONS AT THIS TIME

## 2024-06-14 NOTE — TELEPHONE ENCOUNTER
2nd attempt, unable to leave voicemail due to it being full.    RELAY: Please call patient and notify that there was a recall for duloxetine (cymbalta) delayed release capsules manufactured by [employer]. I would recommend calling her pharmacy to see if she received a batch that was recalled. For more information she can call toll free to the [employer] at [phone].      Dr. Gomez

## 2024-06-14 NOTE — TELEPHONE ENCOUNTER
I will resend her duloxetine so that they can use a batch that was not affected by the recall.  Thanks.

## 2024-06-16 DIAGNOSIS — I10 PRIMARY HYPERTENSION: ICD-10-CM

## 2024-06-17 RX ORDER — OLMESARTAN MEDOXOMIL 20 MG/1
20 TABLET ORAL DAILY
Qty: 90 TABLET | Refills: 0 | Status: SHIPPED | OUTPATIENT
Start: 2024-06-17

## 2024-06-18 DIAGNOSIS — F33.41 RECURRENT MAJOR DEPRESSIVE DISORDER, IN PARTIAL REMISSION: ICD-10-CM

## 2024-06-18 DIAGNOSIS — I25.10 CORONARY ARTERY DISEASE INVOLVING NATIVE HEART WITHOUT ANGINA PECTORIS, UNSPECIFIED VESSEL OR LESION TYPE: ICD-10-CM

## 2024-06-18 DIAGNOSIS — R05.3 CHRONIC COUGH: ICD-10-CM

## 2024-06-18 DIAGNOSIS — K21.9 GERD WITHOUT ESOPHAGITIS: ICD-10-CM

## 2024-06-18 RX ORDER — DULOXETIN HYDROCHLORIDE 60 MG/1
60 CAPSULE, DELAYED RELEASE ORAL DAILY
Qty: 90 CAPSULE | Refills: 1 | Status: CANCELLED | OUTPATIENT
Start: 2024-06-18

## 2024-06-19 ENCOUNTER — TELEPHONE (OUTPATIENT)
Dept: PSYCHIATRY | Facility: CLINIC | Age: 69
End: 2024-06-19
Payer: MEDICARE

## 2024-06-19 RX ORDER — ATORVASTATIN CALCIUM 20 MG/1
20 TABLET, FILM COATED ORAL DAILY
Qty: 90 TABLET | Refills: 0 | Status: SHIPPED | OUTPATIENT
Start: 2024-06-19

## 2024-06-19 RX ORDER — PANTOPRAZOLE SODIUM 40 MG/1
40 TABLET, DELAYED RELEASE ORAL DAILY
Qty: 90 TABLET | Refills: 3 | Status: SHIPPED | OUTPATIENT
Start: 2024-06-19

## 2024-06-19 RX ORDER — METOPROLOL SUCCINATE 25 MG/1
25 TABLET, EXTENDED RELEASE ORAL DAILY
Qty: 90 TABLET | Refills: 1 | Status: SHIPPED | OUTPATIENT
Start: 2024-06-19

## 2024-06-19 NOTE — TELEPHONE ENCOUNTER
Pt called stating that she doesn't want to take the Cymbalta, Pt stated that it was on the recall list.Pt request to be switch to a different medication.

## 2024-06-19 NOTE — TELEPHONE ENCOUNTER
Called patient vm not set up patient to needs to schedule appointment before medications can be changed

## 2024-06-23 DIAGNOSIS — F41.1 GENERALIZED ANXIETY DISORDER: ICD-10-CM

## 2024-06-23 DIAGNOSIS — F33.1 MAJOR DEPRESSIVE DISORDER, RECURRENT EPISODE, MODERATE: ICD-10-CM

## 2024-06-24 RX ORDER — ESCITALOPRAM OXALATE 20 MG/1
20 TABLET ORAL DAILY
Qty: 30 TABLET | Refills: 1 | Status: SHIPPED | OUTPATIENT
Start: 2024-06-24

## 2024-07-01 ENCOUNTER — TELEMEDICINE (OUTPATIENT)
Dept: PSYCHIATRY | Facility: CLINIC | Age: 69
End: 2024-07-01
Payer: MEDICARE

## 2024-07-01 DIAGNOSIS — F33.1 MAJOR DEPRESSIVE DISORDER, RECURRENT EPISODE, MODERATE: Primary | ICD-10-CM

## 2024-07-01 DIAGNOSIS — F41.1 GENERALIZED ANXIETY DISORDER: ICD-10-CM

## 2024-07-01 PROCEDURE — 90834 PSYTX W PT 45 MINUTES: CPT | Performed by: SOCIAL WORKER

## 2024-07-01 NOTE — PROGRESS NOTES
Date: 2024  Time In: 14:26 EDT  Time out: 15:16 EDT      This provider is located at [facility], [facility] [place], [place], [facility], using a secure [employer] Video Visit through [employer]. Patient is being seen remotely via telehealth at their home address is located in [place]. Patient stated they are in a secure environment for this session. The patient's condition being diagnosed and treated is appropriate for telemedicine. The provider identified themself as well as their credentials. The patient, or  patient's legal guardian consent to be seen remotely, and when consent is given they understand that the consent allows for patient identifiable information to be sent to a third party as needed. They may refuse to be seen remotely at any time. The electronic data is encrypted and password protected, and the patient's or  legal guardian has been advised of the potential risks to privacy not withstanding such measures.   PT Identifiers used: Name and .    You have chosen to receive care through a telehealth visit.  Do you consent to use a video/audio connection for your medical care today? Yes     Subjective   Sarah Howard is a 68 y.o. female who presents today for follow up    Chief Complaint: Anxiety and depression      History of Present Illness: Client discussed how things have been since last session.  She reports that her daughter seems to be showing improvements and has applied for a job and was offered the job. Client feels that this takes a lot of worry off of her.  Her son continues to have some problems.but the client is working on accepting that she cannot focus on this as he is an adult and she has to let him address this.  Client discussed some lingering depression.  She states that as she gets older she feels that the allan/happiness is not there the way that it use to be for her.  She states that she use to have close friends, but does not really feel that she has  made those kinds of attachments/connections since moving here.  There is no women's group or gatherings for women her age group.She expressed some feelings of loneliness.      Clinical Maneuvering/Intervention:    On a scale 0-10 ( with 10 being the worst)  Anxiety: 2/10 currently   Depression: 3/10 currently    Sleep: No current concerns with sleep    Appetite: No current eating issues      Assisted patient in processing above session content; acknowledged and normalized patient’s thoughts, feelings, and concerns.  Rationalized patient thought process regarding concerns presented at session.  Discussed triggers associated with patient's  anxiety  and depression  Also discussed coping skills for patient to implement such as self care  and positive self talk     Allowed patient to freely discuss issues without interruption or judgment. Provided safe, confidential environment to facilitate the development of positive therapeutic relationship and encourage open, honest communication. Assisted patient in identifying risk factors which would indicate the need for higher level of care including thoughts to harm self or others and/or self-harming behavior and encouraged patient to contact this office, call 911, or present to the nearest emergency room should any of these events occur. Discussed crisis intervention services and means to access. Patient adamantly and convincingly denies current suicidal or homicidal ideation or perceptual disturbance.    Assessment:     Patient appears to maintain relative stability as compared to their baseline.  However, patient continues to struggle with anxiety and depression  which continues to cause impairment in important areas of functioning.  A result, they can be reasonably expected to continue to benefit from treatment and would likely be at increased risk for decompensation otherwise.         Mental Status Exam:   Hygiene:   good  Cooperation:  Cooperative  Eye Contact:   Good  Psychomotor Behavior:  Appropriate  Affect:  Appropriate  Mood: depressed and anxious  Speech:  Normal  Thought Process:  Goal directed and Linear  Thought Content:  Mood congruent  Suicidal:  None  Homicidal:  None  Hallucinations:  None  Delusion:  None  Memory:   No current changes reported at this time  Orientation:  Person, Place, Time, and Situation  Reliability:  good  Insight:  Fair  Judgement:  Good  Impulse Control:  Good  Physical/Medical Issues:   No current changes reported at this time         Patient's Support Network Includes:  , children, and extended family    Functional Status: Moderate impairment     Progress toward goal: Not at goal    Prognosis: Fair with Ongoing Treatment         Plan:    Patient will continue in individual outpatient therapy with focus on improved functioning and coping skills, maintaining stability, and avoiding decompensation and the need for higher level of care.    Patient will adhere to medication regimen as prescribed and report any side effects. Patient will contact this office, call 911 or present to the nearest emergency room should suicidal or homicidal ideations occur. Provide Cognitive Behavioral Therapy and Solution Focused Therapy to improve functioning, maintain stability, and avoid decompensation and the need for higher level of care.     Return in about 3 weeks (around 7/22/2024).      VISIT DIAGNOSIS:    Diagnosis Plan   1. Major depressive disorder, recurrent episode, moderate        2. Generalized anxiety disorder               This document has been electronically signed by Chanelle Goodson LCSW  July 1, 2024      Part of this note may be an electronic transcription/translation of spoken language to printed text using the Dragon Dictation System.

## 2024-07-05 RX ORDER — TRAZODONE HYDROCHLORIDE 150 MG/1
150 TABLET ORAL DAILY
Qty: 90 TABLET | Refills: 0 | Status: SHIPPED | OUTPATIENT
Start: 2024-07-05

## 2024-07-12 ENCOUNTER — OFFICE VISIT (OUTPATIENT)
Dept: INTERNAL MEDICINE | Facility: CLINIC | Age: 69
End: 2024-07-12
Payer: MEDICARE

## 2024-07-12 VITALS
HEART RATE: 76 BPM | DIASTOLIC BLOOD PRESSURE: 80 MMHG | SYSTOLIC BLOOD PRESSURE: 128 MMHG | OXYGEN SATURATION: 97 % | BODY MASS INDEX: 32.91 KG/M2 | RESPIRATION RATE: 20 BRPM | TEMPERATURE: 97.3 F | WEIGHT: 195.13 LBS

## 2024-07-12 DIAGNOSIS — R53.83 OTHER FATIGUE: ICD-10-CM

## 2024-07-12 DIAGNOSIS — K52.9 CHRONIC DIARRHEA: Primary | ICD-10-CM

## 2024-07-12 DIAGNOSIS — K51.518 LEFT SIDED COLITIS WITH OTHER COMPLICATION: ICD-10-CM

## 2024-07-12 DIAGNOSIS — K58.2 MIXED IRRITABLE BOWEL SYNDROME: ICD-10-CM

## 2024-07-12 LAB
ALBUMIN SERPL-MCNC: 4.2 G/DL (ref 3.5–5.2)
ALBUMIN/GLOB SERPL: 1.3 G/DL
ALP SERPL-CCNC: 97 U/L (ref 39–117)
ALT SERPL W P-5'-P-CCNC: 29 U/L (ref 1–33)
ANION GAP SERPL CALCULATED.3IONS-SCNC: 11.6 MMOL/L (ref 5–15)
AST SERPL-CCNC: 27 U/L (ref 1–32)
BILIRUB SERPL-MCNC: 0.8 MG/DL (ref 0–1.2)
BUN SERPL-MCNC: 10 MG/DL (ref 8–23)
BUN/CREAT SERPL: 11.1 (ref 7–25)
CALCIUM SPEC-SCNC: 9.4 MG/DL (ref 8.6–10.5)
CHLORIDE SERPL-SCNC: 105 MMOL/L (ref 98–107)
CO2 SERPL-SCNC: 23.4 MMOL/L (ref 22–29)
CREAT SERPL-MCNC: 0.9 MG/DL (ref 0.57–1)
CRP SERPL-MCNC: <0.3 MG/DL (ref 0–0.5)
DEPRECATED RDW RBC AUTO: 38.4 FL (ref 37–54)
EGFRCR SERPLBLD CKD-EPI 2021: 69.3 ML/MIN/1.73
ERYTHROCYTE [DISTWIDTH] IN BLOOD BY AUTOMATED COUNT: 11.2 % (ref 12.3–15.4)
ERYTHROCYTE [SEDIMENTATION RATE] IN BLOOD: 10 MM/HR (ref 0–30)
GLOBULIN UR ELPH-MCNC: 3.2 GM/DL
GLUCOSE SERPL-MCNC: 113 MG/DL (ref 65–99)
HCT VFR BLD AUTO: 39.4 % (ref 34–46.6)
HGB BLD-MCNC: 13.7 G/DL (ref 12–15.9)
MCH RBC QN AUTO: 33.2 PG (ref 26.6–33)
MCHC RBC AUTO-ENTMCNC: 34.8 G/DL (ref 31.5–35.7)
MCV RBC AUTO: 95.4 FL (ref 79–97)
PLATELET # BLD AUTO: 261 10*3/MM3 (ref 140–450)
PMV BLD AUTO: 12 FL (ref 6–12)
POTASSIUM SERPL-SCNC: 4.5 MMOL/L (ref 3.5–5.2)
PROT SERPL-MCNC: 7.4 G/DL (ref 6–8.5)
RBC # BLD AUTO: 4.13 10*6/MM3 (ref 3.77–5.28)
SODIUM SERPL-SCNC: 140 MMOL/L (ref 136–145)
T4 FREE SERPL-MCNC: 1.13 NG/DL (ref 0.92–1.68)
TSH SERPL DL<=0.05 MIU/L-ACNC: 2.39 UIU/ML (ref 0.27–4.2)
WBC NRBC COR # BLD AUTO: 6.14 10*3/MM3 (ref 3.4–10.8)

## 2024-07-12 PROCEDURE — 80053 COMPREHEN METABOLIC PANEL: CPT | Performed by: STUDENT IN AN ORGANIZED HEALTH CARE EDUCATION/TRAINING PROGRAM

## 2024-07-12 PROCEDURE — 1160F RVW MEDS BY RX/DR IN RCRD: CPT | Performed by: STUDENT IN AN ORGANIZED HEALTH CARE EDUCATION/TRAINING PROGRAM

## 2024-07-12 PROCEDURE — 84443 ASSAY THYROID STIM HORMONE: CPT | Performed by: STUDENT IN AN ORGANIZED HEALTH CARE EDUCATION/TRAINING PROGRAM

## 2024-07-12 PROCEDURE — 86258 DGP ANTIBODY EACH IG CLASS: CPT | Performed by: STUDENT IN AN ORGANIZED HEALTH CARE EDUCATION/TRAINING PROGRAM

## 2024-07-12 PROCEDURE — G2211 COMPLEX E/M VISIT ADD ON: HCPCS | Performed by: STUDENT IN AN ORGANIZED HEALTH CARE EDUCATION/TRAINING PROGRAM

## 2024-07-12 PROCEDURE — 82784 ASSAY IGA/IGD/IGG/IGM EACH: CPT | Performed by: STUDENT IN AN ORGANIZED HEALTH CARE EDUCATION/TRAINING PROGRAM

## 2024-07-12 PROCEDURE — 86364 TISS TRNSGLTMNASE EA IG CLAS: CPT | Performed by: STUDENT IN AN ORGANIZED HEALTH CARE EDUCATION/TRAINING PROGRAM

## 2024-07-12 PROCEDURE — 3079F DIAST BP 80-89 MM HG: CPT | Performed by: STUDENT IN AN ORGANIZED HEALTH CARE EDUCATION/TRAINING PROGRAM

## 2024-07-12 PROCEDURE — 99214 OFFICE O/P EST MOD 30 MIN: CPT | Performed by: STUDENT IN AN ORGANIZED HEALTH CARE EDUCATION/TRAINING PROGRAM

## 2024-07-12 PROCEDURE — 85652 RBC SED RATE AUTOMATED: CPT | Performed by: STUDENT IN AN ORGANIZED HEALTH CARE EDUCATION/TRAINING PROGRAM

## 2024-07-12 PROCEDURE — 85027 COMPLETE CBC AUTOMATED: CPT | Performed by: STUDENT IN AN ORGANIZED HEALTH CARE EDUCATION/TRAINING PROGRAM

## 2024-07-12 PROCEDURE — 1126F AMNT PAIN NOTED NONE PRSNT: CPT | Performed by: STUDENT IN AN ORGANIZED HEALTH CARE EDUCATION/TRAINING PROGRAM

## 2024-07-12 PROCEDURE — 86140 C-REACTIVE PROTEIN: CPT | Performed by: STUDENT IN AN ORGANIZED HEALTH CARE EDUCATION/TRAINING PROGRAM

## 2024-07-12 PROCEDURE — 84439 ASSAY OF FREE THYROXINE: CPT | Performed by: STUDENT IN AN ORGANIZED HEALTH CARE EDUCATION/TRAINING PROGRAM

## 2024-07-12 PROCEDURE — 1159F MED LIST DOCD IN RCRD: CPT | Performed by: STUDENT IN AN ORGANIZED HEALTH CARE EDUCATION/TRAINING PROGRAM

## 2024-07-12 PROCEDURE — 3074F SYST BP LT 130 MM HG: CPT | Performed by: STUDENT IN AN ORGANIZED HEALTH CARE EDUCATION/TRAINING PROGRAM

## 2024-07-12 NOTE — PROGRESS NOTES
"    Follow Up Office Visit      Date: 2024   Patient Name: Sarah Howard  : 1955   MRN: [id number]     Chief Complaint:    Chief Complaint   Patient presents with    Fecal Incontinence       History of Present Illness: Sarah Howard is a 68 y.o. female who is here today for changes in bowel movements.    HPI  History of Present Illness  The patient presents for evaluation of diarrhea.    She reports experiencing changes in her bowel movements, often experiencing diarrhea 95 percent of the time. The condition has worsened over the past several months, describing it as if someone is \"pouring a bucket of water in the toilet\". Her stool is dark and occasionally sticky, and she experiences bowel movements 4 to 6 times a day.  She also experiences rare uncontrollable bowel leakage, once waking up twice in one night due to a large bowel movement in bed. This occasionally causes anal soreness from wiping. She denies any groin numbness, back pain, fever, chills, nausea, vomiting, or floating stools. In 2017, she was diagnosed with Irritable Bowel Syndrome (IBS) following an endoscopy and a colonoscopy. She has a cat at home, which does not have diarrhea no other exposure to animals or pets. She denies drinking creek water from a well or traveling prior to the onset of her symptoms. She denies any blood in her stool or abdominal pain. She denies taking any antibiotics prior to the onset of her symptoms. Her bowel habits does not seem to be related to eating, and she does not consume sugar-free or low-fat chips. She still has her gallbladder. She has lost 3 pounds recently. She is not taking any other OTC supplements or medications.     Supplemental Information  She had a colonoscopy in 2023, which was normal. She had a polyp removed, which was nothing to be concerned about. She has bad acid reflux if she does not take Protonix.   Her mother had colon cancer. She denies any family history of inflammatory " bowel disease.    Last colonoscopy 5/18/23: one 4mm polyp    Subjective      Review of Systems:   Review of Systems    I have reviewed the patients family history, social history, past medical history, past surgical history and have updated it as appropriate.     Medications:     Current Outpatient Medications:     atorvastatin (LIPITOR) 20 MG tablet, Take 1 tablet by mouth Daily., Disp: 90 tablet, Rfl: 0    cycloSPORINE (RESTASIS) 0.05 % ophthalmic emulsion, Administer 1 drop to both eyes 2 (Two) Times a Day., Disp: , Rfl:     DULoxetine (CYMBALTA) 60 MG capsule, Take 1 capsule by mouth Daily., Disp: 90 capsule, Rfl: 1    escitalopram (LEXAPRO) 20 MG tablet, TAKE 1 TABLET BY MOUTH DAILY, Disp: 30 tablet, Rfl: 1    ipratropium (ATROVENT) 0.06 % nasal spray, , Disp: , Rfl:     latanoprost (XALATAN) 0.005 % ophthalmic solution, , Disp: , Rfl:     metoprolol succinate XL (Toprol XL) 25 MG 24 hr tablet, Take 1 tablet by mouth Daily., Disp: 90 tablet, Rfl: 1    olmesartan (BENICAR) 20 MG tablet, TAKE 1 TABLET BY MOUTH DAILY, Disp: 90 tablet, Rfl: 0    pantoprazole (PROTONIX) 40 MG EC tablet, Take 1 tablet by mouth Daily., Disp: 90 tablet, Rfl: 3    traZODone (DESYREL) 150 MG tablet, Take 1 tablet by mouth Daily., Disp: 90 tablet, Rfl: 0    Allergies:   No Known Allergies    Objective     Physical Exam: Please see above  Vital Signs:   Vitals:    07/12/24 1126   BP: 128/80   BP Location: Left arm   Patient Position: Sitting   Cuff Size: Adult   Pulse: 76   Resp: 20   Temp: 97.3 °F (36.3 °C)   TempSrc: Temporal   SpO2: 97%   Weight: 88.5 kg (195 lb 2 oz)   PainSc: 0-No pain     Body mass index is 32.91 kg/m².    Physical Exam  Vitals reviewed.   Constitutional:       General: She is not in acute distress.     Appearance: Normal appearance. She is obese. She is not ill-appearing or toxic-appearing.   HENT:      Head: Normocephalic and atraumatic.      Right Ear: External ear normal.      Left Ear: External ear normal.       Nose: Nose normal. No congestion.      Mouth/Throat:      Mouth: Mucous membranes are moist.      Pharynx: No oropharyngeal exudate.   Eyes:      General: No scleral icterus.     Extraocular Movements: Extraocular movements intact.   Cardiovascular:      Rate and Rhythm: Normal rate and regular rhythm.      Pulses: Normal pulses.      Heart sounds: Normal heart sounds.   Pulmonary:      Effort: Pulmonary effort is normal.      Breath sounds: Normal breath sounds.   Abdominal:      General: Abdomen is flat. Bowel sounds are normal. There is no distension.      Palpations: Abdomen is soft. There is no mass.      Tenderness: There is no abdominal tenderness. There is no guarding.   Musculoskeletal:      Cervical back: Normal range of motion. No rigidity.   Skin:     General: Skin is warm and dry.      Capillary Refill: Capillary refill takes less than 2 seconds.   Neurological:      General: No focal deficit present.      Mental Status: She is alert and oriented to person, place, and time.   Psychiatric:         Mood and Affect: Mood normal.         Behavior: Behavior normal.         Judgment: Judgment normal.       Physical Exam        Procedures    Results:   Labs:   Hemoglobin A1C   Date Value Ref Range Status   04/29/2024 5.90 (H) 4.80 - 5.60 % Final     TSH   Date Value Ref Range Status   04/29/2024 2.770 0.270 - 4.200 uIU/mL Final      Results        Imaging:   No valid procedures specified.     Assessment / Plan      Assessment/Plan:   Problem List Items Addressed This Visit    None  Visit Diagnoses       Chronic diarrhea    -  Primary    Relevant Orders    Calprotectin, Fecal - Stool, Per Rectum    CBC (No Diff)    Celiac Disease Antibody Screen    Comprehensive Metabolic Panel    C-reactive Protein    TSH    Clostridioides difficile Toxin, PCR - Stool, Per Rectum    Gastrointestinal Panel, PCR - Stool, Per Rectum    Sedimentation rate, automated    T4, free    Pancreatic Elastase, Fecal - Stool, Per Rectum     Ova & Parasite Examination - Stool, Per Rectum    Ambulatory Referral to Gastroenterology    Other fatigue        Relevant Orders    TSH    Left sided colitis with other complication        Relevant Orders    Gastrointestinal Panel, PCR - Stool, Per Rectum    Mixed irritable bowel syndrome        Relevant Orders    T4, free    Ambulatory Referral to Gastroenterology            Assessment & Plan  1. Diarrhea.  Her weight is stable, suggesting less likelihood of cancer (though her family history and bowel change does raise concern) or malabsorptive conditions. Her fecal incontinence raises suspicion for possible secretory diarrhea. Her abdominal exam is completely benign. Blood work will be conducted to assess kidney function, liver enzymes, blood counts, and inflammatory markers. A stool test for C. Difficile, infectious causes, FOBT, calprotectin and fecal elastase will be conducted. A referral to a gastroenterologist will be made. Over-the-counter Imodium is recommended, starting with 4 mg at the first loose bowel movement of the day, 2 mg at the subsequent bowel movement, and a maximum of 12 mg per day. She is advised to seek immediate medical attention if she experiences dehydration, dizziness, lightheadedness, fevers, abdominal pain, or bright red blood in her stool.       Follow Up:   Return if symptoms worsen or fail to improve.    I have spent a total of 33 min on reviewing test results/preparing to see patient, counseling patient, performing medically appropriate exam and documenting clinical information in the electronic or other health record     Dao Gomez MD  [facility] Anjel Vieira    Patient or patient representative verbalized consent for the use of Ambient Listening during the visit with  Dao Gomez MD for chart documentation. 7/12/2024  12:07 EDT

## 2024-07-13 LAB
GLIADIN PEPTIDE IGA SER-ACNC: 7 UNITS (ref 0–19)
IGA SERPL-MCNC: 378 MG/DL (ref 87–352)
NCCN CRITERIA FLAG: ABNORMAL
TTG IGA SER-ACNC: <2 U/ML (ref 0–3)

## 2024-07-15 ENCOUNTER — LAB (OUTPATIENT)
Dept: INTERNAL MEDICINE | Facility: CLINIC | Age: 69
End: 2024-07-15
Payer: MEDICARE

## 2024-07-15 DIAGNOSIS — K51.518 LEFT SIDED COLITIS WITH OTHER COMPLICATION: ICD-10-CM

## 2024-07-15 DIAGNOSIS — K52.9 CHRONIC DIARRHEA: ICD-10-CM

## 2024-07-15 LAB
ADV 40+41 DNA STL QL NAA+NON-PROBE: NOT DETECTED
ASTRO TYP 1-8 RNA STL QL NAA+NON-PROBE: NOT DETECTED
C CAYETANENSIS DNA STL QL NAA+NON-PROBE: NOT DETECTED
C COLI+JEJ+UPSA DNA STL QL NAA+NON-PROBE: NOT DETECTED
C DIFF TOX GENS STL QL NAA+PROBE: NOT DETECTED
CRYPTOSP DNA STL QL NAA+NON-PROBE: NOT DETECTED
E HISTOLYT DNA STL QL NAA+NON-PROBE: NOT DETECTED
EAEC PAA PLAS AGGR+AATA ST NAA+NON-PRB: NOT DETECTED
EC STX1+STX2 GENES STL QL NAA+NON-PROBE: NOT DETECTED
EPEC EAE GENE STL QL NAA+NON-PROBE: NOT DETECTED
ETEC LTA+ST1A+ST1B TOX ST NAA+NON-PROBE: NOT DETECTED
G LAMBLIA DNA STL QL NAA+NON-PROBE: NOT DETECTED
NOROVIRUS GI+II RNA STL QL NAA+NON-PROBE: NOT DETECTED
P SHIGELLOIDES DNA STL QL NAA+NON-PROBE: NOT DETECTED
RVA RNA STL QL NAA+NON-PROBE: NOT DETECTED
S ENT+BONG DNA STL QL NAA+NON-PROBE: NOT DETECTED
SAPO I+II+IV+V RNA STL QL NAA+NON-PROBE: NOT DETECTED
SHIGELLA SP+EIEC IPAH ST NAA+NON-PROBE: NOT DETECTED
V CHOL+PARA+VUL DNA STL QL NAA+NON-PROBE: NOT DETECTED
V CHOLERAE DNA STL QL NAA+NON-PROBE: NOT DETECTED
Y ENTEROCOL DNA STL QL NAA+NON-PROBE: NOT DETECTED

## 2024-07-15 PROCEDURE — 87177 OVA AND PARASITES SMEARS: CPT | Performed by: STUDENT IN AN ORGANIZED HEALTH CARE EDUCATION/TRAINING PROGRAM

## 2024-07-15 PROCEDURE — 83993 ASSAY FOR CALPROTECTIN FECAL: CPT | Performed by: STUDENT IN AN ORGANIZED HEALTH CARE EDUCATION/TRAINING PROGRAM

## 2024-07-15 PROCEDURE — 82653 EL-1 FECAL QUANTITATIVE: CPT | Performed by: STUDENT IN AN ORGANIZED HEALTH CARE EDUCATION/TRAINING PROGRAM

## 2024-07-15 PROCEDURE — 87209 SMEAR COMPLEX STAIN: CPT | Performed by: STUDENT IN AN ORGANIZED HEALTH CARE EDUCATION/TRAINING PROGRAM

## 2024-07-15 PROCEDURE — 87507 IADNA-DNA/RNA PROBE TQ 12-25: CPT | Performed by: STUDENT IN AN ORGANIZED HEALTH CARE EDUCATION/TRAINING PROGRAM

## 2024-07-15 PROCEDURE — 87493 C DIFF AMPLIFIED PROBE: CPT | Performed by: STUDENT IN AN ORGANIZED HEALTH CARE EDUCATION/TRAINING PROGRAM

## 2024-07-16 ENCOUNTER — HOSPITAL ENCOUNTER (OUTPATIENT)
Dept: MAMMOGRAPHY | Facility: HOSPITAL | Age: 69
Discharge: HOME | End: 2024-07-16
Admitting: STUDENT IN AN ORGANIZED HEALTH CARE EDUCATION/TRAINING PROGRAM
Payer: MEDICARE

## 2024-07-16 DIAGNOSIS — Z12.31 BREAST CANCER SCREENING BY MAMMOGRAM: ICD-10-CM

## 2024-07-16 PROCEDURE — 77067 SCR MAMMO BI INCL CAD: CPT

## 2024-07-16 PROCEDURE — 77063 BREAST TOMOSYNTHESIS BI: CPT

## 2024-07-18 LAB — ELASTASE PANC STL-MCNT: 770 UG ELAST./G

## 2024-07-19 LAB — CALPROTECTIN STL-MCNT: 9 UG/G (ref 0–120)

## 2024-07-21 LAB
O+P SPEC MICRO: NORMAL
O+P STL TRI STN: NORMAL

## 2024-07-22 ENCOUNTER — TELEMEDICINE (OUTPATIENT)
Dept: PSYCHIATRY | Facility: CLINIC | Age: 69
End: 2024-07-22
Payer: MEDICARE

## 2024-07-22 DIAGNOSIS — F41.1 GENERALIZED ANXIETY DISORDER: ICD-10-CM

## 2024-07-22 DIAGNOSIS — F33.1 MAJOR DEPRESSIVE DISORDER, RECURRENT EPISODE, MODERATE: Primary | ICD-10-CM

## 2024-07-22 PROCEDURE — 90837 PSYTX W PT 60 MINUTES: CPT | Performed by: SOCIAL WORKER

## 2024-07-22 NOTE — PROGRESS NOTES
Date: 2024  Time In: 14:28 EDT  Time out: 15:21 EDT      This provider is located at [facility], [facility] [place], [place], [facility], using a secure [employer] Video Visit through [employer]. Patient is being seen remotely via telehealth at their home address is located in [place]. Patient stated they are in a secure environment for this session. The patient's condition being diagnosed and treated is appropriate for telemedicine. The provider identified themself as well as their credentials. The patient, or  patient's legal guardian consent to be seen remotely, and when consent is given they understand that the consent allows for patient identifiable information to be sent to a third party as needed. They may refuse to be seen remotely at any time. The electronic data is encrypted and password protected, and the patient's or  legal guardian has been advised of the potential risks to privacy not withstanding such measures.   PT Identifiers used: Name and .    You have chosen to receive care through a telehealth visit.  Do you consent to use a video/audio connection for your medical care today? Yes     Subjective   Sarah Howard is a 68 y.o. female who presents today for follow up    Chief Complaint: Anxiety and depression     History of Present Illness: Client discussed how things have been since last session.  Client reports that their daughter has returned to work, but is still staying with them in the evening. Client discussed a feeling of sadness.  Client reports that she has been having some increased health issues. Client expressed that she does not understand what is going on despite the tests that have been run so far have come back negative.  The health issues have negatively impacted her ability to do the things that she wants to do. She states that as a cancer survivor, there is a worry in the back of her mind there is concern about cancer. Client also discussed some  sadness about relationship with her brother and cousin      Clinical Maneuvering/Intervention:    On a scale 0-10 ( with 10 being the worst)  Anxiety: 6/10 currently  Depression: 6/10 currently    Sleep:  No current changes reported    Appetite:  No current changes reported      Assisted patient in processing above session content; acknowledged and normalized patient’s thoughts, feelings, and concerns.  Rationalized patient thought process regarding concerns presented at session.  Discussed triggers associated with patient's  anxiety  and depression  Also discussed coping skills for patient to implement such as  unhelpful negative thoughts/ [id number] thoughts    Allowed patient to freely discuss issues without interruption or judgment. Provided safe, confidential environment to facilitate the development of positive therapeutic relationship and encourage open, honest communication. Assisted patient in identifying risk factors which would indicate the need for higher level of care including thoughts to harm self or others and/or self-harming behavior and encouraged patient to contact this office, call 911, or present to the nearest emergency room should any of these events occur. Discussed crisis intervention services and means to access. Patient adamantly and convincingly denies current suicidal or homicidal ideation or perceptual disturbance.    Assessment:     Patient appears to maintain relative stability as compared to their baseline.  However, patient continues to struggle with anxiety and depression  which continues to cause impairment in important areas of functioning.  A result, they can be reasonably expected to continue to benefit from treatment and would likely be at increased risk for decompensation otherwise.         Mental Status Exam:   Hygiene:   good  Cooperation:  Cooperative  Eye Contact:  Good  Psychomotor Behavior:  Appropriate  Affect:  Appropriate  Mood: sad, depressed, and  anxious  Speech:  Normal  Thought Process:  Goal directed and Linear  Thought Content:  Mood congruent  Suicidal:  None  Homicidal:  None  Hallucinations:  None  Delusion:  None  Memory:   No current changes reported  Orientation:  Person, Place, Time, and Situation  Reliability:  good  Insight:  Good  Judgement:  Good  Impulse Control:  Good  Physical/Medical Issues:   Some ongoing and new health issues        Patient's Support Network Includes:  , children, and extended family    Functional Status: Moderate impairment     Progress toward goal: Not at goal    Prognosis: Fair with Ongoing Treatment         Plan:    Patient will continue in individual outpatient therapy with focus on improved functioning and coping skills, maintaining stability, and avoiding decompensation and the need for higher level of care.    Patient will adhere to medication regimen as prescribed and report any side effects. Patient will contact this office, call 911 or present to the nearest emergency room should suicidal or homicidal ideations occur. Provide Cognitive Behavioral Therapy and Solution Focused Therapy to improve functioning, maintain stability, and avoid decompensation and the need for higher level of care.     Return in about 2 weeks (around 8/5/2024).      VISIT DIAGNOSIS:    Diagnosis Plan   1. Major depressive disorder, recurrent episode, moderate        2. Generalized anxiety disorder               This document has been electronically signed by Chanelle Goodson LCSW  July 22, 2024      Part of this note may be an electronic transcription/translation of spoken language to printed text using the Dragon Dictation System.

## 2024-07-23 NOTE — TREATMENT PLAN
Multi-Disciplinary Problems (from Behavioral Health Treatment Plan)      Active Problems       Problem: Anxiety  Start Date: 07/23/24      Problem Details: The patient self-scales this problem as a 6 with 10 being the worst.          Goal Priority Start Date Expected End Date End Date    Patient will develop and implement behavioral and cognitive strategies to reduce anxiety and irrational fears. -- 07/23/24 -- --    Goal Details: Progress toward goal:  Not appropriate to rate progress toward goal since this is the initial treatment plan.          Goal Intervention Frequency Start Date End Date    Help patient explore past emotional issues in relation to present anxiety. Q Month 07/23/24 01/21/25    Intervention Details: Duration of treatment until remission of symptoms.          Goal Intervention Frequency Start Date End Date    Help patient develop an awareness of their cognitive and physical responses to anxiety. Q Month 07/23/24 01/21/25    Intervention Details: Duration of treatment until remission of symptoms.                  Problem: Depression  Start Date: 07/23/24      Problem Details: The patient self-scales this problem as a 6 with 10 being the worst.          Goal Priority Start Date Expected End Date End Date    Patient will demonstrate the ability to initiate new constructive life skills outside of sessions on a consistent basis. -- 07/23/24 -- --    Goal Details: Progress toward goal:  Not appropriate to rate progress toward goal since this is the initial treatment plan.          Goal Intervention Frequency Start Date End Date    Assist patient in setting attainable activities of daily living goals. PRN 07/23/24 01/21/25      Goal Intervention Frequency Start Date End Date    Provide education about depression Q Month 07/23/24 01/21/25    Intervention Details: Duration of treatment until remission of symptoms.          Goal Intervention Frequency Start Date End Date    Assist patient in developing  healthy coping strategies. Q Month 07/23/24 01/21/25    Intervention Details: Duration of treatment until remission of symptoms.                                 I have discussed and reviewed this treatment plan with the patient.  It has been printed for signatures.

## 2024-07-24 ENCOUNTER — TELEMEDICINE (OUTPATIENT)
Dept: PSYCHIATRY | Facility: CLINIC | Age: 69
End: 2024-07-24
Payer: MEDICARE

## 2024-07-24 DIAGNOSIS — F33.1 MAJOR DEPRESSIVE DISORDER, RECURRENT EPISODE, MODERATE: Primary | ICD-10-CM

## 2024-07-24 DIAGNOSIS — F41.1 GENERALIZED ANXIETY DISORDER: ICD-10-CM

## 2024-07-24 PROCEDURE — 99214 OFFICE O/P EST MOD 30 MIN: CPT | Performed by: NURSE PRACTITIONER

## 2024-07-24 RX ORDER — TRAZODONE HYDROCHLORIDE 150 MG/1
150 TABLET ORAL DAILY
Qty: 90 TABLET | Refills: 1 | Status: SHIPPED | OUTPATIENT
Start: 2024-07-24

## 2024-07-24 RX ORDER — DESVENLAFAXINE SUCCINATE 50 MG/1
50 TABLET, EXTENDED RELEASE ORAL DAILY
Qty: 30 TABLET | Refills: 1 | Status: SHIPPED | OUTPATIENT
Start: 2024-07-24

## 2024-07-24 RX ORDER — DULOXETIN HYDROCHLORIDE 30 MG/1
30 CAPSULE, DELAYED RELEASE ORAL DAILY
Qty: 30 CAPSULE | Refills: 2 | Status: SHIPPED | OUTPATIENT
Start: 2024-07-24 | End: 2025-07-24

## 2024-07-24 RX ORDER — ESCITALOPRAM OXALATE 20 MG/1
20 TABLET ORAL DAILY
Qty: 90 TABLET | Refills: 1 | Status: SHIPPED | OUTPATIENT
Start: 2024-07-24

## 2024-07-24 NOTE — PROGRESS NOTES
Patient Name: Sarah Howard  MRN: [id number]   :  1955     This provider is located at her home office through the [facility] (through [facility]), [address], [address] using a secure [employer] Video Visit through [employer]. Patient is being seen remotely via telehealth at their home address in [place], and stated they are in a secure environment for this session. The patient's condition being diagnosed/treated is appropriate for telemedicine. The provider identified herself as well as her credentials.   The patient, and/or patients guardian, consent to be seen remotely, and when consent is given they understand that the consent allows for patient identifiable information to be sent to a third party as needed.   They may refuse to be seen remotely at any time. The electronic data is encrypted and password protected, and the patient and/or guardian has been advised of the potential risks to privacy not withstanding such measures.    You have chosen to receive care through a telehealth visit.  Do you consent to use a video/audio connection for your medical care today? Yes    Chief Complaint:      ICD-10-CM ICD-9-CM   1. Major depressive disorder, recurrent episode, moderate  F33.1 296.32   2. Generalized anxiety disorder  F41.1 300.02       History of Present Illness: Sarah Howard is a 68 y.o. female is here today for medication management follow up.  Patient states mood and anxiety are not good.  States her Cymbalta was recalled.  Has worries about that.  Has been on Cymbalta for a long time.  Sleep is good however feels she is sleeping too much.    The following portions of the patient's history were reviewed and updated as appropriate: allergies, current medications, past family history, past medical history, past social history, past surgical history, and problem list.    Review of Systems;;  Review of Systems   Constitutional:  Negative for  activity change, appetite change, fatigue, unexpected weight gain and unexpected weight loss.   Respiratory:  Negative for shortness of breath and wheezing.    Gastrointestinal:  Negative for constipation, diarrhea, nausea and vomiting.   Musculoskeletal:  Negative for gait problem.   Skin:  Negative for dry skin and rash.   Neurological:  Negative for dizziness, speech difficulty, weakness, light-headedness, headache, memory problem and confusion.   Psychiatric/Behavioral:  Positive for depressed mood. Negative for agitation, behavioral problems, decreased concentration, dysphoric mood, hallucinations, self-injury, sleep disturbance, suicidal ideas, negative for hyperactivity and stress. The patient is nervous/anxious.        Physical Exam;;  Physical Exam  Constitutional:       General: She is not in acute distress.     Appearance: She is well-developed. She is not diaphoretic.   HENT:      Head: Normocephalic and atraumatic.   Eyes:      Conjunctiva/sclera: Conjunctivae normal.   Pulmonary:      Effort: Pulmonary effort is normal. No respiratory distress.   Musculoskeletal:         General: Normal range of motion.      Cervical back: Full passive range of motion without pain and normal range of motion.   Neurological:      Mental Status: She is alert and oriented to person, place, and time.   Psychiatric:         Mood and Affect: Mood is anxious and depressed. Affect is not labile, blunt, angry or inappropriate.         Speech: Speech is not rapid and pressured or tangential.         Behavior: Behavior normal. Behavior is not agitated, slowed, aggressive, withdrawn, hyperactive or combative. Behavior is cooperative.         Thought Content: Thought content normal. Thought content is not paranoid or delusional. Thought content does not include homicidal or suicidal ideation. Thought content does not include homicidal or suicidal plan.         Judgment: Judgment normal.       There were no vitals taken for this  visit.  There is no height or weight on file to calculate BMI. Video appt unable to obtain.     Current Medications;;    Current Outpatient Medications:     escitalopram (LEXAPRO) 20 MG tablet, Take 1 tablet by mouth Daily., Disp: 90 tablet, Rfl: 1    traZODone (DESYREL) 150 MG tablet, Take 1 tablet by mouth Daily., Disp: 90 tablet, Rfl: 1    atorvastatin (LIPITOR) 20 MG tablet, Take 1 tablet by mouth Daily., Disp: 90 tablet, Rfl: 0    benzonatate (TESSALON) 100 MG capsule, Take 1 capsule by mouth 3 (Three) Times a Day As Needed for Cough., Disp: 30 capsule, Rfl: 0    cycloSPORINE (RESTASIS) 0.05 % ophthalmic emulsion, Administer 1 drop to both eyes 2 (Two) Times a Day., Disp: , Rfl:     desvenlafaxine (Pristiq) 50 MG 24 hr tablet, Take 1 tablet by mouth Daily., Disp: 30 tablet, Rfl: 1    DULoxetine (Cymbalta) 30 MG capsule, Take 1 capsule by mouth Daily., Disp: 30 capsule, Rfl: 2    fluticasone (FLONASE) 50 MCG/ACT nasal spray, 2 sprays into the nostril(s) as directed by provider Daily., Disp: , Rfl:     latanoprost (XALATAN) 0.005 % ophthalmic solution, , Disp: , Rfl:     metoprolol succinate XL (Toprol XL) 25 MG 24 hr tablet, Take 1 tablet by mouth Daily., Disp: 90 tablet, Rfl: 1    Nirmatrelvir & Ritonavir, 300mg/100mg, (PAXLOVID), Take 3 tablets by mouth 2 (Two) Times a Day., Disp: 30 tablet, Rfl: 0    olmesartan (BENICAR) 20 MG tablet, TAKE 1 TABLET BY MOUTH DAILY, Disp: 90 tablet, Rfl: 0    pantoprazole (PROTONIX) 40 MG EC tablet, Take 1 tablet by mouth Daily., Disp: 90 tablet, Rfl: 3    Lab Results:   Lab on 07/15/2024   Component Date Value Ref Range Status    Toxigenic C. difficile by PCR 07/15/2024 Not Detected  Not Detected Final    Campylobacter 07/15/2024 Not Detected  Not Detected Final    Plesiomonas shigelloides 07/15/2024 Not Detected  Not Detected Final    Salmonella 07/15/2024 Not Detected  Not Detected Final    Vibrio 07/15/2024 Not Detected  Not Detected Final    Vibrio cholerae 07/15/2024 Not  Detected  Not Detected Final    Yersinia enterocolitica 07/15/2024 Not Detected  Not Detected Final    Enteroaggregative E. coli (EAEC) 07/15/2024 Not Detected  Not Detected Final    Enteropathogenic E. coli (EPEC) 07/15/2024 Not Detected  Not Detected Final    Enterotoxigenic E. coli (ETEC) lt/* 07/15/2024 Not Detected  Not Detected Final    Shiga-like toxin-producing E. coli* 07/15/2024 Not Detected  Not Detected Final    Shigella/Enteroinvasive E. coli (E* 07/15/2024 Not Detected  Not Detected Final    Cryptosporidium 07/15/2024 Not Detected  Not Detected Final    Cyclospora cayetanensis 07/15/2024 Not Detected  Not Detected Final    Entamoeba histolytica 07/15/2024 Not Detected  Not Detected Final    Giardia lamblia 07/15/2024 Not Detected  Not Detected Final    Adenovirus F40/41 07/15/2024 Not Detected  Not Detected Final    Astrovirus 07/15/2024 Not Detected  Not Detected Final    Norovirus GI/GII 07/15/2024 Not Detected  Not Detected Final    Rotavirus A 07/15/2024 Not Detected  Not Detected Final    Sapovirus (I, II, IV or V) 07/15/2024 Not Detected  Not Detected Final    Calprotectin, Fecal 07/15/2024 9  0 - 120 ug/g Final    Concentration     Interpretation   Follow-Up  < 5 - 50 ug/g     Normal           None  >50 -120 ug/g     Borderline       Re-evaluate in 4-6 weeks      >120 ug/g     Abnormal         Repeat as clinically                                     indicated    Ova + Parasite Exam 07/15/2024 No ova or parasites seen on concentrated smear  No Ova or Parasites Seen Final    Trichrome Stain 07/15/2024 No ova or parasites seen on trichrome stain   Final    Pancreatic Fecal 07/15/2024 770  >200 ug Elast./g Final    Stool of watery consistency received. Since watery stool is  inherently dilute, low test results should be interpreted with  caution. Retesting on formed stool, if possible, is recommended.         Severe Pancreatic Insufficiency:          <100         Moderate Pancreatic Insufficiency:    100 - 200         Normal:                                   >200   Orders Only on 07/13/2024   Component Date Value Ref Range Status    NCCN 07/13/2024 NCCN met (A)   Final    High Risk Cancer Risk Assessment   Office Visit on 07/12/2024   Component Date Value Ref Range Status    WBC 07/12/2024 6.14  3.40 - 10.80 10*3/mm3 Final    RBC 07/12/2024 4.13  3.77 - 5.28 10*6/mm3 Final    Hemoglobin 07/12/2024 13.7  12.0 - 15.9 g/dL Final    Hematocrit 07/12/2024 39.4  34.0 - 46.6 % Final    MCV 07/12/2024 95.4  79.0 - 97.0 fL Final    MCH 07/12/2024 33.2 (H)  26.6 - 33.0 pg Final    MCHC 07/12/2024 34.8  31.5 - 35.7 g/dL Final    RDW 07/12/2024 11.2 (L)  12.3 - 15.4 % Final    RDW-SD 07/12/2024 38.4  37.0 - 54.0 fl Final    MPV 07/12/2024 12.0  6.0 - 12.0 fL Final    Platelets 07/12/2024 261  140 - 450 10*3/mm3 Final    IgA 07/12/2024 378 (H)  87 - 352 mg/dL Final    Gliadin Deamidated Peptide Ab, IgA 07/12/2024 7  0 - 19 units Final                       Negative                   0 - 19                     Weak Positive             20 - 30                     Moderate to Strong Positive   >30    Tissue Transglutaminase IgA 07/12/2024 <2  0 - 3 U/mL Final                                  Negative        0 -  3                                Weak Positive   4 - 10                                Positive           >10   Tissue Transglutaminase (tTG) has been identified   as the endomysial antigen.  Studies have demonstr-   ated that endomysial IgA antibodies have over 99%   specificity for gluten sensitive enteropathy.    Glucose 07/12/2024 113 (H)  65 - 99 mg/dL Final    BUN 07/12/2024 10  8 - 23 mg/dL Final    Creatinine 07/12/2024 0.90  0.57 - 1.00 mg/dL Final    Sodium 07/12/2024 140  136 - 145 mmol/L Final    Potassium 07/12/2024 4.5  3.5 - 5.2 mmol/L Final    Chloride 07/12/2024 105  98 - 107 mmol/L Final    CO2 07/12/2024 23.4  22.0 - 29.0 mmol/L Final    Calcium 07/12/2024 9.4  8.6 - 10.5 mg/dL Final    Total  Protein 07/12/2024 7.4  6.0 - 8.5 g/dL Final    Albumin 07/12/2024 4.2  3.5 - 5.2 g/dL Final    ALT (SGPT) 07/12/2024 29  1 - 33 U/L Final    AST (SGOT) 07/12/2024 27  1 - 32 U/L Final    Alkaline Phosphatase 07/12/2024 97  39 - 117 U/L Final    Total Bilirubin 07/12/2024 0.8  0.0 - 1.2 mg/dL Final    Globulin 07/12/2024 3.2  gm/dL Final    A/G Ratio 07/12/2024 1.3  g/dL Final    BUN/Creatinine Ratio 07/12/2024 11.1  7.0 - 25.0 Final    Anion Gap 07/12/2024 11.6  5.0 - 15.0 mmol/L Final    eGFR 07/12/2024 69.3  >60.0 mL/min/1.73 Final    C-Reactive Protein 07/12/2024 <0.30  0.00 - 0.50 mg/dL Final    TSH 07/12/2024 2.390  0.270 - 4.200 uIU/mL Final    Sed Rate 07/12/2024 10  0 - 30 mm/hr Final    Free T4 07/12/2024 1.13  0.92 - 1.68 ng/dL Final   Office Visit on 04/29/2024   Component Date Value Ref Range Status    WBC 04/29/2024 5.95  3.40 - 10.80 10*3/mm3 Final    RBC 04/29/2024 4.00  3.77 - 5.28 10*6/mm3 Final    Hemoglobin 04/29/2024 13.3  12.0 - 15.9 g/dL Final    Hematocrit 04/29/2024 39.0  34.0 - 46.6 % Final    MCV 04/29/2024 97.5 (H)  79.0 - 97.0 fL Final    MCH 04/29/2024 33.3 (H)  26.6 - 33.0 pg Final    MCHC 04/29/2024 34.1  31.5 - 35.7 g/dL Final    RDW 04/29/2024 11.5 (L)  12.3 - 15.4 % Final    RDW-SD 04/29/2024 41.0  37.0 - 54.0 fl Final    MPV 04/29/2024 11.5  6.0 - 12.0 fL Final    Platelets 04/29/2024 241  140 - 450 10*3/mm3 Final    nRBC 04/29/2024 0.0  0.0 - 0.2 /100 WBC Final    Glucose 04/29/2024 107 (H)  65 - 99 mg/dL Final    BUN 04/29/2024 14  8 - 23 mg/dL Final    Creatinine 04/29/2024 0.89  0.57 - 1.00 mg/dL Final    Sodium 04/29/2024 142  136 - 145 mmol/L Final    Potassium 04/29/2024 5.0  3.5 - 5.2 mmol/L Final    Chloride 04/29/2024 106  98 - 107 mmol/L Final    CO2 04/29/2024 23.0  22.0 - 29.0 mmol/L Final    Calcium 04/29/2024 9.3  8.6 - 10.5 mg/dL Final    Total Protein 04/29/2024 7.1  6.0 - 8.5 g/dL Final    Albumin 04/29/2024 4.5  3.5 - 5.2 g/dL Final    ALT (SGPT) 04/29/2024 30   1 - 33 U/L Final    AST (SGOT) 04/29/2024 25  1 - 32 U/L Final    Alkaline Phosphatase 04/29/2024 91  39 - 117 U/L Final    Total Bilirubin 04/29/2024 0.8  0.0 - 1.2 mg/dL Final    Globulin 04/29/2024 2.6  gm/dL Final    A/G Ratio 04/29/2024 1.7  g/dL Final    BUN/Creatinine Ratio 04/29/2024 15.7  7.0 - 25.0 Final    Anion Gap 04/29/2024 13.0  5.0 - 15.0 mmol/L Final    eGFR 04/29/2024 70.3  >60.0 mL/min/1.73 Final    Total Cholesterol 04/29/2024 146  0 - 200 mg/dL Final    Triglycerides 04/29/2024 141  0 - 150 mg/dL Final    HDL Cholesterol 04/29/2024 54  40 - 60 mg/dL Final    LDL Cholesterol  04/29/2024 68  0 - 100 mg/dL Final    VLDL Cholesterol 04/29/2024 24  5 - 40 mg/dL Final    LDL/HDL Ratio 04/29/2024 1.18   Final    TSH 04/29/2024 2.770  0.270 - 4.200 uIU/mL Final    Hemoglobin A1C 04/29/2024 5.90 (H)  4.80 - 5.60 % Final    Free T4 04/29/2024 1.07  0.93 - 1.70 ng/dL Final    Neutrophil % 04/29/2024 43.3  42.7 - 76.0 % Final    Lymphocyte % 04/29/2024 45.4 (H)  19.6 - 45.3 % Final    Monocyte % 04/29/2024 8.2  5.0 - 12.0 % Final    Eosinophil % 04/29/2024 1.0  0.3 - 6.2 % Final    Atypical Lymphocyte % 04/29/2024 2.1  0.0 - 5.0 % Final    Neutrophils Absolute 04/29/2024 2.58  1.70 - 7.00 10*3/mm3 Final    Lymphocytes Absolute 04/29/2024 2.83  0.70 - 3.10 10*3/mm3 Final    Monocytes Absolute 04/29/2024 0.49  0.10 - 0.90 10*3/mm3 Final    Eosinophils Absolute 04/29/2024 0.06  0.00 - 0.40 10*3/mm3 Final    RBC Morphology 04/29/2024 Normal  Normal Final    WBC Morphology 04/29/2024 Normal  Normal Final    Platelet Morphology 04/29/2024 Normal  Normal Final       Mental Status Exam:   Hygiene:   good  Cooperation:  Cooperative  Eye Contact:  Good  Psychomotor Behavior:  Appropriate  Mood:anxious and depressed  Affect:  Appropriate  Hopelessness: Denies  Speech:  Normal  Thought Process:  Goal directed  Thought Content:  Normal  Suicidal:  None  Homicidal:  None  Hallucinations:  None  Delusion:  None  Memory:   Intact  Orientation:  Person, Place, Time, and Situation  Reliability:  good  Insight:  Good  Judgement:  Good  Impulse Control:  Good    PHQ-9 Depression Screening  Little interest or pleasure in doing things? (P) 1-->several days   Feeling down, depressed, or hopeless? (P) 1-->several days   Trouble falling or staying asleep, or sleeping too much? (P) 0-->not at all   Feeling tired or having little energy? (P) 3-->nearly every day   Poor appetite or overeating? (P) 0-->not at all   Feeling bad about yourself - or that you are a failure or have let yourself or your family down? (P) 0-->not at all   Trouble concentrating on things, such as reading the newspaper or watching television? (P) 0-->not at all   Moving or speaking so slowly that other people could have noticed? Or the opposite - being so fidgety or restless that you have been moving around a lot more than usual? (P) 0-->not at all   Thoughts that you would be better off dead, or of hurting yourself in some way? (P) 0-->not at all   PHQ-9 Total Score (P) 5   If you checked off any problems, how difficult have these problems made it for you to do your work, take care of things at home, or get along with other people? (P) somewhat difficult        Assessment/Plan:  Diagnoses and all orders for this visit:    1. Major depressive disorder, recurrent episode, moderate (Primary)  -     desvenlafaxine (Pristiq) 50 MG 24 hr tablet; Take 1 tablet by mouth Daily.  Dispense: 30 tablet; Refill: 1  -     DULoxetine (Cymbalta) 30 MG capsule; Take 1 capsule by mouth Daily.  Dispense: 30 capsule; Refill: 2  -     escitalopram (LEXAPRO) 20 MG tablet; Take 1 tablet by mouth Daily.  Dispense: 90 tablet; Refill: 1  -     traZODone (DESYREL) 150 MG tablet; Take 1 tablet by mouth Daily.  Dispense: 90 tablet; Refill: 1    2. Generalized anxiety disorder  -     desvenlafaxine (Pristiq) 50 MG 24 hr tablet; Take 1 tablet by mouth Daily.  Dispense: 30 tablet; Refill: 1  -      DULoxetine (Cymbalta) 30 MG capsule; Take 1 capsule by mouth Daily.  Dispense: 30 capsule; Refill: 2  -     escitalopram (LEXAPRO) 20 MG tablet; Take 1 tablet by mouth Daily.  Dispense: 90 tablet; Refill: 1      Patient states Cymbalta has not helped much with mood and anxiety.  We will wean from Cymbalta and trial Pristiq 50 mg daily.  We will continue Lexapro 20 mg.    A psychological evaluation was conducted in order to assess past and current level of functioning. Areas assessed included, but were not limited to: perception of social support, perception of ability to face and deal with challenges in life (positive functioning), anxiety symptoms, depressive symptoms, perspective on beliefs/belief system, coping skills for stress, intelligence level,  Therapeutic rapport was established. Interventions conducted today were geared towards incorporating medication management along with support for continued therapy. Education was also provided as to the med management with this provider and what to expect in subsequent sessions.    We discussed risks, benefits,goals and side effects of the above medication and the patient was agreeable with the plan.Patient was educated on the importance of compliance with treatment and follow-up appointments. Patient is aware to contact the [facility] [phone] with any worsening of symptoms. To call for questions or concerns and return early if necessary. Patent is agreeable to go to the Emergency Department or call 911 should they begin SI/HI.     Part of this note may be an electronic transcription/translation of spoken language to printed text using the Dragon Dictation System.    Return in about 4 weeks (around 8/21/2024) for Follow Up 30 min, Video visit.    MARCOS Smart

## 2024-08-12 ENCOUNTER — TELEMEDICINE (OUTPATIENT)
Dept: PSYCHIATRY | Facility: CLINIC | Age: 69
End: 2024-08-12
Payer: MEDICARE

## 2024-08-12 DIAGNOSIS — F41.1 GENERALIZED ANXIETY DISORDER: ICD-10-CM

## 2024-08-12 DIAGNOSIS — F33.1 MAJOR DEPRESSIVE DISORDER, RECURRENT EPISODE, MODERATE: Primary | ICD-10-CM

## 2024-08-12 PROCEDURE — 90834 PSYTX W PT 45 MINUTES: CPT | Performed by: SOCIAL WORKER

## 2024-08-12 NOTE — PROGRESS NOTES
Date: 2024  Time In: 12:31 EDT  Time out: 13:22 EDT      This provider is located at [facility] [place], [place], [facility], using a secure [employer] Video Visit through [employer]. Patient is being seen remotely via telehealth at their home address is located in [place]. Patient stated they are in a secure environment for this session. The patient's condition being diagnosed and treated is appropriate for telemedicine. The provider identified themself as well as their credentials. The patient, or  patient's legal guardian consent to be seen remotely, and when consent is given they understand that the consent allows for patient identifiable information to be sent to a third party as needed. They may refuse to be seen remotely at any time. The electronic data is encrypted and password protected, and the patient's or  legal guardian has been advised of the potential risks to privacy not withstanding such measures.   PT Identifiers used: Name and .    You have chosen to receive care through a telehealth visit.  Do you consent to use a video/audio connection for your medical care today? Yes     Subjective   Sarah Howard is a 68 y.o. female who presents today for follow up    Chief Complaint: Anxiety and Depression     History of Present Illness: Client discussed how things have been since last session. Client described feeling very stressed and overwhelmed by some recent events.  Her daughter who has been living with them and who had just recently started a new job, and unfortunately  fell and ended up having to have surgery on her wrist/hand. This has caused her to have to continue to live with the client and her .  In addition to that surgery, her daughter also got Covid.  The client herself also got Covid.  Her  also had to have surgery and the client was taking care of him as well as he recovered.  She states that her son also ended up going into the  "hospital for rehab.  Client verbalized that she was over whelmed by all of the demands.  Client discussed that she has been trying to take care of everyone as well as trying to recover herself physically. Client discussed the feeling that it just feels like \"one hit right after another\".  Client described that with all that has been going on, she has cried because she has felt unsure of what to do or how to handle all of it.    Clinical Maneuvering/Intervention:    On a scale 0-10 ( with 10 being the worst)  Anxiety: 7/10  Depression: 7/10    Sleep:  Client reports that having Covid did disrupt her sleep for a while, but states that a few nights ago she slept almost 12 hours because she was so exhausted    Appetite: No current eating issues      Assisted patient in processing above session content; acknowledged and normalized patient’s thoughts, feelings, and concerns.  Rationalized patient thought process regarding concerns presented at session.  Discussed triggers associated with patient's  anxiety  and depression  Also discussed coping skills for patient to implement such as self care  and positive self talk     Allowed patient to freely discuss issues without interruption or judgment. Provided safe, confidential environment to facilitate the development of positive therapeutic relationship and encourage open, honest communication. Assisted patient in identifying risk factors which would indicate the need for higher level of care including thoughts to harm self or others and/or self-harming behavior and encouraged patient to contact this office, call 911, or present to the nearest emergency room should any of these events occur. Discussed crisis intervention services and means to access. Patient adamantly and convincingly denies current suicidal or homicidal ideation or perceptual disturbance.    Assessment:     Patient appears to maintain relative stability as compared to their baseline.  However, patient " continues to struggle with anxiety and depression  which continues to cause impairment in important areas of functioning.  A result, they can be reasonably expected to continue to benefit from treatment and would likely be at increased risk for decompensation otherwise.         Mental Status Exam:   Hygiene:   good  Cooperation:  Cooperative  Eye Contact:  Good  Psychomotor Behavior:  Appropriate  Affect:  Appropriate  Mood: sad, depressed, and anxious  Speech:  Normal  Thought Process:  Goal directed and Linear  Thought Content:  Mood congruent  Suicidal:  None  Homicidal:  None  Hallucinations:  None  Delusion:  None  Memory:   No current changes reported  Orientation:  Person, Place, Time, and Situation  Reliability:  good  Insight:  Good  Judgement:  Fair  Impulse Control:  Good  Physical/Medical Issues:   Client has had Covid        Patient's Support Network Includes:  , children, and extended family    Functional Status: Moderate impairment     Progress toward goal: Not at goal    Prognosis: Fair with Ongoing Treatment         Plan:    Patient will continue in individual outpatient therapy with focus on improved functioning and coping skills, maintaining stability, and avoiding decompensation and the need for higher level of care.    Patient will adhere to medication regimen as prescribed and report any side effects. Patient will contact this office, call 911 or present to the nearest emergency room should suicidal or homicidal ideations occur. Provide Cognitive Behavioral Therapy and Solution Focused Therapy to improve functioning, maintain stability, and avoid decompensation and the need for higher level of care.     Return in about 3 weeks (around 9/2/2024).      VISIT DIAGNOSIS:    Diagnosis Plan   1. Major depressive disorder, recurrent episode, moderate        2. Generalized anxiety disorder               This document has been electronically signed by Chanelle Goodson LCSW  August 12, 2024       Part of this note may be an electronic transcription/translation of spoken language to printed text using the Dragon Dictation System.

## 2024-08-21 ENCOUNTER — TELEMEDICINE (OUTPATIENT)
Dept: PSYCHIATRY | Facility: CLINIC | Age: 69
End: 2024-08-21
Payer: MEDICARE

## 2024-08-21 DIAGNOSIS — F33.1 MAJOR DEPRESSIVE DISORDER, RECURRENT EPISODE, MODERATE: Primary | ICD-10-CM

## 2024-08-21 DIAGNOSIS — F41.1 GENERALIZED ANXIETY DISORDER: ICD-10-CM

## 2024-08-21 PROCEDURE — 99214 OFFICE O/P EST MOD 30 MIN: CPT | Performed by: NURSE PRACTITIONER

## 2024-08-21 RX ORDER — DESVENLAFAXINE 100 MG/1
100 TABLET, EXTENDED RELEASE ORAL DAILY
Qty: 30 TABLET | Refills: 3 | Status: SHIPPED | OUTPATIENT
Start: 2024-08-21

## 2024-08-21 NOTE — PROGRESS NOTES
Patient Name: Sarah Howard  MRN: [id number]   :  1955     This provider is located at her home office through the [facility] (through [facility]), [address] [facility], [address] using a secure [employer] Video Visit through [employer]. Patient is being seen remotely via telehealth at their home address in [place], and stated they are in a secure environment for this session. The patient's condition being diagnosed/treated is appropriate for telemedicine. The provider identified herself as well as her credentials.   The patient, and/or patients guardian, consent to be seen remotely, and when consent is given they understand that the consent allows for patient identifiable information to be sent to a third party as needed.   They may refuse to be seen remotely at any time. The electronic data is encrypted and password protected, and the patient and/or guardian has been advised of the potential risks to privacy not withstanding such measures.    You have chosen to receive care through a telehealth visit.  Do you consent to use a video/audio connection for your medical care today? Yes    Chief Complaint:      ICD-10-CM ICD-9-CM   1. Major depressive disorder, recurrent episode, moderate  F33.1 296.32   2. Generalized anxiety disorder  F41.1 300.02       History of Present Illness: Sarah Howard is a 68 y.o. female is here today for medication management follow up.  Patient states she rates her depression at a 3 on a scale of 0-10 with 10 being the worst.  States she rates her anxiety at a 4 on that same scale.  Patient states sleep is good with trazodone.    The following portions of the patient's history were reviewed and updated as appropriate: allergies, current medications, past family history, past medical history, past social history, past surgical history, and problem list.    Review of Systems;;  Review of Systems   Constitutional:  Negative for activity  change, appetite change, fatigue, unexpected weight gain and unexpected weight loss.   Respiratory:  Negative for shortness of breath and wheezing.    Gastrointestinal:  Negative for constipation, diarrhea, nausea and vomiting.   Musculoskeletal:  Negative for gait problem.   Skin:  Negative for dry skin and rash.   Neurological:  Negative for dizziness, speech difficulty, weakness, light-headedness, headache, memory problem and confusion.   Psychiatric/Behavioral:  Positive for depressed mood. Negative for agitation, behavioral problems, decreased concentration, dysphoric mood, hallucinations, self-injury, sleep disturbance, suicidal ideas, negative for hyperactivity and stress. The patient is nervous/anxious.        Physical Exam;;  Physical Exam  Constitutional:       General: She is not in acute distress.     Appearance: She is well-developed. She is not diaphoretic.   HENT:      Head: Normocephalic and atraumatic.   Eyes:      Conjunctiva/sclera: Conjunctivae normal.   Pulmonary:      Effort: Pulmonary effort is normal. No respiratory distress.   Musculoskeletal:         General: Normal range of motion.      Cervical back: Full passive range of motion without pain and normal range of motion.   Neurological:      Mental Status: She is alert and oriented to person, place, and time.   Psychiatric:         Mood and Affect: Mood is anxious and depressed. Affect is not labile, blunt, angry or inappropriate.         Speech: Speech is not rapid and pressured or tangential.         Behavior: Behavior normal. Behavior is not agitated, slowed, aggressive, withdrawn, hyperactive or combative. Behavior is cooperative.         Thought Content: Thought content normal. Thought content is not paranoid or delusional. Thought content does not include homicidal or suicidal ideation. Thought content does not include homicidal or suicidal plan.         Judgment: Judgment normal.       There were no vitals taken for this  visit.  There is no height or weight on file to calculate BMI. Video appt unable to obtain.     Current Medications;;    Current Outpatient Medications:     atorvastatin (LIPITOR) 20 MG tablet, Take 1 tablet by mouth Daily., Disp: 90 tablet, Rfl: 0    benzonatate (TESSALON) 100 MG capsule, Take 1 capsule by mouth 3 (Three) Times a Day As Needed for Cough., Disp: 30 capsule, Rfl: 0    cycloSPORINE (RESTASIS) 0.05 % ophthalmic emulsion, Administer 1 drop to both eyes 2 (Two) Times a Day., Disp: , Rfl:     desvenlafaxine (Pristiq) 100 MG 24 hr tablet, Take 1 tablet by mouth Daily., Disp: 30 tablet, Rfl: 3    escitalopram (LEXAPRO) 20 MG tablet, Take 1 tablet by mouth Daily., Disp: 90 tablet, Rfl: 1    fluticasone (FLONASE) 50 MCG/ACT nasal spray, 2 sprays into the nostril(s) as directed by provider Daily., Disp: , Rfl:     hydrocortisone 1 % cream, Apply 1 Application topically to the appropriate area as directed 2 (Two) Times a Day., Disp: 45 g, Rfl: 1    latanoprost (XALATAN) 0.005 % ophthalmic solution, , Disp: , Rfl:     metoprolol succinate XL (Toprol XL) 25 MG 24 hr tablet, Take 1 tablet by mouth Daily., Disp: 90 tablet, Rfl: 1    Nirmatrelvir & Ritonavir, 300mg/100mg, (PAXLOVID), Take 3 tablets by mouth 2 (Two) Times a Day., Disp: 30 tablet, Rfl: 0    olmesartan (BENICAR) 20 MG tablet, TAKE 1 TABLET BY MOUTH DAILY, Disp: 90 tablet, Rfl: 0    pantoprazole (PROTONIX) 40 MG EC tablet, Take 1 tablet by mouth Daily., Disp: 90 tablet, Rfl: 3    traZODone (DESYREL) 150 MG tablet, Take 1 tablet by mouth Daily., Disp: 90 tablet, Rfl: 1    Lab Results:   Admission on 08/03/2024, Discharged on 08/03/2024   Component Date Value Ref Range Status    Rapid Strep A Screen 08/03/2024 Negative   Final    Internal Control 08/03/2024 Passed   Final    Lot Number 08/03/2024 [id number]   Final    Expiration Date 08/03/2024 11/30/26   Final    Rapid Influenza A Ag 08/03/2024 Negative  Negative Final    Rapid Influenza B Ag 08/03/2024  Negative  Negative Final    Internal Control 08/03/2024 Passed  Passed Final    Lot Number 08/03/2024 [id number]   Corrected    Expiration Date 08/03/2024 12/5/25   Corrected    SARS Antigen 08/03/2024 Detected (A)  Not Detected, Presumptive Negative Final    Internal Control 08/03/2024 Passed  Passed Final    Lot Number 08/03/2024 [id number]   Final    Expiration Date 08/03/2024 12/30/24   Final   Lab on 07/15/2024   Component Date Value Ref Range Status    Toxigenic C. difficile by PCR 07/15/2024 Not Detected  Not Detected Final    Campylobacter 07/15/2024 Not Detected  Not Detected Final    Plesiomonas shigelloides 07/15/2024 Not Detected  Not Detected Final    Salmonella 07/15/2024 Not Detected  Not Detected Final    Vibrio 07/15/2024 Not Detected  Not Detected Final    Vibrio cholerae 07/15/2024 Not Detected  Not Detected Final    Yersinia enterocolitica 07/15/2024 Not Detected  Not Detected Final    Enteroaggregative E. coli (EAEC) 07/15/2024 Not Detected  Not Detected Final    Enteropathogenic E. coli (EPEC) 07/15/2024 Not Detected  Not Detected Final    Enterotoxigenic E. coli (ETEC) lt/* 07/15/2024 Not Detected  Not Detected Final    Shiga-like toxin-producing E. coli* 07/15/2024 Not Detected  Not Detected Final    Shigella/Enteroinvasive E. coli (E* 07/15/2024 Not Detected  Not Detected Final    Cryptosporidium 07/15/2024 Not Detected  Not Detected Final    Cyclospora cayetanensis 07/15/2024 Not Detected  Not Detected Final    Entamoeba histolytica 07/15/2024 Not Detected  Not Detected Final    Giardia lamblia 07/15/2024 Not Detected  Not Detected Final    Adenovirus F40/41 07/15/2024 Not Detected  Not Detected Final    Astrovirus 07/15/2024 Not Detected  Not Detected Final    Norovirus GI/GII 07/15/2024 Not Detected  Not Detected Final    Rotavirus A 07/15/2024 Not Detected  Not Detected Final    Sapovirus (I, II, IV or V) 07/15/2024 Not Detected  Not Detected Final    Calprotectin, Fecal 07/15/2024 9  0 -  120 ug/g Final    Concentration     Interpretation   Follow-Up  < 5 - 50 ug/g     Normal           None  >50 -120 ug/g     Borderline       Re-evaluate in 4-6 weeks      >120 ug/g     Abnormal         Repeat as clinically                                     indicated    Ova + Parasite Exam 07/15/2024 No ova or parasites seen on concentrated smear  No Ova or Parasites Seen Final    Trichrome Stain 07/15/2024 No ova or parasites seen on trichrome stain   Final    Pancreatic Fecal 07/15/2024 770  >200 ug Elast./g Final    Stool of watery consistency received. Since watery stool is  inherently dilute, low test results should be interpreted with  caution. Retesting on formed stool, if possible, is recommended.         Severe Pancreatic Insufficiency:          <100         Moderate Pancreatic Insufficiency:   100 - 200         Normal:                                   >200   Orders Only on 07/13/2024   Component Date Value Ref Range Status    NCCN 07/13/2024 NCCN met (A)   Final    High Risk Cancer Risk Assessment   Office Visit on 07/12/2024   Component Date Value Ref Range Status    WBC 07/12/2024 6.14  3.40 - 10.80 10*3/mm3 Final    RBC 07/12/2024 4.13  3.77 - 5.28 10*6/mm3 Final    Hemoglobin 07/12/2024 13.7  12.0 - 15.9 g/dL Final    Hematocrit 07/12/2024 39.4  34.0 - 46.6 % Final    MCV 07/12/2024 95.4  79.0 - 97.0 fL Final    MCH 07/12/2024 33.2 (H)  26.6 - 33.0 pg Final    MCHC 07/12/2024 34.8  31.5 - 35.7 g/dL Final    RDW 07/12/2024 11.2 (L)  12.3 - 15.4 % Final    RDW-SD 07/12/2024 38.4  37.0 - 54.0 fl Final    MPV 07/12/2024 12.0  6.0 - 12.0 fL Final    Platelets 07/12/2024 261  140 - 450 10*3/mm3 Final    IgA 07/12/2024 378 (H)  87 - 352 mg/dL Final    Gliadin Deamidated Peptide Ab, IgA 07/12/2024 7  0 - 19 units Final                       Negative                   0 - 19                     Weak Positive             20 - 30                     Moderate to Strong Positive   >30    Tissue Transglutaminase  IgA 07/12/2024 <2  0 - 3 U/mL Final                                  Negative        0 -  3                                Weak Positive   4 - 10                                Positive           >10   Tissue Transglutaminase (tTG) has been identified   as the endomysial antigen.  Studies have demonstr-   ated that endomysial IgA antibodies have over 99%   specificity for gluten sensitive enteropathy.    Glucose 07/12/2024 113 (H)  65 - 99 mg/dL Final    BUN 07/12/2024 10  8 - 23 mg/dL Final    Creatinine 07/12/2024 0.90  0.57 - 1.00 mg/dL Final    Sodium 07/12/2024 140  136 - 145 mmol/L Final    Potassium 07/12/2024 4.5  3.5 - 5.2 mmol/L Final    Chloride 07/12/2024 105  98 - 107 mmol/L Final    CO2 07/12/2024 23.4  22.0 - 29.0 mmol/L Final    Calcium 07/12/2024 9.4  8.6 - 10.5 mg/dL Final    Total Protein 07/12/2024 7.4  6.0 - 8.5 g/dL Final    Albumin 07/12/2024 4.2  3.5 - 5.2 g/dL Final    ALT (SGPT) 07/12/2024 29  1 - 33 U/L Final    AST (SGOT) 07/12/2024 27  1 - 32 U/L Final    Alkaline Phosphatase 07/12/2024 97  39 - 117 U/L Final    Total Bilirubin 07/12/2024 0.8  0.0 - 1.2 mg/dL Final    Globulin 07/12/2024 3.2  gm/dL Final    A/G Ratio 07/12/2024 1.3  g/dL Final    BUN/Creatinine Ratio 07/12/2024 11.1  7.0 - 25.0 Final    Anion Gap 07/12/2024 11.6  5.0 - 15.0 mmol/L Final    eGFR 07/12/2024 69.3  >60.0 mL/min/1.73 Final    C-Reactive Protein 07/12/2024 <0.30  0.00 - 0.50 mg/dL Final    TSH 07/12/2024 2.390  0.270 - 4.200 uIU/mL Final    Sed Rate 07/12/2024 10  0 - 30 mm/hr Final    Free T4 07/12/2024 1.13  0.92 - 1.68 ng/dL Final       Mental Status Exam:   Hygiene:   good  Cooperation:  Cooperative  Eye Contact:  Good  Psychomotor Behavior:  Appropriate  Mood:depressed and anxious  Affect:  Appropriate  Hopelessness: Denies  Speech:  Normal  Thought Process:  Goal directed  Thought Content:  Normal  Suicidal:  None  Homicidal:  None  Hallucinations:  None  Delusion:  None  Memory:  Intact  Orientation:   Person, Place, Time, and Situation  Reliability:  good  Insight:  Good  Judgement:  Good  Impulse Control:  Good    PHQ-9 Depression Screening  Little interest or pleasure in doing things? (P) 1-->several days   Feeling down, depressed, or hopeless? (P) 1-->several days   Trouble falling or staying asleep, or sleeping too much? (P) 0-->not at all   Feeling tired or having little energy? (P) 3-->nearly every day   Poor appetite or overeating? (P) 0-->not at all   Feeling bad about yourself - or that you are a failure or have let yourself or your family down? (P) 2-->more than half the days   Trouble concentrating on things, such as reading the newspaper or watching television? (P) 2-->more than half the days   Moving or speaking so slowly that other people could have noticed? Or the opposite - being so fidgety or restless that you have been moving around a lot more than usual? (P) 0-->not at all   Thoughts that you would be better off dead, or of hurting yourself in some way? (P) 0-->not at all   PHQ-9 Total Score (P) 9   If you checked off any problems, how difficult have these problems made it for you to do your work, take care of things at home, or get along with other people? (P) somewhat difficult        Assessment/Plan:  Diagnoses and all orders for this visit:    1. Major depressive disorder, recurrent episode, moderate (Primary)  -     desvenlafaxine (Pristiq) 100 MG 24 hr tablet; Take 1 tablet by mouth Daily.  Dispense: 30 tablet; Refill: 3    2. Generalized anxiety disorder      Patient still suffering with some anxiety and depression.  We will increase Pristiq to 100 mg daily.  We will follow-up in a month.    A psychological evaluation was conducted in order to assess past and current level of functioning. Areas assessed included, but were not limited to: perception of social support, perception of ability to face and deal with challenges in life (positive functioning), anxiety symptoms, depressive  symptoms, perspective on beliefs/belief system, coping skills for stress, intelligence level,  Therapeutic rapport was established. Interventions conducted today were geared towards incorporating medication management along with support for continued therapy. Education was also provided as to the med management with this provider and what to expect in subsequent sessions.    We discussed risks, benefits,goals and side effects of the above medication and the patient was agreeable with the plan.Patient was educated on the importance of compliance with treatment and follow-up appointments. Patient is aware to contact the [facility] [phone] with any worsening of symptoms. To call for questions or concerns and return early if necessary. Patent is agreeable to go to the Emergency Department or call 911 should they begin SI/HI.     Part of this note may be an electronic transcription/translation of spoken language to printed text using the Dragon Dictation System.    Return in about 4 weeks (around 9/18/2024) for Follow Up 30 min, Video visit.    MARCOS Smart

## 2024-08-22 ENCOUNTER — PATIENT MESSAGE (OUTPATIENT)
Dept: INTERNAL MEDICINE | Facility: CLINIC | Age: 69
End: 2024-08-22
Payer: MEDICARE

## 2024-08-22 DIAGNOSIS — Z13.79 GENETIC TESTING: Primary | ICD-10-CM

## 2024-08-22 NOTE — TELEPHONE ENCOUNTER
From: Sarah Howard  To: Dao Gomez  Sent: 8/22/2024 2:13 PM EDT  Subject: Dermatologist    Good afternoon Dr. Gomez! I'm having a rash around my nose area that is red and itchy. Is there someone you could recommend a Dermatologist that I can see?    Thanks so much :)    Sarah

## 2024-08-23 ENCOUNTER — LAB (OUTPATIENT)
Dept: LAB | Facility: HOSPITAL | Age: 69
End: 2024-08-23
Payer: MEDICARE

## 2024-08-23 DIAGNOSIS — Z13.79 GENETIC TESTING: ICD-10-CM

## 2024-08-23 PROCEDURE — 36415 COLL VENOUS BLD VENIPUNCTURE: CPT

## 2024-08-30 ENCOUNTER — OFFICE VISIT (OUTPATIENT)
Dept: INTERNAL MEDICINE | Facility: CLINIC | Age: 69
End: 2024-08-30
Payer: MEDICARE

## 2024-08-30 VITALS
BODY MASS INDEX: 34.18 KG/M2 | SYSTOLIC BLOOD PRESSURE: 118 MMHG | WEIGHT: 199.13 LBS | DIASTOLIC BLOOD PRESSURE: 72 MMHG | HEART RATE: 80 BPM | TEMPERATURE: 98 F | RESPIRATION RATE: 18 BRPM

## 2024-08-30 DIAGNOSIS — L20.9 ATOPIC DERMATITIS, UNSPECIFIED TYPE: Primary | ICD-10-CM

## 2024-08-30 PROCEDURE — 1126F AMNT PAIN NOTED NONE PRSNT: CPT

## 2024-08-30 PROCEDURE — 99213 OFFICE O/P EST LOW 20 MIN: CPT

## 2024-08-30 PROCEDURE — 3078F DIAST BP <80 MM HG: CPT

## 2024-08-30 PROCEDURE — 3074F SYST BP LT 130 MM HG: CPT

## 2024-08-30 RX ORDER — BENZOCAINE/MENTHOL 6 MG-10 MG
1 LOZENGE MUCOUS MEMBRANE 2 TIMES DAILY
Qty: 45 G | Refills: 1 | Status: SHIPPED | OUTPATIENT
Start: 2024-08-30

## 2024-08-30 NOTE — PATIENT INSTRUCTIONS
Will send hydrocortisone cream to your pharmacy. Mix with a lotion to try to dilute this as this is going on your face   -Cerave healing lotion or ointment (dark blue and grey bottle)  -Make appointment with dermatology

## 2024-08-30 NOTE — PROGRESS NOTES
Chief Complaint  Eczema    Subjective          Sarah Howard presents to [facility]  History of Present Illness  Patient is here for evaluation of her eczema. She states she can't get into dermatology until November but has not made an appointment yet. She states she typically will get eczema patches around the corners of her nose and sometimes under her bottom lip. She states it itches and will hurt if she scratches it too much. She says some days are worse than others with her flares. She has tried Cetaphil cream with eczema relief with mild relief in her symptoms. She has not tried any other topical creams or lotions to help with this.     Objective   Vital Signs:   /72 (BP Location: Left arm, Patient Position: Sitting, Cuff Size: Adult)   Pulse 80   Temp 98 °F (36.7 °C) (Temporal)   Resp 18   Wt 90.3 kg (199 lb 2 oz)   BMI 34.18 kg/m²     Body mass index is 34.18 kg/m².    Review of Systems   Constitutional:  Negative for fatigue.   Respiratory:  Negative for cough and shortness of breath.    Cardiovascular:  Negative for chest pain.   Skin:  Positive for dry skin.        Eczema patches bilateral sides of nose       Past History:  Medical History: has a past medical history of Anxiety, Breast cancer, Cancer (Breast), Cervical disc disorder (ACDF 5/2020), Coronary artery disease (10/2020), Depression, Drug therapy, GERD (gastroesophageal reflux disease) (2016), Glaucoma (2016), radiation therapy, Hyperlipidemia (2022), Hypertension (2020), Osteopenia (2009), and Tear of meniscus of knee.   Surgical History: has a past surgical history that includes Adenoidectomy (1972); Breast surgery (2 x 2010); Cardiac catheterization (2020); Cosmetic surgery (breast reduction); Eye surgery (laser iridotomies); Hysterectomy (2011); Spine surgery (2019); Tonsillectomy (1972); Tubal ligation (1982); Colonoscopy (2018); Breast biopsy (Right); Breast lumpectomy (Right);  Reduction mammaplasty (Bilateral); Neck surgery; Back surgery (2018); and Oophorectomy.   Family History: family history includes Alcohol abuse in her maternal grandfather; Anxiety disorder in her mother; Atrial fibrillation in her brother; Cancer in her mother; Colon cancer (age of onset: 48) in her mother; Depression in her mother; Diabetes in her mother; Heart disease in her father; Stroke in her mother; Thyroid disease in her mother.   Social History: reports that she has never smoked. She has never been exposed to tobacco smoke. She has never used smokeless tobacco. She reports current alcohol use of about 10.0 standard drinks of alcohol per week. She reports that she does not use drugs.      Current Outpatient Medications:     atorvastatin (LIPITOR) 20 MG tablet, Take 1 tablet by mouth Daily., Disp: 90 tablet, Rfl: 0    cycloSPORINE (RESTASIS) 0.05 % ophthalmic emulsion, Administer 1 drop to both eyes 2 (Two) Times a Day., Disp: , Rfl:     desvenlafaxine (Pristiq) 100 MG 24 hr tablet, Take 1 tablet by mouth Daily., Disp: 30 tablet, Rfl: 3    escitalopram (LEXAPRO) 20 MG tablet, Take 1 tablet by mouth Daily., Disp: 90 tablet, Rfl: 1    fluticasone (FLONASE) 50 MCG/ACT nasal spray, 2 sprays into the nostril(s) as directed by provider Daily., Disp: , Rfl:     latanoprost (XALATAN) 0.005 % ophthalmic solution, , Disp: , Rfl:     metoprolol succinate XL (Toprol XL) 25 MG 24 hr tablet, Take 1 tablet by mouth Daily., Disp: 90 tablet, Rfl: 1    olmesartan (BENICAR) 20 MG tablet, TAKE 1 TABLET BY MOUTH DAILY, Disp: 90 tablet, Rfl: 0    pantoprazole (PROTONIX) 40 MG EC tablet, Take 1 tablet by mouth Daily., Disp: 90 tablet, Rfl: 3    traZODone (DESYREL) 150 MG tablet, Take 1 tablet by mouth Daily., Disp: 90 tablet, Rfl: 1    benzonatate (TESSALON) 100 MG capsule, Take 1 capsule by mouth 3 (Three) Times a Day As Needed for Cough., Disp: 30 capsule, Rfl: 0    Nirmatrelvir & Ritonavir, 300mg/100mg, (PAXLOVID), Take 3  tablets by mouth 2 (Two) Times a Day., Disp: 30 tablet, Rfl: 0    Allergies: Patient has no known allergies.    Physical Exam  Constitutional:       General: She is not in acute distress.     Appearance: She is not ill-appearing or toxic-appearing.   HENT:      Head: Normocephalic and atraumatic.   Cardiovascular:      Rate and Rhythm: Normal rate and regular rhythm.      Pulses: Normal pulses.      Heart sounds: Normal heart sounds. No murmur heard.  Pulmonary:      Effort: Pulmonary effort is normal. No respiratory distress.      Breath sounds: Normal breath sounds.   Skin:     General: Skin is warm.      Comments: Erythematous and scaly dry patches around corners of nostrils, L>R   Neurological:      General: No focal deficit present.      Mental Status: She is alert and oriented to person, place, and time. Mental status is at baseline.   Psychiatric:         Mood and Affect: Mood normal.           Assessment and Plan    Assessment & Plan  Atopic dermatitis, unspecified type  -discussed with patient that this resembles eczema. Will send topical hydrocortisone cream to pharmacy. Advised to dilute cream with a non-scented face lotion to try to dilute as it is going on her face. Recommend Cerave healing ointment for routine use. Recommend making an appointment with dermatology. Advised to return to clinic if new or worsening symptoms. She is agreeable to this and verbalized understanding of plan of care.           Follow Up   No follow-ups on file.  Patient was given instructions and counseling regarding her condition or for health maintenance advice. Please see specific information pulled into the AVS if appropriate.     MARCOS Duff

## 2024-09-03 LAB
AMBRY INTERPRETATION REPORT: NORMAL
GENE DIS ANL INTERP-IMP: NORMAL

## 2024-09-13 DIAGNOSIS — I10 PRIMARY HYPERTENSION: ICD-10-CM

## 2024-09-13 RX ORDER — OLMESARTAN MEDOXOMIL 20 MG/1
20 TABLET ORAL DAILY
Qty: 90 TABLET | Refills: 0 | Status: SHIPPED | OUTPATIENT
Start: 2024-09-13

## 2024-09-16 ENCOUNTER — TELEMEDICINE (OUTPATIENT)
Dept: PSYCHIATRY | Facility: CLINIC | Age: 69
End: 2024-09-16
Payer: MEDICARE

## 2024-09-16 DIAGNOSIS — F41.1 GENERALIZED ANXIETY DISORDER: ICD-10-CM

## 2024-09-16 DIAGNOSIS — F33.1 MAJOR DEPRESSIVE DISORDER, RECURRENT EPISODE, MODERATE: Primary | ICD-10-CM

## 2024-09-16 PROCEDURE — 90837 PSYTX W PT 60 MINUTES: CPT | Performed by: SOCIAL WORKER

## 2024-09-17 ENCOUNTER — TELEPHONE (OUTPATIENT)
Dept: ORTHOPEDIC SURGERY | Facility: CLINIC | Age: 69
End: 2024-09-17

## 2024-09-17 NOTE — TELEPHONE ENCOUNTER
Provider: VERONIQUE    Caller: DOUG KISER    Relationship to Patient: PATIENT    Pharmacy: NA    Phone Number: [phone]    Reason for Call: PATIENT WOULD LIKE TO SEE DR FIELD FOR THE KNEE; HAS BEEN REFERRED TO HIM BY FRIENDS AND FAMILY    When was the patient last seen: 12.14.23

## 2024-09-19 ENCOUNTER — OFFICE VISIT (OUTPATIENT)
Dept: ORTHOPEDIC SURGERY | Facility: CLINIC | Age: 69
End: 2024-09-19
Payer: MEDICARE

## 2024-09-19 VITALS
DIASTOLIC BLOOD PRESSURE: 88 MMHG | BODY MASS INDEX: 33.97 KG/M2 | SYSTOLIC BLOOD PRESSURE: 130 MMHG | WEIGHT: 199 LBS | HEIGHT: 64 IN

## 2024-09-19 DIAGNOSIS — M25.562 LEFT KNEE PAIN, UNSPECIFIED CHRONICITY: Primary | ICD-10-CM

## 2024-09-19 DIAGNOSIS — E66.09 CLASS 1 OBESITY DUE TO EXCESS CALORIES WITHOUT SERIOUS COMORBIDITY WITH BODY MASS INDEX (BMI) OF 34.0 TO 34.9 IN ADULT: ICD-10-CM

## 2024-09-19 DIAGNOSIS — M17.12 PRIMARY OSTEOARTHRITIS OF LEFT KNEE: ICD-10-CM

## 2024-09-19 RX ORDER — TRIAMCINOLONE ACETONIDE 40 MG/ML
80 INJECTION, SUSPENSION INTRA-ARTICULAR; INTRAMUSCULAR
Status: COMPLETED | OUTPATIENT
Start: 2024-09-19 | End: 2024-09-19

## 2024-09-19 RX ORDER — FLUTICASONE PROPIONATE 50 MCG
2 SPRAY, SUSPENSION (ML) NASAL DAILY
Qty: 11 ML | Refills: 0 | Status: SHIPPED | OUTPATIENT
Start: 2024-09-19

## 2024-09-19 RX ORDER — LIDOCAINE HYDROCHLORIDE 10 MG/ML
3 INJECTION, SOLUTION EPIDURAL; INFILTRATION; INTRACAUDAL; PERINEURAL
Status: COMPLETED | OUTPATIENT
Start: 2024-09-19 | End: 2024-09-19

## 2024-09-19 RX ORDER — BUPIVACAINE HYDROCHLORIDE 2.5 MG/ML
3 INJECTION, SOLUTION EPIDURAL; INFILTRATION; INTRACAUDAL
Status: COMPLETED | OUTPATIENT
Start: 2024-09-19 | End: 2024-09-19

## 2024-09-19 RX ADMIN — BUPIVACAINE HYDROCHLORIDE 3 ML: 2.5 INJECTION, SOLUTION EPIDURAL; INFILTRATION; INTRACAUDAL at 15:17

## 2024-09-19 RX ADMIN — TRIAMCINOLONE ACETONIDE 80 MG: 40 INJECTION, SUSPENSION INTRA-ARTICULAR; INTRAMUSCULAR at 15:17

## 2024-09-19 RX ADMIN — LIDOCAINE HYDROCHLORIDE 3 ML: 10 INJECTION, SOLUTION EPIDURAL; INFILTRATION; INTRACAUDAL; PERINEURAL at 15:17

## 2024-09-25 ENCOUNTER — TELEMEDICINE (OUTPATIENT)
Dept: PSYCHIATRY | Facility: CLINIC | Age: 69
End: 2024-09-25
Payer: MEDICARE

## 2024-09-25 DIAGNOSIS — F41.1 GENERALIZED ANXIETY DISORDER: ICD-10-CM

## 2024-09-25 DIAGNOSIS — F33.1 MAJOR DEPRESSIVE DISORDER, RECURRENT EPISODE, MODERATE: Primary | ICD-10-CM

## 2024-09-25 RX ORDER — ESCITALOPRAM OXALATE 20 MG/1
20 TABLET ORAL DAILY
Qty: 90 TABLET | Refills: 1 | Status: SHIPPED | OUTPATIENT
Start: 2024-09-25

## 2024-09-25 RX ORDER — DESVENLAFAXINE 100 MG/1
100 TABLET, EXTENDED RELEASE ORAL DAILY
Qty: 90 TABLET | Refills: 1 | Status: SHIPPED | OUTPATIENT
Start: 2024-09-25

## 2024-09-25 RX ORDER — TRAZODONE HYDROCHLORIDE 150 MG/1
150 TABLET ORAL DAILY
Qty: 90 TABLET | Refills: 1 | Status: SHIPPED | OUTPATIENT
Start: 2024-09-25

## 2024-09-25 NOTE — PROGRESS NOTES
Patient Name: Sarah Howard  MRN: [id number]   :  1955     This provider is located at her home office through the [facility] (through [facility]), [address], [address] using a secure [employer] Video Visit through [employer]. Patient is being seen remotely via telehealth at their home address in [place], and stated they are in a secure environment for this session. The patient's condition being diagnosed/treated is appropriate for telemedicine. The provider identified herself as well as her credentials.   The patient, and/or patients guardian, consent to be seen remotely, and when consent is given they understand that the consent allows for patient identifiable information to be sent to a third party as needed.   They may refuse to be seen remotely at any time. The electronic data is encrypted and password protected, and the patient and/or guardian has been advised of the potential risks to privacy not withstanding such measures.    You have chosen to receive care through a telehealth visit.  Do you consent to use a video/audio connection for your medical care today? Yes    Chief Complaint:      ICD-10-CM ICD-9-CM   1. Major depressive disorder, recurrent episode, moderate  F33.1 296.32   2. Generalized anxiety disorder  F41.1 300.02       History of Present Illness: Sarah Howard is a 68 y.o. female is here today for medication management follow up.  Since the increase in medication, patient feels symptoms have improved.  Feels medication is good at its current dose.    The following portions of the patient's history were reviewed and updated as appropriate: allergies, current medications, past family history, past medical history, past social history, past surgical history, and problem list.    Review of Systems;;  Review of Systems   Constitutional:  Negative for activity change, appetite change, fatigue, unexpected weight gain and unexpected weight  loss.   Respiratory:  Negative for shortness of breath and wheezing.    Gastrointestinal:  Negative for constipation, diarrhea, nausea and vomiting.   Musculoskeletal:  Negative for gait problem.   Skin:  Negative for dry skin and rash.   Neurological:  Negative for dizziness, speech difficulty, weakness, light-headedness, headache, memory problem and confusion.   Psychiatric/Behavioral:  Negative for agitation, behavioral problems, decreased concentration, dysphoric mood, hallucinations, self-injury, sleep disturbance, suicidal ideas, negative for hyperactivity, depressed mood and stress. The patient is not nervous/anxious.        Physical Exam;;  Physical Exam  Constitutional:       General: She is not in acute distress.     Appearance: She is well-developed. She is not diaphoretic.   HENT:      Head: Normocephalic and atraumatic.   Eyes:      Conjunctiva/sclera: Conjunctivae normal.   Pulmonary:      Effort: Pulmonary effort is normal. No respiratory distress.   Musculoskeletal:         General: Normal range of motion.      Cervical back: Full passive range of motion without pain and normal range of motion.   Neurological:      Mental Status: She is alert and oriented to person, place, and time.   Psychiatric:         Mood and Affect: Mood is not anxious or depressed. Affect is not labile, blunt, angry or inappropriate.         Speech: Speech is not rapid and pressured or tangential.         Behavior: Behavior normal. Behavior is not agitated, slowed, aggressive, withdrawn, hyperactive or combative. Behavior is cooperative.         Thought Content: Thought content normal. Thought content is not paranoid or delusional. Thought content does not include homicidal or suicidal ideation. Thought content does not include homicidal or suicidal plan.         Judgment: Judgment normal.       There were no vitals taken for this visit.  There is no height or weight on file to calculate BMI. Video appt unable to obtain.      Current Medications;;    Current Outpatient Medications:     desvenlafaxine (Pristiq) 100 MG 24 hr tablet, Take 1 tablet by mouth Daily., Disp: 90 tablet, Rfl: 1    escitalopram (LEXAPRO) 20 MG tablet, Take 1 tablet by mouth Daily., Disp: 90 tablet, Rfl: 1    traZODone (DESYREL) 150 MG tablet, Take 1 tablet by mouth Daily., Disp: 90 tablet, Rfl: 1    atorvastatin (LIPITOR) 20 MG tablet, Take 1 tablet by mouth Daily., Disp: 90 tablet, Rfl: 0    fluticasone (FLONASE) 50 MCG/ACT nasal spray, Administer 2 sprays into the nostril(s) as directed by provider Daily., Disp: 11 mL, Rfl: 0    hydrocortisone 1 % cream, Apply 1 Application topically to the appropriate area as directed 2 (Two) Times a Day., Disp: 45 g, Rfl: 1    latanoprost (XALATAN) 0.005 % ophthalmic solution, , Disp: , Rfl:     metoprolol succinate XL (Toprol XL) 25 MG 24 hr tablet, Take 1 tablet by mouth Daily., Disp: 90 tablet, Rfl: 1    olmesartan (BENICAR) 20 MG tablet, TAKE 1 TABLET BY MOUTH DAILY, Disp: 90 tablet, Rfl: 0    pantoprazole (PROTONIX) 40 MG EC tablet, Take 1 tablet by mouth Daily., Disp: 90 tablet, Rfl: 3    Lab Results:   Lab on 08/23/2024   Component Date Value Ref Range Status    Overall Interpretation 08/23/2024 NEGATIVE: No Clinically Significant Variants Detected   Final    Interpretation Report 08/23/2024 See Notes^^LN   Final    [facility] Report   Admission on 08/03/2024, Discharged on 08/03/2024   Component Date Value Ref Range Status    Rapid Strep A Screen 08/03/2024 Negative   Final    Internal Control 08/03/2024 Passed   Final    Lot Number 08/03/2024 [id number]   Final    Expiration Date 08/03/2024 11/30/26   Final    Rapid Influenza A Ag 08/03/2024 Negative  Negative Final    Rapid Influenza B Ag 08/03/2024 Negative  Negative Final    Internal Control 08/03/2024 Passed  Passed Final    Lot Number 08/03/2024 [id number]   Corrected    Expiration Date 08/03/2024 12/5/25   Corrected    SARS Antigen 08/03/2024 Detected (A)  Not  Detected, Presumptive Negative Final    Internal Control 08/03/2024 Passed  Passed Final    Lot Number 08/03/2024 [id number]   Final    Expiration Date 08/03/2024 12/30/24   Final   Lab on 07/15/2024   Component Date Value Ref Range Status    Toxigenic C. difficile by PCR 07/15/2024 Not Detected  Not Detected Final    Campylobacter 07/15/2024 Not Detected  Not Detected Final    Plesiomonas shigelloides 07/15/2024 Not Detected  Not Detected Final    Salmonella 07/15/2024 Not Detected  Not Detected Final    Vibrio 07/15/2024 Not Detected  Not Detected Final    Vibrio cholerae 07/15/2024 Not Detected  Not Detected Final    Yersinia enterocolitica 07/15/2024 Not Detected  Not Detected Final    Enteroaggregative E. coli (EAEC) 07/15/2024 Not Detected  Not Detected Final    Enteropathogenic E. coli (EPEC) 07/15/2024 Not Detected  Not Detected Final    Enterotoxigenic E. coli (ETEC) lt/* 07/15/2024 Not Detected  Not Detected Final    Shiga-like toxin-producing E. coli* 07/15/2024 Not Detected  Not Detected Final    Shigella/Enteroinvasive E. coli (E* 07/15/2024 Not Detected  Not Detected Final    Cryptosporidium 07/15/2024 Not Detected  Not Detected Final    Cyclospora cayetanensis 07/15/2024 Not Detected  Not Detected Final    Entamoeba histolytica 07/15/2024 Not Detected  Not Detected Final    Giardia lamblia 07/15/2024 Not Detected  Not Detected Final    Adenovirus F40/41 07/15/2024 Not Detected  Not Detected Final    Astrovirus 07/15/2024 Not Detected  Not Detected Final    Norovirus GI/GII 07/15/2024 Not Detected  Not Detected Final    Rotavirus A 07/15/2024 Not Detected  Not Detected Final    Sapovirus (I, II, IV or V) 07/15/2024 Not Detected  Not Detected Final    Calprotectin, Fecal 07/15/2024 9  0 - 120 ug/g Final    Concentration     Interpretation   Follow-Up  < 5 - 50 ug/g     Normal           None  >50 -120 ug/g     Borderline       Re-evaluate in 4-6 weeks      >120 ug/g     Abnormal         Repeat as  clinically                                     indicated    Ova + Parasite Exam 07/15/2024 No ova or parasites seen on concentrated smear  No Ova or Parasites Seen Final    Trichrome Stain 07/15/2024 No ova or parasites seen on trichrome stain   Final    Pancreatic Fecal 07/15/2024 770  >200 ug Elast./g Final    Stool of watery consistency received. Since watery stool is  inherently dilute, low test results should be interpreted with  caution. Retesting on formed stool, if possible, is recommended.         Severe Pancreatic Insufficiency:          <100         Moderate Pancreatic Insufficiency:   100 - 200         Normal:                                   >200   Orders Only on 07/13/2024   Component Date Value Ref Range Status    NCCN 07/13/2024 NCCN met (A)   Final    High Risk Cancer Risk Assessment   Office Visit on 07/12/2024   Component Date Value Ref Range Status    WBC 07/12/2024 6.14  3.40 - 10.80 10*3/mm3 Final    RBC 07/12/2024 4.13  3.77 - 5.28 10*6/mm3 Final    Hemoglobin 07/12/2024 13.7  12.0 - 15.9 g/dL Final    Hematocrit 07/12/2024 39.4  34.0 - 46.6 % Final    MCV 07/12/2024 95.4  79.0 - 97.0 fL Final    MCH 07/12/2024 33.2 (H)  26.6 - 33.0 pg Final    MCHC 07/12/2024 34.8  31.5 - 35.7 g/dL Final    RDW 07/12/2024 11.2 (L)  12.3 - 15.4 % Final    RDW-SD 07/12/2024 38.4  37.0 - 54.0 fl Final    MPV 07/12/2024 12.0  6.0 - 12.0 fL Final    Platelets 07/12/2024 261  140 - 450 10*3/mm3 Final    IgA 07/12/2024 378 (H)  87 - 352 mg/dL Final    Gliadin Deamidated Peptide Ab, IgA 07/12/2024 7  0 - 19 units Final                       Negative                   0 - 19                     Weak Positive             20 - 30                     Moderate to Strong Positive   >30    Tissue Transglutaminase IgA 07/12/2024 <2  0 - 3 U/mL Final                                  Negative        0 -  3                                Weak Positive   4 - 10                                Positive           >10   Tissue  Transglutaminase (tTG) has been identified   as the endomysial antigen.  Studies have demonstr-   ated that endomysial IgA antibodies have over 99%   specificity for gluten sensitive enteropathy.    Glucose 07/12/2024 113 (H)  65 - 99 mg/dL Final    BUN 07/12/2024 10  8 - 23 mg/dL Final    Creatinine 07/12/2024 0.90  0.57 - 1.00 mg/dL Final    Sodium 07/12/2024 140  136 - 145 mmol/L Final    Potassium 07/12/2024 4.5  3.5 - 5.2 mmol/L Final    Chloride 07/12/2024 105  98 - 107 mmol/L Final    CO2 07/12/2024 23.4  22.0 - 29.0 mmol/L Final    Calcium 07/12/2024 9.4  8.6 - 10.5 mg/dL Final    Total Protein 07/12/2024 7.4  6.0 - 8.5 g/dL Final    Albumin 07/12/2024 4.2  3.5 - 5.2 g/dL Final    ALT (SGPT) 07/12/2024 29  1 - 33 U/L Final    AST (SGOT) 07/12/2024 27  1 - 32 U/L Final    Alkaline Phosphatase 07/12/2024 97  39 - 117 U/L Final    Total Bilirubin 07/12/2024 0.8  0.0 - 1.2 mg/dL Final    Globulin 07/12/2024 3.2  gm/dL Final    A/G Ratio 07/12/2024 1.3  g/dL Final    BUN/Creatinine Ratio 07/12/2024 11.1  7.0 - 25.0 Final    Anion Gap 07/12/2024 11.6  5.0 - 15.0 mmol/L Final    eGFR 07/12/2024 69.3  >60.0 mL/min/1.73 Final    C-Reactive Protein 07/12/2024 <0.30  0.00 - 0.50 mg/dL Final    TSH 07/12/2024 2.390  0.270 - 4.200 uIU/mL Final    Sed Rate 07/12/2024 10  0 - 30 mm/hr Final    Free T4 07/12/2024 1.13  0.92 - 1.68 ng/dL Final       Mental Status Exam:   Hygiene:   good  Cooperation:  Cooperative  Eye Contact:  Good  Psychomotor Behavior:  Appropriate  Mood:within normal limits  Affect:  Appropriate  Hopelessness: Denies  Speech:  Normal  Thought Process:  Goal directed  Thought Content:  Normal  Suicidal:  None  Homicidal:  None  Hallucinations:  None  Delusion:  None  Memory:  Intact  Orientation:  Person, Place, Time, and Situation  Reliability:  good  Insight:  Good  Judgement:  Good  Impulse Control:  Good    PHQ-9 Depression Screening  Little interest or pleasure in doing things? (Patient-Rptd) (P)  0-->not at all   Feeling down, depressed, or hopeless? (Patient-Rptd) (P) 1-->several days   Trouble falling or staying asleep, or sleeping too much? (Patient-Rptd) (P) 0-->not at all   Feeling tired or having little energy? (Patient-Rptd) (P) 1-->several days   Poor appetite or overeating? (Patient-Rptd) (P) 0-->not at all   Feeling bad about yourself - or that you are a failure or have let yourself or your family down? (Patient-Rptd) (P) 0-->not at all   Trouble concentrating on things, such as reading the newspaper or watching television? (Patient-Rptd) (P) 0-->not at all   Moving or speaking so slowly that other people could have noticed? Or the opposite - being so fidgety or restless that you have been moving around a lot more than usual? (Patient-Rptd) (P) 0-->not at all   Thoughts that you would be better off dead, or of hurting yourself in some way? (Patient-Rptd) (P) 0-->not at all   PHQ-9 Total Score (P) 2   If you checked off any problems, how difficult have these problems made it for you to do your work, take care of things at home, or get along with other people? (Patient-Rptd) (P) not difficult at all        Assessment/Plan:  Diagnoses and all orders for this visit:    1. Major depressive disorder, recurrent episode, moderate (Primary)  -     desvenlafaxine (Pristiq) 100 MG 24 hr tablet; Take 1 tablet by mouth Daily.  Dispense: 90 tablet; Refill: 1  -     escitalopram (LEXAPRO) 20 MG tablet; Take 1 tablet by mouth Daily.  Dispense: 90 tablet; Refill: 1  -     traZODone (DESYREL) 150 MG tablet; Take 1 tablet by mouth Daily.  Dispense: 90 tablet; Refill: 1    2. Generalized anxiety disorder  -     escitalopram (LEXAPRO) 20 MG tablet; Take 1 tablet by mouth Daily.  Dispense: 90 tablet; Refill: 1    Patient feels symptoms are better with the increase.  Patient would like to continue medication as ordered.  We will follow up in 3 months.      A psychological evaluation was conducted in order to assess past  and current level of functioning. Areas assessed included, but were not limited to: perception of social support, perception of ability to face and deal with challenges in life (positive functioning), anxiety symptoms, depressive symptoms, perspective on beliefs/belief system, coping skills for stress, intelligence level,  Therapeutic rapport was established. Interventions conducted today were geared towards incorporating medication management along with support for continued therapy. Education was also provided as to the med management with this provider and what to expect in subsequent sessions.    We discussed risks, benefits,goals and side effects of the above medication and the patient was agreeable with the plan.Patient was educated on the importance of compliance with treatment and follow-up appointments. Patient is aware to contact the [facility] [phone] with any worsening of symptoms. To call for questions or concerns and return early if necessary. Patent is agreeable to go to the Emergency Department or call 911 should they begin SI/HI.     Part of this note may be an electronic transcription/translation of spoken language to printed text using the Dragon Dictation System.    Return in about 3 months (around 12/25/2024) for Follow Up 30 min, Video visit.    MARCOS Smart

## 2024-09-26 ENCOUNTER — EXTERNAL PBMM DATA (OUTPATIENT)
Dept: PHARMACY | Facility: OTHER | Age: 69
End: 2024-09-26
Payer: MEDICARE

## 2024-10-07 DIAGNOSIS — K21.9 GERD WITHOUT ESOPHAGITIS: ICD-10-CM

## 2024-10-07 DIAGNOSIS — R05.3 CHRONIC COUGH: ICD-10-CM

## 2024-10-08 RX ORDER — PANTOPRAZOLE SODIUM 40 MG/1
40 TABLET, DELAYED RELEASE ORAL DAILY
Qty: 90 TABLET | Refills: 3 | Status: SHIPPED | OUTPATIENT
Start: 2024-10-08

## 2024-10-08 RX ORDER — ATORVASTATIN CALCIUM 20 MG/1
20 TABLET, FILM COATED ORAL DAILY
Qty: 90 TABLET | Refills: 0 | Status: SHIPPED | OUTPATIENT
Start: 2024-10-08

## 2024-10-24 ENCOUNTER — TELEMEDICINE (OUTPATIENT)
Dept: PSYCHIATRY | Facility: CLINIC | Age: 69
End: 2024-10-24
Payer: MEDICARE

## 2024-10-24 DIAGNOSIS — F41.1 GENERALIZED ANXIETY DISORDER: ICD-10-CM

## 2024-10-24 DIAGNOSIS — F33.1 MAJOR DEPRESSIVE DISORDER, RECURRENT EPISODE, MODERATE: Primary | ICD-10-CM

## 2024-10-24 NOTE — PROGRESS NOTES
Date: 2024  Time In: 15:28 EDT  Time out: 14:25 EDT      This provider is located at [facility], [facility] [place], [place], [facility], using a secure [employer] Video Visit through [employer]. Patient is being seen remotely via telehealth at their home address is located in [place]. Patient stated they are in a secure environment for this session. The patient's condition being diagnosed and treated is appropriate for telemedicine. The provider identified themself as well as their credentials. The patient, or  patient's legal guardian consent to be seen remotely, and when consent is given they understand that the consent allows for patient identifiable information to be sent to a third party as needed. They may refuse to be seen remotely at any time. The electronic data is encrypted and password protected, and the patient's or  legal guardian has been advised of the potential risks to privacy not withstanding such measures.   PT Identifiers used: Name and .    You have chosen to receive care through a telehealth visit.  Do you consent to use a video/audio connection for your medical care today? Yes     Subjective   Sarah Howard is a 68 y.o. female who presents today for follow up    Chief Complaint: Anxiety and depression     History of Present Illness: Client discussed how things have been since last session.  Client has begun to read the book on co dependence and feels that she has gained some insight in terms of her own struggles.  Client reports that she is using that insight in terms of her relationship with her children, and sees some improvement in mood ( depression and anxiety), states that she is becoming aware that she does not need to be in control or responsible for others.  State that this is still hard at times but feels improevment      Clinical Maneuvering/Intervention:    On a scale 0-10 ( with 10 being the worst)  Anxiety: 4/10  Depression: 3/10    Sleep: No  current concerns with sleep    Appetite: No current eating issues      Assisted patient in processing above session content; acknowledged and normalized patient’s thoughts, feelings, and concerns.  Rationalized patient thought process regarding concerns presented at session.  Discussed triggers associated with patient's  anxiety  and depression  Also discussed coping skills for patient to implement such as self care , positive self talk , and boundaries    Allowed patient to freely discuss issues without interruption or judgment. Provided safe, confidential environment to facilitate the development of positive therapeutic relationship and encourage open, honest communication. Assisted patient in identifying risk factors which would indicate the need for higher level of care including thoughts to harm self or others and/or self-harming behavior and encouraged patient to contact this office, call 911, or present to the nearest emergency room should any of these events occur. Discussed crisis intervention services and means to access. Patient adamantly and convincingly denies current suicidal or homicidal ideation or perceptual disturbance.    Assessment:     Patient appears to maintain relative stability as compared to their baseline.  However, patient continues to struggle with anxiety and depression  which continues to cause impairment in important areas of functioning.  A result, they can be reasonably expected to continue to benefit from treatment and would likely be at increased risk for decompensation otherwise.         Mental Status Exam:   Hygiene:   good  Cooperation:  Cooperative  Eye Contact:  Good  Psychomotor Behavior:  Appropriate  Affect:  Full range  Mood: depressed and anxious  Speech:  Normal  Thought Process:  Goal directed and Linear  Thought Content:  Normal  Suicidal:  None  Homicidal:  None  Hallucinations:  None  Delusion:  None  Memory:   No current changes reported  Orientation:  Person,  Place, Time, and Situation  Reliability:  good  Insight:  Good  Judgement:  Good  Impulse Control:  Good  Physical/Medical Issues:   Ongoing health issues        Patient's Support Network Includes:  , children, and extended family    Functional Status: Moderate impairment     Progress toward goal: Not at goal    Prognosis: Fair with Ongoing Treatment         Plan:    Patient will continue in individual outpatient therapy with focus on improved functioning and coping skills, maintaining stability, and avoiding decompensation and the need for higher level of care.    Patient will adhere to medication regimen as prescribed and report any side effects. Patient will contact this office, call 911 or present to the nearest emergency room should suicidal or homicidal ideations occur. Provide Cognitive Behavioral Therapy and Solution Focused Therapy to improve functioning, maintain stability, and avoid decompensation and the need for higher level of care.     Return in about 1 month (around 11/24/2024).      VISIT DIAGNOSIS:    Diagnosis Plan   1. Major depressive disorder, recurrent episode, moderate        2. Generalized anxiety disorder               This document has been electronically signed by Chanelle Goodson LCSW  October 24, 2024      Part of this note may be an electronic transcription/translation of spoken language to printed text using the Dragon Dictation System.

## 2024-10-29 ENCOUNTER — OFFICE VISIT (OUTPATIENT)
Dept: INTERNAL MEDICINE | Facility: CLINIC | Age: 69
End: 2024-10-29
Payer: MEDICARE

## 2024-10-29 VITALS
WEIGHT: 194.25 LBS | RESPIRATION RATE: 20 BRPM | SYSTOLIC BLOOD PRESSURE: 126 MMHG | TEMPERATURE: 97 F | HEART RATE: 82 BPM | OXYGEN SATURATION: 97 % | BODY MASS INDEX: 33.33 KG/M2 | DIASTOLIC BLOOD PRESSURE: 80 MMHG

## 2024-10-29 DIAGNOSIS — G89.29 CHRONIC LOW BACK PAIN WITHOUT SCIATICA, UNSPECIFIED BACK PAIN LATERALITY: ICD-10-CM

## 2024-10-29 DIAGNOSIS — F32.A MILD DEPRESSION: ICD-10-CM

## 2024-10-29 DIAGNOSIS — M54.50 CHRONIC LOW BACK PAIN WITHOUT SCIATICA, UNSPECIFIED BACK PAIN LATERALITY: ICD-10-CM

## 2024-10-29 DIAGNOSIS — I10 PRIMARY HYPERTENSION: Primary | ICD-10-CM

## 2024-10-29 PROCEDURE — 99214 OFFICE O/P EST MOD 30 MIN: CPT | Performed by: STUDENT IN AN ORGANIZED HEALTH CARE EDUCATION/TRAINING PROGRAM

## 2024-10-29 PROCEDURE — G2211 COMPLEX E/M VISIT ADD ON: HCPCS | Performed by: STUDENT IN AN ORGANIZED HEALTH CARE EDUCATION/TRAINING PROGRAM

## 2024-10-29 PROCEDURE — 1159F MED LIST DOCD IN RCRD: CPT | Performed by: STUDENT IN AN ORGANIZED HEALTH CARE EDUCATION/TRAINING PROGRAM

## 2024-10-29 PROCEDURE — 3079F DIAST BP 80-89 MM HG: CPT | Performed by: STUDENT IN AN ORGANIZED HEALTH CARE EDUCATION/TRAINING PROGRAM

## 2024-10-29 PROCEDURE — 3074F SYST BP LT 130 MM HG: CPT | Performed by: STUDENT IN AN ORGANIZED HEALTH CARE EDUCATION/TRAINING PROGRAM

## 2024-10-29 PROCEDURE — 1160F RVW MEDS BY RX/DR IN RCRD: CPT | Performed by: STUDENT IN AN ORGANIZED HEALTH CARE EDUCATION/TRAINING PROGRAM

## 2024-10-29 PROCEDURE — 1126F AMNT PAIN NOTED NONE PRSNT: CPT | Performed by: STUDENT IN AN ORGANIZED HEALTH CARE EDUCATION/TRAINING PROGRAM

## 2024-10-29 NOTE — PATIENT INSTRUCTIONS
"https://www.nhlbi.nih.gov/files/docs/public/heart/dash_brief.pdf\">   DASH Eating Plan  DASH stands for Dietary Approaches to Stop Hypertension. The DASH eating plan is a healthy eating plan that has been shown to:  Reduce high blood pressure (hypertension).  Reduce your risk for type 2 diabetes, heart disease, and stroke.  Help with weight loss.  What are tips for following this plan?  Reading food labels  Check food labels for the amount of salt (sodium) per serving. Choose foods with less than 5 percent of the Daily Value of sodium. Generally, foods with less than 300 milligrams (mg) of sodium per serving fit into this eating plan.  To find whole grains, look for the word \"whole\" as the first word in the ingredient list.  Shopping  Buy products labeled as \"low-sodium\" or \"no salt added.\"  Buy fresh foods. Avoid canned foods and pre-made or frozen meals.  Cooking  Avoid adding salt when cooking. Use salt-free seasonings or herbs instead of table salt or sea salt. Check with your health care provider or pharmacist before using salt substitutes.  Do not martinez foods. Cook foods using healthy methods such as baking, boiling, grilling, roasting, and broiling instead.  Cook with heart-healthy oils, such as olive, canola, avocado, soybean, or sunflower oil.  Meal planning    Eat a balanced diet that includes:  4 or more servings of fruits and 4 or more servings of vegetables each day. Try to fill one-half of your plate with fruits and vegetables.  6-8 servings of whole grains each day.  Less than 6 oz (170 g) of lean meat, poultry, or fish each day. A 3-oz (85-g) serving of meat is about the same size as a deck of cards. One egg equals 1 oz (28 g).  2-3 servings of low-fat dairy each day. One serving is 1 cup (237 mL).  1 serving of nuts, seeds, or beans 5 times each week.  2-3 servings of heart-healthy fats. Healthy fats called omega-3 fatty acids are found in foods such as walnuts, flaxseeds, fortified milks, and eggs. " These fats are also found in cold-water fish, such as sardines, salmon, and mackerel.  Limit how much you eat of:  Canned or prepackaged foods.  Food that is high in trans fat, such as some fried foods.  Food that is high in saturated fat, such as fatty meat.  Desserts and other sweets, sugary drinks, and other foods with added sugar.  Full-fat dairy products.  Do not salt foods before eating.  Do not eat more than 4 egg yolks a week.  Try to eat at least 2 vegetarian meals a week.  Eat more home-cooked food and less restaurant, buffet, and fast food.     Lifestyle  When eating at a restaurant, ask that your food be prepared with less salt or no salt, if possible.  If you drink alcohol:  Limit how much you use to:  0-1 drink a day for women who are not pregnant.  0-2 drinks a day for men.  Be aware of how much alcohol is in your drink. In the [place] one drink equals one 12 oz bottle of beer (355 mL), one 5 oz glass of wine (148 mL), or one 1½ oz glass of hard liquor (44 mL).  General information  Avoid eating more than 2,300 mg of salt a day. If you have hypertension, you may need to reduce your sodium intake to 1,500 mg a day.  Work with your health care provider to maintain a healthy body weight or to lose weight. Ask what an ideal weight is for you.  Get at least 30 minutes of exercise that causes your heart to beat faster (aerobic exercise) most days of the week. Activities may include walking, swimming, or biking.  Work with your health care provider or dietitian to adjust your eating plan to your individual calorie needs.  What foods should I eat?  Fruits  All fresh, dried, or frozen fruit. Canned fruit in natural juice (without added sugar).  Vegetables  Fresh or frozen vegetables (raw, steamed, roasted, or grilled). Low-sodium or reduced-sodium tomato and vegetable juice. Low-sodium or reduced-sodium tomato sauce and tomato paste. Low-sodium or reduced-sodium canned vegetables.  Grains  Whole-grain or  whole-wheat bread. Whole-grain or whole-wheat pasta. Brown rice. Oatmeal. Quinoa. Bulgur. Whole-grain and low-sodium cereals. Georgia bread. Low-fat, low-sodium crackers. Whole-wheat flour tortillas.  Meats and other proteins  Skinless chicken or turkey. Ground chicken or turkey. Pork with fat trimmed off. Fish and seafood. Egg whites. Dried beans, peas, or lentils. Unsalted nuts, nut butters, and seeds. Unsalted canned beans. Lean cuts of beef with fat trimmed off. Low-sodium, lean precooked or cured meat, such as sausages or meat loaves.  Dairy  Low-fat (1%) or fat-free (skim) milk. Reduced-fat, low-fat, or fat-free cheeses. Nonfat, low-sodium ricotta or cottage cheese. Low-fat or nonfat yogurt. Low-fat, low-sodium cheese.  Fats and oils  Soft margarine without trans fats. Vegetable oil. Reduced-fat, low-fat, or light mayonnaise and salad dressings (reduced-sodium). Canola, safflower, olive, avocado, soybean, and sunflower oils. Avocado.  Seasonings and condiments  Herbs. Spices. Seasoning mixes without salt.  Other foods  Unsalted popcorn and pretzels. Fat-free sweets.  The items listed above may not be a complete list of foods and beverages you can eat. Contact a dietitian for more information.  What foods should I avoid?  Fruits  Canned fruit in a light or heavy syrup. Fried fruit. Fruit in cream or butter sauce.  Vegetables  Creamed or fried vegetables. Vegetables in a cheese sauce. Regular canned vegetables (not low-sodium or reduced-sodium). Regular canned tomato sauce and paste (not low-sodium or reduced-sodium). Regular tomato and vegetable juice (not low-sodium or reduced-sodium). Pickles. Olives.  Grains  Baked goods made with fat, such as croissants, muffins, or some breads. Dry pasta or rice meal packs.  Meats and other proteins  Fatty cuts of meat. Ribs. Fried meat. Foster. Bologna, salami, and other precooked or cured meats, such as sausages or meat loaves. Fat from the back of a pig (fatback).  Bratwurst. Salted nuts and seeds. Canned beans with added salt. Canned or smoked fish. Whole eggs or egg yolks. Chicken or turkey with skin.  Dairy  Whole or 2% milk, cream, and half-and-half. Whole or full-fat cream cheese. Whole-fat or sweetened yogurt. Full-fat cheese. Nondairy creamers. Whipped toppings. Processed cheese and cheese spreads.  Fats and oils  Butter. Stick margarine. Lard. Shortening. Ghee. Foster fat. Tropical oils, such as coconut, palm kernel, or palm oil.  Seasonings and condiments  Onion salt, garlic salt, seasoned salt, table salt, and sea salt. Worcestershire sauce. Tartar sauce. Barbecue sauce. Teriyaki sauce. Soy sauce, including reduced-sodium. Steak sauce. Canned and packaged gravies. Fish sauce. Oyster sauce. Cocktail sauce. Store-bought horseradish. Ketchup. Mustard. Meat flavorings and tenderizers. Bouillon cubes. Hot sauces. Pre-made or packaged marinades. Pre-made or packaged taco seasonings. Relishes. Regular salad dressings.  Other foods  Salted popcorn and pretzels.  The items listed above may not be a complete list of foods and beverages you should avoid. Contact a dietitian for more information.  Where to find more information  National Heart, Lung, and Blood [place]: www.nhlbi.nih.gov  American Heart Association: www.heart.org  Academy of Nutrition and Dietetics: www.eatright.org  National Kidney Foundation: www.kidney.org  Summary  The DASH eating plan is a healthy eating plan that has been shown to reduce high blood pressure (hypertension). It may also reduce your risk for type 2 diabetes, heart disease, and stroke.  When on the DASH eating plan, aim to eat more fresh fruits and vegetables, whole grains, lean proteins, low-fat dairy, and heart-healthy fats.  With the DASH eating plan, you should limit salt (sodium) intake to 2,300 mg a day. If you have hypertension, you may need to reduce your sodium intake to 1,500 mg a day.  Work with your health care provider or  dietitian to adjust your eating plan to your individual calorie needs.  This information is not intended to replace advice given to you by your health care provider. Make sure you discuss any questions you have with your health care provider.  Document Revised: 11/20/2020 Document Reviewed: 11/20/2020  Elsevier Patient Education © 2021 Elsevier Inc.

## 2024-10-29 NOTE — PROGRESS NOTES
"    Follow Up Office Visit      Date: 10/29/2024   Patient Name: Sarah Howard  : 1955   MRN: [id number]     Chief Complaint:    Chief Complaint   Patient presents with    Follow-up     6 month hypertension and low back pain        History of Present Illness: Sarah Howard is a 68 y.o. female who is here today to follow up with the following problems.    HPI  History of Present Illness  The patient is a 68-year-old female who presents for evaluation of multiple medical concerns.    She reports that her back pain has not improved but is manageable as long as she avoids excessive bending. She does not require any medication refills at this time.    She is currently experiencing knee discomfort and received an injection from Dr. Baker. She has a follow-up appointment scheduled for 2024. 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 Knee replacement surgery was advised, but she prefers to delay this until after the holidays. She was also advised to lose weight and strengthen her quadriceps. Despite not eating much, she struggles with weight loss. She has visited the gym a few times but admits to a lack of motivation.    She received her influenza vaccine on 10/03/2024 but declined the COVID-19 vaccine. She contracted COVID-19 in 2024.      Her emotional state is heavily influenced by the well-being of others. She identifies as extremely codependent and is currently reading a book recommended by her counselor, \"Codependent No More.\" She is attempting to manage this through increased prayer and finds solace in volunteering at the [facility] on Friday mornings, where she interacts with patients and shares her experiences.  She continues Lexapro 20 mg daily, Pristiq 100 mg daily and trazodone 150 mg daily.  Also continues talk therapy regularly.    She has a 10-day cruise planned with her Judaism in 2025.     HTN  - " olmesartan 20mg daily  - metoprolol 25mg daily  -Reports good compliance with medication.         Subjective      Review of Systems:   Review of Systems    I have reviewed the patients family history, social history, past medical history, past surgical history and have updated it as appropriate.     Medications:     Current Outpatient Medications:     atorvastatin (LIPITOR) 20 MG tablet, Take 1 tablet by mouth Daily., Disp: 90 tablet, Rfl: 0    desvenlafaxine (Pristiq) 100 MG 24 hr tablet, Take 1 tablet by mouth Daily., Disp: 90 tablet, Rfl: 1    escitalopram (LEXAPRO) 20 MG tablet, Take 1 tablet by mouth Daily., Disp: 90 tablet, Rfl: 1    fluticasone (FLONASE) 50 MCG/ACT nasal spray, Administer 2 sprays into the nostril(s) as directed by provider Daily., Disp: 11 mL, Rfl: 0    hydrocortisone 1 % cream, Apply 1 Application topically to the appropriate area as directed 2 (Two) Times a Day., Disp: 45 g, Rfl: 1    latanoprost (XALATAN) 0.005 % ophthalmic solution, , Disp: , Rfl:     metoprolol succinate XL (Toprol XL) 25 MG 24 hr tablet, Take 1 tablet by mouth Daily., Disp: 90 tablet, Rfl: 1    olmesartan (BENICAR) 20 MG tablet, TAKE 1 TABLET BY MOUTH DAILY, Disp: 90 tablet, Rfl: 0    pantoprazole (PROTONIX) 40 MG EC tablet, Take 1 tablet by mouth Daily., Disp: 90 tablet, Rfl: 3    traZODone (DESYREL) 150 MG tablet, Take 1 tablet by mouth Daily., Disp: 90 tablet, Rfl: 1    Allergies:   No Known Allergies    Objective     Physical Exam: Please see above  Vital Signs:   Vitals:    10/29/24 1348   BP: 126/80   BP Location: Left arm   Patient Position: Sitting   Cuff Size: Adult   Pulse: 82   Resp: 20   Temp: 97 °F (36.1 °C)   TempSrc: Temporal   SpO2: 97%   Weight: 88.1 kg (194 lb 4 oz)   PainSc: 0-No pain     Body mass index is 33.33 kg/m².          Physical Exam  Vitals reviewed.   Constitutional:       General: She is not in acute distress.     Appearance: Normal appearance. She is obese. She is not ill-appearing or  toxic-appearing.   HENT:      Head: Normocephalic and atraumatic.      Right Ear: External ear normal.      Left Ear: External ear normal.      Nose: Nose normal. No congestion.      Mouth/Throat:      Mouth: Mucous membranes are moist.   Eyes:      General: No scleral icterus.     Extraocular Movements: Extraocular movements intact.   Cardiovascular:      Rate and Rhythm: Normal rate and regular rhythm.      Pulses: Normal pulses.      Heart sounds: Normal heart sounds.   Pulmonary:      Effort: Pulmonary effort is normal.      Breath sounds: Normal breath sounds.   Abdominal:      General: Abdomen is flat. There is no distension.   Musculoskeletal:      Cervical back: Normal range of motion. No rigidity.   Skin:     General: Skin is warm and dry.      Capillary Refill: Capillary refill takes less than 2 seconds.   Neurological:      General: No focal deficit present.      Mental Status: She is alert and oriented to person, place, and time.   Psychiatric:         Mood and Affect: Mood normal.         Behavior: Behavior normal.         Judgment: Judgment normal.       Physical Exam        Procedures    Results:   Labs:   Hemoglobin A1C   Date Value Ref Range Status   04/29/2024 5.90 (H) 4.80 - 5.60 % Final     TSH   Date Value Ref Range Status   07/12/2024 2.390 0.270 - 4.200 uIU/mL Final      Results        Imaging:   No valid procedures specified.     Assessment / Plan      Assessment/Plan:   Problem List Items Addressed This Visit       Mild depression    Current Assessment & Plan     Chronic, improving.  Continue current medications and talk therapy.  Continue follow-up with psychiatry and psychology         Primary hypertension - Primary    Overview     Lisinopril stopped 6/13/23 due to cough         Current Assessment & Plan     Hypertension is improving with treatment.  Continue current treatment regimen.  Weight loss.  Regular aerobic exercise.  Blood pressure will be reassessed at the next regular  appointment.  Continue metoprolol 25mg daily and olmesartan 20mg daily.           Other Visit Diagnoses       Chronic low back pain without sciatica, unspecified back pain laterality                Assessment & Plan  1. Back Pain.  She reports that her back pain is manageable as long as she avoids excessive bending. She has been working at home football games, which may exacerbate her symptoms. She is advised to continue avoiding activities that aggravate her pain and to consider gentle exercises to strengthen her back muscles.  Continue OTC analgesics.    2. Knee Pain.  She has been experiencing knee pain and has seen Dr. Baker, who administered a shot for relief. Dr. Baker diagnosed her with arthritis behind the kneecap, bone-on-bone contact, and bone spurs. She is advised to consider knee replacement surgery after the holidays, potentially in January. She is also encouraged to lose weight and strengthen her quadriceps to improve her knee condition.     Health Maintenance.  She received her flu shot on October 3, 2024. She had COVID-19 in August 2024 and has decided against receiving a COVID-19 booster shot at this time.    Follow-up  Return in 6 months for follow-up.       Follow Up:   Return in about 6 months (around 4/29/2025) for Medicare Wellness, Fasting.      Dao Gomez MD   [facility] [place]    Patient or patient representative verbalized consent for the use of Ambient Listening during the visit with  Dao Gomez MD for chart documentation. 10/29/2024  14:25 EDT

## 2024-10-29 NOTE — ASSESSMENT & PLAN NOTE
Chronic, improving.  Continue current medications and talk therapy.  Continue follow-up with psychiatry and psychology

## 2024-10-30 ENCOUNTER — POP HEALTH PHARMACY (OUTPATIENT)
Dept: PHARMACY | Facility: OTHER | Age: 69
End: 2024-10-30
Payer: MEDICARE

## 2024-11-14 ENCOUNTER — TELEPHONE (OUTPATIENT)
Dept: INTERNAL MEDICINE | Facility: CLINIC | Age: 69
End: 2024-11-14
Payer: MEDICARE

## 2024-11-14 NOTE — TELEPHONE ENCOUNTER
I would recommend patient set up a clinic appointment for clinical breast exam so we can determine need for further imaging.    Dr. Gomez

## 2024-11-14 NOTE — TELEPHONE ENCOUNTER
Caller: Sarah Howard    Relationship: Self    Best call back number: [phone]     What was the call regarding: PATIENT STATES THAT SHE'S GOT TENDERNESS AND SORENESS OUTSIDE OF WHERE SHE HAD HER BREAST CANCER. WOULD LIKE TO KNOW WHAT THE PROVIDER WOULD SUGGEST. SHE IS A LITTLE CONCERNED.    Is it okay if the provider responds through MyChart: NO

## 2024-11-18 ENCOUNTER — OFFICE VISIT (OUTPATIENT)
Dept: INTERNAL MEDICINE | Facility: CLINIC | Age: 69
End: 2024-11-18
Payer: MEDICARE

## 2024-11-18 VITALS
TEMPERATURE: 97 F | RESPIRATION RATE: 20 BRPM | DIASTOLIC BLOOD PRESSURE: 72 MMHG | BODY MASS INDEX: 33.2 KG/M2 | WEIGHT: 193.5 LBS | OXYGEN SATURATION: 97 % | SYSTOLIC BLOOD PRESSURE: 122 MMHG | HEART RATE: 95 BPM

## 2024-11-18 DIAGNOSIS — Z85.3 HISTORY OF BREAST CANCER: ICD-10-CM

## 2024-11-18 DIAGNOSIS — N64.4 BREAST PAIN, RIGHT: Primary | ICD-10-CM

## 2024-11-18 PROCEDURE — 3078F DIAST BP <80 MM HG: CPT | Performed by: STUDENT IN AN ORGANIZED HEALTH CARE EDUCATION/TRAINING PROGRAM

## 2024-11-18 PROCEDURE — 1126F AMNT PAIN NOTED NONE PRSNT: CPT | Performed by: STUDENT IN AN ORGANIZED HEALTH CARE EDUCATION/TRAINING PROGRAM

## 2024-11-18 PROCEDURE — 1159F MED LIST DOCD IN RCRD: CPT | Performed by: STUDENT IN AN ORGANIZED HEALTH CARE EDUCATION/TRAINING PROGRAM

## 2024-11-18 PROCEDURE — 99214 OFFICE O/P EST MOD 30 MIN: CPT | Performed by: STUDENT IN AN ORGANIZED HEALTH CARE EDUCATION/TRAINING PROGRAM

## 2024-11-18 PROCEDURE — 3074F SYST BP LT 130 MM HG: CPT | Performed by: STUDENT IN AN ORGANIZED HEALTH CARE EDUCATION/TRAINING PROGRAM

## 2024-11-18 PROCEDURE — G2211 COMPLEX E/M VISIT ADD ON: HCPCS | Performed by: STUDENT IN AN ORGANIZED HEALTH CARE EDUCATION/TRAINING PROGRAM

## 2024-11-18 PROCEDURE — 1160F RVW MEDS BY RX/DR IN RCRD: CPT | Performed by: STUDENT IN AN ORGANIZED HEALTH CARE EDUCATION/TRAINING PROGRAM

## 2024-11-18 NOTE — PROGRESS NOTES
"Answers submitted by the patient for this visit:  Other (Submitted on 2024)  Please describe your symptoms.: tenderness and soreness under my armpit.  Have you had these symptoms before?: No  How long have you been having these symptoms?: 1-2 weeks  Please list any medications you are currently taking for this condition.: None  Primary Reason for Visit (Submitted on 2024)  What is the primary reason for your visit?: Problem Not Listed      Follow Up Office Visit      Date: 2024   Patient Name: Sarah Howard  : 1955   MRN: [id number]     Chief Complaint:    Chief Complaint   Patient presents with    breast exam       History of Present Illness: Sarah Howard is a 69 y.o. female history of right sided breast cancer (3/2010), anxiety, CAD, HTN, HLD, GERD, depressionwho is here today for breast pain.    HPI  History of Present Illness  The patient presents for evaluation of pain and soreness in the armpit area and lateral breast.    She reports experiencing pain and soreness in the right armpit area, along with an occasional unusual \"twinge\" sensation under her left collar bone. She describes this sensation as a twinge, which is not particularly painful. She notes that when her arm touches the area, it feels uncomfortable and abnormal.     She has a history of breast cancer on the right side, diagnosed in , for which she underwent lumpectomies, chemotherapy with Cytoxan and Taxol, and radiation treatment. She does not report any skin changes to the breast, nipple discharge, or changes with the nipple. She also does not feel any bumps or lumps. She recalls that prior to her diagnosis, she was informed that the tumor was so deep that she would not have been able to feel it. She also remembers experiencing extreme soreness before her diagnosis.    Per prior notes:  \"She was diagnosed with breast cancer in , and was treated with chemotherapy and radiation. She states she did not have a " "mastectomy, but had 2 lumpectomies. She reports she had a reduction at the time. She states she had to have a complete abdominal hysterectomy. She reports they thought her cancer had spread, but this was a fibroid. She notes she received her chemotherapy at the [facility] [place] in [place]. She states her chemotherapy medications were Cytoxan and Taxol. She notes she does not follow with a oncologist anymore. She reports she does still have an annual mammogram \"    Subjective      Review of Systems:   Review of Systems    I have reviewed the patients family history, social history, past medical history, past surgical history and have updated it as appropriate.     Medications:     Current Outpatient Medications:     atorvastatin (LIPITOR) 20 MG tablet, Take 1 tablet by mouth Daily., Disp: 90 tablet, Rfl: 0    desvenlafaxine (Pristiq) 100 MG 24 hr tablet, Take 1 tablet by mouth Daily., Disp: 90 tablet, Rfl: 1    escitalopram (LEXAPRO) 20 MG tablet, Take 1 tablet by mouth Daily., Disp: 90 tablet, Rfl: 1    fluticasone (FLONASE) 50 MCG/ACT nasal spray, Administer 2 sprays into the nostril(s) as directed by provider Daily., Disp: 11 mL, Rfl: 0    hydrocortisone 1 % cream, Apply 1 Application topically to the appropriate area as directed 2 (Two) Times a Day., Disp: 45 g, Rfl: 1    latanoprost (XALATAN) 0.005 % ophthalmic solution, , Disp: , Rfl:     metoprolol succinate XL (Toprol XL) 25 MG 24 hr tablet, Take 1 tablet by mouth Daily., Disp: 90 tablet, Rfl: 1    olmesartan (BENICAR) 20 MG tablet, TAKE 1 TABLET BY MOUTH DAILY, Disp: 90 tablet, Rfl: 0    pantoprazole (PROTONIX) 40 MG EC tablet, Take 1 tablet by mouth Daily., Disp: 90 tablet, Rfl: 3    traZODone (DESYREL) 150 MG tablet, Take 1 tablet by mouth Daily., Disp: 90 tablet, Rfl: 1    Allergies:   No Known Allergies    Objective     Physical Exam: Please see above  Vital Signs:   Vitals:    11/18/24 1550   BP: 122/72   BP Location: Left arm "   Patient Position: Sitting   Cuff Size: Adult   Pulse: 95   Resp: 20   Temp: 97 °F (36.1 °C)   TempSrc: Temporal   SpO2: 97%   Weight: 87.8 kg (193 lb 8 oz)     Body mass index is 33.2 kg/m².    Physical Exam  Exam conducted with a chaperone present (ABEL Beach present).   Constitutional:       General: She is not in acute distress.     Appearance: Normal appearance. She is not ill-appearing, toxic-appearing or diaphoretic.   HENT:      Nose: Nose normal.      Mouth/Throat:      Mouth: Mucous membranes are moist.   Cardiovascular:      Rate and Rhythm: Normal rate and regular rhythm.   Pulmonary:      Effort: Pulmonary effort is normal. No respiratory distress.   Chest:      Chest wall: Deformity (well healed scar inferior to left breast) and tenderness present. No mass, lacerations, swelling, crepitus or edema.   Breasts:     Ben Score is 5.      Breasts are asymmetrical (due to prior surgical scars as above).      Right: Tenderness (along lateral right breast extending to axillary region) present. No swelling, bleeding, inverted nipple, mass, nipple discharge or skin change.      Left: Tenderness (along lateral left breast) present. No swelling, bleeding, inverted nipple, mass, nipple discharge or skin change.   Lymphadenopathy:      Cervical: No cervical adenopathy.      Upper Body:      Right upper body: No supraclavicular, axillary or pectoral adenopathy.      Left upper body: No supraclavicular, axillary or pectoral adenopathy.   Skin:     General: Skin is warm and dry.      Findings: No bruising, lesion or rash.   Neurological:      General: No focal deficit present.      Mental Status: She is alert and oriented to person, place, and time.   Psychiatric:         Mood and Affect: Mood normal.         Behavior: Behavior normal.       Physical Exam        Procedures    Results:   Labs:   Hemoglobin A1C   Date Value Ref Range Status   04/29/2024 5.90 (H) 4.80 - 5.60 % Final     TSH   Date Value Ref  Range Status   07/12/2024 2.390 0.270 - 4.200 uIU/mL Final      Results        Imaging:   No valid procedures specified.     Assessment / Plan      Assessment/Plan:   Problem List Items Addressed This Visit       History of breast cancer    Relevant Orders    Mammo Diagnostic Digital Tomosynthesis Bilateral With CAD     Other Visit Diagnoses       Breast pain, right    -  Primary    Relevant Orders    Mammo Diagnostic Digital Tomosynthesis Bilateral With CAD            Assessment & Plan  1. Breast pain  2. History of breast cancer  Unclear clinical significance, worsening  She reports experiencing pain and soreness in the armpit area, with occasional discomfort under the collarbone. Given her history of breast cancer in 2010, it is important to rule out any recurrence or new malignancy. A comprehensive clinical breast exam will be conducted today to identify any abnormal lymph nodes, bumps, or lumps; normal today other than known surgical scars and tenderness in axillary region. A diagnostic mammogram will also be performed to ensure there are no underlying issues.         Follow Up:   Return if symptoms worsen or fail to improve.      Dao Gomez MD  [facility] [place]    Patient or patient representative verbalized consent for the use of Ambient Listening during the visit with  Dao Gomez MD for chart documentation. 11/18/2024  16:37 EST

## 2024-11-21 ENCOUNTER — OFFICE VISIT (OUTPATIENT)
Dept: ORTHOPEDIC SURGERY | Facility: CLINIC | Age: 69
End: 2024-11-21
Payer: MEDICARE

## 2024-11-21 VITALS
BODY MASS INDEX: 33.12 KG/M2 | SYSTOLIC BLOOD PRESSURE: 122 MMHG | HEIGHT: 64 IN | DIASTOLIC BLOOD PRESSURE: 70 MMHG | WEIGHT: 194 LBS

## 2024-11-21 DIAGNOSIS — M17.12 PRIMARY OSTEOARTHRITIS OF LEFT KNEE: Primary | ICD-10-CM

## 2024-11-21 PROBLEM — M17.10 ARTHRITIS OF KNEE: Status: ACTIVE | Noted: 2024-11-21

## 2024-11-21 PROCEDURE — 1159F MED LIST DOCD IN RCRD: CPT | Performed by: ORTHOPAEDIC SURGERY

## 2024-11-21 PROCEDURE — 3078F DIAST BP <80 MM HG: CPT | Performed by: ORTHOPAEDIC SURGERY

## 2024-11-21 PROCEDURE — 1160F RVW MEDS BY RX/DR IN RCRD: CPT | Performed by: ORTHOPAEDIC SURGERY

## 2024-11-21 PROCEDURE — 3074F SYST BP LT 130 MM HG: CPT | Performed by: ORTHOPAEDIC SURGERY

## 2024-11-21 PROCEDURE — 99214 OFFICE O/P EST MOD 30 MIN: CPT | Performed by: ORTHOPAEDIC SURGERY

## 2024-11-21 RX ORDER — ACETAMINOPHEN 325 MG/1
1000 TABLET ORAL ONCE
OUTPATIENT
Start: 2024-11-21 | End: 2024-11-21

## 2024-11-21 RX ORDER — PREGABALIN 75 MG/1
75 CAPSULE ORAL ONCE
OUTPATIENT
Start: 2024-11-21 | End: 2024-11-21

## 2024-11-21 RX ORDER — CHLORHEXIDINE GLUCONATE 40 MG/ML
SOLUTION TOPICAL
Qty: 236 ML | Refills: 0 | Status: SHIPPED | OUTPATIENT
Start: 2024-11-21

## 2024-11-21 NOTE — PROGRESS NOTES
Orthopaedic Clinic Note: Knee Established Patient    Chief Complaint   Patient presents with    Follow-up     2 month follow up--Primary osteoarthritis of left knee         HPI    It has been 2  month(s) since Ms. Howard's last visit. She returns to clinic today for follow-up left knee osteoarthritis.  Patient has known end-stage arthritis of the left knee.  She underwent cortisone injection left knee 2 months ago.  The injection provided 6 weeks of relief.  Pain is returned.  She rates her pain 8/10 on the pain scale today.  She is here to discuss proceeding to total knee arthroplasty.      Past Medical History:   Diagnosis Date    Anxiety     Breast cancer     3/2010, right breast    Cancer Breast    2010    Cervical disc disorder ACDF 5/2020    Coronary artery disease 10/2020    Depression     Drug therapy     2010    Fracture, foot     GERD (gastroesophageal reflux disease) 2016    Glaucoma 2016    Hx of radiation therapy     2010    Hyperlipidemia 2022    Hypertension 2020    Knee swelling     Osteopenia 2009    Tear of meniscus of knee       Past Surgical History:   Procedure Laterality Date    ADENOIDECTOMY  1972    BACK SURGERY  2018    BREAST BIOPSY Right     3/2010    BREAST LUMPECTOMY Right     x2, 2010    BREAST SURGERY  2 x 2010    CARDIAC CATHETERIZATION  2020    COLONOSCOPY  2018    COSMETIC SURGERY  breast reduction    2010    EYE SURGERY  laser iridotomies    2019    HYSTERECTOMY  2011    NECK SURGERY      OOPHORECTOMY      REDUCTION MAMMAPLASTY Bilateral     2010 at time of lumpectomy    SPINE SURGERY  2019    TONSILLECTOMY  1972    TUBAL ABDOMINAL LIGATION  1982      Family History   Problem Relation Age of Onset    Colon cancer Mother 48    Stroke Mother     Thyroid disease Mother     Anxiety disorder Mother     Depression Mother     Cancer Mother     Diabetes Mother     Heart disease Father         mitral valve disease    Atrial fibrillation Brother     Alcohol abuse Maternal Grandfather      Breast cancer Neg Hx     Ovarian cancer Neg Hx      Social History     Socioeconomic History    Marital status:    Tobacco Use    Smoking status: Never     Passive exposure: Never    Smokeless tobacco: Never   Vaping Use    Vaping status: Never Used   Substance and Sexual Activity    Alcohol use: Yes     Alcohol/week: 8.0 standard drinks of alcohol     Types: 4 Glasses of wine, 4 Drinks containing 0.5 oz of alcohol per week     Comment: drinking alcohol makes me feel happier    Drug use: Never     Types: Marijuana    Sexual activity: Not Currently     Partners: Male     Birth control/protection: Post-menopausal, Tubal ligation, Hysterectomy      Current Outpatient Medications on File Prior to Visit   Medication Sig Dispense Refill    atorvastatin (LIPITOR) 20 MG tablet Take 1 tablet by mouth Daily. 90 tablet 0    desvenlafaxine (Pristiq) 100 MG 24 hr tablet Take 1 tablet by mouth Daily. 90 tablet 1    escitalopram (LEXAPRO) 20 MG tablet Take 1 tablet by mouth Daily. 90 tablet 1    fluticasone (FLONASE) 50 MCG/ACT nasal spray Administer 2 sprays into the nostril(s) as directed by provider Daily. 11 mL 0    hydrocortisone 1 % cream Apply 1 Application topically to the appropriate area as directed 2 (Two) Times a Day. 45 g 1    latanoprost (XALATAN) 0.005 % ophthalmic solution       metoprolol succinate XL (Toprol XL) 25 MG 24 hr tablet Take 1 tablet by mouth Daily. 90 tablet 1    olmesartan (BENICAR) 20 MG tablet TAKE 1 TABLET BY MOUTH DAILY 90 tablet 0    pantoprazole (PROTONIX) 40 MG EC tablet Take 1 tablet by mouth Daily. 90 tablet 3    traZODone (DESYREL) 150 MG tablet Take 1 tablet by mouth Daily. 90 tablet 1     No current facility-administered medications on file prior to visit.      No Known Allergies     Review of Systems   Constitutional: Negative.    HENT: Negative.     Eyes: Negative.    Respiratory: Negative.     Cardiovascular: Negative.    Gastrointestinal: Negative.    Endocrine: Negative.   "  Genitourinary: Negative.    Musculoskeletal:  Positive for arthralgias.   Skin: Negative.    Allergic/Immunologic: Negative.    Neurological: Negative.    Hematological: Negative.    Psychiatric/Behavioral: Negative.          The patient's Review of Systems was personally reviewed and confirmed as accurate.    Physical Exam  Blood pressure 122/70, height 162.6 cm (64.02\"), weight 88 kg (194 lb).    Body mass index is 33.28 kg/m².    GENERAL APPEARANCE: awake, alert, oriented, in no acute distress and well developed, well nourished  LUNGS:  breathing nonlabored  EXTREMITIES: no clubbing, cyanosis  PERIPHERAL PULSES: palpable dorsalis pedis and posterior tibial pulses bilaterally.    GAIT:  Antalgic        ----------  Left Knee Exam:  ----------  ALIGNMENT: severe varus, correctable to neutral  ----------  RANGE OF MOTION:  Decreased (5 - 115 degrees) with no extensor lag  LIGAMENTOUS STABILITY:   stable to varus and valgus stress at terminal extension and 30 degrees; retensioning of the MCL is appreciated with valgus stress at 30 degrees consistent with medial compartment degeneration  ----------  STRENGTH:  KNEE FLEXION 4/5  KNEE EXTENSION  4/5  ANKLE DORSIFLEXION  5/5  ANKLE PLANTARFLEXION  5/5  ----------  PAIN WITH PALPATION:global  KNEE EFFUSION: yes, trace effusion  PAIN WITH KNEE ROM: yes  PATELLAR CREPITUS:  yes, painful and symptomatic  ----------  SENSATION TO LIGHT TOUCH:  DEEP PERONEAL/SUPERFICIAL PERONEAL/SURAL/SAPHENOUS/TIBIAL:    intact  ----------  EDEMA:  no  ERYTHEMA:    no  WOUNDS/INCISIONS:   no  _____________________________________________________________________  _____________________________________________________________________    RADIOGRAPHIC FINDINGS:   No new imaging today.  Prior imaging of left knee from 9/19/2024 reveals end-stage osteoarthritis of the left knee with genu varum alignment and bone-on-bone articulation medial compartment (Kellgren-Jonathan grade " 4)    Assessment/Plan:   Diagnosis Plan   1. Primary osteoarthritis of left knee  Case Request    CBC and Differential    Comprehensive metabolic panel    Protime-INR    APTT    Hemoglobin A1c    ECG 12 Lead    Nicotine & Metabolite, Quant    Tranexamic Acid 1,000 mg in sodium chloride 0.9 % 100 mL    Tranexamic Acid 1,000 mg in sodium chloride 0.9 % 100 mL    ethyl alcohol 62 % 2 each    ceFAZolin (ANCEF) 2 g in sodium chloride 0.9 % 100 mL IVPB    acetaminophen (TYLENOL) tablet 975 mg    pregabalin (LYRICA) capsule 75 mg    Case Request    CT Lower Extremity Left Without Contrast        The patient has clinical and radiographic evidence of end-stage left knee joint degeneration. Conservative measures have been tried for 3 months or longer, but have failed to adequately treat or improve the patient's symptoms. Pain is restricting the patient's daily activities as well as quality of life. The recommendation at this time is to proceed with a left total knee arthroplasty with the goal to improve patient function and pain. The risks, benefits, potential complications, and alternatives were discussed with the patient in detail. Risks included but were not limited to bleeding, infection, anesthesia risks, damage to neurovascular structures, osteolysis, aseptic loosening, instability, dislocation, pain, continued pain, iatrogenic fracture, possible need for future surgery including the potential for amputation, blood clots, myocardial infarction, stroke, and death. Nancy-operative blood management and the potential for blood transfusion were discussed with risks and options clearly outlined. Specific details of the surgical procedure, hospitalization, recovery, rehabilitation, and long-term precautions were also presented. Pre-operative teaching was provided. Implant/prosthesis selection was outlined, and the many options available were explained; the final choice will be made at the time of the procedure to match the  anatomy and condition of the bone, ligaments, tendons, and muscles. Given this instruction, the patient elected to proceed with the left total knee arthroplasty. The patient will be seen by pre-admission testing for pre-operative optimization and risk assessment and will be scheduled for surgery once this is completed.    The patient is considered standard risk for DVT based on patient risk factors and will be placed on aspirin postoperatively for DVT prophylaxis.        Praful Baker MD  11/21/24  15:50 EST

## 2024-12-17 ENCOUNTER — HOSPITAL ENCOUNTER (OUTPATIENT)
Facility: HOSPITAL | Age: 69
Discharge: HOME | End: 2024-12-17
Admitting: STUDENT IN AN ORGANIZED HEALTH CARE EDUCATION/TRAINING PROGRAM
Payer: MEDICARE

## 2024-12-17 DIAGNOSIS — Z85.3 HISTORY OF BREAST CANCER: ICD-10-CM

## 2024-12-17 DIAGNOSIS — N64.4 BREAST PAIN, RIGHT: ICD-10-CM

## 2024-12-17 PROCEDURE — G0279 TOMOSYNTHESIS, MAMMO: HCPCS | Performed by: RADIOLOGY

## 2024-12-17 PROCEDURE — 77066 DX MAMMO INCL CAD BI: CPT | Performed by: RADIOLOGY

## 2024-12-17 PROCEDURE — 77066 DX MAMMO INCL CAD BI: CPT

## 2024-12-17 PROCEDURE — G0279 TOMOSYNTHESIS, MAMMO: HCPCS

## 2024-12-21 DIAGNOSIS — I10 PRIMARY HYPERTENSION: ICD-10-CM

## 2024-12-23 RX ORDER — OLMESARTAN MEDOXOMIL 20 MG/1
20 TABLET ORAL DAILY
Qty: 90 TABLET | Refills: 0 | Status: SHIPPED | OUTPATIENT
Start: 2024-12-23

## 2025-01-07 ENCOUNTER — TELEMEDICINE (OUTPATIENT)
Dept: PSYCHIATRY | Facility: CLINIC | Age: 70
End: 2025-01-07
Payer: MEDICARE

## 2025-01-07 DIAGNOSIS — F33.1 MAJOR DEPRESSIVE DISORDER, RECURRENT EPISODE, MODERATE: Primary | ICD-10-CM

## 2025-01-07 DIAGNOSIS — F41.1 GENERALIZED ANXIETY DISORDER: ICD-10-CM

## 2025-01-07 NOTE — PROGRESS NOTES
Date: 2025  Time In: 15:30 EST  Time out: 15:59 EST      This provider is located at [facility], [facility] [place], [place], [facility], using a secure [employer] Video Visit through [employer]. Patient is being seen remotely via telehealth at their home address is located in [place]. Patient stated they are in a secure environment for this session. The patient's condition being diagnosed and treated is appropriate for telemedicine. The provider identified themself as well as their credentials. The patient, or  patient's legal guardian consent to be seen remotely, and when consent is given they understand that the consent allows for patient identifiable information to be sent to a third party as needed. They may refuse to be seen remotely at any time. The electronic data is encrypted and password protected, and the patient's or  legal guardian has been advised of the potential risks to privacy not withstanding such measures.   PT Identifiers used: Name and .    You have chosen to receive care through a telehealth visit.  Do you consent to use a video/audio connection for your medical care today? Yes     Subjective   Sarah Howard is a 69 y.o. female who presents today for follow up    Chief Complaint: anxiety, depression     History of Present Illness: Client discussed how things have been since last session.  Client shared that she has been working on establishing emotional boundaries with each of her adult kids.  She is proud of the work she has done and also  proud of the work that each of her kids has been doing.  Client feels overall she is currently doing well.  Some anxiety about upcoming knee surgery at the end of January       Clinical Maneuvering/Intervention:    On a scale 0-10 ( with 10 being the worst)  Anxiety: 2/10  Depression: 2/10    Sleep: No current concerns with sleep    Appetite: No current eating issues      Assisted patient in processing above session content;  acknowledged and normalized patient’s thoughts, feelings, and concerns.  Rationalized patient thought process regarding concerns presented at session.  Discussed triggers associated with patient's  anxiety  and depression  Also discussed coping skills for patient to implement such as self care , positive self talk , and boundaries    Allowed patient to freely discuss issues without interruption or judgment. Provided safe, confidential environment to facilitate the development of positive therapeutic relationship and encourage open, honest communication. Assisted patient in identifying risk factors which would indicate the need for higher level of care including thoughts to harm self or others and/or self-harming behavior and encouraged patient to contact this office, call 911, or present to the nearest emergency room should any of these events occur. Discussed crisis intervention services and means to access. Patient adamantly and convincingly denies current suicidal or homicidal ideation or perceptual disturbance.    Assessment:     Patient appears to maintain relative stability as compared to their baseline.  However, patient continues to struggle with anxiety and depression which continues to cause impairment in important areas of functioning.  A result, they can be reasonably expected to continue to benefit from treatment and would likely be at increased risk for decompensation otherwise.        Mental Status Exam:   Hygiene:   good  Cooperation:  Cooperative  Eye Contact:  Good  Psychomotor Behavior:  Appropriate  Affect:  Appropriate  Mood: normal  Speech:  Normal  Thought Process:  Goal directed and Linear  Thought Content:  Normal  Suicidal:  None  Homicidal:  None  Hallucinations:  None  Delusion:  None  Memory:  Intact  Orientation:  Person, Place, Time, and Situation  Reliability:  good  Insight:  Good  Judgement:  Good  Impulse Control:  Good  Physical/Medical Issues:   Client having knee surgery at the  end of January        Patient's Support Network Includes:  , children, and extended family    Functional Status: Mild impairment     Progress toward goal: Not at goal    Prognosis: Fair with Ongoing Treatment         Plan:    Patient will continue in individual outpatient therapy with focus on improved functioning and coping skills, maintaining stability, and avoiding decompensation and the need for higher level of care.    Patient will adhere to medication regimen as prescribed and report any side effects. Patient will contact this office, call 911 or present to the nearest emergency room should suicidal or homicidal ideations occur. Provide Cognitive Behavioral Therapy and Solution Focused Therapy to improve functioning, maintain stability, and avoid decompensation and the need for higher level of care.     Return in about 2 months (around 3/7/2025).      VISIT DIAGNOSIS:    Diagnosis Plan   1. Major depressive disorder, recurrent episode, moderate        2. Generalized anxiety disorder               This document has been electronically signed by Chanelle Goodson LCSW  January 7, 2025      Part of this note may be an electronic transcription/translation of spoken language to printed text using the Dragon Dictation System.

## 2025-01-08 ENCOUNTER — HOSPITAL ENCOUNTER (OUTPATIENT)
Facility: HOSPITAL | Age: 70
Discharge: HOME | End: 2025-01-08
Admitting: RADIOLOGY
Payer: MEDICARE

## 2025-01-08 DIAGNOSIS — M17.12 PRIMARY OSTEOARTHRITIS OF LEFT KNEE: Primary | ICD-10-CM

## 2025-01-08 DIAGNOSIS — R92.8 ABNORMAL MAMMOGRAM: ICD-10-CM

## 2025-01-08 PROCEDURE — G0279 TOMOSYNTHESIS, MAMMO: HCPCS

## 2025-01-08 PROCEDURE — 77066 DX MAMMO INCL CAD BI: CPT

## 2025-01-08 RX ORDER — FLUTICASONE PROPIONATE 50 MCG
2 SPRAY, SUSPENSION (ML) NASAL DAILY
Qty: 16 G | Refills: 3 | Status: SHIPPED | OUTPATIENT
Start: 2025-01-08

## 2025-01-13 ENCOUNTER — PRE-ADMISSION TESTING (OUTPATIENT)
Dept: PREADMISSION TESTING | Facility: HOSPITAL | Age: 70
End: 2025-01-13
Payer: MEDICARE

## 2025-01-13 ENCOUNTER — HOSPITAL ENCOUNTER (OUTPATIENT)
Dept: CT IMAGING | Facility: HOSPITAL | Age: 70
Discharge: HOME | End: 2025-01-13
Payer: MEDICARE

## 2025-01-13 VITALS — WEIGHT: 192.02 LBS | BODY MASS INDEX: 32.78 KG/M2 | HEIGHT: 64 IN

## 2025-01-13 DIAGNOSIS — M17.12 PRIMARY OSTEOARTHRITIS OF LEFT KNEE: ICD-10-CM

## 2025-01-13 LAB
ALBUMIN SERPL-MCNC: 4.5 G/DL (ref 3.5–5.2)
ALBUMIN/GLOB SERPL: 1.7 G/DL
ALP SERPL-CCNC: 82 U/L (ref 39–117)
ALT SERPL W P-5'-P-CCNC: 35 U/L (ref 1–33)
ANION GAP SERPL CALCULATED.3IONS-SCNC: 14 MMOL/L (ref 5–15)
APTT PPP: 25.3 SECONDS (ref 22–39)
AST SERPL-CCNC: 29 U/L (ref 1–32)
BASOPHILS # BLD AUTO: 0.07 10*3/MM3 (ref 0–0.2)
BASOPHILS NFR BLD AUTO: 1 % (ref 0–1.5)
BILIRUB SERPL-MCNC: 0.6 MG/DL (ref 0–1.2)
BUN SERPL-MCNC: 17 MG/DL (ref 8–23)
BUN/CREAT SERPL: 16.2 (ref 7–25)
CALCIUM SPEC-SCNC: 10.1 MG/DL (ref 8.6–10.5)
CHLORIDE SERPL-SCNC: 102 MMOL/L (ref 98–107)
CO2 SERPL-SCNC: 23 MMOL/L (ref 22–29)
CREAT SERPL-MCNC: 1.05 MG/DL (ref 0.57–1)
DEPRECATED RDW RBC AUTO: 43 FL (ref 37–54)
EGFRCR SERPLBLD CKD-EPI 2021: 57.3 ML/MIN/1.73
EOSINOPHIL # BLD AUTO: 0.14 10*3/MM3 (ref 0–0.4)
EOSINOPHIL NFR BLD AUTO: 2 % (ref 0.3–6.2)
ERYTHROCYTE [DISTWIDTH] IN BLOOD BY AUTOMATED COUNT: 11.6 % (ref 12.3–15.4)
GLOBULIN UR ELPH-MCNC: 2.6 GM/DL
GLUCOSE SERPL-MCNC: 119 MG/DL (ref 65–99)
HBA1C MFR BLD: 5.4 % (ref 4.8–5.6)
HCT VFR BLD AUTO: 42.6 % (ref 34–46.6)
HGB BLD-MCNC: 13.7 G/DL (ref 12–15.9)
IMM GRANULOCYTES # BLD AUTO: 0.02 10*3/MM3 (ref 0–0.05)
IMM GRANULOCYTES NFR BLD AUTO: 0.3 % (ref 0–0.5)
INR PPP: 1.01 (ref 0.89–1.12)
LYMPHOCYTES # BLD AUTO: 3.31 10*3/MM3 (ref 0.7–3.1)
LYMPHOCYTES NFR BLD AUTO: 47.1 % (ref 19.6–45.3)
MCH RBC QN AUTO: 32.5 PG (ref 26.6–33)
MCHC RBC AUTO-ENTMCNC: 32.2 G/DL (ref 31.5–35.7)
MCV RBC AUTO: 101.2 FL (ref 79–97)
MONOCYTES # BLD AUTO: 0.71 10*3/MM3 (ref 0.1–0.9)
MONOCYTES NFR BLD AUTO: 10.1 % (ref 5–12)
NEUTROPHILS NFR BLD AUTO: 2.78 10*3/MM3 (ref 1.7–7)
NEUTROPHILS NFR BLD AUTO: 39.5 % (ref 42.7–76)
NRBC BLD AUTO-RTO: 0 /100 WBC (ref 0–0.2)
PLATELET # BLD AUTO: 241 10*3/MM3 (ref 140–450)
PMV BLD AUTO: 12.3 FL (ref 6–12)
POTASSIUM SERPL-SCNC: 4.6 MMOL/L (ref 3.5–5.2)
PROT SERPL-MCNC: 7.1 G/DL (ref 6–8.5)
PROTHROMBIN TIME: 13.4 SECONDS (ref 12.2–14.5)
QT INTERVAL: 402 MS
QTC INTERVAL: 411 MS
RBC # BLD AUTO: 4.21 10*6/MM3 (ref 3.77–5.28)
SODIUM SERPL-SCNC: 139 MMOL/L (ref 136–145)
WBC NRBC COR # BLD AUTO: 7.03 10*3/MM3 (ref 3.4–10.8)

## 2025-01-13 PROCEDURE — 85610 PROTHROMBIN TIME: CPT

## 2025-01-13 PROCEDURE — 85025 COMPLETE CBC W/AUTO DIFF WBC: CPT

## 2025-01-13 PROCEDURE — 80053 COMPREHEN METABOLIC PANEL: CPT

## 2025-01-13 PROCEDURE — 83036 HEMOGLOBIN GLYCOSYLATED A1C: CPT

## 2025-01-13 PROCEDURE — 85730 THROMBOPLASTIN TIME PARTIAL: CPT

## 2025-01-13 PROCEDURE — 73700 CT LOWER EXTREMITY W/O DYE: CPT

## 2025-01-13 PROCEDURE — 93005 ELECTROCARDIOGRAM TRACING: CPT

## 2025-01-13 PROCEDURE — 36415 COLL VENOUS BLD VENIPUNCTURE: CPT

## 2025-01-13 PROCEDURE — G0480 DRUG TEST DEF 1-7 CLASSES: HCPCS

## 2025-01-13 NOTE — PAT
An arrival time for procedure was not provided during PAT visit. If patient had any questions or concerns about their arrival time, they were instructed to contact their surgeon/physician.  Additionally, if the patient referred to an arrival time that was acquired from their my chart account, patient was encouraged to verify that time with their surgeon/physician. Arrival times are NOT provided in Pre Admission Testing Department.    Per Anesthesia Request, patient instructed not to take their ACE/ARB medications on the AM of surgery.    Patient instructed to drink 20 ounces of Gatorade or Gatorlyte (if diabetic) and it needs to be completed 1 hour (for Main OR patients) or 2 hours (scheduled  section & [facility] patients) before given arrival time for procedure (NO RED Gatorade and NO Gatorade Zero).    Patient verbalized understanding.      Patient's surgeon called in a prescription for Chlorhexidine wash to patient's pharmacy.  Patient verbalized that she had already picked up the Chlorhexidine.  Verbal and written instructions given regarding proper use of the Chlorhexidine wash provided to patient and/or donglily during PAT visit. Patient/family also instructed to complete Chlorhexidine wash checklist and return it to Pre-op on the day of surgery.  Patient and/or family verbalized understanding.     Patient to apply Chlorhexadine wipes  to surgical area (as instructed) the night before procedure and the AM of procedure. Wipes provided.    Discussed with patient options for receiving total joint replacement education and assessed patient's ability and preference. Joint Replacement Guide given to patient during PAT visit since not received a copy within the last year. Encouraged patient/family to read guide thoroughly and notify PAT staff with any questions or concerns. Handout provided directing patient to links to watch online videos related to joint replacement surgery on the [facility] website. The  handout gives detailed instructions for joining an online joint replacement class through Microsoft Teams or phone conference offered on the 1st and 3rd Thursdays of the month. Patient agreed to participate by watching videos online. Patient verbalized understanding of instructions. Encouraged to share information with family and/or . An overview of the joint replacement education was provided during the visit including general perioperative instructions that are routine for all surgical patients (PAT PASS, wipes, directions to pre-op, etc.).

## 2025-01-17 LAB
COTININE SERPL-MCNC: <1 NG/ML
NICOTINE SERPL-MCNC: <1 NG/ML

## 2025-01-26 ENCOUNTER — ANESTHESIA EVENT (OUTPATIENT)
Dept: PERIOP | Facility: HOSPITAL | Age: 70
End: 2025-01-26
Payer: MEDICARE

## 2025-01-27 ENCOUNTER — HOSPITAL ENCOUNTER (OUTPATIENT)
Facility: HOSPITAL | Age: 70
Discharge: HOME | End: 2025-01-30
Attending: ORTHOPAEDIC SURGERY | Admitting: ORTHOPAEDIC SURGERY
Payer: MEDICARE

## 2025-01-27 ENCOUNTER — ANESTHESIA (OUTPATIENT)
Dept: PERIOP | Facility: HOSPITAL | Age: 70
End: 2025-01-27
Payer: MEDICARE

## 2025-01-27 ENCOUNTER — ANESTHESIA EVENT CONVERTED (OUTPATIENT)
Dept: ANESTHESIOLOGY | Facility: HOSPITAL | Age: 70
End: 2025-01-27
Payer: MEDICARE

## 2025-01-27 ENCOUNTER — APPOINTMENT (OUTPATIENT)
Dept: GENERAL RADIOLOGY | Facility: HOSPITAL | Age: 70
End: 2025-01-27
Payer: MEDICARE

## 2025-01-27 DIAGNOSIS — M17.12 PRIMARY OSTEOARTHRITIS OF LEFT KNEE: ICD-10-CM

## 2025-01-27 DIAGNOSIS — Z96.652 S/P TKR (TOTAL KNEE REPLACEMENT), LEFT: Primary | ICD-10-CM

## 2025-01-27 PROBLEM — M17.10 ARTHRITIS OF KNEE: Status: RESOLVED | Noted: 2024-11-21 | Resolved: 2025-01-27

## 2025-01-27 PROBLEM — E78.5 HYPERLIPIDEMIA: Status: ACTIVE | Noted: 2025-01-27

## 2025-01-27 PROBLEM — M17.10 ARTHRITIS OF KNEE: Status: ACTIVE | Noted: 2025-01-27

## 2025-01-27 LAB — GLUCOSE BLDC GLUCOMTR-MCNC: 78 MG/DL (ref 70–130)

## 2025-01-27 PROCEDURE — A9270 NON-COVERED ITEM OR SERVICE: HCPCS | Performed by: ORTHOPAEDIC SURGERY

## 2025-01-27 PROCEDURE — 63710000001 POVIDONE-IODINE 10 % SOLUTION 30 ML BOTTLE: Performed by: ORTHOPAEDIC SURGERY

## 2025-01-27 PROCEDURE — C1776 JOINT DEVICE (IMPLANTABLE): HCPCS | Performed by: ORTHOPAEDIC SURGERY

## 2025-01-27 PROCEDURE — 63710000001 MELOXICAM 15 MG TABLET: Performed by: ORTHOPAEDIC SURGERY

## 2025-01-27 PROCEDURE — A9270 NON-COVERED ITEM OR SERVICE: HCPCS | Performed by: INTERNAL MEDICINE

## 2025-01-27 PROCEDURE — 20985 CPTR-ASST DIR MS PX: CPT | Performed by: ORTHOPAEDIC SURGERY

## 2025-01-27 PROCEDURE — 97161 PT EVAL LOW COMPLEX 20 MIN: CPT

## 2025-01-27 PROCEDURE — 63710000001 ACETAMINOPHEN 500 MG TABLET: Performed by: ORTHOPAEDIC SURGERY

## 2025-01-27 PROCEDURE — 63710000001 TRAZODONE 100 MG TABLET: Performed by: INTERNAL MEDICINE

## 2025-01-27 PROCEDURE — 63710000001 OXYCODONE 5 MG TABLET: Performed by: ORTHOPAEDIC SURGERY

## 2025-01-27 PROCEDURE — 25010000002 CEFAZOLIN PER 500 MG: Performed by: ORTHOPAEDIC SURGERY

## 2025-01-27 PROCEDURE — 97110 THERAPEUTIC EXERCISES: CPT

## 2025-01-27 PROCEDURE — 0055T BONE SRGRY CMPTR CT/MRI IMAG: CPT | Performed by: ORTHOPAEDIC SURGERY

## 2025-01-27 PROCEDURE — 25010000002 PROPOFOL 10 MG/ML EMULSION: Performed by: ANESTHESIOLOGY

## 2025-01-27 PROCEDURE — G0378 HOSPITAL OBSERVATION PER HR: HCPCS

## 2025-01-27 PROCEDURE — 63710000001 ACETAMINOPHEN EXTRA STRENGTH 500 MG TABLET: Performed by: ORTHOPAEDIC SURGERY

## 2025-01-27 PROCEDURE — 25010000002 MEPIVACAINE HCL (PF) 1.5 % SOLUTION: Performed by: ANESTHESIOLOGY

## 2025-01-27 PROCEDURE — A4648 IMPLANTABLE TISSUE MARKER: HCPCS | Performed by: ORTHOPAEDIC SURGERY

## 2025-01-27 PROCEDURE — 63710000001 FAMOTIDINE 20 MG TABLET: Performed by: ANESTHESIOLOGY

## 2025-01-27 PROCEDURE — 63710000001 DESVENLAFAXINE 50 MG TABLET SUSTAINED-RELEASE 24 HOUR: Performed by: INTERNAL MEDICINE

## 2025-01-27 PROCEDURE — 25010000002 BUPIVACAINE (PF) 0.25 % SOLUTION: Performed by: NURSE ANESTHETIST, CERTIFIED REGISTERED

## 2025-01-27 PROCEDURE — 27447 TOTAL KNEE ARTHROPLASTY: CPT

## 2025-01-27 PROCEDURE — 63710000001 ATORVASTATIN 20 MG TABLET: Performed by: INTERNAL MEDICINE

## 2025-01-27 PROCEDURE — 25010000002 ROPIVACAINE HCL-NACL 0.2-0.9 % SOLUTION: Performed by: ORTHOPAEDIC SURGERY

## 2025-01-27 PROCEDURE — 25010000002 LIDOCAIN 0.5%-EPINEPHRINE 1:200000 0.5 %-1:200000 SOLUTION 50 ML VIAL: Performed by: ORTHOPAEDIC SURGERY

## 2025-01-27 PROCEDURE — 25010000002 KETOROLAC TROMETHAMINE PER 15 MG: Performed by: ORTHOPAEDIC SURGERY

## 2025-01-27 PROCEDURE — 27447 TOTAL KNEE ARTHROPLASTY: CPT | Performed by: ORTHOPAEDIC SURGERY

## 2025-01-27 PROCEDURE — 25010000002 ROPIVACAINE PER 1 MG: Performed by: ORTHOPAEDIC SURGERY

## 2025-01-27 PROCEDURE — 63710000001 PREGABALIN 75 MG CAPSULE: Performed by: ORTHOPAEDIC SURGERY

## 2025-01-27 PROCEDURE — 25010000002 LIDOCAINE PF 1% 1 % SOLUTION: Performed by: ANESTHESIOLOGY

## 2025-01-27 PROCEDURE — 25010000002 ROPIVACAINE HCL-NACL 0.2-0.9 % SOLUTION: Performed by: NURSE ANESTHETIST, CERTIFIED REGISTERED

## 2025-01-27 PROCEDURE — 25010000002 DEXAMETHASONE PER 1 MG: Performed by: ANESTHESIOLOGY

## 2025-01-27 PROCEDURE — 82948 REAGENT STRIP/BLOOD GLUCOSE: CPT

## 2025-01-27 PROCEDURE — 63710000001 ESCITALOPRAM 20 MG TABLET: Performed by: INTERNAL MEDICINE

## 2025-01-27 PROCEDURE — 25810000003 LACTATED RINGERS PER 1000 ML: Performed by: ANESTHESIOLOGY

## 2025-01-27 PROCEDURE — 73560 X-RAY EXAM OF KNEE 1 OR 2: CPT

## 2025-01-27 PROCEDURE — A9270 NON-COVERED ITEM OR SERVICE: HCPCS | Performed by: ANESTHESIOLOGY

## 2025-01-27 PROCEDURE — 63710000001 TRAZODONE 50 MG TABLET: Performed by: INTERNAL MEDICINE

## 2025-01-27 PROCEDURE — 25010000002 ONDANSETRON PER 1 MG: Performed by: ANESTHESIOLOGY

## 2025-01-27 PROCEDURE — 25010000002 MORPHINE PER 10 MG: Performed by: ORTHOPAEDIC SURGERY

## 2025-01-27 PROCEDURE — 20985 CPTR-ASST DIR MS PX: CPT

## 2025-01-27 DEVICE — IMPLANTABLE DEVICE: Type: IMPLANTABLE DEVICE | Site: KNEE | Status: FUNCTIONAL

## 2025-01-27 DEVICE — CRUCIATE RETAINING FEMORAL
Type: IMPLANTABLE DEVICE | Site: KNEE | Status: FUNCTIONAL
Brand: TRIATHLON

## 2025-01-27 DEVICE — TIBIAL BEARING INSERT - CS
Type: IMPLANTABLE DEVICE | Site: KNEE | Status: FUNCTIONAL
Brand: TRIATHLON

## 2025-01-27 DEVICE — STRATAFIX (SPIRAL PDS PLUS), BIDIRECTIONAL
Type: IMPLANTABLE DEVICE | Site: KNEE | Status: FUNCTIONAL
Brand: STRATAFIX

## 2025-01-27 DEVICE — TIBIAL COMPONENT
Type: IMPLANTABLE DEVICE | Site: KNEE | Status: FUNCTIONAL
Brand: TRIATHLON

## 2025-01-27 DEVICE — PATELLA
Type: IMPLANTABLE DEVICE | Site: KNEE | Status: FUNCTIONAL
Brand: TRIATHLON

## 2025-01-27 RX ORDER — ASPIRIN 81 MG/1
81 TABLET ORAL 2 TIMES DAILY
Qty: 60 TABLET | Refills: 0 | Status: SHIPPED | OUTPATIENT
Start: 2025-01-28

## 2025-01-27 RX ORDER — SODIUM CHLORIDE 0.9 % (FLUSH) 0.9 %
3 SYRINGE (ML) INJECTION EVERY 12 HOURS SCHEDULED
Status: DISCONTINUED | OUTPATIENT
Start: 2025-01-27 | End: 2025-01-30 | Stop reason: HOSPADM

## 2025-01-27 RX ORDER — MELOXICAM 15 MG/1
15 TABLET ORAL DAILY
Status: DISCONTINUED | OUTPATIENT
Start: 2025-01-27 | End: 2025-01-30 | Stop reason: HOSPADM

## 2025-01-27 RX ORDER — BUPIVACAINE HCL/0.9 % NACL/PF 0.125 %
PLASTIC BAG, INJECTION (ML) EPIDURAL AS NEEDED
Status: DISCONTINUED | OUTPATIENT
Start: 2025-01-27 | End: 2025-01-27 | Stop reason: SURG

## 2025-01-27 RX ORDER — MELOXICAM 15 MG/1
15 TABLET ORAL DAILY
Qty: 15 TABLET | Refills: 0 | Status: SHIPPED | OUTPATIENT
Start: 2025-01-27 | End: 2025-02-11

## 2025-01-27 RX ORDER — SODIUM CHLORIDE, SODIUM LACTATE, POTASSIUM CHLORIDE, CALCIUM CHLORIDE 600; 310; 30; 20 MG/100ML; MG/100ML; MG/100ML; MG/100ML
9 INJECTION, SOLUTION INTRAVENOUS CONTINUOUS
Status: ACTIVE | OUTPATIENT
Start: 2025-01-28 | End: 2025-01-28

## 2025-01-27 RX ORDER — ONDANSETRON 2 MG/ML
INJECTION INTRAMUSCULAR; INTRAVENOUS AS NEEDED
Status: DISCONTINUED | OUTPATIENT
Start: 2025-01-27 | End: 2025-01-27 | Stop reason: SURG

## 2025-01-27 RX ORDER — CEFAZOLIN SODIUM 2 G/100ML
2 INJECTION, SOLUTION INTRAVENOUS EVERY 8 HOURS
Status: DISCONTINUED | OUTPATIENT
Start: 2025-01-27 | End: 2025-01-27

## 2025-01-27 RX ORDER — ONDANSETRON 2 MG/ML
4 INJECTION INTRAMUSCULAR; INTRAVENOUS EVERY 6 HOURS PRN
Status: DISCONTINUED | OUTPATIENT
Start: 2025-01-27 | End: 2025-01-30 | Stop reason: HOSPADM

## 2025-01-27 RX ORDER — ROPIVACAINE HYDROCHLORIDE 2 MG/ML
2 INJECTION, SOLUTION EPIDURAL; INFILTRATION; PERINEURAL CONTINUOUS
Start: 2025-01-27

## 2025-01-27 RX ORDER — ONDANSETRON 2 MG/ML
4 INJECTION INTRAMUSCULAR; INTRAVENOUS ONCE AS NEEDED
Status: DISCONTINUED | OUTPATIENT
Start: 2025-01-27 | End: 2025-01-27

## 2025-01-27 RX ORDER — PANTOPRAZOLE SODIUM 40 MG/1
40 TABLET, DELAYED RELEASE ORAL
Status: DISCONTINUED | OUTPATIENT
Start: 2025-01-28 | End: 2025-01-30 | Stop reason: HOSPADM

## 2025-01-27 RX ORDER — ACETAMINOPHEN 500 MG
1000 TABLET ORAL ONCE
Status: COMPLETED | OUTPATIENT
Start: 2025-01-27 | End: 2025-01-27

## 2025-01-27 RX ORDER — FAMOTIDINE 10 MG/ML
20 INJECTION, SOLUTION INTRAVENOUS ONCE
Status: CANCELLED | OUTPATIENT
Start: 2025-01-27 | End: 2025-01-27

## 2025-01-27 RX ORDER — ACETAMINOPHEN 500 MG
1000 TABLET ORAL EVERY 8 HOURS SCHEDULED
Status: DISCONTINUED | OUTPATIENT
Start: 2025-01-27 | End: 2025-01-30 | Stop reason: HOSPADM

## 2025-01-27 RX ORDER — BUPIVACAINE HYDROCHLORIDE 2.5 MG/ML
INJECTION, SOLUTION EPIDURAL; INFILTRATION; INTRACAUDAL
Status: DISCONTINUED | OUTPATIENT
Start: 2025-01-27 | End: 2025-01-27 | Stop reason: SURG

## 2025-01-27 RX ORDER — FENTANYL CITRATE 50 UG/ML
50 INJECTION, SOLUTION INTRAMUSCULAR; INTRAVENOUS
Status: DISCONTINUED | OUTPATIENT
Start: 2025-01-27 | End: 2025-01-27

## 2025-01-27 RX ORDER — CEFAZOLIN SODIUM 1 G/3ML
INJECTION, POWDER, FOR SOLUTION INTRAMUSCULAR; INTRAVENOUS
Status: DISPENSED
Start: 2025-01-27 | End: 2025-01-27

## 2025-01-27 RX ORDER — FAMOTIDINE 20 MG/1
20 TABLET, FILM COATED ORAL ONCE
Status: COMPLETED | OUTPATIENT
Start: 2025-01-27 | End: 2025-01-27

## 2025-01-27 RX ORDER — MIDAZOLAM HYDROCHLORIDE 1 MG/ML
0.5 INJECTION, SOLUTION INTRAMUSCULAR; INTRAVENOUS
Status: DISCONTINUED | OUTPATIENT
Start: 2025-01-27 | End: 2025-01-27 | Stop reason: HOSPADM

## 2025-01-27 RX ORDER — ESCITALOPRAM OXALATE 20 MG/1
20 TABLET ORAL DAILY
Status: DISCONTINUED | OUTPATIENT
Start: 2025-01-27 | End: 2025-01-30 | Stop reason: HOSPADM

## 2025-01-27 RX ORDER — LABETALOL HYDROCHLORIDE 5 MG/ML
10 INJECTION, SOLUTION INTRAVENOUS EVERY 4 HOURS PRN
Status: DISCONTINUED | OUTPATIENT
Start: 2025-01-27 | End: 2025-01-30 | Stop reason: HOSPADM

## 2025-01-27 RX ORDER — LOSARTAN POTASSIUM 25 MG/1
25 TABLET ORAL
Status: DISCONTINUED | OUTPATIENT
Start: 2025-01-28 | End: 2025-01-30 | Stop reason: HOSPADM

## 2025-01-27 RX ORDER — ACETAMINOPHEN 500 MG
1000 TABLET ORAL EVERY 8 HOURS
Qty: 60 TABLET | Refills: 0 | Status: SHIPPED | OUTPATIENT
Start: 2025-01-27

## 2025-01-27 RX ORDER — NALOXONE HCL 0.4 MG/ML
0.1 VIAL (ML) INJECTION
Status: DISCONTINUED | OUTPATIENT
Start: 2025-01-27 | End: 2025-01-30 | Stop reason: HOSPADM

## 2025-01-27 RX ORDER — OXYCODONE HYDROCHLORIDE 10 MG/1
10 TABLET ORAL EVERY 4 HOURS PRN
Status: DISCONTINUED | OUTPATIENT
Start: 2025-01-27 | End: 2025-01-30 | Stop reason: HOSPADM

## 2025-01-27 RX ORDER — SODIUM CHLORIDE 0.9 % (FLUSH) 0.9 %
10 SYRINGE (ML) INJECTION EVERY 12 HOURS SCHEDULED
Status: DISCONTINUED | OUTPATIENT
Start: 2025-01-27 | End: 2025-01-27 | Stop reason: HOSPADM

## 2025-01-27 RX ORDER — DOCUSATE SODIUM 100 MG/1
100 CAPSULE, LIQUID FILLED ORAL 2 TIMES DAILY
Qty: 30 CAPSULE | Refills: 0 | Status: SHIPPED | OUTPATIENT
Start: 2025-01-27 | End: 2025-02-11

## 2025-01-27 RX ORDER — ASPIRIN 81 MG/1
81 TABLET ORAL EVERY 12 HOURS SCHEDULED
Status: DISCONTINUED | OUTPATIENT
Start: 2025-01-28 | End: 2025-01-30 | Stop reason: HOSPADM

## 2025-01-27 RX ORDER — METOPROLOL SUCCINATE 25 MG/1
25 TABLET, EXTENDED RELEASE ORAL NIGHTLY
Status: DISCONTINUED | OUTPATIENT
Start: 2025-01-27 | End: 2025-01-30 | Stop reason: HOSPADM

## 2025-01-27 RX ORDER — LIDOCAINE HYDROCHLORIDE 10 MG/ML
INJECTION, SOLUTION EPIDURAL; INFILTRATION; INTRACAUDAL; PERINEURAL AS NEEDED
Status: DISCONTINUED | OUTPATIENT
Start: 2025-01-27 | End: 2025-01-27 | Stop reason: SURG

## 2025-01-27 RX ORDER — ONDANSETRON 4 MG/1
4 TABLET, ORALLY DISINTEGRATING ORAL EVERY 6 HOURS PRN
Status: DISCONTINUED | OUTPATIENT
Start: 2025-01-27 | End: 2025-01-30 | Stop reason: HOSPADM

## 2025-01-27 RX ORDER — ATORVASTATIN CALCIUM 20 MG/1
20 TABLET, FILM COATED ORAL DAILY
Status: DISCONTINUED | OUTPATIENT
Start: 2025-01-27 | End: 2025-01-30 | Stop reason: HOSPADM

## 2025-01-27 RX ORDER — PREGABALIN 75 MG/1
75 CAPSULE ORAL ONCE
Status: COMPLETED | OUTPATIENT
Start: 2025-01-27 | End: 2025-01-27

## 2025-01-27 RX ORDER — SODIUM CHLORIDE 9 MG/ML
INJECTION, SOLUTION INTRAVENOUS
Status: DISPENSED
Start: 2025-01-27 | End: 2025-01-27

## 2025-01-27 RX ORDER — ROPIVACAINE HYDROCHLORIDE 2 MG/ML
INJECTION, SOLUTION EPIDURAL; INFILTRATION; PERINEURAL CONTINUOUS
Status: DISCONTINUED | OUTPATIENT
Start: 2025-01-27 | End: 2025-01-30 | Stop reason: HOSPADM

## 2025-01-27 RX ORDER — DEXAMETHASONE SODIUM PHOSPHATE 4 MG/ML
INJECTION, SOLUTION INTRA-ARTICULAR; INTRALESIONAL; INTRAMUSCULAR; INTRAVENOUS; SOFT TISSUE AS NEEDED
Status: DISCONTINUED | OUTPATIENT
Start: 2025-01-27 | End: 2025-01-27 | Stop reason: SURG

## 2025-01-27 RX ORDER — MAGNESIUM HYDROXIDE 1200 MG/15ML
LIQUID ORAL AS NEEDED
Status: DISCONTINUED | OUTPATIENT
Start: 2025-01-27 | End: 2025-01-27 | Stop reason: HOSPADM

## 2025-01-27 RX ORDER — PROPOFOL 10 MG/ML
VIAL (ML) INTRAVENOUS AS NEEDED
Status: DISCONTINUED | OUTPATIENT
Start: 2025-01-27 | End: 2025-01-27 | Stop reason: SURG

## 2025-01-27 RX ORDER — OXYCODONE HYDROCHLORIDE 5 MG/1
5 TABLET ORAL EVERY 4 HOURS PRN
Status: DISCONTINUED | OUTPATIENT
Start: 2025-01-27 | End: 2025-01-30 | Stop reason: HOSPADM

## 2025-01-27 RX ORDER — LIDOCAINE HYDROCHLORIDE 10 MG/ML
0.5 INJECTION, SOLUTION EPIDURAL; INFILTRATION; INTRACAUDAL; PERINEURAL ONCE AS NEEDED
Status: COMPLETED | OUTPATIENT
Start: 2025-01-27 | End: 2025-01-27

## 2025-01-27 RX ORDER — TRANEXAMIC ACID 10 MG/ML
1000 INJECTION, SOLUTION INTRAVENOUS ONCE
Status: DISCONTINUED | OUTPATIENT
Start: 2025-01-27 | End: 2025-01-27 | Stop reason: HOSPADM

## 2025-01-27 RX ORDER — SODIUM CHLORIDE 0.9 % (FLUSH) 0.9 %
10 SYRINGE (ML) INJECTION AS NEEDED
Status: CANCELLED | OUTPATIENT
Start: 2025-01-27

## 2025-01-27 RX ORDER — DESVENLAFAXINE 50 MG/1
100 TABLET, FILM COATED, EXTENDED RELEASE ORAL DAILY
Status: DISCONTINUED | OUTPATIENT
Start: 2025-01-27 | End: 2025-01-30 | Stop reason: HOSPADM

## 2025-01-27 RX ORDER — TRANEXAMIC ACID 10 MG/ML
1000 INJECTION, SOLUTION INTRAVENOUS ONCE
Status: COMPLETED | OUTPATIENT
Start: 2025-01-27 | End: 2025-01-27

## 2025-01-27 RX ORDER — SODIUM CHLORIDE 0.9 % (FLUSH) 0.9 %
3-10 SYRINGE (ML) INJECTION AS NEEDED
Status: DISCONTINUED | OUTPATIENT
Start: 2025-01-27 | End: 2025-01-30 | Stop reason: HOSPADM

## 2025-01-27 RX ORDER — EPHEDRINE SULFATE 50 MG/ML
INJECTION, SOLUTION INTRAVENOUS AS NEEDED
Status: DISCONTINUED | OUTPATIENT
Start: 2025-01-27 | End: 2025-01-27 | Stop reason: SURG

## 2025-01-27 RX ORDER — OXYCODONE HYDROCHLORIDE 5 MG/1
5 TABLET ORAL EVERY 4 HOURS PRN
Qty: 30 TABLET | Refills: 0 | Status: SHIPPED | OUTPATIENT
Start: 2025-01-27

## 2025-01-27 RX ADMIN — Medication 150 MCG: at 11:20

## 2025-01-27 RX ADMIN — EPHEDRINE SULFATE 10 MG: 50 INJECTION INTRAVENOUS at 10:44

## 2025-01-27 RX ADMIN — Medication 100 MCG: at 11:02

## 2025-01-27 RX ADMIN — SODIUM CHLORIDE 2 G: 900 INJECTION INTRAVENOUS at 10:21

## 2025-01-27 RX ADMIN — Medication 150 MCG: at 11:14

## 2025-01-27 RX ADMIN — SODIUM CHLORIDE, SODIUM LACTATE, POTASSIUM CHLORIDE, CALCIUM CHLORIDE 9 ML/HR: 20; 30; 600; 310 INJECTION, SOLUTION INTRAVENOUS at 08:37

## 2025-01-27 RX ADMIN — Medication 100 MCG: at 11:08

## 2025-01-27 RX ADMIN — DESVENLAFAXINE 100 MG: 50 TABLET, FILM COATED, EXTENDED RELEASE ORAL at 21:05

## 2025-01-27 RX ADMIN — EPHEDRINE SULFATE 5 MG: 50 INJECTION INTRAVENOUS at 10:41

## 2025-01-27 RX ADMIN — PROPOFOL 80 MCG/KG/MIN: 10 INJECTION, EMULSION INTRAVENOUS at 10:26

## 2025-01-27 RX ADMIN — MELOXICAM 15 MG: 15 TABLET ORAL at 15:49

## 2025-01-27 RX ADMIN — ACETAMINOPHEN 1000 MG: 500 TABLET, FILM COATED ORAL at 15:49

## 2025-01-27 RX ADMIN — OXYCODONE 5 MG: 5 TABLET ORAL at 15:49

## 2025-01-27 RX ADMIN — Medication 150 MCG: at 11:47

## 2025-01-27 RX ADMIN — ONDANSETRON 4 MG: 2 INJECTION INTRAMUSCULAR; INTRAVENOUS at 10:35

## 2025-01-27 RX ADMIN — ACETAMINOPHEN 1000 MG: 500 TABLET ORAL at 08:37

## 2025-01-27 RX ADMIN — TRAZODONE HYDROCHLORIDE 150 MG: 50 TABLET ORAL at 18:27

## 2025-01-27 RX ADMIN — LIDOCAINE HYDROCHLORIDE 50 MG: 10 INJECTION, SOLUTION EPIDURAL; INFILTRATION; INTRACAUDAL; PERINEURAL at 10:20

## 2025-01-27 RX ADMIN — TRANEXAMIC ACID 1000 MG: 10 INJECTION, SOLUTION INTRAVENOUS at 11:21

## 2025-01-27 RX ADMIN — LIDOCAINE HYDROCHLORIDE 0.5 ML: 10 INJECTION, SOLUTION EPIDURAL; INFILTRATION; INTRACAUDAL; PERINEURAL at 08:37

## 2025-01-27 RX ADMIN — PROPOFOL 50 MG: 10 INJECTION, EMULSION INTRAVENOUS at 10:20

## 2025-01-27 RX ADMIN — ACETAMINOPHEN 1000 MG: 500 TABLET, FILM COATED ORAL at 21:06

## 2025-01-27 RX ADMIN — Medication 3 ML: at 15:50

## 2025-01-27 RX ADMIN — ATORVASTATIN CALCIUM 20 MG: 20 TABLET, FILM COATED ORAL at 18:28

## 2025-01-27 RX ADMIN — MEPIVACAINE HYDROCHLORIDE 4 ML: 15 INJECTION, SOLUTION EPIDURAL; INFILTRATION at 10:24

## 2025-01-27 RX ADMIN — Medication 1000 MG: at 12:26

## 2025-01-27 RX ADMIN — FAMOTIDINE 20 MG: 20 TABLET, FILM COATED ORAL at 08:37

## 2025-01-27 RX ADMIN — EPHEDRINE SULFATE 10 MG: 50 INJECTION INTRAVENOUS at 11:24

## 2025-01-27 RX ADMIN — PREGABALIN 75 MG: 75 CAPSULE ORAL at 08:37

## 2025-01-27 RX ADMIN — SODIUM CHLORIDE 2000 MG: 900 INJECTION INTRAVENOUS at 18:29

## 2025-01-27 RX ADMIN — DEXAMETHASONE SODIUM PHOSPHATE 8 MG: 4 INJECTION INTRA-ARTICULAR; INTRALESIONAL; INTRAMUSCULAR; INTRAVENOUS; SOFT TISSUE at 10:35

## 2025-01-27 RX ADMIN — TRANEXAMIC ACID 1000 MG: 10 INJECTION, SOLUTION INTRAVENOUS at 10:26

## 2025-01-27 RX ADMIN — EPHEDRINE SULFATE 5 MG: 50 INJECTION INTRAVENOUS at 10:50

## 2025-01-27 RX ADMIN — CEFAZOLIN 1000 MG: 1 INJECTION, POWDER, FOR SOLUTION INTRAMUSCULAR; INTRAVENOUS at 12:23

## 2025-01-27 RX ADMIN — Medication 100 MCG: at 10:57

## 2025-01-27 RX ADMIN — BUPIVACAINE HYDROCHLORIDE 20 ML: 2.5 INJECTION, SOLUTION EPIDURAL; INFILTRATION; INTRACAUDAL; PERINEURAL at 12:12

## 2025-01-27 RX ADMIN — ESCITALOPRAM OXALATE 20 MG: 20 TABLET, FILM COATED ORAL at 18:28

## 2025-01-27 RX ADMIN — Medication 100 MCG: at 10:50

## 2025-01-27 NOTE — DISCHARGE INSTRUCTIONS
"DISCHARGE INSTRUCTIONS   Dr. Baker     Total Knee Replacement/ Partial (Uni) Knee Replacement     Wound Care   1) Keep wound / incision area clean and dry.   2) Dressing to remain in place until post-operative day 7. Upon dressing removal, assess for wound drainage. If no drainage is present, keep wound / incision area open to air as much as possible. If drainage is present, place sterile dressing to cover wound and assess daily. If drainage continues to occur after post-operative day 14, call the office for an urgent appointment. (You should be seen in the clinic within 1-2 days of calling). DO NOT REMOVE SUTURES (IF PRESENT) UNDER ANY CIRCUMSTANCES PRIOR TO FOLLOW UP APPOINTMENT.  3) No baths or swimming until otherwise instructed. The wound must remain dry for 10 days after surgery. After 10 days, you may begin to shower only if no drainage is present. No submerging the wound under standing water until cleared by your physician (no baths, hot tubs, swimming pools, etc). Sponge baths are the best way to perform personal hygiene while at the same time protecting the wound from moisture.   4) Prior to showering, the wound must remain dry for 72 consecutive hours (no drainage whatsoever) prior to showering. If the wound drains or spots, the clock \"resets\" - make sure the wound has been drainage-free for 72 consecutive hours.   5) Once you are allowed to get the wound wet, please use gentle soap to wash the wound area. DO NOT aggressively scrub the wound with a washcloth or bath sponge. Please visually inspect your wound(s) at least once daily. If the wound(s) are in a difficult to see location, please use a mirror or have someone else assist with visual inspection.   6) No scrubbing the wound. You may \"pad dry\" the wound, but do not rub, as this may open up the wound and pre-dispose to wound infection.   7) Do not apply lotions or creams to incision site, unless instructed otherwise.   8) Observe for redness, " "swelling, or drainage. Please call the clinic immediately if you have fevers, chills with warmth/redness surrounding wound site or if you notice pus drainage from the wound site     Activity   No heavy lifting objects greater than 10 pounds.   No driving while on narcotic pain medication.   No submerging wound under standing water (pool, bath tub, etc.) until otherwise instructed.   You may be protected weightbearing as tolerated on your operative (left lower) extremity   Use crutches or a walker for ambulation.   Wean as appropriate per physical therapist's discretion.   Do not sleep with a pillow behind your knee. You may sleep with a pillow behind your Achilles or foot. This will prevent your knee from getting stiff in the flexed (\"bent\") position and will encourage full extension (leg straightening).   Be vigilant in terms of working on full knee extension and flexion. Your goal should be 0 to 90 degrees by 2 weeks post-op - MINIMUM!   Knee range of motion as tolerated.    Blood Clot Prophylaxis   (Aspirin vs. Lovenox vs. Eliquis administration is determined by your surgeon and tailored to your specific risk profile. You will be discharged with one of these medications.) You will need to complete a total 4 week course of enteric coated aspirin 325 mg (or 81mg) twice daily or Eliquis 2.5mg twice daily, in order to minimize your risk of blood clots following surgery. You will be supplied with a prescription to obtain this. Alternatively, you will need to compete a total 2 week course of Lovenox after surgery (followed by a 2 week course of aspirin twice daily), in order to minimize the risk of blood clots following surgery. Lovenox requires a single shot in the abdomen, to be taken once daily. You will be supplied with the prescription to obtain this. Prior to your discharge from the hospital, the nursing staff will instruct you on self-administration of the Lovenox, if you will be returning directly home from the " "hospital.     Discharge Pain Medications   You will be given a prescription for pain medication. You should start taking this the same day after your surgery. Wean off as tolerated. Do not wait to take the pain medication until the pain is severe, as it will be difficult to \"catch up\" once this occurs. The pain medication usually reaches its full effect ~1 hour after ingesting. If you have been sent home on Colace, this medication should be taken until you are off all narcotic (i.e. Oxycodone, etc) pain medications, in order to prevent constipation. If you have been sent home with a combination of oxycodone and Tylenol, please take Tylenol as scheduled.  You must be careful not to exceed 4,000mg (4 grams) of Tylenol. The oxycodone is to be taken as needed for \"breakthrough\" pain.  Some common side effects of the narcotic pain medications include nausea and itching. Benadryl is a great over the counter medication that helps calm your stomach, decreases your anxiety levels, and minimizes the itching. You can easily purchase this at your local pharmacy as an over-the-counter medication. Please abide by the instructions as printed on the bottle. If your nausea persists, make sure to take small amounts of crackers or other lighter foods.     Follow-Up   Follow-up with Dr. Baker's office in 3 weeks from the surgery date for a post-operative evaluation. Have the following xrays done upon arrival to the follow-up appointment: 3 views of operative knee. Please call Dr. Baker's office at [phone] for orthopaedic appointments or questions.          InfuBLOCK - Patient Information    What is a pain pump?  The InfuBLOCK pump delivers post-operative, non-narcotic, numbing medication to the nerve near the surgical site for pain relief.     Where can I find information about my pain pump?           For more information about your pain pump, scan the QR code.  For additional patient resources, visit " [email or web address].                                                                                               While your physician is your primary source for information about your treatment there may be times during your treatment that you need assistance with your infusion pump.     If you need assistance take the following steps:    The [facility] Nursing Hotline is Here for You 24/7.  Please call [phone] for the following concerns or complications:    Answers to questions about your infusion pump                 Tubing disconnect  Assistance with pump alarms                                                      Dislodged catheter  Excessive leakage noted from pump                                         Inadequate pain control    2.   [facility] Anesthesia Acute Pain Service: [phone] is available 24/7 for any further needs or concerns about medication or pain control.     -------------------------------------------------------------------------    Nerve Catheter Removal Instructions  When your device is empty:    Remove your catheter by pulling the dressing off slowly (like you would remove a regular bandage). The catheter should pull right out of the skin.  Check that the BLUE tip is intact.                                                                                     If the catheter is stuck, reposition your   extremity and pull slowly until removed.  *If catheter is HURTING and WON'T come out, stop and call [phone] for further assistance.    Remove medication bag from the black carrying case.  Cut the tubing on right and left side of pump, and discard the medication bag and tubing into garbage.  Place the pump and black carrying case into the plastic bag and then place this into the return box.  Seal box with blue stickers and return to [employer] postal service. THIS IS PRE-PAID  POSTAGE.        -------------------------------------------------------------------------    SMI COLD THERAPY - PATIENT INSTRUCTION SHEET    Cold Compression Therapy for your comfort and rehabilitation  Your caregivers want you to be productive in your rehab and comfortable during your stay. In keeping with those goals, you will be receiving an SMI Cold Therapy Wrap to help ease post-operative pain and swelling that might keep you from getting back on track! Your SMI Cold Therapy Wrap is effective and simple-to-use, and you will be encouraged to apply it throughout your hospital stay and at home through the duration of your recovery.    When you are ready to go home  Be sure to take your SMI Cold Therapy Wrap and both sets of Gel Bags with you for continued comfort and use throughout your rehabilitation. If you don't already have them, ask your nurse or aide to retrieve your SMI Gel Bags from the patient freezer.    Home use precautions  Always follow your medical professional's application instructions upon discharge. Your SMI Cold Therapy Wrap and Gel Bags are designed to last for months following your surgery. Never heat the Gel Bags unless specified by your healthcare provider. Supervision is advised when using this product on children or geriatric patients. To avoid danger of suffocation, please keep the outer plastic packaging away from children & pets.    Cold Therapy Instructions  Place Gel Bags in a freezer set ¾ of the way to max temperature for at least (4) hours. For best results, lay the Gel Bags flat and [id number] in the freezer. Once frozen, slide Gel Bags into the gel pouch and secure your wrap to the affected area with the straps.  Gel wraps that have been stored in a freezer for an extended period of time may require a (10) minute period of softening up in a room temperature environment before application.  The gel pouch acts as a protective barrier. NEVER place frozen bags directly onto skin,  as this may cause frostbite injury.  The [facility] Cold Therapy Wrap is designed to be able to be worm while ambulating. The compression straps can be secured well enough so that the Wrap won't fall off while moving.  Wrap Application Videos can be viewed at [employer].  An additional protective barrier such as clothing, a washcloth, hand-towel or pillowcase may be used during prolonged treatment applications.  The Gel-Pouch and Wrap are both Latex-Free and the Gel Bag ingredients are non toxic.    [facility] Wrap care instructions  The [facility] Cold Therapy Wrap may be hand washed and hung to dry when needed.    [facility] re-order information  Additional [facility] body specific wraps and/or Gel Bags can be re-ordered from [employer] or call [employer] ([phone])        EXOFIN CARING FOR YOUR WOUND    AFTER exofin Fusion SKIN CLOSURE SYSTEM HAS BEEN APPLIED    YOUR HEALTHCARE PROFESSIONAL HAS CHOSEN TO USE exofin Fusion SKIN CLOSURE SYSTEM TO CLOSE YOUR WOUND.    exocin Fusion is the combination of a mesh and liquid adhesive that allows the incision or wound to be held together during the healing process.    exocin Fusion should remain in place until your healthcare professional; has determined that adequate healing has occurred, which is usually anywhere between 7 to 14 days. In most cases, exofin Fusion is easily removed with little or no discomfort.    In the event that you notice that exofin Fusion is beginning to loosen or may be coming off, contact your healthcare professional.    The following information is provided to help you understand how to care for your incision and is based on the FDA-cleared product labeling.    You should always follow the instructions of your healthcare provider.    THINGS TO KNOW    BATHING OR SHOWERING    If directed by your healthcare professional, you may occasionally and briefly wet your incision or wound that was treated with exofin Fusion in the shower or bath. Do not soak  or scrub your incision or wound. Do not swim or soak your incision or wound in water. After showering or bathing, gently blot your incision or wound dry with a soft towel. If a dry protective dressing is being used over exofin Fusion, it should be replaced with a fresh, dry protective dressing after showering or bathing as directed by your healthcare practitioner. Care should also be taken so that any tape that may be part of the dry protective dressing does not come into contact with exofin Fusion because when the tape is removed, it may also remove exofin Fusion.    WOUND HEALING  If you experience any redness, swelling, discomfort, warmth or pus, contact your healthcare professional and he or she will determine how your incision or wound is healing and take the necessary steps to address any issues.    EXERCISE  Do not engage in strenuous exercise that may cause additional stress on your incision or wound. Follow your healthcare professional's guidance about when you can return to your normal activities.    OINTMENTS OR LIQUIDS  Topical ointments, liquids or any other products (other than dry bandages) should not be applied to the incision while exofin Fusion is in place. These may loosen exofin Fusion from the skin before it has completely healed.    REMOVING exofin Fusion  Your healthcare professional will determine when the healing process of your incision or wound has been completed and exofin Fusion is ready to be removed, which is usually between 7 to 14 days. When healing is complete, your healthcare professional will carefully peel off the exofin Fusion.    Prior to removal, do not scratch, rub or pick at the mesh. This may loosen the adhesive and mesh before the skin is healed.  In the event that you notice the exofin Fusion is beginning to loosen and may be coming off or comes off with the skin/wound, contract your healthcare professional.    For complete directions on the use of exofin Fusion, please  refer to instructions for Use (IFU) included in the product packaging.  IF YOU HAVE ANY QUESTIONS OR CONCERNS ABOUT exofin Fusion PLEASE CONTACT YOUR HEALTHCARE PROFESSIONAL.SMI COLD THERAPY - PATIENT INSTRUCTION SHEET    Cold Compression Therapy for your comfort and rehabilitation  Your caregivers want you to be productive in your rehab and comfortable during your stay. In keeping with those goals, you will be receiving an SMI Cold Therapy Wrap to help ease post-operative pain and swelling that might keep you from getting back on track! Your SMI Cold Therapy Wrap is effective and simple-to-use, and you will be encouraged to apply it throughout your hospital stay and at home through the duration of your recovery.    When you are ready to go home  Be sure to take your SMI Cold Therapy Wrap and both sets of Gel Bags with you for continued comfort and use throughout your rehabilitation. If you don't already have them, ask your nurse or aide to retrieve your SMI Gel Bags from the patient freezer.    Home use precautions  Always follow your medical professional's application instructions upon discharge. Your SMI Cold Therapy Wrap and Gel Bags are designed to last for months following your surgery. Never heat the Gel Bags unless specified by your healthcare provider. Supervision is advised when using this product on children or geriatric patients. To avoid danger of suffocation, please keep the outer plastic packaging away from children & pets.    Cold Therapy Instructions  Place Gel Bags in a freezer set ¾ of the way to max temperature for at least (4) hours. For best results, lay the Gel Bags flat and [id number] in the freezer. Once frozen, slide Gel Bags into the gel pouch and secure your wrap to the affected area with the straps.  Gel wraps that have been stored in a freezer for an extended period of time may require a (10) minute period of softening up in a room temperature environment before application.  The gel  pouch acts as a protective barrier. NEVER place frozen bags directly onto skin, as this may cause frostbite injury.  The [facility] Cold Therapy Wrap is designed to be able to be worm while ambulating. The compression straps can be secured well enough so that the Wrap won't fall off while moving.  Wrap Application Videos can be viewed at [email or web address].  An additional protective barrier such as clothing, a washcloth, hand-towel or pillowcase may be used during prolonged treatment applications.  The Gel-Pouch and Wrap are both Latex-Free and the Gel Bag ingredients are non toxic.    [facility] Wrap care instructions  The [facility] Cold Therapy Wrap may be hand washed and hung to dry when needed.    [facility] re-order information  Additional [facility] body specific wraps and/or Gel Bags can be re-ordered from [email or web address] or call [employer] ([phone])

## 2025-01-27 NOTE — ANESTHESIA POSTPROCEDURE EVALUATION
Patient: Sarah Howard    Procedure Summary       Date: 01/27/25 Room / Location:  [facility] /  [facility]    Anesthesia Start: 1017 Anesthesia Stop: 1210    Procedure: TOTAL KNEE ARTHROPLASTY WITH EMELINA ROBOT (Left: Knee) Diagnosis:       Primary osteoarthritis of left knee      (Primary osteoarthritis of left knee [M17.12])    Surgeons: Praful Baker MD Provider: Delonte Downey MD    Anesthesia Type: spinal ASA Status: 3            Anesthesia Type: spinal    Vitals  No vitals data found for the desired time range.          Post Anesthesia Care and Evaluation    Patient location during evaluation: PACU  Patient participation: complete - patient participated  Level of consciousness: awake and alert  Pain management: adequate    Airway patency: patent  Anesthetic complications: No anesthetic complications  PONV Status: none  Cardiovascular status: hemodynamically stable and acceptable  Respiratory status: nonlabored ventilation, acceptable and nasal cannula  Hydration status: acceptable

## 2025-01-27 NOTE — CASE MANAGEMENT/SOCIAL WORK
Discharge Planning Assessment  Psychiatric     Patient Name: Sarah Howard  MRN: [id number]  Today's Date: 1/27/2025    Admit Date: 1/27/2025    Plan: Home with 's assistance, [facility] and a rolling walker from [employer]     Discharge Plan       Row Name 01/27/25 1431       Plan    Plan Home with 's assistance, [facility] and a rolling walker from [employer]    Patient/Family in Agreement with Plan yes    Plan Comments Met with Ms. Howard, and her , Santy, at the bedside, for discharge planning.    Ms. Howard is pending PT and OT evaluation.  The patient requested a rolling walker for home use and did not have preference for provider.  CM delivered a walker from [facility] to the hospital room.    The patient is a pre referral to [facility].  She states that she has an appointment with the agency already.    PCP is Dao Gomez.  Insurance is [employer] [employer].  No ACP documents.    DC plan is to return home with her 's assistance.    CM will continue to follow.    Final Discharge Disposition Code 01 - home or self-care                  Continued Care and Services - Admitted Since 1/27/2025       Durable Medical Equipment       Service Provider Request Status Services Address Phone Fax Patient Preferred    [facility] D[facility] Medical Equipment [address] [id number] [phone] --                      Vanessa Boo RN

## 2025-01-27 NOTE — OP NOTE
OPERATIVE REPORT     DATE OF PROCEDURE: 1/27/2025    SURGEON: Praful Baker M.D.     ASSISTANT(S): Circulator: Bri Sullivan RN; Kaylin Chavarria RN  Scrub Person: Mignon Aly; Xochitl Beverly  Nursing Assistant: Juhi Sullivan  Assistant: Demar Rutherford PA-C  Assistant: Demar Rutherford PA-C    Note-PA was utilized during the case to facilitate positioning the patient, exposure, retraction, placement of final components and definitive closure.    PREOPERATIVE DIAGNOSIS: Advanced degenerative joint disease of the left knee secondary to osteoarthritis    POSTOPERATIVE DIAGNOSIS: same     PROCEDURE: Left total Knee Arthroplasty CPT 34932, Use of computer-assisted surgical navigation system CPT 80260, 0055T CT utilization for preoperative planning of robotic total knee arthroplasty    SURGICAL DETAILS:     APPROACH: Medial parapatellar     ANESTHESIA: Spinal plus local periarticular block    PREOPERATIVE ANTIBIOTICS: Ancef 2 g IV    TRANEXAMIC ACID: IV    TOURNIQUET TIME: 43 min @300 mmHg     ESTIMATED BLOOD LOSS: 25 cc     SPECIMENS: None    IMPLANTS:   /Brand: [employer] triathlon  Tibial component size: 2 pressfit tritanium baseplate   Femoral component size: 2 pressfit cruciate retaining   Tibial polyethylene insert: 9 mm cruciate stabilizing   Patellar component: 29 mm asymmetric tritanium  Cement: None    DRAINS: None    LOCAL INJECTION: 1 cc Toradol 30mg/ml, 4 cc duramorph 2mg/ml, 20 cc 0.5% ropivicaine, 20 cc 0.5% lidocaine with 1:200,000 epinephrine, 15 cc preservative free normal saline     MODIFIER(S): None    COMPLICATIONS: None apparent    INDICATIONS FOR PROCEDURE: The patient has a long history of progressive knee pain, arthritis, and degeneration resulting in deformity in the left knee from predominantly medial wear and bone loss. Non-operative treatment and conservative therapeutic measures have been attempted, but have not improved or controlled the symptoms and pain that  occurs during normal daily activities. Knee motion has also become limited and is restricting the patient. Total knee arthroplasty was recommended. The risks, benefits, alternatives, and potential complications of the arthroplasty surgery were discussed with the patient in detail to include but not be limited to infection, bleeding, anesthesia risks, damage to neurovascular structures, osteolysis, aseptic loosening, instability, anterior knee pain, continued pain, iatrogenic fracture, dislocation, need for future surgery including the potential for amputation, blood clots, myocardial infarction, stroke, and death. Specific details of the surgical procedure, hospitalization, recovery, rehabilitation, and long-term precautions were also presented. Pre-operative teaching was provided. Implant/prosthesis selection was outlined, and the many options available were explained; the final choice will be made at the time of the procedure to match the anatomy and condition of the bone, ligaments, tendons, and muscles. The patient completed preoperative medical optimization and risk assessment, joint arthroplasty education, and MRSA decolonization using a universal decolonization protocol. Perioperative blood management and the potential for blood transfusion were discussed with risks and options clearly outlined.     INTRAOPERATIVE FINDINGS: Advanced tricompartmental osteoarthritis with genu varum alignment    PROCEDURE: The patient was identified in the preoperative holding area. The operative site was confirmed and marked. A sequential compression device was placed on the nonoperative leg. The risks, benefits, and alternatives to surgery were again confirmed with the patient and the patient wished to proceed. The patient was brought to the operating room and placed on the operating room table in the supine position. All bony prominences were padded. A huddle was performed with the patient and all vital surgical team members  to confirm the correct operative site, procedure, anesthesia type, and operative plan with the patient. After anesthesia was performed, a tourniquet was applied to the upper thigh of the operative leg. A full knee exam was performed once anesthesia was in full effect. Intravenous antibiotic prophylaxis was given and confirmed with the anesthesia team.     The operative leg was prepped and draped in the usual sterile fashion. A surgical time out was performed immediately preceding the incision with all personnel in the operating room to confirm patient identity, the correct operative site and extremity, correct radiographic studies, availability of appropriate surgical equipment and agreement on the planned procedure. The operative knee was elevated and exsanguinated using an esmarch and the tourniquet was inflated. The knee was exposed using a limited anterior-midline skin incision. Dissection was carried down through skin and subcutaneous tissue to the extensor mechanism with a scalpel. A medial parapatellar arthrotomy was made to enter the knee space sharply. A large amount of normal appearing joint fluid was encountered and suctioned. The synovium was thickened, hypertrophic, and inflamed. A partial synovectomy was performed for exposure, and the medial and lateral gutters were cleared of scar and synovial reflections. The superficial medial collateral ligament was carefully elevated off osteophytes .  The patellar synovial reflections were released and the patella exposed to reveal complete wear through the articular surface. The trochlea demonstrated similar severe wear. The patella was then subluxed laterally. The knee was then flexed up to 90 degrees.     Assessment of the knee joint revealed severe end-stage articular damage with no remaining medial weight bearing cartilage. The medial compartment was severely eburnated with bone loss on the medial tibia and medial femoral condyle, resulting in the varus  deformity.     With the exposure complete, femoral pins for navigation were drilled and placed intra-incisional in the region of the medial distal femoral metaphysis just proximal to the medial epicondyle.  Tibial pins for navigation were then placed extra-incisional 4 fingerbreadths below the tibial tubercle taking care to engage the far cortex of the tibia but not perforate the cortex.  The navigation arrays were then placed onto the pins, adjusted, and tightened definitively.  The femoral and tibial checkpoints were then placed.    The knee was then taken through range of motion to find the hip center.  The medial and lateral malleolus were then marked to find the ankle center. Next, using a rongeur and osteotome, marginal osteophytes were then removed from the tibia and the femur and the ACL resected.  Baseline measurements of the knee were then taken and the knee was balanced with adjustments made to varus and valgus on the femur and tibia as well as femoral rotation.  Adjustments were as follows:    Femur: 2 degrees varus, 3 degrees external rotation relative to the transepicondylar axis.  1 mm distal and 0.5 mm anterior translation  Tibia: 3 degrees varus, 3 degrees posterior slope, 1 mm proximal translation    Satisfied with the balance of the knee, attention was turned to bony resection.  The tibia was cut first and the tibial bone removed.  A tensioner was then placed on the resected surface to tension the knee in both flexion and extension.  The adjustments made precut were found to remain grossly unchanged with overall good alignment and balance of the knee in flexion and extension.  Next, femoral cuts were made starting with the posterior femoral cuts followed by the anterior femoral cut, followed by the anterior chamfer.  The sawblade was then changed and the distal femoral cut and posterior chamfers were completed.    A lamina  was placed with the knee in 90 degrees of flexion and a large  curved osteotome, rongeur, and curettes were utilized to clear posterior osteophytes. The medial/lateral meniscal remnants were then excised along with any loose bodies.     A femoral trial implant was placed; excellent fit was confirmed. The medial-lateral and anterior-posterior dimensions were checked; anatomic fit and coverage were achieved.The proximal tibia baseplate trial was placed with its mid-point at the junction of medial one-third and lateral two-thirds of the tibial tubercle and pinned to this fixed position. A trial reduction was then performed. Trial reduction demonstrated the knee achieved full extension with excellent stability and range of motion, and no tendency toward instability with varus-valgus stress at full extension, mid-flexion, or 90 degrees of flexion. The PCL was also found to be appropriately tensioned with normal posterior tibial excursion.  The tibia and femoral pins and arrays were then removed along with the femoral and tibial checkpoints.    Next, attention was turned to the patella. It was measured and the posterior 9-10 mm was resected leaving a healthy remnant with greater than 11mm thickness. The patella was sized with the asymmetric guide, and drill holes were made. A trial button was placed and tracking of the patella and the entire knee trial was tested. The patella tracking was excellent throughout range of motion with no instability. Punches and drills were then placed through the trials to accommodate the final implants. All trials were removed.     The wound was copiously lavaged with a pulse irrigation/suction system. The posterior recess of the knee and areas of known bleeding were treated with the electrocautery to reduce post-operative bleeding. A pain cocktail was injected into the liudmila-articular tissues. The cut bone surfaces were then irrigated again, suctioned, and dried. The final implants were impacted into place, tibia followed by tibial polyethylene followed  by femur followed by patella. The tourniquet was released and no excessive bleeding was encountered. Synovial bleeding was further treated with the electrocautery until adequate hemostasis was obtained.     The wound was again irrigated with dilute betadine solution followed by saline. The extensor mechanism and capsule was then anatomically closed with interrupted #1 Vicryl suture and a running #2 Stratafix stitch. Knee stability and range of motion with the capsule closed was excellent, and range of motion was 0 to 135 degrees without excessive stress on the repair. Instrument and sponge count was completed and confirmed correct. Deep and superficial subcutaneous tissue was closed with interrupted 2-0 Vicryl suture. A running 3-0 Monocryl subcuticular stitch was used to re-approximate the skin edges followed by skin glue adhesive to seal the wound. A silver impregnated dressing was then placed over the knee incision and a Covaderm over the tibial pin incisions, followed by a sequential compression device to the operative limb. The patient was sufficiently recovered from anesthesia, transferred to a hospital bed and taken to the PACU in stable condition.     One gram (1000 mg) of intravenous tranexamic acid was administered prior to incision. A second one gram (1000 mg) intravenous dose was given prior to wound closure.    No apparent complications occurred during the procedure. Instrument, sponge and needle counts were correct x 2.     The patient underwent risk stratification preoperatively and aspirin was chosen for DVT prophylaxis. Delay in starting chemical prophylaxis for 23 hours from surgical incision was over concerns for hematoma formation and wound related issues.     POST OPERATIVE PLAN:   Weight bearing as tolerated with knee range of motion as tolerated   Pain control with PO/IV meds   Adductor canal catheter placement by Anesthesia Pain Management Team in PACU.   23 hours perioperative antibiotic  prophylaxis   PT/OT for mobilization and medical equipment needs   Keep silver dressing in place for 7 days post op. Change dressing only if saturated.   SCDs to bilateral lower extremities   Social work for discharge planning needs   Follow up in 3 weeks for post operative wound check with 3 views of operative knee.

## 2025-01-27 NOTE — H&P
Patient Name: Sarah Howard  MRN: [id number]  : 1955  DOS: 2025    Attending: Praful Baker MD    Primary Care Provider: Dao Gomez MD      Chief complaint: Left knee Pain.    Subjective   Patient is a pleasant 69 y.o. female presented for scheduled surgery by Dr. Baker.    Per his note: (The patient has a long history of progressive knee pain, arthritis, and degeneration resulting in deformity in the left knee from predominantly medial wear and bone loss. Non-operative treatment and conservative therapeutic measures have been attempted, but have not improved or controlled the symptoms and pain that occurs during normal daily activities. Knee motion has also become limited and is restricting the patient. Total knee arthroplasty was recommended. )    Patient underwent left total knee arthroplasty under spinal anesthesia, tolerated surgery well.  Adductor canal nerve block catheter was placed by acute pain service, and patient was admitted for further management.    Seen postoperatively, doing well with good pain control, no complains of nausea, vomiting, or shortness of breath.  Patient has no history of DVT or PE.    Reviewed with patient past medical history and home medications    Allergies:  No Known Allergies    Meds:  Medications Prior to Admission   Medication Sig Dispense Refill Last Dose/Taking    atorvastatin (LIPITOR) 20 MG tablet Take 1 tablet by mouth Daily. 90 tablet 0 2025    Chlorhexidine Gluconate 4 % solution Shower daily with hibiclens solution as directed 5 days prior to surgery. 236 mL 0 2025    desvenlafaxine (Pristiq) 100 MG 24 hr tablet Take 1 tablet by mouth Daily. 90 tablet 1 2025    escitalopram (LEXAPRO) 20 MG tablet Take 1 tablet by mouth Daily. 90 tablet 1 2025    fluticasone (FLONASE) 50 MCG/ACT nasal spray Administer 2 sprays into the nostril(s) as directed by provider Daily. 16 g 3 2025    hydrocortisone 1 % cream Apply 1  Application topically to the appropriate area as directed 2 (Two) Times a Day. 45 g 1 1/26/2025    latanoprost (XALATAN) 0.005 % ophthalmic solution    1/26/2025    metoprolol succinate XL (Toprol XL) 25 MG 24 hr tablet Take 1 tablet by mouth Daily. 90 tablet 1 1/26/2025 at  9:30 PM    olmesartan (BENICAR) 20 MG tablet TAKE 1 TABLET BY MOUTH DAILY 90 tablet 0 1/26/2025    pantoprazole (PROTONIX) 40 MG EC tablet Take 1 tablet by mouth Daily. 90 tablet 3 1/26/2025    traZODone (DESYREL) 150 MG tablet Take 1 tablet by mouth Daily. 90 tablet 1 1/26/2025         History:   Past Medical History:   Diagnosis Date    Anxiety     Breast cancer     3/2010, right breast    Cancer Breast    2010    Cervical disc disorder ACDF 5/2020    Coronary artery disease 10/2020    Depression     Drug therapy     2010    Fracture, foot     bilateral    GERD (gastroesophageal reflux disease) 2016    Glaucoma 2016    Hard to intubate     Hx of radiation therapy     2010    Hyperlipidemia 2022    Hypertension 2020    Knee swelling     Osteopenia 2009    Tear of meniscus of knee     left     Past Surgical History:   Procedure Laterality Date    ADENOIDECTOMY  1972    BACK SURGERY  2018    BREAST BIOPSY Right     3/2010    BREAST LUMPECTOMY Right     x2, 2010    BREAST SURGERY  2 x 2010    CARDIAC CATHETERIZATION  2020    COLONOSCOPY  2018    COSMETIC SURGERY  breast reduction    2010    EYE SURGERY  laser iridotomies    2019    HYSTERECTOMY  2011    NECK SURGERY      OOPHORECTOMY      REDUCTION MAMMAPLASTY Bilateral     2010 at time of lumpectomy    SPINE SURGERY  2019    TONSILLECTOMY  1972    TUBAL ABDOMINAL LIGATION  1982     Family History   Problem Relation Age of Onset    Colon cancer Mother 48    Stroke Mother     Thyroid disease Mother     Anxiety disorder Mother     Depression Mother     Cancer Mother     Diabetes Mother     Heart disease Father         mitral valve disease    Atrial fibrillation Brother     Alcohol abuse Maternal  "Grandfather     Breast cancer Neg Hx     Ovarian cancer Neg Hx      Social History     Tobacco Use    Smoking status: Never     Passive exposure: Never    Smokeless tobacco: Never   Vaping Use    Vaping status: Never Used   Substance Use Topics    Alcohol use: Yes     Alcohol/week: 8.0 standard drinks of alcohol     Types: 4 Glasses of wine, 4 Drinks containing 0.5 oz of alcohol per week     Comment: drinking alcohol makes me feel happier    Drug use: Yes     Types: Marijuana     Comment: gummies       Review of Systems  Pertinent items are noted in HPI    Vital Signs  /59   Pulse 60   Temp 97.5 °F (36.4 °C)   Resp 13   SpO2 94%     Physical Exam:    General Appearance:    Alert, cooperative, in no acute distress   Head:    Normocephalic, without obvious abnormality, atraumatic   Eyes:            Lids and lashes normal, conjunctivae and sclerae normal, no   icterus, no pallor, corneas clear    Ears:    Ears appear intact with no abnormalities noted   Throat:   No oral lesions, no thrush, oral mucosa moist   Neck:   No adenopathy, supple, trachea midline, no thyromegaly         Lungs:     Clear to auscultation,respirations regular, even and                   unlabored    Heart:    Regular rhythm and normal rate, normal S1 and S2, no       murmur, no gallop   Abdomen:     Normal bowel sounds, no masses, no organomegaly, soft        nontender, nondistended, no guarding, no rebound                 tenderness   Genitalia:    Deferred   Extremities:  LLE, CDI dressing on knee, PNB cath present.    Pulses:   Pulses palpable and equal bilaterally   Skin:   No bleeding, bruising or rash   Neurologic:   Cranial nerves 2 - 12 grossly intact, decreased movement and sensation of the legs under the effects of spinal anesthesia when seen.      I reviewed the patient's new clinical results.             Invalid input(s): \"NEUTOPHILPCT\"        Invalid input(s): \"LABALBU\", \"PROT\"  Lab Results   Component Value Date    " HGBA1C 5.40 01/13/2025      Latest Reference Range & Units 01/13/25 14:39   Sodium 136 - 145 mmol/L 139   Potassium 3.5 - 5.2 mmol/L 4.6   Chloride 98 - 107 mmol/L 102   CO2 22.0 - 29.0 mmol/L 23.0   Anion Gap 5.0 - 15.0 mmol/L 14.0   BUN 8 - 23 mg/dL 17   Creatinine 0.57 - 1.00 mg/dL 1.05 (H)   BUN/Creatinine Ratio 7.0 - 25.0  16.2   eGFR >60.0 mL/min/1.73 57.3 (L)   Glucose 65 - 99 mg/dL 119 (H)   Calcium 8.6 - 10.5 mg/dL 10.1   Alkaline Phosphatase 39 - 117 U/L 82   Total Protein 6.0 - 8.5 g/dL 7.1   Albumin 3.5 - 5.2 g/dL 4.5   Globulin gm/dL 2.6   A/G Ratio g/dL 1.7   AST (SGOT) 1 - 32 U/L 29   ALT (SGPT) 1 - 33 U/L 35 (H)   Total Bilirubin 0.0 - 1.2 mg/dL 0.6   (H): Data is abnormally high  (L): Data is abnormally low   Latest Reference Range & Units 01/13/25 14:39   WBC 3.40 - 10.80 10*3/mm3 7.03   RBC 3.77 - 5.28 10*6/mm3 4.21   Hemoglobin 12.0 - 15.9 g/dL 13.7   Hematocrit 34.0 - 46.6 % 42.6   Platelets 140 - 450 10*3/mm3 241   RDW 12.3 - 15.4 % 11.6 (L)   MCV 79.0 - 97.0 fL 101.2 (H)   MCH 26.6 - 33.0 pg 32.5   MCHC 31.5 - 35.7 g/dL 32.2   MPV 6.0 - 12.0 fL 12.3 (H)   RDW-SD 37.0 - 54.0 fl 43.0   (L): Data is abnormally low  (H): Data is abnormally high    Assessment and Plan:       S/P TKR (total knee replacement), left    Mild depression    GERD without esophagitis    Primary hypertension    Arthritis of knee    Hyperlipidemia      Plan  1. PT/OT, protected weight bearing as tolerated left LE  2. Pain control-prns, ACB cath with ropivacaine infusion.  3. IS-encourage  4. DVT proph- Mechanicals and aspirin  5. Bowel regimen  6. Resume home medications as appropriate  7. Monitor post-op labs  8. DC planning     - Hypertension:  Resume home medications as appropriate, formulary substitution when indicated.  Holding parameters.  PRN medications for elevated blood pressure.    -Dyslipidemia:  Resume home regimen statin , ( formulary substitution when appropriate).    -Depression: Resume home regimen.      -GERD:  Resume PPI.  Formulary substitution when indicated.    Dragon disclaimer:  Part of this encounter note is an electronic transcription/translation of spoken language to printed text. The electronic translation of spoken language may permit erroneous, or at times, nonsensical words or phrases to be inadvertently transcribed; Although I have reviewed the note for such errors, some may still exist.    iLz Smyth MD  01/27/25  15:14 EST

## 2025-01-27 NOTE — BRIEF OP NOTE
TOTAL KNEE ARTHROPLASTY WITH EMELINA ROBOT  Progress Note    Sarah Howard  1/27/2025    Pre-op Diagnosis:   Primary osteoarthritis of left knee [M17.12]       Post-Op Diagnosis Codes:     * Primary osteoarthritis of left knee [M17.12]    Procedure/CPT® Codes:  CA ARTHRP KNE CONDYLE&PLATU MEDIAL&LAT COMPARTMENTS [55353]      Procedure(s):  TOTAL KNEE ARTHROPLASTY WITH EMELINA ROBOT    Surgical Approach: Knee Medial Parapatellar            Surgeon(s):  Praful Baker MD    Anesthesia: Spinal with Block    Staff:   Circulator: Bri Sullivan RN; Kaylin Chavarria RN  Scrub Person: Mignon Aly; Xochitl Beverly  Nursing Assistant: Juhi Sullivan  Assistant: Demar Rutherford PA-C  Assistant: Demar Rutherford PA-C      Estimated Blood Loss:  25 mL    Urine Voided: * No values recorded between 1/27/2025 10:17 AM and 1/27/2025 11:28 AM *    Specimens:                None          Drains: * No LDAs found *    Findings: Advanced tricompartmental osteoarthritis with genu varum alignment        Complications: None apparent    Assistant: Demar Rutherford PA-C  was responsible for performing the following activities: Retraction, Suction, Irrigation, Suturing, Closing, and Placing Dressing and their skilled assistance was necessary for the success of this case.    Praful Baker MD     Date: 1/27/2025  Time: 11:51 EST

## 2025-01-27 NOTE — ANESTHESIA PROCEDURE NOTES
LEFT Adductor canal cath      Patient reassessed immediately prior to procedure    Start time: 1/27/2025 12:12 PM  Reason for block: at surgeon's request and post-op pain management  Performed by  CRNA/CAA: Jv Sanchez, CRNA  Assisted by: Shellie Sun RNSRNA: Edward Guerrero IV, SRNA  Preanesthetic Checklist  Completed: patient identified, IV checked, site marked, risks and benefits discussed, surgical consent, monitors and equipment checked, pre-op evaluation and timeout performed  Prep:  Pt Position: supine  Sterile barriers:cap, gloves, mask, sterile barriers and washed/disinfected hands  Prep: ChloraPrep  Patient monitoring: blood pressure monitoring, continuous pulse oximetry and EKG  Procedure    Sedation: no  Performed under: spinal  Guidance:ultrasound guided    ULTRASOUND INTERPRETATION.  Using ultrasound guidance a 20 G gauge needle was placed in close proximity to the nerve, at which point, under ultrasound guidance anesthetic was injected in the area of the nerve and spread of the anesthesia was seen on ultrasound in close proximity thereto.  There were no abnormalities seen on ultrasound; a digital image was taken; and the patient tolerated the procedure with no complications. Images:still images obtained, printed/placed on chart    Laterality:left  Block Type:adductor canal block  Injection Technique:catheter  Needle Type:Tuohy and echogenic  Needle Gauge:18 G  Resistance on Injection: none  Catheter Size:20 G (20g)  Cath Depth at skin: 10 cm    Medications Used: bupivacaine PF (MARCAINE) 0.25 % injection - Injection   20 mL - 1/27/2025 12:12:00 PM      Post Assessment  Injection Assessment: negative aspiration for heme, incremental injection and no paresthesia on injection  Patient Tolerance:comfortable throughout block  Complications:no  Additional Notes  CATHETER   A high-frequency linear transducer, with sterile cover, was placed on the anterior mid-thigh (between the anterior superior iliac  "spine and patella). The transducer was then moved medially to identify the Sartorius muscle (Twila), Vastus Medialis muscle (VMM), Superficial Femoral Artery (SFA) and Vein. The transducer was then moved cephalad or caudad to position the SFA in the middle of the Twila. The insertion site was prepped and draped in sterile fashion. Skin and cutaneous tissue was infiltrated with 2-5 ml of 1% Lidocaine. Using ultrasound-guidance, an 18-gauge Contiplex Ultra 360 Touhy needle was advanced in plane from lateral to medial. Preservative-free normal saline was utilized for hydro-dissection of tissue, advancement of Touhy, and to confirm needle placement below the fascial plane of the Twila where the Nerve to the VMM is located. Local anesthetic (LA) 5 ml deposited here. The Touhy needle continues its path lateral to the SFA at the level of the Saphenous Nerve. The remainder of the LA was deposited at the 10-11 o'clock position of the SFA. This injection created a space between the Twila and the SFA. Aspiration every 5 ml to prevent intravascular injection. Injection was completed with negative aspiration of blood and negative intravascular injection. Injection pressures were normal with minimal resistance. A 20-gauge Contiplex Echo catheter was placed through the needle and advance out the tip of the Touhy 3-5 cm anterior to the SFA. The Touhy needle was then removed, and final catheter position verified at the 12 o'clock position to the SFA. The catheter was secured in the usual fashion with skin glue, benzoin, steri-strips, CHG tegaderm and label noting \"Nerve Block Catheter\". Jerk tape applied at yellow connector and catheter connection.   Performed by: Leonidas Bashir, ALEXIS            "

## 2025-01-27 NOTE — PLAN OF CARE
Goal Outcome Evaluation:  Plan of Care Reviewed With: (P) patient, spouse        Progress: (P) no change  Outcome Evaluation: (P) Pt presents POD#0 with significant LLE strength and range of motion limitations, functional mobility/transfers deficits, and severe pt-reported pain which limited participation in today's session. Pt required Mod-Max A from therapist to complete all LLE TherEx activities. Able to complete sit<>stand transfers with FWW and CGA x2, however, reports extreme pain throughout LLE weight-shifting. Ambulated ~10' with step-to pattern + FWW and CGA x2, expressing severe pain which limited ambulation distance. Pt states pain decreases to 2-3/10 with rest at end of session. Recommend IPR to improve independence and minimize risk of fall; pt and spouse agreeable.    Anticipated Discharge Disposition (PT): (P) inpatient rehabilitation facility

## 2025-01-27 NOTE — ANESTHESIA PROCEDURE NOTES
Spinal Block    Pre-sedation assessment completed: 1/27/2025 10:18 AM    Patient reassessed immediately prior to procedure    Patient location during procedure: OR  Start Time: 1/27/2025 10:18 AM  Stop Time: 1/27/2025 10:25 AM  Indication:at surgeon's request  Performed By  CRNA/LUIS: Leonidas Bashir CRNASRNA: Edward Guerrero IV, SRNA  Preanesthetic Checklist  Completed: patient identified, IV checked, site marked, risks and benefits discussed, surgical consent, monitors and equipment checked, pre-op evaluation and timeout performed  Spinal Block Prep:  Patient Position:sitting  Sterile Tech:cap, gloves and gown  Prep:Chloraprep  Patient Monitoring:continuous pulse oximetry    Spinal Block Procedure  Approach:midline  Guidance:landmark technique and palpation technique  Location:L4-L5  Needle Type:Quincke  Needle Gauge:22 G  Placement of Spinal needle event:cerebrospinal fluid aspirated  Paresthesia: no  Fluid Appearance:clear  Medications: Mepivacaine HCl (PF) (CARBOCAINE) 1.5 % injection - Injection   4 mL - 1/27/2025 10:24:00 AM   Post Assessment  Patient Tolerance:patient tolerated the procedure well with no apparent complications  Complications no  Additional Notes  Procedure:  Pt assisted to sitting position, with legs in position of comfort over side of bed.  Pt. instructed in optimal spine presentation, the spine was prepped/ Draped and the skin at insertion site was anesthetized with 1% Lidocaine 2 ml.  The spinal needle was then advanced until CSF flow was obtained and LA was injected:           First attempt by ALAN Guerrero unsuccessful. Attempt by Mckay ESPINOZA successful first pass. atraumatic

## 2025-01-27 NOTE — ANESTHESIA PREPROCEDURE EVALUATION
Anesthesia Evaluation     Patient summary reviewed and Nursing notes reviewed   history of anesthetic complications:  difficult airway  NPO Solid Status: > 8 hours  NPO Liquid Status: > 8 hours           Airway   Mallampati: II  TM distance: >3 FB  Neck ROM: full  No difficulty expected  Dental      Pulmonary - normal exam   Cardiovascular - normal exam    (+) hypertension, CAD, hyperlipidemia      Neuro/Psych  GI/Hepatic/Renal/Endo    (+) morbid obesity, GERD    Musculoskeletal     Abdominal    Substance History      OB/GYN          Other                    Anesthesia Plan    ASA 3     spinal     intravenous induction     Anesthetic plan, risks, benefits, and alternatives have been provided, discussed and informed consent has been obtained with: patient.    Plan discussed with CRNA.    CODE STATUS:

## 2025-01-27 NOTE — THERAPY EVALUATION
Patient Name: Sarah Howard  : 1955    MRN: [id number]                              Today's Date: 2025       Admit Date: 2025    Visit Dx:     ICD-10-CM ICD-9-CM   1. S/P TKR (total knee replacement), left  Z96.652 V43.65   2. Primary osteoarthritis of left knee  M17.12 715.16     Patient Active Problem List   Diagnosis    Mild depression    Osteopenia    GERD without esophagitis    Coronary artery disease involving native coronary artery    History of breast cancer    Glaucoma    Primary hypertension    Alcohol use disorder    Chronic cough    Class 1 obesity due to excess calories without serious comorbidity with body mass index (BMI) of 33.0 to 33.9 in adult    Age-related nuclear cataract of both eyes    Arthritis of knee    Hyperlipidemia    S/P TKR (total knee replacement), left     Past Medical History:   Diagnosis Date    Anxiety     Breast cancer     3/2010, right breast    Cancer Breast    2010    Cervical disc disorder ACDF 2020    Coronary artery disease 10/2020    Depression     Drug therapy     2010    Fracture, foot     bilateral    GERD (gastroesophageal reflux disease) 2016    Glaucoma 2016    Hard to intubate     Hx of radiation therapy     2010    Hyperlipidemia     Hypertension 2020    Knee swelling     Osteopenia 2009    Tear of meniscus of knee     left     Past Surgical History:   Procedure Laterality Date    ADENOIDECTOMY  1972    BACK SURGERY  2018    BREAST BIOPSY Right     3/2010    BREAST LUMPECTOMY Right     x2, 2010    BREAST SURGERY  2 x 2010    CARDIAC CATHETERIZATION  2020    COLONOSCOPY  2018    COSMETIC SURGERY  breast reduction    2010    EYE SURGERY  laser iridotomies    2019    HYSTERECTOMY  2011    NECK SURGERY      OOPHORECTOMY      REDUCTION MAMMAPLASTY Bilateral     2010 at time of lumpectomy    SPINE SURGERY  2019    TONSILLECTOMY  1972    TUBAL ABDOMINAL LIGATION  1982      General Information       Row Name 25 [id number]          Physical  Therapy Time and Intention    Document Type evaluation (P)   -AP     Mode of Treatment individual therapy;physical therapy (P)   -AP       Row Name 01/27/25 1720          General Information    Patient Profile Reviewed yes (P)   -AP     Prior Level of Function independent:;all household mobility;community mobility;gait;transfer;bed mobility;using stairs (P)   Reports significant pain in LLE; denies prior use of AD  -AP     Existing Precautions/Restrictions fall;left;weight bearing;other (see comments) (P)   WBAT s/p L TKA, adductor canal nerve block  -AP     Barriers to Rehab -- (P)   10/10 pt-reported pain POD#0, which significantly limited participation in TherEx/OOB mobilization  -AP       Row Name 01/27/25 1720          Living Environment    People in Home spouse (P)   -AP       Row Name 01/27/25 1720          Home Main Entrance    Number of Stairs, Main Entrance three (P)   -AP     Stair Railings, Main Entrance none (P)   -AP       Row Name 01/27/25 1720          Stairs Within Home, Primary    Number of Stairs, Within Home, Primary none (P)   -AP       Row Name 01/27/25 1720          Cognition    Orientation Status (Cognition) oriented x 3 (P)   -AP       Row Name 01/27/25 1720          Safety Issues/Impairments Affecting Functional Mobility    Safety Issues Affecting Function (Mobility) insight into deficits/self-awareness;judgment;positioning of assistive device;problem-solving;sequencing abilities;safety precaution awareness;safety precautions follow-through/compliance (P)   -AP     Impairments Affecting Function (Mobility) balance;coordination;endurance/activity tolerance;motor control;pain;range of motion (ROM);motor planning;strength (P)   -AP     Comment, Safety Issues/Impairments (Mobility) Requires VCs for proper hand placement during sit<>stand transfers/scooting in chair (P)   -AP               User Key  (r) = Recorded By, (t) = Taken By, (c) = Cosigned By      Initials Name Provider Type    AP  Silva Kelly, PT Student PT Student                   Mobility       Row Name 01/27/25 1726          Bed Mobility    Comment, (Bed Mobility) Not assessed--pt supine in bed upon first attempt to evaluate; was sitting upright in chair when PT returned for Eval (P)   -AP       Row Name 01/27/25 1726          Sit-Stand Transfer    Sit-Stand Dameron (Transfers) minimum assist (75% patient effort);2 person assist (P)   -AP     Assistive Device (Sit-Stand Transfers) walker, front-wheeled (P)   -AP     Comment, (Sit-Stand Transfer) Completed with VCs for proper hand placement and LLE kick out during sit<>stand with FWW (P)   -AP       Row Name 01/27/25 1726 01/27/25 1515       Gait/Stairs (Locomotion)    Dameron Level (Gait) contact guard;1 person assist;1 person to manage equipment (P)   Ambulated with chair follow  -AP --    Assistive Device (Gait) walker, front-wheeled (P)   -AP --    Patient was able to Ambulate yes (P)   -AP no, other medical factors prevent ambulation (P)   -AP    Reason Patient was unable to Ambulate -- Limited Motor Function related to Block (P)   Pt completed LLE active toe extension/ankle DF, but was unable to active quadriceps  -AP    Distance in Feet (Gait) 10 (P)   -AP --    Deviations/Abnormal Patterns (Gait) bilateral deviations;antalgic;base of support, narrow;pennie decreased;gait speed decreased;stride length decreased;weight shifting decreased (P)   -AP --    Bilateral Gait Deviations forward flexed posture;heel strike decreased;weight shift ability decreased (P)   -AP --    Left Sided Gait Deviations decreased knee extension;weight shift ability decreased;leans left (P)   -AP --    Maintains Weight-bearing Status (Gait) verbal cues to maintain (P)   VCs to encourage weight-bearing as tolerated  -AP --    Dameron Level (Stairs) unable to assess (P)   -AP --    Comment, (Gait/Stairs) Pt expresses significant pain with ambulation with FWW, limiting distance to ~10' with  maximal encouragement from therapist. Variable step-to pattern with occasional demonstration of slight step-through. Pt requires VCs for proper weight-shifting onto LLE as well as focusing on active knee extension during stance/flexion during swing phase. No overt LOB or knee buckling noted. Stairs assessment inappropriate considering severe pt-reported pain. (P)   -AP --      Row Name 01/27/25 1726          Mobility    Extremity Weight-bearing Status left lower extremity (P)   -AP     Left Lower Extremity (Weight-bearing Status) weight-bearing as tolerated (WBAT) (P)   -AP               User Key  (r) = Recorded By, (t) = Taken By, (c) = Cosigned By      Initials Name Provider Type    AP Silva Kelly, PT Student PT Student                   Obj/Interventions       Row Name 01/27/25 1735          Range of Motion Comprehensive    General Range of Motion lower extremity range of motion deficits identified (P)   -AP     Comment, General Range of Motion L knee ROM: 8-55 (P)   significantly limited by pain and muscle guarding  -AP       Row Name 01/27/25 1735          Strength Comprehensive (MMT)    General Manual Muscle Testing (MMT) Assessment lower extremity strength deficits identified (P)   -AP     Comment, General Manual Muscle Testing (MMT) Assessment Pt able to complete B active DF but struggled significantly with R SLR, requiring MaxA from therapist d/t severe pt-reported pain. No LOB or knee buckling noted during weight-bearing. (P)   -AP       Row Name 01/27/25 1735          Motor Skills    Therapeutic Exercise hip;knee;ankle (P)   -AP       Row Name 01/27/25 1735          Hip (Therapeutic Exercise)    Hip (Therapeutic Exercise) AAROM (active assistive range of motion) (P)   -AP     Hip AAROM (Therapeutic Exercise) left;flexion;extension;sitting;10 repetitions (P)   Pt assisted minimally  -AP     Hip Isometrics (Therapeutic Exercise) -- (P)   -AP       Row Name 01/27/25 1732          Knee (Therapeutic  Exercise)    Knee (Therapeutic Exercise) isometric exercises;strengthening exercise (P)   -AP     Knee Isometrics (Therapeutic Exercise) quad sets;left;sitting;10 repetitions;3 second hold (P)   -AP     Knee Strengthening (Therapeutic Exercise) SLR (straight leg raise);SAQ (short arc quad);LAQ (long arc quad);heel slides;left;sitting;10 repetitions (P)   Pt required Mod-Max A support from therapist to complete SLR, LAQ, and heel slides d/t severe pt-reported pain  -AP       Row Name 01/27/25 1735          Ankle (Therapeutic Exercise)    Ankle (Therapeutic Exercise) AROM (active range of motion) (P)   -AP     Ankle AROM (Therapeutic Exercise) bilateral;dorsiflexion;plantarflexion;sitting;10 repetitions (P)   -AP       Row Name 01/27/25 1735          Balance    Balance Assessment sitting static balance;sitting dynamic balance;standing static balance;standing dynamic balance (P)   -AP     Static Sitting Balance standby assist (P)   -AP     Dynamic Sitting Balance standby assist (P)   -AP     Position, Sitting Balance unsupported;sitting in chair (P)   -AP     Static Standing Balance contact guard;2-person assist (P)   -AP     Dynamic Standing Balance contact guard;1-person assist;1 person to manage equipment (P)   -AP     Position/Device Used, Standing Balance supported;walker, front-wheeled (P)   -AP     Balance Interventions sitting;standing;sit to stand;supported;dynamic;moderate challenge;occupation based/functional task;weight shifting activity (P)   -AP     Comment, Balance No overt LOB or knee buckling noted throughout standing activities. Pt completed 5x weight shift L<>R with CGA x2 and BUE support thru FWW (P)   -AP       Row Name 01/27/25 1736          Sensory Assessment (Somatosensory)    Sensory Assessment (Somatosensory) LE sensation intact (P)   -AP               User Key  (r) = Recorded By, (t) = Taken By, (c) = Cosigned By      Initials Name Provider Type    Silva Rain, PT Student PT Student                    Goals/Plan       Row Name 01/27/25 1830          Bed Mobility Goal 1 (PT)    Activity/Assistive Device (Bed Mobility Goal 1, PT) sit to supine;supine to sit (P)   -AP     Cascade Level/Cues Needed (Bed Mobility Goal 1, PT) modified independence (P)   -AP     Time Frame (Bed Mobility Goal 1, PT) short term goal (STG);1 day (P)   -AP       Row Name 01/27/25 1830          Transfer Goal 1 (PT)    Activity/Assistive Device (Transfer Goal 1, PT) sit-to-stand/stand-to-sit (P)   -AP     Cascade Level/Cues Needed (Transfer Goal 1, PT) standby assist (P)   -AP     Time Frame (Transfer Goal 1, PT) short term goal (STG);1 day (P)   -AP       Row Name 01/27/25 1830          Gait Training Goal 1 (PT)    Activity/Assistive Device (Gait Training Goal 1, PT) gait (walking locomotion);assistive device use;walker, platform (P)   -AP     Cascade Level (Gait Training Goal 1, PT) standby assist (P)   -AP     Distance (Gait Training Goal 1, PT) 250' (P)   -AP     Time Frame (Gait Training Goal 1, PT) long term goal (LTG);3 days (P)   -AP       Row Name 01/27/25 1830          Stairs Goal 1 (PT)    Activity/Assistive Device (Stairs Goal 1, PT) ascending stairs;descending stairs (P)   -AP     Cascade Level/Cues Needed (Stairs Goal 1, PT) standby assist (P)   -AP     Number of Stairs (Stairs Goal 1, PT) 3 (P)   -AP     Time Frame (Stairs Goal 1, PT) long term goal (LTG);3 days (P)   -AP               User Key  (r) = Recorded By, (t) = Taken By, (c) = Cosigned By      Initials Name Provider Type    Silva Rain, PT Student PT Student                   Clinical Impression       Row Name 01/27/25 1825          Pain    Pretreatment Pain Rating 8/10 (P)   -AP     Posttreatment Pain Rating 2/10 (P)   Pt reports 2/10 pain with rest at end of session. Reported 10/10 pain during ambulation with FWW  -AP     Pain Location knee (P)   -AP     Pain Side/Orientation left;anterior;lateral (P)   -AP     Pain  Management Interventions cold applied;exercise or physical activity utilized;positioning techniques utilized (P)   -AP     Response to Pain Interventions activity participation with increased pain;no change per patient report (P)   -AP     Pre/Posttreatment Pain Comment Pt premedicated before activity (P)   -AP       Row Name 01/27/25 1824          Plan of Care Review    Plan of Care Reviewed With patient;spouse (P)   -AP     Progress no change (P)   -AP     Outcome Evaluation Pt presents POD#0 with significant LLE strength and range of motion limitations, functional mobility/transfers deficits, and severe pt-reported pain which limited participation in today's session. Pt required Mod-Max A from therapist to complete all LLE TherEx activities. Able to complete sit<>stand transfers with FWW and CGA x2, however, reports extreme pain throughout LLE weight-shifting. Ambulated ~10' with step-to pattern + FWW and CGA x2, expressing severe pain which limited ambulation distance. Pt states pain decreases to 2-3/10 with rest at end of session. Recommend IPR to improve independence and minimize risk of fall; pt and spouse agreeable. (P)   -AP       Row Name 01/27/25 3808          Therapy Assessment/Plan (PT)    Patient/Family Therapy Goals Statement (PT) Pt primary goal to d/c home (P)   -AP     Rehab Potential (PT) good (P)   -AP     Criteria for Skilled Interventions Met (PT) yes;meets criteria;skilled treatment is necessary (P)   -AP     Therapy Frequency (PT) 2 times/day (P)   -AP     Predicted Duration of Therapy Intervention (PT) 5 days (P)   -AP       Row Name 01/27/25 1049          Positioning and Restraints    Pre-Treatment Position sitting in chair/recliner (P)   -AP     Post Treatment Position chair (P)   -AP     In Chair notified nsg;reclined;call light within reach;encouraged to call for assist;exit alarm on;with family/caregiver;LLE elevated;compression device (P)   -AP               User Key  (r) = Recorded  By, (t) = Taken By, (c) = Cosigned By      Initials Name Provider Type    Silva Rain, PT Student PT Student                   Outcome Measures       Row Name 01/27/25 1832 01/27/25 1740       How much help from another person do you currently need...    Turning from your back to your side while in flat bed without using bedrails? 3 (P)   -AP 3  -ADELA    Moving from lying on back to sitting on the side of a flat bed without bedrails? 3 (P)   -AP 3  -ADELA    Moving to and from a bed to a chair (including a wheelchair)? 3 (P)   -AP 2  -ADELA    Standing up from a chair using your arms (e.g., wheelchair, bedside chair)? 3 (P)   -AP 3  -ADELA    Climbing 3-5 steps with a railing? 2 (P)   -AP 2  -ADELA    To walk in hospital room? 3 (P)   -AP 2  -ADELA    AM-PAC 6 Clicks Score (PT) 17 (P)   -AP 15  -ADELA    Highest Level of Mobility Goal 5 --> Static standing (P)   -AP 4 --> Transfer to chair/commode  -ADELA      Row Name 01/27/25 1832          PADD    Diagnosis 1 (P)   -AP     Gender 1 (P)   -AP     Age Group 1 (P)   -AP     Gait Distance 0 (P)   -AP     Assist Level 1 (P)   -AP     Home Support 3 (P)   -AP     PADD Score 7 (P)   -AP     Patient Preference extended rehabilitation (P)   -AP     Prediction by PADD Score extended rehabilitation (P)   -AP       Row Name 01/27/25 1832          Functional Assessment    Outcome Measure Options AM-PAC 6 Clicks Basic Mobility (PT);PADD (P)   -AP               User Key  (r) = Recorded By, (t) = Taken By, (c) = Cosigned By      Initials Name Provider Type    Mike Gooden, RN Registered Nurse    Silva Rain, PT Student PT Student                                 Physical Therapy Education       Title: PT OT SLP Therapies (Done)       Topic: Physical Therapy (Done)       Point: Mobility training (Done)       Learning Progress Summary            Patient Acceptance, E,H, VU,DU by VAN at 1/27/2025 1832   Significant Other Acceptance, E,H, VU,DU by AP at 1/27/2025 1832                       Point: Home exercise program (Done)       Learning Progress Summary            Patient Acceptance, E,H, VU,DU by AP at 1/27/2025 1832   Significant Other Acceptance, E,H, VU,DU by AP at 1/27/2025 1832                      Point: Body mechanics (Done)       Learning Progress Summary            Patient Acceptance, E,H, VU,DU by AP at 1/27/2025 1832   Significant Other Acceptance, E,H, VU,DU by AP at 1/27/2025 1832                      Point: Precautions (Done)       Learning Progress Summary            Patient Acceptance, E,H, VU,DU by AP at 1/27/2025 1832   Significant Other Acceptance, E,H, VU,DU by AP at 1/27/2025 1832                                      User Key       Initials Effective Dates Name Provider Type Discipline     01/09/25 -  Silva Kelly, PT Student PT Student PT                  PT Recommendation and Plan     Progress: (P) no change  Outcome Evaluation: (P) Pt presents POD#0 with significant LLE strength and range of motion limitations, functional mobility/transfers deficits, and severe pt-reported pain which limited participation in today's session. Pt required Mod-Max A from therapist to complete all LLE TherEx activities. Able to complete sit<>stand transfers with FWW and CGA x2, however, reports extreme pain throughout LLE weight-shifting. Ambulated ~10' with step-to pattern + FWW and CGA x2, expressing severe pain which limited ambulation distance. Pt states pain decreases to 2-3/10 with rest at end of session. Recommend IPR to improve independence and minimize risk of fall; pt and spouse agreeable.     Time Calculation:   PT Evaluation Complexity  History, PT Evaluation Complexity: (P) 1-2 personal factors and/or comorbidities  Examination of Body Systems (PT Eval Complexity): (P) 1-2 elements  Clinical Presentation (PT Evaluation Complexity): (P) stable  Clinical Decision Making (PT Evaluation Complexity): (P) low complexity  Overall Complexity (PT Evaluation Complexity): (P) low  complexity     PT Charges       Row Name 01/27/25 1834             Time Calculation    Start Time 1432 (P)   -AP      PT Received On 01/27/25 (P)   -AP      PT Goal Re-Cert Due Date 02/06/25 (P)   -AP         Timed Charges    96592 - PT Therapeutic Exercise Minutes 8 (P)   -AP      08152 - Gait Training Minutes  5 (P)   -AP         Untimed Charges    PT Eval/Re-eval Minutes 47 (P)   -AP         Total Minutes    Timed Charges Total Minutes 13 (P)   -AP      Untimed Charges Total Minutes 47 (P)   -AP       Total Minutes 60 (P)   -AP                User Key  (r) = Recorded By, (t) = Taken By, (c) = Cosigned By      Initials Name Provider Type    AP Silva Kelly, PT Student PT Student                  Therapy Charges for Today       Code Description Service Date Service Provider Modifiers Qty    95807933253 HC PT THER PROC EA 15 MIN 1/27/2025 Silva Kelly, PT Student GP 1    [id number] HC PT EVAL LOW COMPLEXITY 4 1/27/2025 Silva Kelly, PT Student GP 1            PT G-Codes  Outcome Measure Options: (P) AM-PAC 6 Clicks Basic Mobility (PT), PADD  AM-PAC 6 Clicks Score (PT): (P) 17  PT Discharge Summary  Anticipated Discharge Disposition (PT): (P) inpatient rehabilitation facility    Silva Kelly PT Student  1/27/2025

## 2025-01-28 LAB
ANION GAP SERPL CALCULATED.3IONS-SCNC: 10 MMOL/L (ref 5–15)
BUN SERPL-MCNC: 16 MG/DL (ref 8–23)
BUN/CREAT SERPL: 16.2 (ref 7–25)
CALCIUM SPEC-SCNC: 8.8 MG/DL (ref 8.6–10.5)
CHLORIDE SERPL-SCNC: 107 MMOL/L (ref 98–107)
CO2 SERPL-SCNC: 23 MMOL/L (ref 22–29)
CREAT SERPL-MCNC: 0.99 MG/DL (ref 0.57–1)
EGFRCR SERPLBLD CKD-EPI 2021: 61.5 ML/MIN/1.73
GLUCOSE SERPL-MCNC: 183 MG/DL (ref 65–99)
HCT VFR BLD AUTO: 36.6 % (ref 34–46.6)
HGB BLD-MCNC: 11.6 G/DL (ref 12–15.9)
POTASSIUM SERPL-SCNC: 4.2 MMOL/L (ref 3.5–5.2)
SODIUM SERPL-SCNC: 140 MMOL/L (ref 136–145)

## 2025-01-28 PROCEDURE — 63710000001 METOPROLOL SUCCINATE XL 25 MG TABLET SUSTAINED-RELEASE 24 HOUR: Performed by: INTERNAL MEDICINE

## 2025-01-28 PROCEDURE — 97110 THERAPEUTIC EXERCISES: CPT

## 2025-01-28 PROCEDURE — 99024 POSTOP FOLLOW-UP VISIT: CPT | Performed by: ORTHOPAEDIC SURGERY

## 2025-01-28 PROCEDURE — 85018 HEMOGLOBIN: CPT | Performed by: ORTHOPAEDIC SURGERY

## 2025-01-28 PROCEDURE — A9270 NON-COVERED ITEM OR SERVICE: HCPCS | Performed by: INTERNAL MEDICINE

## 2025-01-28 PROCEDURE — A9270 NON-COVERED ITEM OR SERVICE: HCPCS | Performed by: ORTHOPAEDIC SURGERY

## 2025-01-28 PROCEDURE — 97165 OT EVAL LOW COMPLEX 30 MIN: CPT

## 2025-01-28 PROCEDURE — 63710000001 TRAZODONE 50 MG TABLET: Performed by: INTERNAL MEDICINE

## 2025-01-28 PROCEDURE — 63710000001 OXYCODONE 5 MG TABLET: Performed by: ORTHOPAEDIC SURGERY

## 2025-01-28 PROCEDURE — 97535 SELF CARE MNGMENT TRAINING: CPT

## 2025-01-28 PROCEDURE — 25010000002 CEFAZOLIN PER 500 MG: Performed by: ORTHOPAEDIC SURGERY

## 2025-01-28 PROCEDURE — 97116 GAIT TRAINING THERAPY: CPT

## 2025-01-28 PROCEDURE — 63710000001 ACETAMINOPHEN EXTRA STRENGTH 500 MG TABLET: Performed by: ORTHOPAEDIC SURGERY

## 2025-01-28 PROCEDURE — 80048 BASIC METABOLIC PNL TOTAL CA: CPT | Performed by: ORTHOPAEDIC SURGERY

## 2025-01-28 PROCEDURE — 63710000001 DESVENLAFAXINE 50 MG TABLET SUSTAINED-RELEASE 24 HOUR: Performed by: INTERNAL MEDICINE

## 2025-01-28 PROCEDURE — 85014 HEMATOCRIT: CPT | Performed by: ORTHOPAEDIC SURGERY

## 2025-01-28 PROCEDURE — 63710000001 PANTOPRAZOLE 40 MG TABLET DELAYED-RELEASE: Performed by: INTERNAL MEDICINE

## 2025-01-28 PROCEDURE — 63710000001 ASPIRIN 81 MG TABLET DELAYED-RELEASE: Performed by: ORTHOPAEDIC SURGERY

## 2025-01-28 PROCEDURE — 63710000001 MELOXICAM 15 MG TABLET: Performed by: ORTHOPAEDIC SURGERY

## 2025-01-28 PROCEDURE — 63710000001 ATORVASTATIN 20 MG TABLET: Performed by: INTERNAL MEDICINE

## 2025-01-28 PROCEDURE — 63710000001 ESCITALOPRAM 20 MG TABLET: Performed by: INTERNAL MEDICINE

## 2025-01-28 PROCEDURE — 63710000001 TRAZODONE 100 MG TABLET: Performed by: INTERNAL MEDICINE

## 2025-01-28 RX ADMIN — OXYCODONE 5 MG: 5 TABLET ORAL at 13:39

## 2025-01-28 RX ADMIN — DESVENLAFAXINE 100 MG: 50 TABLET, FILM COATED, EXTENDED RELEASE ORAL at 08:09

## 2025-01-28 RX ADMIN — METOPROLOL SUCCINATE 25 MG: 25 TABLET, EXTENDED RELEASE ORAL at 20:14

## 2025-01-28 RX ADMIN — ESCITALOPRAM OXALATE 20 MG: 20 TABLET, FILM COATED ORAL at 08:07

## 2025-01-28 RX ADMIN — Medication 3 ML: at 20:16

## 2025-01-28 RX ADMIN — TRAZODONE HYDROCHLORIDE 150 MG: 50 TABLET ORAL at 22:14

## 2025-01-28 RX ADMIN — ACETAMINOPHEN 1000 MG: 500 TABLET, FILM COATED ORAL at 13:39

## 2025-01-28 RX ADMIN — ATORVASTATIN CALCIUM 20 MG: 20 TABLET, FILM COATED ORAL at 08:09

## 2025-01-28 RX ADMIN — SODIUM CHLORIDE 2000 MG: 900 INJECTION INTRAVENOUS at 02:47

## 2025-01-28 RX ADMIN — ASPIRIN 81 MG: 81 TABLET, COATED ORAL at 20:14

## 2025-01-28 RX ADMIN — Medication 3 ML: at 08:15

## 2025-01-28 RX ADMIN — ACETAMINOPHEN 1000 MG: 500 TABLET, FILM COATED ORAL at 05:48

## 2025-01-28 RX ADMIN — PANTOPRAZOLE SODIUM 40 MG: 40 TABLET, DELAYED RELEASE ORAL at 05:49

## 2025-01-28 RX ADMIN — OXYCODONE 5 MG: 5 TABLET ORAL at 19:36

## 2025-01-28 RX ADMIN — ACETAMINOPHEN 1000 MG: 500 TABLET, FILM COATED ORAL at 22:13

## 2025-01-28 RX ADMIN — ASPIRIN 81 MG: 81 TABLET, COATED ORAL at 08:08

## 2025-01-28 RX ADMIN — MELOXICAM 15 MG: 15 TABLET ORAL at 08:09

## 2025-01-28 RX ADMIN — OXYCODONE 5 MG: 5 TABLET ORAL at 08:09

## 2025-01-28 RX ADMIN — Medication 3 ML: at 00:41

## 2025-01-28 NOTE — THERAPY TREATMENT NOTE
Patient Name: Sarah Howard  : 1955    MRN: [id number]                              Today's Date: 2025       Admit Date: 2025    Visit Dx:     ICD-10-CM ICD-9-CM   1. S/P TKR (total knee replacement), left  Z96.652 V43.65   2. Primary osteoarthritis of left knee  M17.12 715.16     Patient Active Problem List   Diagnosis    Mild depression    Osteopenia    GERD without esophagitis    Coronary artery disease involving native coronary artery    History of breast cancer    Glaucoma    Primary hypertension    Alcohol use disorder    Chronic cough    Class 1 obesity due to excess calories without serious comorbidity with body mass index (BMI) of 33.0 to 33.9 in adult    Age-related nuclear cataract of both eyes    Arthritis of knee    Hyperlipidemia    S/P TKR (total knee replacement), left     Past Medical History:   Diagnosis Date    Anxiety     Breast cancer     3/2010, right breast    Cancer Breast    2010    Cervical disc disorder ACDF 2020    Coronary artery disease 10/2020    Depression     Drug therapy     2010    Fracture, foot     bilateral    GERD (gastroesophageal reflux disease) 2016    Glaucoma 2016    Hard to intubate     Hx of radiation therapy     2010    Hyperlipidemia     Hypertension 2020    Knee swelling     Osteopenia 2009    Tear of meniscus of knee     left     Past Surgical History:   Procedure Laterality Date    ADENOIDECTOMY  1972    BACK SURGERY  2018    BREAST BIOPSY Right     3/2010    BREAST LUMPECTOMY Right     x2, 2010    BREAST SURGERY  2 x 2010    CARDIAC CATHETERIZATION  2020    COLONOSCOPY  2018    COSMETIC SURGERY  breast reduction    2010    EYE SURGERY  laser iridotomies    2019    HYSTERECTOMY  2011    NECK SURGERY      OOPHORECTOMY      REDUCTION MAMMAPLASTY Bilateral     2010 at time of lumpectomy    SPINE SURGERY  2019    TONSILLECTOMY  1972    TOTAL KNEE ARTHROPLASTY Left 2025    Procedure: TOTAL KNEE ARTHROPLASTY WITH EMELINA ROBOT LEFT;   Surgeon: Praful Baker MD;  Location: [facility];  Service: Robotics - Ortho;  Laterality: Left;    TUBAL ABDOMINAL LIGATION  1982      General Information       Row Name 01/28/25 1441 01/28/25 1039       Physical Therapy Time and Intention    Document Type therapy note (daily note)  -HW therapy note (daily note)  -HW (r) AP (t) HW (c)    Mode of Treatment physical therapy  -HW individual therapy;physical therapy  -HW (r) AP (t) HW (c)      Row Name 01/28/25 1441 01/28/25 1039       General Information    Patient Profile Reviewed yes  -HW yes  -HW (r) AP (t) HW (c)    Existing Precautions/Restrictions fall;left;weight bearing;other (see comments)  WBAT s/p L TKA, adductor canal nerve block  -HW fall;left;weight bearing;other (see comments)  WBAT s/p L TKA, adductor canal nerve block  -HW (r) AP (t) HW (c)      Row Name 01/28/25 1441 01/28/25 1039       Cognition    Orientation Status (Cognition) oriented x 4  -HW oriented x 3  -HW (r) AP (t) HW (c)      Row Name 01/28/25 1441 01/28/25 1039       Safety Issues/Impairments Affecting Functional Mobility    Safety Issues Affecting Function (Mobility) -- insight into deficits/self-awareness;judgment;positioning of assistive device;problem-solving;sequencing abilities;safety precaution awareness;safety precautions follow-through/compliance  -HW (r) AP (t) HW (c)    Impairments Affecting Function (Mobility) balance;coordination;endurance/activity tolerance;motor control;pain;range of motion (ROM);motor planning;strength  - balance;coordination;endurance/activity tolerance;motor control;pain;range of motion (ROM);motor planning;strength  -HW (r) AP (t) HW (c)    Comment, Safety Issues/Impairments (Mobility) -- Requires VCs for proper hand placement during sit<>stand transfer from chair with FWW  -HW (r) AP (t) HW (c)              User Key  (r) = Recorded By, (t) = Taken By, (c) = Cosigned By      Initials Name Provider Type    HW Mary Lou Rojo PT Physical Therapist     Silva Rain, PT Student PT Student                   Mobility       Row Name 01/28/25 1442 01/28/25 1042       Bed Mobility    Comment, (Bed Mobility) [facility]  - Pt upright in chair upon arrival to room  -HW (r) AP (t) HW (c)      Row Name 01/28/25 1442          Transfers    Comment, (Transfers) VC for hannd placement with good recall  -       Row Name 01/28/25 1442 01/28/25 1042       Sit-Stand Transfer    Sit-Stand [place] (Transfers) contact guard;verbal cues;nonverbal cues (demo/gesture)  -HW contact guard;2 person assist  -HW (r) AP (t) HW (c)    Assistive Device (Sit-Stand Transfers) walker, front-wheeled  -HW walker, front-wheeled  -HW (r) AP (t) HW (c)    Comment, (Sit-Stand Transfer) -- VCs for proper hand placement during sit<>stand with FWW and appropriate weight shift onto LLE upon standing  -HW (r) AP (t) HW (c)      Row Name 01/28/25 1442 01/28/25 1042       Gait/Stairs (Locomotion)    [place] Level (Gait) contact guard;nonverbal cues (demo/gesture);verbal cues  -HW contact guard;1 person assist;1 person to manage equipment  -HW (r) AP (t) HW (c)    Assistive Device (Gait) walker, front-wheeled  -HW walker, front-wheeled  -HW (r) AP (t) HW (c)    Patient was able to Ambulate -- yes  -HW (r) AP (t) HW (c)    Distance in Feet (Gait) 160  -  -HW (r) AP (t) HW (c)    Deviations/Abnormal Patterns (Gait) bilateral deviations;antalgic;base of support, narrow;pennie decreased;gait speed decreased;stride length decreased;weight shifting decreased  -HW bilateral deviations;antalgic;base of support, narrow;pennie decreased;gait speed decreased;stride length decreased;weight shifting decreased  -HW (r) AP (t) HW (c)    Bilateral Gait Deviations forward flexed posture;heel strike decreased  -HW forward flexed posture;heel strike decreased  -HW (r) AP (t) HW (c)    Left Sided Gait Deviations decreased knee extension;weight shift ability decreased;leans left  -HW decreased knee  extension;weight shift ability decreased;leans left  -HW (r) AP (t) HW (c)    Maintains Weight-bearing Status (Gait) -- verbal cues to maintain  VCs to encourage weight-bearing as tolerated  -HW (r) AP (t) HW (c)    [place] Level (Stairs) -- unable to assess  -HW (r) AP (t) HW (c)    Comment, (Gait/Stairs) Pt amb in rucker with step-through gait pattern. L knee buckling noted throughout mobility despite VC for decreased stride length, active knee extension in stance phase, and manual cueing. Elevated pain to 10/10 noted with ambulation especially with episodes of knee instability.  Increased pain limited further ambulation. Unable to assess stairs at this time due to knee instability and elevated pain.  -HW Pt able to ambulate increased distance this date with FWW and CGA x1 with chair follow. Demonstrates good step-through pattern despite pt-reported 6/10 pain with gait training. Pt requires VCs to encourage weight-shifting onto LLE, heel strike during L stance, and upright posture throughout. No overt LOB or knee buckling noted.  -HW (r) AP (t) HW (c)      Row Name 01/28/25 1038          Gait/Stairs (Locomotion)    Patient was able to Ambulate yes  -HW (r) AP (t) HW (c)     Distance in Feet (Gait) 140  -HW (r) AP (t) HW (c)       Row Name 01/28/25 1442 01/28/25 1042       Mobility    Extremity Weight-bearing Status left lower extremity  -HW left lower extremity  -HW (r) AP (t) HW (c)    Left Lower Extremity (Weight-bearing Status) weight-bearing as tolerated (WBAT)  -HW weight-bearing as tolerated (WBAT)  -HW (r) AP (t) HW (c)              User Key  (r) = Recorded By, (t) = Taken By, (c) = Cosigned By      Initials Name Provider Type    HW Mary Lou Rojo, PT Physical Therapist    Silva Rain, PT Student PT Student                   Obj/Interventions       Row Name 01/28/25 1445 01/28/25 1046       Range of Motion Comprehensive    General Range of Motion lower extremity range of motion deficits identified   -HW lower extremity range of motion deficits identified  -HW (r) AP (t) HW (c)    Comment, General Range of Motion Surgical knee ROM: 8-85  -HW L knee AROM: 6-65  -HW (r) AP (t) HW (c)      Row Name 01/28/25 1445 01/28/25 1046       Strength Comprehensive (MMT)    General Manual Muscle Testing (MMT) Assessment lower extremity strength deficits identified  - --    Comment, General Manual Muscle Testing (MMT) Assessment Pt able to perform B active ankle DF and SLR  - Pt able to complete B active DF and R SLR without assistance from therapist  -HW (r) AP (t) HW (c)      Row Name 01/28/25 1445 01/28/25 1046       Motor Skills    Therapeutic Exercise knee;ankle  - hip;knee;ankle  -HW (r) AP (t) HW (c)      Row Name 01/28/25 1046          Hip (Therapeutic Exercise)    Hip (Therapeutic Exercise) AAROM (active assistive range of motion)  -HW (r) AP (t) HW (c)     Hip AAROM (Therapeutic Exercise) left;flexion;extension;sitting;10 repetitions  -HW (r) AP (t) HW (c)       Row Name 01/28/25 1445 01/28/25 1046       Knee (Therapeutic Exercise)    Knee (Therapeutic Exercise) isometric exercises;strengthening exercise  - isometric exercises;strengthening exercise  -HW (r) AP (t) HW (c)    Knee Isometrics (Therapeutic Exercise) left;quad sets;10 repetitions  -HW left;quad sets;10 repetitions;3 second hold  -HW (r) AP (t) HW (c)    Knee Strengthening (Therapeutic Exercise) left;heel slides;SLR (straight leg raise);SAQ (short arc quad);LAQ (long arc quad);10 repetitions  -HW left;SAQ (short arc quad);LAQ (long arc quad);SLR (straight leg raise);heel slides;sitting;10 repetitions  -HW (r) AP (t) HW (c)      Row Name 01/28/25 1445 01/28/25 1046       Ankle (Therapeutic Exercise)    Ankle (Therapeutic Exercise) AROM (active range of motion)  - AROM (active range of motion)  -HW (r) AP (t) HW (c)    Ankle AROM (Therapeutic Exercise) bilateral;dorsiflexion;plantarflexion;10 repetitions  -HW  bilateral;dorsiflexion;plantarflexion;sitting;10 repetitions  -HW (r) AP (t) HW (c)      Row Name 01/28/25 1445 01/28/25 1046       Balance    Balance Assessment sitting static balance;sitting dynamic balance;standing static balance;standing dynamic balance  -HW sitting static balance;sitting dynamic balance;standing static balance;standing dynamic balance  -HW (r) AP (t) HW (c)    Static Sitting Balance standby assist  -HW independent  -HW (r) AP (t) HW (c)    Dynamic Sitting Balance standby assist  -HW standby assist  -HW (r) AP (t) HW (c)    Position, Sitting Balance sitting edge of bed  -HW unsupported;sitting in chair  -HW (r) AP (t) HW (c)    Static Standing Balance contact guard  -HW contact guard;1-person assist  -HW (r) AP (t) HW (c)    Dynamic Standing Balance contact guard  -HW contact guard;1-person assist;1 person to manage equipment  -HW (r) AP (t) HW (c)    Position/Device Used, Standing Balance supported;walker, rolling  -HW supported;walker, front-wheeled  -HW (r) AP (t) HW (c)    Balance Interventions sitting;standing;sit to stand;occupation based/functional task  - sitting;standing;sit to stand;supported;dynamic;minimal challenge;occupation based/functional task;weight shifting activity  -HW (r) AP (t) HW (c)    Comment, Balance -- No overt LOB or knee buckling noted throughout standing activities. Pt completed 2x weight shift onto LLE with BUE support thru FWW prior to initiating ambulation  -HW (r) AP (t) HW (c)              User Key  (r) = Recorded By, (t) = Taken By, (c) = Cosigned By      Initials Name Provider Type    HW Mary Lou Rojo, PT Physical Therapist    Silva Rain, PT Student PT Student                   Goals/Plan    No documentation.                  Clinical Impression       Row Name 01/28/25 1446 01/28/25 1048       Pain    Pretreatment Pain Rating 1/10  -HW 1/10  -HW (r) AP (t) HW (c)    Posttreatment Pain Rating 5/10  - 2/10  Pt describes 6/10 pain during ambulation   -HW (r) AP (t) HW (c)    Pain Location knee  -HW knee  -HW (r) AP (t) HW (c)    Pain Side/Orientation left;generalized  -HW left;anterior;lateral;medial  -HW (r) AP (t) HW (c)    Pain Management Interventions cold applied;exercise or physical activity utilized;nursing notified;positioning techniques utilized  -HW cold applied;exercise or physical activity utilized;positioning techniques utilized  -HW (r) AP (t) HW (c)    Response to Pain Interventions activity participation with tolerable pain  -HW activity participation with tolerable pain;no change per patient report  -HW (r) AP (t) HW (c)      Row Name 01/28/25 1446 01/28/25 1048       Plan of Care Review    Plan of Care Reviewed With patient  -HW patient;spouse  -HW (r) AP (t) HW (c)    Progress no change  -HW improving  -HW (r) AP (t) HW (c)    Outcome Evaluation Pt amb 160' with FWW and CGA. L knee buckling and instability noted throughout ambulation despite VC for active knee extension and tactile cueing. Elevated pain to 10/10 noted with ambulation especially with episodes of knee instability. Increased pain limited further ambulation. Unable to assess stairs at this time due to knee instability and elevated pain. HEP reviewed with pt. Frequent ambulation encouraged. Unfortunately pt is not cleared to d/c today from PT standpoint. Recommend additional therapy tomorrow to address motor control deficits and trial stairs to decrease risk for falls or injury prior to returning home with OPPT.  -HW Pt presents POD#1 with continued deficits in LLE strength and range of motion, functional mobility/transfers, and altered gait mechanics with FWW. Pt required CGA x1 for sit<>stand transfers from chair with FWW; VCs for safety/proper hand placement. Ambulated ~140' with CGA x1 and chair follow + FWW, demonstrating step-through pattern with decreased weight-shifting onto LLE, gait speed, and pennie. Pt describes increased 6/10 pain with gait training which decreased to  3/10 with rest. Recommend d/c home with OPPT following stair training this afternoon.  -HW (r) AP (t) HW (c)      Row Name 01/28/25 1446 01/28/25 1048       Positioning and Restraints    Pre-Treatment Position sitting in chair/recliner  -HW sitting in chair/recliner  -HW (r) AP (t) HW (c)    Post Treatment Position chair  -HW chair  -HW (r) AP (t) HW (c)    In Chair notified nsg;reclined;sitting;call light within reach;encouraged to call for assist;exit alarm on;with family/caregiver;legs elevated  ice applied  -HW notified nsg;reclined;call light within reach;encouraged to call for assist;exit alarm on;with family/caregiver;LLE elevated;compression device  -HW (r) AP (t) HW (c)              User Key  (r) = Recorded By, (t) = Taken By, (c) = Cosigned By      Initials Name Provider Type    HW Mary Lou Rojo, PT Physical Therapist    Silva Rain, PT Student PT Student                   Outcome Measures       Row Name 01/28/25 1450 01/28/25 1052       How much help from another person do you currently need...    Turning from your back to your side while in flat bed without using bedrails? 3  -HW 3  -HW (r) AP (t) HW (c)    Moving from lying on back to sitting on the side of a flat bed without bedrails? 3  -HW 3  -HW (r) AP (t) HW (c)    Moving to and from a bed to a chair (including a wheelchair)? 3  -HW 3  -HW (r) AP (t) HW (c)    Standing up from a chair using your arms (e.g., wheelchair, bedside chair)? 3  -HW 3  -HW (r) AP (t) HW (c)    Climbing 3-5 steps with a railing? 3  -HW 2  -HW (r) AP (t) HW (c)    To walk in hospital room? 3  -HW 3  -HW (r) AP (t) HW (c)    AM-PAC 6 Clicks Score (PT) 18  -HW 17  -HW (r) AP (t)    Highest Level of Mobility Goal 6 --> Walk 10 steps or more  -HW 5 --> Static standing  -HW (r) AP (t)      Row Name 01/28/25 0808          How much help from another person do you currently need...    Turning from your back to your side while in flat bed without using bedrails? 3  -ADELA      Moving from lying on back to sitting on the side of a flat bed without bedrails? 3  -ADELA     Moving to and from a bed to a chair (including a wheelchair)? 3  -ADELA     Standing up from a chair using your arms (e.g., wheelchair, bedside chair)? 3  -ADELA     Climbing 3-5 steps with a railing? 2  -ADELA     To walk in hospital room? 3  -ADELA     AM-PAC 6 Clicks Score (PT) 17  -ADELA     Highest Level of Mobility Goal 5 --> Static standing  -ADELA       Row Name 01/28/25 1450 01/28/25 1101       Functional Assessment    Outcome Measure Options AM-PAC 6 Clicks Basic Mobility (PT)  - AM-PAC 6 Clicks Daily Activity (OT)  -MR      Row Name 01/28/25 1052          Functional Assessment    Outcome Measure Options AM-PAC 6 Clicks Basic Mobility (PT)  - (r) AP (t) HW (c)               User Key  (r) = Recorded By, (t) = Taken By, (c) = Cosigned By      Initials Name Provider Type     Mary Lou Rojo, PT Physical Therapist    Jolynn Castillo, OT Occupational Therapist    Mike Gooden, RN Registered Nurse    Silva Rain, PT Student PT Student                                 Physical Therapy Education       Title: PT OT SLP Therapies (In Progress)       Topic: Physical Therapy (Done)       Point: Mobility training (Done)       Learning Progress Summary            Patient Acceptance, E,D, VU,NR by  at 1/28/2025 1450    Acceptance, E,TB,H, VU,DU by AP at 1/28/2025 1053    Acceptance, E,H, VU,DU by VAN at 1/27/2025 1832   Significant Other Acceptance, E,TB,H, VU,DU by AP at 1/28/2025 1053    Acceptance, E,H, VU,DU by VAN at 1/27/2025 1832                      Point: Home exercise program (Done)       Learning Progress Summary            Patient Acceptance, E,D, VU,NR by  at 1/28/2025 1450    Acceptance, E,TB,H, VU,DU by VAN at 1/28/2025 1053    Acceptance, E,H, VU,DU by VAN at 1/27/2025 1832   Significant Other Acceptance, E,TB,H, VU,DU by AP at 1/28/2025 1053    Acceptance, E,H, VU,DU by VAN at 1/27/2025 1831                       Point: Body mechanics (Done)       Learning Progress Summary            Patient Acceptance, E,D, VU,NR by  at 1/28/2025 1450    Acceptance, E,TB,H, VU,DU by AP at 1/28/2025 1053    Acceptance, E,H, VU,DU by AP at 1/27/2025 1832   Significant Other Acceptance, E,TB,H, VU,DU by AP at 1/28/2025 1053    Acceptance, E,H, VU,DU by AP at 1/27/2025 1832                      Point: Precautions (Done)       Learning Progress Summary            Patient Acceptance, E,D, VU,NR by  at 1/28/2025 1450    Acceptance, E,TB,H, VU,DU by AP at 1/28/2025 1053    Acceptance, E,H, VU,DU by AP at 1/27/2025 1832   Significant Other Acceptance, E,TB,H, VU,DU by AP at 1/28/2025 1053    Acceptance, E,H, VU,DU by AP at 1/27/2025 1832                                      User Key       Initials Effective Dates Name Provider Type Discipline     12/15/23 -  Mary Lou Rojo, PT Physical Therapist PT     01/09/25 -  Silva Kelly, PT Student PT Student PT                  PT Recommendation and Plan     Progress: no change  Outcome Evaluation: Pt amb 160' with FWW and CGA. L knee buckling and instability noted throughout ambulation despite VC for active knee extension and tactile cueing. Elevated pain to 10/10 noted with ambulation especially with episodes of knee instability. Increased pain limited further ambulation. Unable to assess stairs at this time due to knee instability and elevated pain. HEP reviewed with pt. Frequent ambulation encouraged. Unfortunately pt is not cleared to d/c today from PT standpoint. Recommend additional therapy tomorrow to address motor control deficits and trial stairs to decrease risk for falls or injury prior to returning home with OPPT.     Time Calculation:         PT Charges       Row Name 01/28/25 1451 01/28/25 1053          Time Calculation    Start Time 1405  - 0958  -HW (r) AP (t) HW (c)     PT Received On 01/28/25  -HW 01/28/25  -HW (r) AP (t) HW (c)     PT Goal Re-Cert Due Date -- 02/06/25  -HW (r)  AP (t) HW (c)        Timed Charges    24580 - PT Therapeutic Exercise Minutes 14  -HW 22  -HW (r) AP (t) HW (c)     11288 - Gait Training Minutes  12  -HW 8  -HW (r) AP (t) HW (c)        Total Minutes    Timed Charges Total Minutes 26  -HW 30  -HW (r) AP (t)      Total Minutes 26  -HW 30  -HW (r) AP (t)               User Key  (r) = Recorded By, (t) = Taken By, (c) = Cosigned By      Initials Name Provider Type     Mary Lou Rojo, PT Physical Therapist    AP Silva Kelly, PT Student PT Student                  Therapy Charges for Today       Code Description Service Date Service Provider Modifiers Qty    31601695816 HC PT THER PROC EA 15 MIN 1/28/2025 Mary Lou Rojo, PT GP 1    [id number] HC GAIT TRAINING EA 15 MIN 1/28/2025 Mary Lou Rojo, PT GP 1            PT G-Codes  Outcome Measure Options: AM-PAC 6 Clicks Basic Mobility (PT)  AM-PAC 6 Clicks Score (PT): 18  AM-PAC 6 Clicks Score (OT): 18  PT Discharge Summary  Anticipated Discharge Disposition (PT): home with outpatient therapy services    Mary Lou Rojo PT  1/28/2025

## 2025-01-28 NOTE — PROGRESS NOTES
[facility]    Acute pain service Inpatient Progress Note    Patient Name: Sarah Howard  :  1955  MRN:  [id number]        Acute Pain  Service Inpatient Progress Note:    Analgesia:Good  Pain Score:1/10  LOC: alert and awake  Resp Status: room air  Cardiac: VS stable  Side Effects:None  Catheter Site:clean, dressing intact and dry  Cath type: peripheral nerve cath(InfuSystem)  Volume: 1mL,8ml, 8ml InfuSystem Pump.  Catheter Plan:Catheter to remain Insitu and Continue catheter infusion rate unchanged  Comments: 1/10 anterior knee. More pain thigh and lateral.

## 2025-01-28 NOTE — PLAN OF CARE
Goal Outcome Evaluation:  Plan of Care Reviewed With: patient        Progress: no change (Initial Eval)  Outcome Evaluation: Patient presenting below her functional baseline w/ mobility, balance and transfers. Pt requiring increased assist w/ ADLs this date, good effort w/ HH distances of mobility w/ RW. Pt's deficits warrant skilled OT services. Recommend home w/ assist and OP PT when medically appropriate for d/c.

## 2025-01-28 NOTE — THERAPY EVALUATION
Patient Name: Sarah Howard  : 1955    MRN: [id number]                              Today's Date: 2025       Admit Date: 2025    Visit Dx:     ICD-10-CM ICD-9-CM   1. S/P TKR (total knee replacement), left  Z96.652 V43.65   2. Primary osteoarthritis of left knee  M17.12 715.16     Patient Active Problem List   Diagnosis    Mild depression    Osteopenia    GERD without esophagitis    Coronary artery disease involving native coronary artery    History of breast cancer    Glaucoma    Primary hypertension    Alcohol use disorder    Chronic cough    Class 1 obesity due to excess calories without serious comorbidity with body mass index (BMI) of 33.0 to 33.9 in adult    Age-related nuclear cataract of both eyes    Arthritis of knee    Hyperlipidemia    S/P TKR (total knee replacement), left     Past Medical History:   Diagnosis Date    Anxiety     Breast cancer     3/2010, right breast    Cancer Breast    2010    Cervical disc disorder ACDF 2020    Coronary artery disease 10/2020    Depression     Drug therapy     2010    Fracture, foot     bilateral    GERD (gastroesophageal reflux disease) 2016    Glaucoma 2016    Hard to intubate     Hx of radiation therapy     2010    Hyperlipidemia     Hypertension 2020    Knee swelling     Osteopenia 2009    Tear of meniscus of knee     left     Past Surgical History:   Procedure Laterality Date    ADENOIDECTOMY  1972    BACK SURGERY  2018    BREAST BIOPSY Right     3/2010    BREAST LUMPECTOMY Right     x2, 2010    BREAST SURGERY  2 x 2010    CARDIAC CATHETERIZATION  2020    COLONOSCOPY  2018    COSMETIC SURGERY  breast reduction    2010    EYE SURGERY  laser iridotomies    2019    HYSTERECTOMY  2011    NECK SURGERY      OOPHORECTOMY      REDUCTION MAMMAPLASTY Bilateral     2010 at time of lumpectomy    SPINE SURGERY  2019    TONSILLECTOMY  1972    TUBAL ABDOMINAL LIGATION  1982      General Information       Row Name 25 1049          OT Time and  Intention    Document Type evaluation  -MR     Mode of Treatment occupational therapy;individual therapy  -MR       Row Name 01/28/25 1049          General Information    Patient Profile Reviewed yes  -MR     Prior Level of Function independent:;all household mobility;community mobility;gait;bed mobility;using stairs  Reports significant pain in LLE; denies prior use of AD. Has a walk in shower w/ seat. Driving and denies any recent falls.  -MR     Existing Precautions/Restrictions fall;left;weight bearing;other (see comments)  WBAT s/p L TKA, adductor canal nerve block  -MR       Row Name 01/28/25 1049          Living Environment    People in Home spouse  -MR       Row Name 01/28/25 1049          Home Main Entrance    Number of Stairs, Main Entrance three  -MR     Stair Railings, Main Entrance none  -MR       Row Name 01/28/25 1049          Stairs Within Home, Primary    Number of Stairs, Within Home, Primary none  -MR       Row Name 01/28/25 1049          Cognition    Orientation Status (Cognition) oriented x 4  -MR       Row Name 01/28/25 1049          Safety Issues/Impairments Affecting Functional Mobility    Safety Issues Affecting Function (Mobility) awareness of need for assistance;insight into deficits/self-awareness;safety precaution awareness;safety precautions follow-through/compliance;sequencing abilities  -MR     Impairments Affecting Function (Mobility) balance;coordination;endurance/activity tolerance;motor control;pain;range of motion (ROM);motor planning;strength  -MR               User Key  (r) = Recorded By, (t) = Taken By, (c) = Cosigned By      Initials Name Provider Type    MR Jolynn Rivera OT Occupational Therapist                     Mobility/ADL's       Row Name 01/28/25 1050          Bed Mobility    Bed Mobility supine-sit  -MR     Supine-Sit [place] (Bed Mobility) contact guard;verbal cues;nonverbal cues (demo/gesture)  -MR     Assistive Device (Bed Mobility) head of bed  elevated;bed rails  -MR     Comment, (Bed Mobility) Support provided for the LLE when advancing to the EOB. Assist for line management to prevent accidental dislodgement of the nerve cath.  -MR Burr Name 01/28/25 1050          Transfers    Transfers sit-stand transfer;toilet transfer  -MR Burr Name 01/28/25 1050          Sit-Stand Transfer    Sit-Stand [place] (Transfers) contact guard;verbal cues;nonverbal cues (demo/gesture)  -MR     Assistive Device (Sit-Stand Transfers) walker, front-wheeled  -MR     Comment, (Sit-Stand Transfer) v/c for hand placement and advancement of the LLE during transitional phases of transfers.  -MR Burr Name 01/28/25 1050          Toilet Transfer    Type (Toilet Transfer) sit-stand;stand-sit  -MR     [place] Level (Toilet Transfer) contact guard;verbal cues;nonverbal cues (demo/gesture)  -MR     Assistive Device (Toilet Transfer) commode;walker, front-wheeled;grab bars/safety frame  -MR Burr Name 01/28/25 1050          Functional Mobility    Functional Mobility- Ind. Level contact guard assist;verbal cues required;nonverbal cues required (demo/gesture)  -MR     Functional Mobility- Device walker, front-wheeled  -MR     Functional Mobility-Distance (Feet) --  HH distances <> restroom  -MR Burr Name 01/28/25 1050          Activities of Daily Living    BADL Assessment/Intervention lower body dressing;toileting;grooming;bathing  -MR Burr Name 01/28/25 1050          Mobility    Extremity Weight-bearing Status left lower extremity  -     Left Lower Extremity (Weight-bearing Status) weight-bearing as tolerated (WBAT)  -       [place] Name 01/28/25 1050          Lower Body Dressing Assessment/Training    [place] Level (Lower Body Dressing) don;socks;maximum assist (25% patient effort)  -MR     Position (Lower Body Dressing) supine  -MR     Comment, (Lower Body Dressing) Pt educated on sequencing and safety for LB dressing while having nerve cath to  prevent accidental dislodgement.  -MR       Row Name 01/28/25 1050          Toileting Assessment/Training    [place] Level (Toileting) adjust/manage clothing;contact guard assist;perform perineal hygiene  -MR     Assistive Devices (Toileting) commode  -MR     Position (Toileting) unsupported sitting;unsupported standing  -MR       Row Name 01/28/25 1050          Grooming Assessment/Training    [place] Level (Grooming) wash face, hands;set up  -MR     Position (Grooming) supported sitting  -MR       Row Name 01/28/25 1050          Bathing Assessment/Intervention    [place] Level (Bathing) bathing skills  -MR     Comment, (Bathing) Pt educated on waiting to shower once nerve cath has been discontinued and cleared by surgeon.  -MR               User Key  (r) = Recorded By, (t) = Taken By, (c) = Cosigned By      Initials Name Provider Type     Jolynn Rivera OT Occupational Therapist                   Obj/Interventions       Row Name 01/28/25 1056          Sensory Assessment (Somatosensory)    Sensory Assessment (Somatosensory) UE sensation intact  -MR       Row Name 01/28/25 1056          Vision Assessment/Intervention    Visual Impairment/Limitations WFL  -MR       Row Name 01/28/25 1056          Range of Motion Comprehensive    General Range of Motion bilateral upper extremity ROM WFL  -MR       Row Name 01/28/25 1056          Strength Comprehensive (MMT)    General Manual Muscle Testing (MMT) Assessment no strength deficits identified  -MR       Row Name 01/28/25 1056          Balance    Balance Assessment sitting static balance;sitting dynamic balance;standing static balance;standing dynamic balance  -MR     Static Sitting Balance standby assist  -MR     Dynamic Sitting Balance standby assist  -MR     Position, Sitting Balance unsupported;sitting edge of bed  -MR     Static Standing Balance contact guard  -MR     Dynamic Standing Balance contact guard  -MR     Position/Device Used, Standing  Balance supported;walker, front-wheeled  -MR     Balance Interventions sitting;standing;sit to stand;supported;static;dynamic;minimal challenge;occupation based/functional task  -MR               User Key  (r) = Recorded By, (t) = Taken By, (c) = Cosigned By      Initials Name Provider Type     Jolynn Rivera, OT Occupational Therapist                   Goals/Plan       Row Name 01/28/25 1100          Bed Mobility Goal 1 (OT)    Activity/Assistive Device (Bed Mobility Goal 1, OT) sit to supine;supine to sit  -MR     [place] Level/Cues Needed (Bed Mobility Goal 1, OT) supervision required  -MR     Time Frame (Bed Mobility Goal 1, OT) short term goal (STG);3 days  -MR     Progress/Outcomes (Bed Mobility Goal 1, OT) new goal  -MR       Row Name 01/28/25 1100          Transfer Goal 1 (OT)    Activity/Assistive Device (Transfer Goal 1, OT) sit-to-stand/stand-to-sit;toilet  -MR     [place] Level/Cues Needed (Transfer Goal 1, OT) supervision required  -MR     Time Frame (Transfer Goal 1, OT) long term goal (LTG);5 days  -MR     Progress/Outcome (Transfer Goal 1, OT) new goal  -MR       Row Name 01/28/25 1100          Dressing Goal 1 (OT)    Activity/Device (Dressing Goal 1, OT) lower body dressing  -MR     [place]/Cues Needed (Dressing Goal 1, OT) moderate assist (50-74% patient effort)  -MR     Time Frame (Dressing Goal 1, OT) long term goal (LTG);5 days  -MR     Progress/Outcome (Dressing Goal 1, OT) new goal  -MR       Row Name 01/28/25 1100          Therapy Assessment/Plan (OT)    Planned Therapy Interventions (OT) activity tolerance training;adaptive equipment training;BADL retraining;functional balance retraining;wheelchair assessment/training;transfer/mobility retraining;strengthening exercise;ROM/therapeutic exercise;patient/caregiver education/training;neuromuscular control/coordination retraining;occupation/activity based interventions;IADL retraining  -MR               User Key  (r) = Recorded  By, (t) = Taken By, (c) = Cosigned By      Initials Name Provider Type    MR Jolynn Rivera, OT Occupational Therapist                   Clinical Impression       Row Name 01/28/25 1057          Pain Assessment    Pretreatment Pain Rating 3/10  -MR     Posttreatment Pain Rating 3/10  -MR     Pain Location knee  -MR     Pain Side/Orientation generalized  -MR       Row Name 01/28/25 1057          Plan of Care Review    Plan of Care Reviewed With patient  -MR     Progress no change  Initial Eval  -MR     Outcome Evaluation Patient presenting below her functional baseline w/ mobility, balance and transfers. Pt requiring increased assist w/ ADLs this date, good effort w/ HH distances of mobility w/ RW. Pt's deficits warrant skilled OT services. Recommend home w/ assist and OP PT when medically appropriate for d/c.  -MR       Row Name 01/28/25 1057          Therapy Assessment/Plan (OT)    Patient/Family Therapy Goal Statement (OT) Return to PLOF  -MR     Rehab Potential (OT) good  -MR     Criteria for Skilled Therapeutic Interventions Met (OT) yes;meets criteria;skilled treatment is necessary  -MR     Therapy Frequency (OT) daily  -MR     Predicted Duration of Therapy Intervention (OT) 5 days  -MR       Row Name 01/28/25 1057          Vital Signs    Pre Systolic BP Rehab 111  -MR     Pre Treatment Diastolic BP 67  -MR     O2 Delivery Pre Treatment room air  -MR     O2 Delivery Intra Treatment room air  -MR     O2 Delivery Post Treatment room air  -MR     Pre Patient Position Supine  -MR     Intra Patient Position Standing  -MR     Post Patient Position Sitting  -MR       Row Name 01/28/25 1057          Positioning and Restraints    Pre-Treatment Position in bed  -MR     Post Treatment Position chair  -MR     In Chair notified nsg;reclined;sitting;call light within reach;encouraged to call for assist;exit alarm on;with family/caregiver;waffle cushion;legs elevated;compression device  -MR               User Key  (r) =  Recorded By, (t) = Taken By, (c) = Cosigned By      Initials Name Provider Type    Jolynn Castillo, OT Occupational Therapist                   Outcome Measures       Row Name 01/28/25 1101          How much help from another is currently needed...    Putting on and taking off regular lower body clothing? 2  -MR     Bathing (including washing, rinsing, and drying) 2  -MR     Toileting (which includes using toilet bed pan or urinal) 3  -MR     Putting on and taking off regular upper body clothing 3  -MR     Taking care of personal grooming (such as brushing teeth) 4  -MR     Eating meals 4  -MR     AM-PAC 6 Clicks Score (OT) 18  -MR       Row Name 01/28/25 1052 01/28/25 0808       How much help from another person do you currently need...    Turning from your back to your side while in flat bed without using bedrails? 3 (P)   -AP 3  -ADELA    Moving from lying on back to sitting on the side of a flat bed without bedrails? 3 (P)   -AP 3  -ADELA    Moving to and from a bed to a chair (including a wheelchair)? 3 (P)   -AP 3  -ADELA    Standing up from a chair using your arms (e.g., wheelchair, bedside chair)? 3 (P)   -AP 3  -ADELA    Climbing 3-5 steps with a railing? 2 (P)   -AP 2  -ADELA    To walk in hospital room? 3 (P)   -AP 3  -ADELA    AM-PAC 6 Clicks Score (PT) 17 (P)   -AP 17  -ADELA    Highest Level of Mobility Goal 5 --> Static standing (P)   -AP 5 --> Static standing  -ADELA      Row Name 01/28/25 1101 01/28/25 1052       Functional Assessment    Outcome Measure Options AM-PAC 6 Clicks Daily Activity (OT)  -MR AM-PAC 6 Clicks Basic Mobility (PT) (P)   -AP              User Key  (r) = Recorded By, (t) = Taken By, (c) = Cosigned By      Initials Name Provider Type    Jolynn Castillo, OT Occupational Therapist    Mike Gooden, RN Registered Nurse    Silva Rain, PT Student PT Student                    Occupational Therapy Education       Title: PT OT SLP Therapies (In Progress)       Topic: Occupational Therapy (In  Progress)       Point: ADL training (Done)       Description:   Instruct learner(s) on proper safety adaptation and remediation techniques during self care or transfers.   Instruct in proper use of assistive devices.                  Learning Progress Summary            Patient Acceptance, E, VU by MR at 1/28/2025 1102                      Point: Home exercise program (Not Started)       Description:   Instruct learner(s) on appropriate technique for monitoring, assisting and/or progressing therapeutic exercises/activities.                  Learner Progress:  Not documented in this visit.              Point: Precautions (Done)       Description:   Instruct learner(s) on prescribed precautions during self-care and functional transfers.                  Learning Progress Summary            Patient Acceptance, E, VU by MR at 1/28/2025 1102                      Point: Body mechanics (Done)       Description:   Instruct learner(s) on proper positioning and spine alignment during self-care, functional mobility activities and/or exercises.                  Learning Progress Summary            Patient Acceptance, E, VU by MR at 1/28/2025 1102                                      User Key       Initials Effective Dates Name Provider Type Discipline    MR 09/22/22 -  Jolynn Rivera OT Occupational Therapist OT                  OT Recommendation and Plan  Planned Therapy Interventions (OT): activity tolerance training, adaptive equipment training, BADL retraining, functional balance retraining, wheelchair assessment/training, transfer/mobility retraining, strengthening exercise, ROM/therapeutic exercise, patient/caregiver education/training, neuromuscular control/coordination retraining, occupation/activity based interventions, IADL retraining  Therapy Frequency (OT): daily  Plan of Care Review  Plan of Care Reviewed With: patient  Progress: no change (Initial Eval)  Outcome Evaluation: Patient presenting below her functional  baseline w/ mobility, balance and transfers. Pt requiring increased assist w/ ADLs this date, good effort w/ HH distances of mobility w/ RW. Pt's deficits warrant skilled OT services. Recommend home w/ assist and OP PT when medically appropriate for d/c.     Time Calculation:   Evaluation Complexity (OT)  Review Occupational Profile/Medical/Therapy History Complexity: brief/low complexity  Assessment, Occupational Performance/Identification of Deficit Complexity: 1-3 performance deficits  Clinical Decision Making Complexity (OT): problem focused assessment/low complexity  Overall Complexity of Evaluation (OT): low complexity     Time Calculation- OT       Row Name 01/28/25 1102 01/28/25 1053          Time Calculation- OT    OT Start Time 0908  -MR --     OT Received On 01/28/25  -MR --     OT Goal Re-Cert Due Date 02/07/25  -MR --        Timed Charges    54608 - Gait Training Minutes  -- 8 (P)   -AP     23731 - OT Self Care/Mgmt Minutes 8  -MR --        Untimed Charges    OT Eval/Re-eval Minutes 46  -MR --        Total Minutes    Timed Charges Total Minutes 8  -MR 8 (P)   -AP     Untimed Charges Total Minutes 46  -MR --      Total Minutes 54  -MR 8 (P)   -AP               User Key  (r) = Recorded By, (t) = Taken By, (c) = Cosigned By      Initials Name Provider Type    MR Jolynn Rivera, OT Occupational Therapist    Silva Rain, PT Student PT Student                  Therapy Charges for Today       Code Description Service Date Service Provider Modifiers Qty    25573483833 HC OT SELF CARE/MGMT/TRAIN EA 15 MIN 1/28/2025 Jolynn Rivera OT  1    [id number] HC OT EVAL LOW COMPLEXITY 4 1/28/2025 Jolynn Rivera OT GO 1                 oJlynn Rivera OT  1/28/2025

## 2025-01-28 NOTE — PLAN OF CARE
Goal Outcome Evaluation:  Plan of Care Reviewed With: patient        Progress: no change  Outcome Evaluation: Pt amb 160' with FWW and CGA. L knee buckling and instability noted throughout ambulation despite VC for active knee extension and tactile cueing. Elevated pain to 10/10 noted with ambulation especially with episodes of knee instability. Increased pain limited further ambulation. Unable to assess stairs at this time due to knee instability and elevated pain. HEP reviewed with pt. Frequent ambulation encouraged. Unfortunately pt is not cleared to d/c today from PT standpoint. Recommend additional therapy tomorrow to address motor control deficits and trial stairs to decrease risk for falls or injury prior to returning home with OPPT.    Anticipated Discharge Disposition (PT): home with outpatient therapy services

## 2025-01-28 NOTE — THERAPY TREATMENT NOTE
Patient Name: Sarah Howard  : 1955    MRN: [id number]                              Today's Date: 2025       Admit Date: 2025    Visit Dx:     ICD-10-CM ICD-9-CM   1. S/P TKR (total knee replacement), left  Z96.652 V43.65   2. Primary osteoarthritis of left knee  M17.12 715.16     Patient Active Problem List   Diagnosis    Mild depression    Osteopenia    GERD without esophagitis    Coronary artery disease involving native coronary artery    History of breast cancer    Glaucoma    Primary hypertension    Alcohol use disorder    Chronic cough    Class 1 obesity due to excess calories without serious comorbidity with body mass index (BMI) of 33.0 to 33.9 in adult    Age-related nuclear cataract of both eyes    Arthritis of knee    Hyperlipidemia    S/P TKR (total knee replacement), left     Past Medical History:   Diagnosis Date    Anxiety     Breast cancer     3/2010, right breast    Cancer Breast    2010    Cervical disc disorder ACDF 2020    Coronary artery disease 10/2020    Depression     Drug therapy     2010    Fracture, foot     bilateral    GERD (gastroesophageal reflux disease) 2016    Glaucoma 2016    Hard to intubate     Hx of radiation therapy     2010    Hyperlipidemia     Hypertension 2020    Knee swelling     Osteopenia 2009    Tear of meniscus of knee     left     Past Surgical History:   Procedure Laterality Date    ADENOIDECTOMY  1972    BACK SURGERY  2018    BREAST BIOPSY Right     3/2010    BREAST LUMPECTOMY Right     x2, 2010    BREAST SURGERY  2 x 2010    CARDIAC CATHETERIZATION  2020    COLONOSCOPY  2018    COSMETIC SURGERY  breast reduction    2010    EYE SURGERY  laser iridotomies    2019    HYSTERECTOMY  2011    NECK SURGERY      OOPHORECTOMY      REDUCTION MAMMAPLASTY Bilateral     2010 at time of lumpectomy    SPINE SURGERY  2019    TONSILLECTOMY  1972    TOTAL KNEE ARTHROPLASTY Left 2025    Procedure: TOTAL KNEE ARTHROPLASTY WITH EMELINA ROBOT LEFT;   Surgeon: Praful Baker MD;  Location: [facility];  Service: Robotics - Ortho;  Laterality: Left;    TUBAL ABDOMINAL LIGATION  1982      General Information       Row Name 01/28/25 1039          Physical Therapy Time and Intention    Document Type therapy note (daily note) (P)   -AP     Mode of Treatment individual therapy;physical therapy (P)   -AP       Row Name 01/28/25 1039          General Information    Patient Profile Reviewed yes (P)   -AP     Existing Precautions/Restrictions fall;left;weight bearing;other (see comments) (P)   WBAT s/p L TKA, adductor canal nerve block  -AP       Row Name 01/28/25 1039          Cognition    Orientation Status (Cognition) oriented x 3 (P)   -AP       Row Name 01/28/25 1039          Safety Issues/Impairments Affecting Functional Mobility    Safety Issues Affecting Function (Mobility) insight into deficits/self-awareness;judgment;positioning of assistive device;problem-solving;sequencing abilities;safety precaution awareness;safety precautions follow-through/compliance (P)   -AP     Impairments Affecting Function (Mobility) balance;coordination;endurance/activity tolerance;motor control;pain;range of motion (ROM);motor planning;strength (P)   -AP     Comment, Safety Issues/Impairments (Mobility) Requires VCs for proper hand placement during sit<>stand transfer from chair with FWW (P)   -AP               User Key  (r) = Recorded By, (t) = Taken By, (c) = Cosigned By      Initials Name Provider Type    AP Silva Kelly, PT Student PT Student                   Mobility       Row Name 01/28/25 1042          Bed Mobility    Comment, (Bed Mobility) Pt upright in chair upon arrival to room (P)   -AP       Row Name 01/28/25 1042          Sit-Stand Transfer    Sit-Stand [place] (Transfers) contact guard;2 person assist (P)   -AP     Assistive Device (Sit-Stand Transfers) walker, front-wheeled (P)   -AP     Comment, (Sit-Stand Transfer) VCs for proper hand placement during  sit<>stand with FWW and appropriate weight shift onto LLE upon standing (P)   -AP       Row Name 01/28/25 1042 01/28/25 1038       Gait/Stairs (Locomotion)    [place] Level (Gait) contact guard;1 person assist;1 person to manage equipment (P)   -AP --    Assistive Device (Gait) walker, front-wheeled (P)   -AP --    Patient was able to Ambulate yes (P)   -AP yes (P)   -AP    Distance in Feet (Gait) 140 (P)   - (P)   -AP    Deviations/Abnormal Patterns (Gait) bilateral deviations;antalgic;base of support, narrow;pennie decreased;gait speed decreased;stride length decreased;weight shifting decreased (P)   -AP --    Bilateral Gait Deviations forward flexed posture;heel strike decreased (P)   -AP --    Left Sided Gait Deviations decreased knee extension;weight shift ability decreased;leans left (P)   -AP --    Maintains Weight-bearing Status (Gait) verbal cues to maintain (P)   VCs to encourage weight-bearing as tolerated  -AP --    [place] Level (Stairs) unable to assess (P)   -AP --    Comment, (Gait/Stairs) Pt able to ambulate increased distance this date with FWW and CGA x1 with chair follow. Demonstrates good step-through pattern despite pt-reported 6/10 pain with gait training. Pt requires VCs to encourage weight-shifting onto LLE, heel strike during L stance, and upright posture throughout. No overt LOB or knee buckling noted. (P)   -AP --      Row Name 01/28/25 1042          Mobility    Extremity Weight-bearing Status left lower extremity (P)   -AP     Left Lower Extremity (Weight-bearing Status) weight-bearing as tolerated (WBAT) (P)   -AP               User Key  (r) = Recorded By, (t) = Taken By, (c) = Cosigned By      Initials Name Provider Type    Silva Rain, PT Student PT Student                   Obj/Interventions       Row Name 01/28/25 1046          Range of Motion Comprehensive    General Range of Motion lower extremity range of motion deficits identified (P)   -AP     Comment,  General Range of Motion L knee AROM: 6-65 (P)   -AP       Row Name 01/28/25 1046          Strength Comprehensive (MMT)    Comment, General Manual Muscle Testing (MMT) Assessment Pt able to complete B active DF and R SLR without assistance from therapist (P)   -AP       Row Name 01/28/25 1046          Motor Skills    Therapeutic Exercise hip;knee;ankle (P)   -AP       Row Name 01/28/25 1046          Hip (Therapeutic Exercise)    Hip (Therapeutic Exercise) AAROM (active assistive range of motion) (P)   -AP     Hip AAROM (Therapeutic Exercise) left;flexion;extension;sitting;10 repetitions (P)   -AP       Row Name 01/28/25 1046          Knee (Therapeutic Exercise)    Knee (Therapeutic Exercise) isometric exercises;strengthening exercise (P)   -AP     Knee Isometrics (Therapeutic Exercise) left;quad sets;10 repetitions;3 second hold (P)   -AP     Knee Strengthening (Therapeutic Exercise) left;SAQ (short arc quad);LAQ (long arc quad);SLR (straight leg raise);heel slides;sitting;10 repetitions (P)   -AP       Row Name 01/28/25 1046          Ankle (Therapeutic Exercise)    Ankle (Therapeutic Exercise) AROM (active range of motion) (P)   -AP     Ankle AROM (Therapeutic Exercise) bilateral;dorsiflexion;plantarflexion;sitting;10 repetitions (P)   -AP       Row Name 01/28/25 1046          Balance    Balance Assessment sitting static balance;sitting dynamic balance;standing static balance;standing dynamic balance (P)   -AP     Static Sitting Balance independent (P)   -AP     Dynamic Sitting Balance standby assist (P)   -AP     Position, Sitting Balance unsupported;sitting in chair (P)   -AP     Static Standing Balance contact guard;1-person assist (P)   -AP     Dynamic Standing Balance contact guard;1-person assist;1 person to manage equipment (P)   -AP     Position/Device Used, Standing Balance supported;walker, front-wheeled (P)   -AP     Balance Interventions sitting;standing;sit to stand;supported;dynamic;minimal  challenge;occupation based/functional task;weight shifting activity (P)   -AP     Comment, Balance No overt LOB or knee buckling noted throughout standing activities. Pt completed 2x weight shift onto LLE with BUE support thru FWW prior to initiating ambulation (P)   -AP               User Key  (r) = Recorded By, (t) = Taken By, (c) = Cosigned By      Initials Name Provider Type    AP Silva Kelly, PT Student PT Student                   Goals/Plan    No documentation.                  Clinical Impression       Row Name 01/28/25 1048          Pain    Pretreatment Pain Rating 1/10 (P)   -AP     Posttreatment Pain Rating 2/10 (P)   Pt describes 6/10 pain during ambulation  -AP     Pain Location knee (P)   -AP     Pain Side/Orientation left;anterior;lateral;medial (P)   -AP     Pain Management Interventions cold applied;exercise or physical activity utilized;positioning techniques utilized (P)   -AP     Response to Pain Interventions activity participation with tolerable pain;no change per patient report (P)   -AP       Row Name 01/28/25 1048          Plan of Care Review    Plan of Care Reviewed With patient;spouse (P)   -AP     Progress improving (P)   -AP     Outcome Evaluation Pt presents POD#1 with continued deficits in LLE strength and range of motion, functional mobility/transfers, and altered gait mechanics with FWW. Pt required CGA x1 for sit<>stand transfers from chair with FWW; VCs for safety/proper hand placement. Ambulated ~140' with CGA x1 and chair follow + FWW, demonstrating step-through pattern with decreased weight-shifting onto LLE, gait speed, and pennie. Pt describes increased 6/10 pain with gait training which decreased to 3/10 with rest. Recommend d/c home with OPPT following stair training this afternoon. (P)   -AP       Row Name 01/28/25 1048          Positioning and Restraints    Pre-Treatment Position sitting in chair/recliner (P)   -AP     Post Treatment Position chair (P)   -AP     In  Chair notified nsg;reclined;call light within reach;encouraged to call for assist;exit alarm on;with family/caregiver;LLE elevated;compression device (P)   -AP               User Key  (r) = Recorded By, (t) = Taken By, (c) = Cosigned By      Initials Name Provider Type    Silva Rain, PT Student PT Student                   Outcome Measures       Row Name 01/28/25 1052 01/28/25 0808       How much help from another person do you currently need...    Turning from your back to your side while in flat bed without using bedrails? 3 (P)   -AP 3  -ADELA    Moving from lying on back to sitting on the side of a flat bed without bedrails? 3 (P)   -AP 3  -ADELA    Moving to and from a bed to a chair (including a wheelchair)? 3 (P)   -AP 3  -ADELA    Standing up from a chair using your arms (e.g., wheelchair, bedside chair)? 3 (P)   -AP 3  -ADELA    Climbing 3-5 steps with a railing? 2 (P)   -AP 2  -ADELA    To walk in hospital room? 3 (P)   -AP 3  -ADELA    AM-PAC 6 Clicks Score (PT) 17 (P)   -AP 17  -ADELA    Highest Level of Mobility Goal 5 --> Static standing (P)   -AP 5 --> Static standing  -ADELA      Row Name 01/28/25 1101 01/28/25 1052       Functional Assessment    Outcome Measure Options AM-PAC 6 Clicks Daily Activity (OT)  -MR AM-PAC 6 Clicks Basic Mobility (PT) (P)   -AP              User Key  (r) = Recorded By, (t) = Taken By, (c) = Cosigned By      Initials Name Provider Type     MiguelJolynn, OT Occupational Therapist    Mike Gooden, RN Registered Nurse    Silva Rain, PT Student PT Student                                 Physical Therapy Education       Title: PT OT SLP Therapies (In Progress)       Topic: Physical Therapy (Done)       Point: Mobility training (Done)       Learning Progress Summary            Patient Acceptance, E,TB,H, VU,DU by AP at 1/28/2025 1053    Acceptance, E,H, VU,DU by AP at 1/27/2025 1832   Significant Other Acceptance, E,TB,H, VU,DU by AP at 1/28/2025 1053    Acceptance, E,H, VU,DU  by AP at 1/27/2025 1832                      Point: Home exercise program (Done)       Learning Progress Summary            Patient Acceptance, E,TB,H, VU,DU by AP at 1/28/2025 1053    Acceptance, E,H, VU,DU by AP at 1/27/2025 1832   Significant Other Acceptance, E,TB,H, VU,DU by AP at 1/28/2025 1053    Acceptance, E,H, VU,DU by AP at 1/27/2025 1832                      Point: Body mechanics (Done)       Learning Progress Summary            Patient Acceptance, E,TB,H, VU,DU by AP at 1/28/2025 1053    Acceptance, E,H, VU,DU by AP at 1/27/2025 1832   Significant Other Acceptance, E,TB,H, VU,DU by AP at 1/28/2025 1053    Acceptance, E,H, VU,DU by AP at 1/27/2025 1832                      Point: Precautions (Done)       Learning Progress Summary            Patient Acceptance, E,TB,H, VU,DU by AP at 1/28/2025 1053    Acceptance, E,H, VU,DU by AP at 1/27/2025 1832   Significant Other Acceptance, E,TB,H, VU,DU by AP at 1/28/2025 1053    Acceptance, E,H, VU,DU by AP at 1/27/2025 1832                                      User Key       Initials Effective Dates Name Provider Type Discipline     01/09/25 -  Silva Kelly, PT Student PT Student PT                  PT Recommendation and Plan     Progress: (P) improving  Outcome Evaluation: (P) Pt presents POD#1 with continued deficits in LLE strength and range of motion, functional mobility/transfers, and altered gait mechanics with FWW. Pt required CGA x1 for sit<>stand transfers from chair with FWW; VCs for safety/proper hand placement. Ambulated ~140' with CGA x1 and chair follow + FWW, demonstrating step-through pattern with decreased weight-shifting onto LLE, gait speed, and pennie. Pt describes increased 6/10 pain with gait training which decreased to 3/10 with rest. Recommend d/c home with OPPT following stair training this afternoon.     Time Calculation:   PT Evaluation Complexity  History, PT Evaluation Complexity: 1-2 personal factors and/or  comorbidities  Examination of Body Systems (PT Eval Complexity): 1-2 elements  Clinical Presentation (PT Evaluation Complexity): stable  Clinical Decision Making (PT Evaluation Complexity): low complexity  Overall Complexity (PT Evaluation Complexity): low complexity     PT Charges       Row Name 01/28/25 1053             Time Calculation    Start Time 0958 (P)   -AP      PT Received On 01/28/25 (P)   -AP      PT Goal Re-Cert Due Date 02/06/25 (P)   -AP         Timed Charges    50182 - PT Therapeutic Exercise Minutes 22 (P)   -AP      43456 - Gait Training Minutes  8 (P)   -AP         Total Minutes    Timed Charges Total Minutes 30 (P)   -AP       Total Minutes 30 (P)   -AP                User Key  (r) = Recorded By, (t) = Taken By, (c) = Cosigned By      Initials Name Provider Type    AP Silva Kelly, PT Student PT Student                  Therapy Charges for Today       Code Description Service Date Service Provider Modifiers Qty    75181974723 HC PT THER PROC EA 15 MIN 1/27/2025 Silva Kelly, PT Student GP 1    68072750772 HC PT EVAL LOW COMPLEXITY 4 1/27/2025 Silva Kelly, PT Student GP 1    60936721475 HC PT THER PROC EA 15 MIN 1/28/2025 Silva Kelly, PT Student GP 1    05785796240 HC GAIT TRAINING EA 15 MIN 1/28/2025 Silva Kelly, PT Student GP 1            PT G-Codes  Outcome Measure Options: AM-PAC 6 Clicks Daily Activity (OT)  AM-PAC 6 Clicks Score (PT): (P) 17  AM-PAC 6 Clicks Score (OT): 18  PT Discharge Summary  Anticipated Discharge Disposition (PT): (P) home with outpatient therapy services    Silva Kelly PT Student  1/28/2025

## 2025-01-28 NOTE — PROGRESS NOTES
IM progress note      Sarah Hoawrd  [id number]  1955     LOS: 0 days     Attending: Praful Baker MD    Primary Care Provider: Dao Gomez MD      Chief Complaint/Reason for visit:  No chief complaint on file.      Subjective    Doing ok overall. Pain control improved. No n/vom/sob.  Progressing with PT but still having some buckling and unable to progress to stairs training with PM session.    Objective        Visit Vitals  /75 (BP Location: Left arm, Patient Position: Lying)   Pulse 70   Temp 98.5 °F (36.9 °C) (Axillary)   Resp 16   SpO2 94%     Temp (24hrs), Av.2 °F (36.8 °C), Min:97.7 °F (36.5 °C), Max:98.5 °F (36.9 °C)      Intake/Output:    Intake/Output Summary (Last 24 hours) at 2025 1740  Last data filed at 2025 1537  Gross per 24 hour   Intake 1338 ml   Output 1900 ml   Net -562 ml        Physical Therapy:    Date of Service: 25 1405  Creation Time: 25 1450     Signed         Goal Outcome Evaluation:  Plan of Care Reviewed With: patient  Progress: no change  Outcome Evaluation: Pt amb 160' with FWW and CGA. L knee buckling and instability noted throughout ambulation despite VC for active knee extension and tactile cueing. Elevated pain to 10/10 noted with ambulation especially with episodes of knee instability. Increased pain limited further ambulation. Unable to assess stairs at this time due to knee instability and elevated pain. HEP reviewed with pt. Frequent ambulation encouraged. Unfortunately pt is not cleared to d/c today from PT standpoint. Recommend additional therapy tomorrow to address motor control deficits and trial stairs to decrease risk for falls or injury prior to returning home with OPPT.     Anticipated Discharge Disposition (PT): home with outpatient therapy services              Physical Exam:     General Appearance:    Alert, cooperative, in no acute distress   Head:    Normocephalic, without obvious abnormality, atraumatic     Lungs:     Normal effort, symmetric chest rise,  clear to      auscultation bilaterally              Heart:    Regular rhythm and normal rate, normal S1 and S2    Abdomen:     Normal bowel sounds, no masses, no organomegaly, soft        nontender, nondistended, no guarding, no rebound                tenderness   Extremities:   CDI dressing. PNB cath present.  Flexion and dorsiflexion intact bilateral feet.    No clubbing, cyanosis or edema.  No deformities.    Pulses:   Pulses palpable and equal bilaterally   Skin:   No bleeding, bruising or rash          Results Review:     I reviewed the patient's new clinical results.   Results from last 7 days   Lab Units 01/28/25  0633   HEMOGLOBIN g/dL 11.6*   HEMATOCRIT % 36.6     Results from last 7 days   Lab Units 01/28/25  0633   SODIUM mmol/L 140   POTASSIUM mmol/L 4.2   CHLORIDE mmol/L 107   CO2 mmol/L 23.0   BUN mg/dL 16   CREATININE mg/dL 0.99   CALCIUM mg/dL 8.8   GLUCOSE mg/dL 183*     I reviewed the patient's new imaging including images and reports.    All medications reviewed.   acetaminophen, 1,000 mg, Oral, Q8H  aspirin, 81 mg, Oral, Q12H  atorvastatin, 20 mg, Oral, Daily  desvenlafaxine, 100 mg, Oral, Daily  escitalopram, 20 mg, Oral, Daily  losartan, 25 mg, Oral, Q24H  meloxicam, 15 mg, Oral, Daily  metoprolol succinate XL, 25 mg, Oral, Nightly  pantoprazole, 40 mg, Oral, Q AM  sodium chloride, 3 mL, Intravenous, Q12H  traZODone, 150 mg, Oral, Daily      HYDROmorphone, 0.5 mg, Q2H PRN   And  naloxone, 0.1 mg, Q5 Min PRN  labetalol, 10 mg, Q4H PRN  ondansetron ODT, 4 mg, Q6H PRN   Or  ondansetron, 4 mg, Q6H PRN  oxyCODONE, 10 mg, Q4H PRN  oxyCODONE, 5 mg, Q4H PRN  sodium chloride, 500 mL, TID PRN  sodium chloride, 3-10 mL, PRN        Assessment & Plan       S/P TKR (total knee replacement), left    Mild depression    GERD without esophagitis    Primary hypertension    Arthritis of knee    Hyperlipidemia         Plan   1. PT/OT, protected weight bearing as  tolerated left LE  2. Pain control-prns, ACB cath with ropivacaine infusion.  3. IS-encourage  4. DVT proph-Mechanicals and aspirin  5. Bowel regimen  6. Resume home medications as appropriate  7. Monitor post-op labs  8. DC planning , home when cleared by PT.      - Hypertension:  Resume home medications as appropriate, formulary substitution when indicated.  Holding parameters.  PRN medications for elevated blood pressure.     -Dyslipidemia:  Resume home regimen statin , ( formulary substitution when appropriate).     -Depression: Resume home regimen.      -GERD:  Resume PPI.  Formulary substitution when indicated.    Liz Smyth MD  01/28/25  17:40 EST

## 2025-01-28 NOTE — PLAN OF CARE
Problem: Adult Inpatient Plan of Care  Goal: Absence of Hospital-Acquired Illness or Injury  Outcome: Progressing  Intervention: Identify and Manage Fall Risk  Recent Flowsheet Documentation  Taken 1/28/2025 0440 by Kathryn Sanders RN  Safety Promotion/Fall Prevention:   assistive device/personal items within reach   clutter free environment maintained   fall prevention program maintained   safety round/check completed  Taken 1/28/2025 0301 by Kathryn Sanders RN  Safety Promotion/Fall Prevention: activity supervised  Taken 1/28/2025 0040 by Kathryn Sanders RN  Safety Promotion/Fall Prevention:   assistive device/personal items within reach   clutter free environment maintained   fall prevention program maintained   safety round/check completed  Taken 1/27/2025 2040 by Kathryn Sanders RN  Safety Promotion/Fall Prevention: activity supervised  Intervention: Prevent Skin Injury  Recent Flowsheet Documentation  Taken 1/28/2025 0440 by Kathryn Sanders RN  Body Position: position changed independently  Taken 1/28/2025 0301 by Kathryn Sanders RN  Body Position: position changed independently  Taken 1/28/2025 0040 by Kathryn Sanders RN  Body Position: position changed independently  Skin Protection: incontinence pads utilized  Taken 1/27/2025 2040 by Kathryn Sanders RN  Body Position: neutral body alignment  Skin Protection: incontinence pads utilized  Intervention: Prevent and Manage VTE (Venous Thromboembolism) Risk  Recent Flowsheet Documentation  Taken 1/27/2025 2040 by Kathryn Sanders RN  VTE Prevention/Management:   bilateral   SCDs (sequential compression devices) on   Goal Outcome Evaluation:

## 2025-01-28 NOTE — PLAN OF CARE
Goal Outcome Evaluation:  Plan of Care Reviewed With: (P) patient, spouse        Progress: (P) improving  Outcome Evaluation: (P) Pt presents POD#1 with continued deficits in LLE strength and range of motion, functional mobility/transfers, and altered gait mechanics with FWW. Pt required CGA x1 for sit<>stand transfers from chair with FWW; VCs for safety/proper hand placement. Ambulated ~140' with CGA x1 and chair follow + FWW, demonstrating step-through pattern with decreased weight-shifting onto LLE, gait speed, and pennie. Pt describes increased 6/10 pain with gait training which decreased to 3/10 with rest. Recommend d/c home with OPPT following stair training this afternoon.    Anticipated Discharge Disposition (PT): (P) home with outpatient therapy services

## 2025-01-28 NOTE — PROGRESS NOTES
CHIEF COMPLAINT: Follow-up left total knee arthroplasty.    SUBJECTIVE  Patient resting comfortably.  Pain control much improved this morning.  No events overnight.    PHYSICAL THERAPY PROGRESS  Outcome Evaluation: Pt presents POD#0 with significant LLE strength and range of motion limitations, functional mobility/transfers deficits, and severe pt-reported pain which limited participation in today's session. Pt required Mod-Max A from therapist to complete all LLE TherEx activities. Able to complete sit<>stand transfers with FWW and CGA x2, however, reports extreme pain throughout LLE weight-shifting. Ambulated ~10' with step-to pattern + FWW and CGA x2, expressing severe pain which limited ambulation distance. Pt states pain decreases to 2-3/10 with rest at end of session. Recommend IPR to improve independence and minimize risk of fall; pt and spouse agreeable. (25 8790)     OBJECTIVE  Temp (24hrs), Av.8 °F (36.6 °C), Min:97.4 °F (36.3 °C), Max:98.4 °F (36.9 °C)    Blood pressure 106/60, pulse 66, temperature 98.3 °F (36.8 °C), temperature source Oral, resp. rate 16, SpO2 94%.    Lab Results (last 24 hours)       Procedure Component Value Units Date/Time    POC Glucose Once [[id number]]  (Normal) Collected: 25    Specimen: Blood Updated: 25 0832     Glucose 78 mg/dL               PHYSICAL EXAM  Lower extremity: Dressing clean, dry and intact.  Able to perform straight leg raise without assist.  Intact EHL, FHL, tibialis anterior, and gastrocsoleus. Sensation intact to light touch to deep peroneal, superficial peroneal, sural, saphenous, tibial nerves. 2+ palpable DP and PT pulses.         S/P TKR (total knee replacement), left    Mild depression    GERD without esophagitis    Primary hypertension    Arthritis of knee    Hyperlipidemia      PLAN / DISPOSITION:  1 Day Post-Op left total knee arthroplasty    Protected weight bearing as tolerated left lower extremity, knee range of motion as  tolerated  Pain control  PT/OT for post op mobilization and medical equipment needs   23 hours perioperative antibiotic prophylaxis   SCD's bilateral lower extremities   Aspirin for DVT prophylaxis   Social work for discharge planning.  Anticipate discharge home today.  Dressing to remain in place for 7 days. May remove on POD#7. If no drainage, may shower on POD#10. No submerging wound in water. If drainage is noted, sterile dressing should be placed and wound checked daily. No showering until wound has remained dry for 72 consecutive hours.   Follow up in 3 weeks for re-assessment.      Future Appointments   Date Time Provider Department Center   1/29/2025  1:00 PM Osmany Ko, PT [facility]   2/11/2025  1:00 PM Mayuri Covington APRN [facility]   2/18/2025  2:30 PM Soila Patino PA-C [facility]   3/10/2025  2:30 PM Chanelle Goodson LCSW [facility]   5/5/2025 10:00 AM Dao Gomez MD [facility]       Praful Baker MD  01/28/25  07:13 EST

## 2025-01-29 VITALS
SYSTOLIC BLOOD PRESSURE: 112 MMHG | RESPIRATION RATE: 16 BRPM | OXYGEN SATURATION: 92 % | HEART RATE: 68 BPM | TEMPERATURE: 97.7 F | DIASTOLIC BLOOD PRESSURE: 57 MMHG

## 2025-01-29 LAB
ANION GAP SERPL CALCULATED.3IONS-SCNC: 10 MMOL/L (ref 5–15)
BASOPHILS # BLD AUTO: 0.04 10*3/MM3 (ref 0–0.2)
BASOPHILS NFR BLD AUTO: 0.4 % (ref 0–1.5)
BUN SERPL-MCNC: 19 MG/DL (ref 8–23)
BUN/CREAT SERPL: 21.3 (ref 7–25)
CALCIUM SPEC-SCNC: 8.5 MG/DL (ref 8.6–10.5)
CHLORIDE SERPL-SCNC: 108 MMOL/L (ref 98–107)
CO2 SERPL-SCNC: 23 MMOL/L (ref 22–29)
CREAT SERPL-MCNC: 0.89 MG/DL (ref 0.57–1)
DEPRECATED RDW RBC AUTO: 45.4 FL (ref 37–54)
EGFRCR SERPLBLD CKD-EPI 2021: 69.8 ML/MIN/1.73
EOSINOPHIL # BLD AUTO: 0.16 10*3/MM3 (ref 0–0.4)
EOSINOPHIL NFR BLD AUTO: 1.5 % (ref 0.3–6.2)
ERYTHROCYTE [DISTWIDTH] IN BLOOD BY AUTOMATED COUNT: 11.9 % (ref 12.3–15.4)
GLUCOSE SERPL-MCNC: 130 MG/DL (ref 65–99)
HCT VFR BLD AUTO: 34.3 % (ref 34–46.6)
HGB BLD-MCNC: 10.8 G/DL (ref 12–15.9)
IMM GRANULOCYTES # BLD AUTO: 0.03 10*3/MM3 (ref 0–0.05)
IMM GRANULOCYTES NFR BLD AUTO: 0.3 % (ref 0–0.5)
LYMPHOCYTES # BLD AUTO: 3.65 10*3/MM3 (ref 0.7–3.1)
LYMPHOCYTES NFR BLD AUTO: 34.1 % (ref 19.6–45.3)
MCH RBC QN AUTO: 32.9 PG (ref 26.6–33)
MCHC RBC AUTO-ENTMCNC: 31.5 G/DL (ref 31.5–35.7)
MCV RBC AUTO: 104.6 FL (ref 79–97)
MONOCYTES # BLD AUTO: 1.52 10*3/MM3 (ref 0.1–0.9)
MONOCYTES NFR BLD AUTO: 14.2 % (ref 5–12)
NEUTROPHILS NFR BLD AUTO: 49.5 % (ref 42.7–76)
NEUTROPHILS NFR BLD AUTO: 5.3 10*3/MM3 (ref 1.7–7)
NRBC BLD AUTO-RTO: 0 /100 WBC (ref 0–0.2)
PLATELET # BLD AUTO: 166 10*3/MM3 (ref 140–450)
PMV BLD AUTO: 12.8 FL (ref 6–12)
POTASSIUM SERPL-SCNC: 4.4 MMOL/L (ref 3.5–5.2)
RBC # BLD AUTO: 3.28 10*6/MM3 (ref 3.77–5.28)
SODIUM SERPL-SCNC: 141 MMOL/L (ref 136–145)
WBC NRBC COR # BLD AUTO: 10.7 10*3/MM3 (ref 3.4–10.8)

## 2025-01-29 PROCEDURE — 63710000001 METOPROLOL SUCCINATE XL 25 MG TABLET SUSTAINED-RELEASE 24 HOUR: Performed by: INTERNAL MEDICINE

## 2025-01-29 PROCEDURE — A9270 NON-COVERED ITEM OR SERVICE: HCPCS | Performed by: INTERNAL MEDICINE

## 2025-01-29 PROCEDURE — A9270 NON-COVERED ITEM OR SERVICE: HCPCS | Performed by: ORTHOPAEDIC SURGERY

## 2025-01-29 PROCEDURE — 97116 GAIT TRAINING THERAPY: CPT

## 2025-01-29 PROCEDURE — 63710000001 OXYCODONE 5 MG TABLET: Performed by: ORTHOPAEDIC SURGERY

## 2025-01-29 PROCEDURE — 63710000001 OXYCODONE 10 MG TABLET: Performed by: ORTHOPAEDIC SURGERY

## 2025-01-29 PROCEDURE — 63710000001 DESVENLAFAXINE 50 MG TABLET SUSTAINED-RELEASE 24 HOUR: Performed by: INTERNAL MEDICINE

## 2025-01-29 PROCEDURE — 97110 THERAPEUTIC EXERCISES: CPT

## 2025-01-29 PROCEDURE — 63710000001 ATORVASTATIN 20 MG TABLET: Performed by: INTERNAL MEDICINE

## 2025-01-29 PROCEDURE — 63710000001 ACETAMINOPHEN EXTRA STRENGTH 500 MG TABLET: Performed by: ORTHOPAEDIC SURGERY

## 2025-01-29 PROCEDURE — 80048 BASIC METABOLIC PNL TOTAL CA: CPT | Performed by: ORTHOPAEDIC SURGERY

## 2025-01-29 PROCEDURE — 63710000001 TRAZODONE 100 MG TABLET: Performed by: INTERNAL MEDICINE

## 2025-01-29 PROCEDURE — 99024 POSTOP FOLLOW-UP VISIT: CPT | Performed by: ORTHOPAEDIC SURGERY

## 2025-01-29 PROCEDURE — 25010000002 HYDROMORPHONE 1 MG/ML SOLUTION: Performed by: ORTHOPAEDIC SURGERY

## 2025-01-29 PROCEDURE — 63710000001 ASPIRIN 81 MG TABLET DELAYED-RELEASE: Performed by: ORTHOPAEDIC SURGERY

## 2025-01-29 PROCEDURE — 85025 COMPLETE CBC W/AUTO DIFF WBC: CPT | Performed by: INTERNAL MEDICINE

## 2025-01-29 PROCEDURE — 63710000001 MELOXICAM 15 MG TABLET: Performed by: ORTHOPAEDIC SURGERY

## 2025-01-29 PROCEDURE — 63710000001 PANTOPRAZOLE 40 MG TABLET DELAYED-RELEASE: Performed by: INTERNAL MEDICINE

## 2025-01-29 PROCEDURE — 63710000001 TRAZODONE 50 MG TABLET: Performed by: INTERNAL MEDICINE

## 2025-01-29 PROCEDURE — 63710000001 ESCITALOPRAM 20 MG TABLET: Performed by: INTERNAL MEDICINE

## 2025-01-29 RX ADMIN — TRAZODONE HYDROCHLORIDE 150 MG: 50 TABLET ORAL at 20:22

## 2025-01-29 RX ADMIN — ACETAMINOPHEN 1000 MG: 500 TABLET, FILM COATED ORAL at 13:38

## 2025-01-29 RX ADMIN — OXYCODONE 5 MG: 5 TABLET ORAL at 20:24

## 2025-01-29 RX ADMIN — PANTOPRAZOLE SODIUM 40 MG: 40 TABLET, DELAYED RELEASE ORAL at 05:47

## 2025-01-29 RX ADMIN — ACETAMINOPHEN 1000 MG: 500 TABLET, FILM COATED ORAL at 05:47

## 2025-01-29 RX ADMIN — ASPIRIN 81 MG: 81 TABLET, COATED ORAL at 20:23

## 2025-01-29 RX ADMIN — OXYCODONE 5 MG: 5 TABLET ORAL at 14:34

## 2025-01-29 RX ADMIN — OXYCODONE 5 MG: 5 TABLET ORAL at 04:06

## 2025-01-29 RX ADMIN — OXYCODONE HYDROCHLORIDE 10 MG: 10 TABLET ORAL at 08:04

## 2025-01-29 RX ADMIN — Medication 3 ML: at 08:07

## 2025-01-29 RX ADMIN — MELOXICAM 15 MG: 15 TABLET ORAL at 08:04

## 2025-01-29 RX ADMIN — ESCITALOPRAM OXALATE 20 MG: 20 TABLET, FILM COATED ORAL at 08:04

## 2025-01-29 RX ADMIN — ASPIRIN 81 MG: 81 TABLET, COATED ORAL at 08:04

## 2025-01-29 RX ADMIN — DESVENLAFAXINE 100 MG: 50 TABLET, FILM COATED, EXTENDED RELEASE ORAL at 08:04

## 2025-01-29 RX ADMIN — HYDROMORPHONE HYDROCHLORIDE 0.5 MG: 1 INJECTION, SOLUTION INTRAMUSCULAR; INTRAVENOUS; SUBCUTANEOUS at 09:39

## 2025-01-29 RX ADMIN — ACETAMINOPHEN 1000 MG: 500 TABLET, FILM COATED ORAL at 20:24

## 2025-01-29 RX ADMIN — ATORVASTATIN CALCIUM 20 MG: 20 TABLET, FILM COATED ORAL at 08:04

## 2025-01-29 RX ADMIN — Medication 3 ML: at 20:28

## 2025-01-29 RX ADMIN — METOPROLOL SUCCINATE 25 MG: 25 TABLET, EXTENDED RELEASE ORAL at 20:28

## 2025-01-29 NOTE — PLAN OF CARE
Goal Outcome Evaluation:  Plan of Care Reviewed With: patient        Progress: improving     The patient is very pleasant, alert and oriented x4. VSS on room air. Voiding spontaneously via assist x1 to the bathroom. Ambulating well. The patient was able to ambulate several times to the bathroom and 100 feet in the hallway. Pain managed well with nerve block, ice packs and 5 mg oxycodone as ordered. Current plan is to discharge the patient home.

## 2025-01-29 NOTE — CASE MANAGEMENT/SOCIAL WORK
"Continued Stay Note  [facility]     Patient Name: Sarah Howard  MRN: [id number]  Today's Date: 1/29/2025    Admit Date: 1/27/2025    Plan: Home with spouse's assistance & [facility]   Discharge Plan       Row Name 01/29/25 1102       Plan    Plan Home with spouse's assistance & [facility]    Patient/Family in Agreement with Plan yes    Plan Comments CM spoke with patient at bedside regarding DC planning. Patient is a pre referral to [facility] on [address] Patient states she has rescheduled her appointment for Friday, 1/31. Per PT notes, \" Activity limited by pain - pt. tearful and reporting 10/10 pain.\" DC plan is to return home with her spouse's assistance when medically ready. CM will continue to follow.    Final Discharge Disposition Code 01 - home or self-care                   Discharge Codes    No documentation.                 Expected Discharge Date and Time       Expected Discharge Date Expected Discharge Time    Jan 29, 2025               Olimpia Rubalcava RN    "

## 2025-01-29 NOTE — PROGRESS NOTES
CHIEF COMPLAINT: Follow-up left total knee arthroplasty.    SUBJECTIVE  Patient resting comfortably.  Complaining of pain this morning after improved performance with therapy yesterday.    PHYSICAL THERAPY PROGRESS  Outcome Evaluation: Pt amb 160' with FWW and CGA. L knee buckling and instability noted throughout ambulation despite VC for active knee extension and tactile cueing. Elevated pain to 10/10 noted with ambulation especially with episodes of knee instability. Increased pain limited further ambulation. Unable to assess stairs at this time due to knee instability and elevated pain. HEP reviewed with pt. Frequent ambulation encouraged. Unfortunately pt is not cleared to d/c today from PT standpoint. Recommend additional therapy tomorrow to address motor control deficits and trial stairs to decrease risk for falls or injury prior to returning home with OPPT. (25 1446)     OBJECTIVE  Temp (24hrs), Av.2 °F (36.8 °C), Min:98 °F (36.7 °C), Max:98.5 °F (36.9 °C)    Blood pressure 120/81, pulse 62, temperature 98.1 °F (36.7 °C), temperature source Oral, resp. rate 16, SpO2 97%.    Lab Results (last 24 hours)       Procedure Component Value Units Date/Time    CBC & Differential [[id number]]  (Abnormal) Collected: 25    Specimen: Blood Updated: 25    Narrative:      The following orders were created for panel order CBC & Differential.  Procedure                               Abnormality         Status                     ---------                               -----------         ------                     CBC Auto Differential[[id number]]        Abnormal            Final result                 Please view results for these tests on the individual orders.    CBC Auto Differential [[id number]]  (Abnormal) Collected: 25    Specimen: Blood Updated: 25     WBC 10.70 10*3/mm3      RBC 3.28 10*6/mm3      Hemoglobin 10.8 g/dL      Hematocrit 34.3 %      .6 fL      MCH  32.9 pg      MCHC 31.5 g/dL      RDW 11.9 %      RDW-SD 45.4 fl      MPV 12.8 fL      Platelets 166 10*3/mm3      Neutrophil % 49.5 %      Lymphocyte % 34.1 %      Monocyte % 14.2 %      Eosinophil % 1.5 %      Basophil % 0.4 %      Immature Grans % 0.3 %      Neutrophils, Absolute 5.30 10*3/mm3      Lymphocytes, Absolute 3.65 10*3/mm3      Monocytes, Absolute 1.52 10*3/mm3      Eosinophils, Absolute 0.16 10*3/mm3      Basophils, Absolute 0.04 10*3/mm3      Immature Grans, Absolute 0.03 10*3/mm3      nRBC 0.0 /100 WBC     Basic Metabolic Panel [[id number]] Collected: 01/29/25 0608    Specimen: Blood Updated: 01/29/25 0628    Basic Metabolic Panel [[id number]]  (Abnormal) Collected: 01/28/25 0633    Specimen: Blood Updated: 01/28/25 0820     Glucose 183 mg/dL      BUN 16 mg/dL      Creatinine 0.99 mg/dL      Sodium 140 mmol/L      Potassium 4.2 mmol/L      Chloride 107 mmol/L      CO2 23.0 mmol/L      Calcium 8.8 mg/dL      BUN/Creatinine Ratio 16.2     Anion Gap 10.0 mmol/L      eGFR 61.5 mL/min/1.73     Narrative:      GFR Categories in Chronic Kidney Disease (CKD)      GFR Category          GFR (mL/min/1.73)    Interpretation  G1                     90 or greater         Normal or high (1)  G2                      60-89                Mild decrease (1)  G3a                   45-59                Mild to moderate decrease  G3b                   30-44                Moderate to severe decrease  G4                    15-29                Severe decrease  G5                    14 or less           Kidney failure          (1)In the absence of evidence of kidney disease, neither GFR category G1 or G2 fulfill the criteria for CKD.    eGFR calculation 2021 CKD-EPI creatinine equation, which does not include race as a factor    Hemoglobin & Hematocrit, Blood [[id number]]  (Abnormal) Collected: 01/28/25 0633    Specimen: Blood Updated: 01/28/25 0754     Hemoglobin 11.6 g/dL      Hematocrit 36.6 %               PHYSICAL  EXAM  Lower extremity: Dressing clean, dry and intact.  Able to perform straight leg raise without assist.  Intact EHL, FHL, tibialis anterior, and gastrocsoleus. Sensation intact to light touch to deep peroneal, superficial peroneal, sural, saphenous, tibial nerves. 2+ palpable DP and PT pulses.         S/P TKR (total knee replacement), left    Mild depression    GERD without esophagitis    Primary hypertension    Arthritis of knee    Hyperlipidemia      PLAN / DISPOSITION:  2 Days Post-Op left total knee arthroplasty    Protected weight bearing as tolerated left lower extremity, knee range of motion as tolerated  Pain control  PT/OT for post op mobilization and medical equipment needs    SCD's bilateral lower extremities   Aspirin for DVT prophylaxis   Social work for discharge planning.  Anticipate discharge home today if cleared by PT..  Dressing to remain in place for 7 days. May remove on POD#7. If no drainage, may shower on POD#10. No submerging wound in water. If drainage is noted, sterile dressing should be placed and wound checked daily. No showering until wound has remained dry for 72 consecutive hours.   Follow up in 3 weeks for re-assessment.      Future Appointments   Date Time Provider Department Center   1/30/2025  9:00 AM Osmany Ko, PT [facility]   2/11/2025  1:00 PM Mayuri Covington APRN [facility]   2/18/2025  2:30 PM Soila Patino PA-C [facility]   3/10/2025  2:30 PM Chanelle Goodson LCSW [facility]   5/5/2025 10:00 AM Dao Gomez MD [facility]       Praful Baker MD  01/29/25  07:07 EST

## 2025-01-29 NOTE — THERAPY TREATMENT NOTE
Patient Name: Sarah Howard  : 1955    MRN: [id number]                              Today's Date: 2025       Admit Date: 2025    Visit Dx:     ICD-10-CM ICD-9-CM   1. S/P TKR (total knee replacement), left  Z96.652 V43.65   2. Primary osteoarthritis of left knee  M17.12 715.16     Patient Active Problem List   Diagnosis    Mild depression    Osteopenia    GERD without esophagitis    Coronary artery disease involving native coronary artery    History of breast cancer    Glaucoma    Primary hypertension    Alcohol use disorder    Chronic cough    Class 1 obesity due to excess calories without serious comorbidity with body mass index (BMI) of 33.0 to 33.9 in adult    Age-related nuclear cataract of both eyes    Arthritis of knee    Hyperlipidemia    S/P TKR (total knee replacement), left     Past Medical History:   Diagnosis Date    Anxiety     Breast cancer     3/2010, right breast    Cancer Breast    2010    Cervical disc disorder ACDF 2020    Coronary artery disease 10/2020    Depression     Drug therapy     2010    Fracture, foot     bilateral    GERD (gastroesophageal reflux disease) 2016    Glaucoma 2016    Hard to intubate     Hx of radiation therapy     2010    Hyperlipidemia     Hypertension 2020    Knee swelling     Osteopenia 2009    Tear of meniscus of knee     left     Past Surgical History:   Procedure Laterality Date    ADENOIDECTOMY  1972    BACK SURGERY  2018    BREAST BIOPSY Right     3/2010    BREAST LUMPECTOMY Right     x2, 2010    BREAST SURGERY  2 x 2010    CARDIAC CATHETERIZATION  2020    COLONOSCOPY  2018    COSMETIC SURGERY  breast reduction    2010    EYE SURGERY  laser iridotomies    2019    HYSTERECTOMY  2011    NECK SURGERY      OOPHORECTOMY      REDUCTION MAMMAPLASTY Bilateral     2010 at time of lumpectomy    SPINE SURGERY  2019    TONSILLECTOMY  1972    TOTAL KNEE ARTHROPLASTY Left 2025    Procedure: TOTAL KNEE ARTHROPLASTY WITH EMELINA ROBOT LEFT;   Surgeon: Praful Baker MD;  Location: [facility];  Service: Robotics - Ortho;  Laterality: Left;    TUBAL ABDOMINAL LIGATION  1982      General Information       Row Name 01/29/25 1505 01/29/25 1031       Physical Therapy Time and Intention    Document Type therapy note (daily note)  -SS therapy note (daily note)  -SS    Mode of Treatment physical therapy  -SS physical therapy  -SS      Row Name 01/29/25 1505 01/29/25 1031       General Information    Patient Profile Reviewed yes  -SS yes  -SS    Existing Precautions/Restrictions fall;left;weight bearing;other (see comments)  WBAT s/p L TKA, adductor canal nerve block  -SS fall;left;weight bearing;other (see comments)  WBAT s/p L TKA, adductor canal nerve block  -SS    Barriers to Rehab medically complex  -SS --      Row Name 01/29/25 1505 01/29/25 1031       Cognition    Orientation Status (Cognition) oriented x 4  -SS oriented x 4  -SS      Row Name 01/29/25 1505 01/29/25 1031       Safety Issues/Impairments Affecting Functional Mobility    Safety Issues Affecting Function (Mobility) awareness of need for assistance;insight into deficits/self-awareness;judgment;positioning of assistive device;problem-solving;safety precaution awareness;safety precautions follow-through/compliance;sequencing abilities  -SS awareness of need for assistance;insight into deficits/self-awareness;judgment;positioning of assistive device;problem-solving;safety precaution awareness;safety precautions follow-through/compliance;sequencing abilities  -SS    Impairments Affecting Function (Mobility) balance;coordination;endurance/activity tolerance;pain;range of motion (ROM);strength;postural/trunk control  -SS balance;coordination;endurance/activity tolerance;pain;range of motion (ROM);strength  -SS              User Key  (r) = Recorded By, (t) = Taken By, (c) = Cosigned By      Initials Name Provider Type    SS Felicia Moran PT Physical Therapist                   Mobility       Row  Name 01/29/25 1506 01/29/25 1032       Bed Mobility    Bed Mobility -- scooting/bridging;supine-sit  -SS    Scooting/Bridging Collier (Bed Mobility) -- standby assist;verbal cues  -SS    Supine-Sit [place]lier (Bed Mobility) -- standby assist;verbal cues  -SS    Assistive Device (Bed Mobility) -- bed rails  -SS    Comment, (Bed Mobility) up in chair  -SS VC for sequencing  -SS      Row Name 01/29/25 1506 01/29/25 1032       Sit-Stand Transfer    Sit-Stand Collier (Transfers) contact guard;verbal cues  -SS contact guard;verbal cues  -SS    Assistive Device (Sit-Stand Transfers) walker, front-wheeled  -SS walker, front-wheeled  -SS    Comment, (Sit-Stand Transfer) VC for hand placement, appropriate alignment, emphasis on lowering with eccentric control  -SS VC for hand placement, stepping out LLE, appropriate alignment, emphasis on lowering with eccentric control  -SS      Row Name 01/29/25 1506 01/29/25 1032       Gait/Stairs (Locomotion)    Collier Level (Gait) contact guard;verbal cues;1 person assist;1 person to manage equipment  -SS contact guard;verbal cues;1 person assist;1 person to manage equipment  -SS    Assistive Device (Gait) walker, front-wheeled  -SS walker, front-wheeled  -SS    Patient was able to Ambulate yes  -SS yes  -SS    Distance in Feet (Gait) 100  -SS 40  -SS    Deviations/Abnormal Patterns (Gait) bilateral deviations;antalgic;base of support, narrow;pennie decreased;gait speed decreased;stride length decreased;weight shifting decreased  -SS bilateral deviations;antalgic;base of support, narrow;pennie decreased;gait speed decreased;stride length decreased;weight shifting decreased  -SS    Bilateral Gait Deviations forward flexed posture;heel strike decreased  -SS forward flexed posture;heel strike decreased  -SS    Left Sided Gait Deviations weight shift ability decreased  -SS --    Collier Level (Stairs) minimum assist (75% patient effort);moderate assist (50% patient  effort);2 person assist;verbal cues;nonverbal cues (demo/gesture)  -SS --    Assistive Device (Stairs) cane, straight  -SS --    Handrail Location (Stairs) other (see comments)  hand held assist  -SS --    Number of Steps (Stairs) 3  -SS --    Ascending Technique (Stairs) step-to-step  -SS --    Descending Technique (Stairs) step-to-step  -SS --    Comment, (Gait/Stairs) Pt. ambulated with a step through gait pattern. VC for upright posture, continuous forward progression of AD, hip/knee flexion w/heel toe gait pattern, activation of quads to encourage knee extension during stance phase. Pt. reports some instability of L knee persists. Pt. navigated steps w/cueing for leading w/RLE in ascending and LLE in descending and AD management. No buckling noted but pt. very apprehensive w/both ascending and descending steps.  -SS Pt. ambulated with a step through gait pattern. VC for upright posture, body positioning within walker, continuous forward progression of AD. Activity limited by pain - pt. tearful and stating 10/10 pain. RN aware. Deferred stair navigation at this time.  -      Row Name 01/29/25 1506 01/29/25 1032       Mobility    Extremity Weight-bearing Status left lower extremity  -SS left lower extremity  -SS    Left Lower Extremity (Weight-bearing Status) weight-bearing as tolerated (WBAT)  -SS weight-bearing as tolerated (WBAT)  -              User Key  (r) = Recorded By, (t) = Taken By, (c) = Cosigned By      Initials Name Provider Type     Felicia Moran, PT Physical Therapist                   Obj/Interventions       Row Name 01/29/25 1034          Range of Motion Comprehensive    Comment, General Range of Motion L knee AROM: -8-80  -       Row Name 01/29/25 1516 01/29/25 1034       Motor Skills    Therapeutic Exercise knee;ankle  - knee;ankle  -      Row Name 01/29/25 1516 01/29/25 1034       Knee (Therapeutic Exercise)    Knee (Therapeutic Exercise) AROM (active range of motion);isometric  exercises;strengthening exercise  - AROM (active range of motion);isometric exercises;strengthening exercise  -    Knee AROM (Therapeutic Exercise) left;sitting;flexion;10 repetitions  -SS left;sitting;flexion;10 repetitions  -SS    Knee Isometrics (Therapeutic Exercise) left;quad sets;10 repetitions  -SS left;quad sets;10 repetitions  -SS    Knee Strengthening (Therapeutic Exercise) left;heel slides;SLR (straight leg raise);SAQ (short arc quad);LAQ (long arc quad);10 repetitions  -SS left;heel slides;SLR (straight leg raise);SAQ (short arc quad);LAQ (long arc quad);10 repetitions  -SS      Row Name 01/29/25 1516 01/29/25 1034       Ankle (Therapeutic Exercise)    Ankle (Therapeutic Exercise) AROM (active range of motion)  - AROM (active range of motion)  -    Ankle AROM (Therapeutic Exercise) bilateral;dorsiflexion;plantarflexion;10 repetitions  - bilateral;plantarflexion;dorsiflexion;10 repetitions  -      Row Name 01/29/25 1516 01/29/25 1034       Balance    Balance Assessment sitting static balance;sitting dynamic balance;standing static balance;standing dynamic balance  - sitting static balance;sitting dynamic balance;standing static balance;standing dynamic balance  -    Static Sitting Balance supervision  - standby assist  -    Dynamic Sitting Balance standby assist  - standby assist  -    Position, Sitting Balance unsupported;sitting in chair  - unsupported;sitting edge of bed  -    Static Standing Balance contact guard  -SS contact guard  -SS    Dynamic Standing Balance contact guard  -SS contact guard  -SS    Position/Device Used, Standing Balance supported;walker, front-wheeled  - supported;walker, front-wheeled  -SS    Balance Interventions sitting;standing;sit to stand;supported;static;dynamic  -SS sitting;standing;sit to stand;supported;static;dynamic  -SS              User Key  (r) = Recorded By, (t) = Taken By, (c) = Cosigned By      Initials Name Provider Type    SS  Felicia Moran, PT Physical Therapist                   Goals/Plan       Row Name 01/29/25 1521          Bed Mobility Goal 1 (PT)    Activity/Assistive Device (Bed Mobility Goal 1, PT) sit to supine;supine to sit  -SS     Ste[place]s Level/Cues Needed (Bed Mobility Goal 1, PT) modified independence  -SS     Time Frame (Bed Mobility Goal 1, PT) short term goal (STG);5 days  -SS     Progress/Outcomes (Bed Mobility Goal 1, PT) continuing progress toward goal;goal ongoing  -SS       Row Name 01/29/25 1521          Transfer Goal 1 (PT)    Activity/Assistive Device (Transfer Goal 1, PT) sit-to-stand/stand-to-sit;walker, rolling;bed-to-chair/chair-to-bed  -SS     Ste[place]s Level/Cues Needed (Transfer Goal 1, PT) standby assist  -SS     Time Frame (Transfer Goal 1, PT) short term goal (STG);5 days  -SS     Progress/Outcome (Transfer Goal 1, PT) continuing progress toward goal;goal ongoing  -       Row Name 01/29/25 1521          Gait Training Goal 1 (PT)    Activity/Assistive Device (Gait Training Goal 1, PT) gait (walking locomotion);assistive device use;walker, rolling  -SS     Stearns Level (Gait Training Goal 1, PT) standby assist  -SS     Distance (Gait Training Goal 1, PT) 250  -SS     Time Frame (Gait Training Goal 1, PT) long term goal (LTG);10 days  -SS     Progress/Outcome (Gait Training Goal 1, PT) continuing progress toward goal;goal ongoing  -       Row Name 01/29/25 1521          Stairs Goal 1 (PT)    Activity/Assistive Device (Stairs Goal 1, PT) ascending stairs;descending stairs;cane, straight  -SS     Ste[place]s Level/Cues Needed (Stairs Goal 1, PT) standby assist  -SS     Number of Stairs (Stairs Goal 1, PT) 3  -SS     Time Frame (Stairs Goal 1, PT) long term goal (LTG);3 days  -SS     Progress/Outcome (Stairs Goal 1, PT) continuing progress toward goal;goal ongoing  -SS               User Key  (r) = Recorded By, (t) = Taken By, (c) = Cosigned By      Initials Name Provider Type    SS  Felicia Moran, PT Physical Therapist                   Clinical Impression       Row Name 01/29/25 1517 01/29/25 1038       Pain    Pretreatment Pain Rating 1/10  -SS 4/10  -SS    Posttreatment Pain Rating 8/10  -SS 10/10  -SS    Pain Location knee  -SS knee  -SS    Pain Side/Orientation left  -SS left;posterior  -SS    Pain Management Interventions activity modification encouraged;cold applied;complementary health approaches promoted;diversional activity provided;exercise or physical activity utilized;movement retraining implemented;nursing notified;premedicated for activity;positioning techniques utilized  -SS activity modification encouraged;breathing exercises utilized;cold applied;complementary health approaches promoted;diversional activity provided;movement retraining implemented;nursing notified;exercise or physical activity utilized;premedicated for activity;positioning techniques utilized  -SS    Response to Pain Interventions activity participation with increased pain;nonverbal indicators present  -SS activity participation with increased pain;nonverbal indicators present  -SS      Row Name 01/29/25 1517 01/29/25 1038       Plan of Care Review    Plan of Care Reviewed With spouse;patient  -SS patient  -SS    Progress improving  -SS no change  -SS    Outcome Evaluation Pt. continues to present below baseline function w/decreased strength and AROM of L knee and acute pain affecting her ability to safely participate in functional mobility. She performed transfers, ambulated 100' and navigated 3 steps w/min-mod assist of 2. Activity limited by pain. PM session pain at 8/10 after mobility. Pt. tolerated ther-ex well. Continue acute PT POC to progress as able.  -SS Pt. continues to present below baseline function w/decreased strength and AROM of L knee and acute pain affecting her ability to safely participate in functional mobility. She performed bed mobility, transfers and ambulated 40' w/front wheeled  walker, contact guard assist. Activity limited by pain - pt. tearful and reporting 10/10 pain. Pt. tolerated ther-ex well. Continue acute PT POC to progress as able.  -      Row Name 01/29/25 1517 01/29/25 1038       Therapy Assessment/Plan (PT)    Rehab Potential (PT) good  -SS good  -SS    Criteria for Skilled Interventions Met (PT) yes;meets criteria;skilled treatment is necessary  -SS yes;meets criteria;skilled treatment is necessary  -SS    Therapy Frequency (PT) 2 times/day  - 2 times/day  -      Row Name 01/29/25 1038          Vital Signs    Pre Systolic BP Rehab 110  -SS     Pre Treatment Diastolic BP 58  -SS     Pretreatment Heart Rate (beats/min) 62  -SS     Pre SpO2 (%) 96  -SS     O2 Delivery Pre Treatment room air  -     Pre Patient Position Supine  -       Row Name 01/29/25 1517 01/29/25 1038       Positioning and Restraints    Pre-Treatment Position sitting in chair/recliner  -SS in bed  -    Post Treatment Position chair  -SS chair  -SS    In Chair notified nsg;call light within reach;encouraged to call for assist;exit alarm on;with family/caregiver;waffle cushion;legs elevated  - notified nsg;reclined;call light within reach;encouraged to call for assist;exit alarm on;legs elevated;waffle cushion  -              User Key  (r) = Recorded By, (t) = Taken By, (c) = Cosigned By      Initials Name Provider Type    SS Felicia Moran, PT Physical Therapist                   Outcome Measures       Row Name 01/29/25 1523 01/29/25 1043       How much help from another person do you currently need...    Turning from your back to your side while in flat bed without using bedrails? 3  -SS 3  -SS    Moving from lying on back to sitting on the side of a flat bed without bedrails? 3  -SS 3  -SS    Moving to and from a bed to a chair (including a wheelchair)? 3  -SS 3  -SS    Standing up from a chair using your arms (e.g., wheelchair, bedside chair)? 3  -SS 3  -SS    Climbing 3-5 steps with a  railing? 2  -SS 2  -SS    To walk in hospital room? 3  -SS 3  -SS    AM-PAC 6 Clicks Score (PT) 17  -SS 17  -SS    Highest Level of Mobility Goal 5 --> Static standing  -SS 5 --> Static standing  -      Row Name 01/29/25 1523 01/29/25 1043       Functional Assessment    Outcome Measure Options AM-PAC 6 Clicks Basic Mobility (PT)  -SS AM-PAC 6 Clicks Basic Mobility (PT)  -SS              User Key  (r) = Recorded By, (t) = Taken By, (c) = Cosigned By      Initials Name Provider Type     Felicia Moran, GLADIS Physical Therapist                                 Physical Therapy Education       Title: PT OT SLP Therapies (In Progress)       Topic: Physical Therapy (Done)       Point: Mobility training (Done)       Learning Progress Summary            Patient Eager, E,H, VU,DU,NR by  at 1/29/2025 1523    Comment: Reviewed safety/technique w/transfers, ambulation, HEP, PT POC    Eager, E,H, VU,DU,NR by  at 1/29/2025 1043    Comment: Reviewed safety/technique w/bed mobility, transfers, ambulation, HEP, PT POC    Acceptance, E,D, VU,NR by  at 1/28/2025 1450    Acceptance, E,TB,H, VU,DU by AP at 1/28/2025 1053    Acceptance, E,H, VU,DU by AP at 1/27/2025 1832   Family Eager, E,H, VU,DU,NR by  at 1/29/2025 1523    Comment: Reviewed safety/technique w/transfers, ambulation, HEP, PT POC   Significant Other Acceptance, E,TB,H, VU,DU by AP at 1/28/2025 1053    Acceptance, E,H, VU,DU by AP at 1/27/2025 1832                      Point: Home exercise program (Done)       Learning Progress Summary            Patient Eager, E,H, VU,DU,NR by  at 1/29/2025 1523    Comment: Reviewed safety/technique w/transfers, ambulation, HEP, PT POC    Eager, E,H, VU,DU,NR by  at 1/29/2025 1043    Comment: Reviewed safety/technique w/bed mobility, transfers, ambulation, HEP, PT POC    Acceptance, E,D, VU,NR by HW at 1/28/2025 1450    Acceptance, E,TB,H, ROBIN,DU by AP at 1/28/2025 1053    Acceptance, DANIELA CAMACHO VU, DU by AP at 1/27/2025 1832   Family  Eager, E,H, VU,DU,NR by SS at 1/29/2025 1523    Comment: Reviewed safety/technique w/transfers, ambulation, HEP, PT POC   Significant Other Acceptance, E,TB,H, VU,DU by AP at 1/28/2025 1053    Acceptance, E,H, VU,DU by AP at 1/27/2025 1832                      Point: Body mechanics (Done)       Learning Progress Summary            Patient Eager, E,H, VU,DU,NR by SS at 1/29/2025 1523    Comment: Reviewed safety/technique w/transfers, ambulation, HEP, PT POC    Eager, E,H, VU,DU,NR by SS at 1/29/2025 1043    Comment: Reviewed safety/technique w/bed mobility, transfers, ambulation, HEP, PT POC    Acceptance, E,D, VU,NR by  at 1/28/2025 1450    Acceptance, E,TB,H, VU,DU by AP at 1/28/2025 1053    Acceptance, E,H, VU,DU by AP at 1/27/2025 1832   Family Eager, E,H, VU,DU,NR by SS at 1/29/2025 1523    Comment: Reviewed safety/technique w/transfers, ambulation, HEP, PT POC   Significant Other Acceptance, E,TB,H, VU,DU by AP at 1/28/2025 1053    Acceptance, E,H, VU,DU by AP at 1/27/2025 1832                      Point: Precautions (Done)       Learning Progress Summary            Patient Eager, E,H, VU,DU,NR by SS at 1/29/2025 1523    Comment: Reviewed safety/technique w/transfers, ambulation, HEP, PT POC    Eager, E,H, VU,DU,NR by SS at 1/29/2025 1043    Comment: Reviewed safety/technique w/bed mobility, transfers, ambulation, HEP, PT POC    Acceptance, E,D, VU,NR by  at 1/28/2025 1450    Acceptance, E,TB,H, VU,DU by AP at 1/28/2025 1053    Acceptance, E,H, VU,DU by AP at 1/27/2025 1832   Family Eager, E,H, VU,DU,NR by SS at 1/29/2025 1523    Comment: Reviewed safety/technique w/transfers, ambulation, HEP, PT POC   Significant Other Acceptance, E,TB,H, VU,DU by AP at 1/28/2025 1053    Acceptance, E,H, ROBIN,ALEJANDRA by AP at 1/27/2025 3342                                      User Key       Initials Effective Dates Name Provider Type Discipline     12/15/23 -  Mary Lou Rojo, GLADIS Physical Therapist PT    SS 06/01/21 -  Luisa  Felicia, GLADIS Physical Therapist PT    AP 01/09/25 -  Silva Kelly PT Student PT Student PT                  PT Recommendation and Plan     Progress: improving  Outcome Evaluation: Pt. continues to present below baseline function w/decreased strength and AROM of L knee and acute pain affecting her ability to safely participate in functional mobility. She performed transfers, ambulated 100' and navigated 3 steps w/min-mod assist of 2. Activity limited by pain. PM session pain at 8/10 after mobility. Pt. tolerated ther-ex well. Continue acute PT POC to progress as able.     Time Calculation:         PT Charges       Row Name 01/29/25 1524 01/29/25 1044          Time Calculation    Start Time 1335  -SS 0905  -SS     PT Received On 01/29/25  -SS 01/29/25  -SS        Timed Charges    09071 - PT Therapeutic Exercise Minutes 10  -SS 12  -SS     04395 - Gait Training Minutes  15  -SS 12  -SS     07422 - PT Therapeutic Activity Minutes 5  -SS 6  -SS        Total Minutes    Timed Charges Total Minutes 30  -SS 30  -SS      Total Minutes 30  -SS 30  -SS               User Key  (r) = Recorded By, (t) = Taken By, (c) = Cosigned By      Initials Name Provider Type    SS Felicia Moran, PT Physical Therapist                  Therapy Charges for Today       Code Description Service Date Service Provider Modifiers Qty    13009353397 HC PT THER PROC EA 15 MIN 1/29/2025 Felicia Moran, PT GP 1    26995453755 HC GAIT TRAINING EA 15 MIN 1/29/2025 Felicia Moran, PT GP 1    58111779779 HC PT THER SUPP EA 15 MIN 1/29/2025 Felicia Moran, PT GP 2    66070944339 HC PT THER PROC EA 15 MIN 1/29/2025 Felicia Moran, PT GP 1    45774357830 HC GAIT TRAINING EA 15 MIN 1/29/2025 Felicia Moran, PT GP 1    70432871616 HC PT THER SUPP EA 15 MIN 1/29/2025 Felicia Moran, PT GP 2            PT G-Codes  Outcome Measure Options: AM-PAC 6 Clicks Basic Mobility (PT)  AM-PAC 6 Clicks Score (PT): 17  AM-PAC 6 Clicks Score (OT): 18       Felicia Moran  PT  1/29/2025

## 2025-01-29 NOTE — PROGRESS NOTES
IM progress note      Sarah Howard  [id number]  1955     LOS: 0 days     Attending: Praful Baker MD    Primary Care Provider: Dao Gomez MD      Chief Complaint/Reason for visit:  Left knee pain    Subjective   Doing ok with seen. Struggling with pain control this morning with PT. Denies f/c/n/v/sob/cp.    Objective     Vital Signs  Visit Vitals  /58   Pulse 65   Temp 98.1 °F (36.7 °C) (Oral)   Resp 16   SpO2 97%     Temp (24hrs), Av.2 °F (36.8 °C), Min:98 °F (36.7 °C), Max:98.5 °F (36.9 °C)      Nutrition: PO    Respiratory: RA    Physical Therapy: Pt. continues to present below baseline function w/decreased strength and AROM of L knee and acute pain affecting her ability to safely participate in functional mobility. She performed bed mobility, transfers and ambulated 40' w/front wheeled walker, contact guard assist. Activity limited by pain - pt. tearful and reporting 10/10 pain. Pt. tolerated ther-ex well. Continue acute PT POC to progress as able.     Physical Exam:     General Appearance:    Alert, cooperative, in no acute distress   Head:    Normocephalic, without obvious abnormality, atraumatic    Lungs:     Normal effort, symmetric chest rise, no crepitus, clear to      auscultation bilaterally             Heart:    Regular rhythm and normal rate, normal S1 and S2   Abdomen:     Normal bowel sounds, no masses, no organomegaly, soft        non-tender, non-distended, no guarding, no rebound                tenderness   Extremities:   CDI dressing. PNB cath present.  Flexion and dorsiflexion intact bilateral feet.    Pulses:   Pulses palpable and equal bilaterally   Skin:   No bleeding, bruising or rash   Neurologic:   Moves all extremities with no obvious focal motor deficit.  Cranial nerves 2 - 12 grossly intact     Results Review:     I reviewed the patient's new clinical results.   Results from last 7 days   Lab Units 25  0608 25  0633   WBC 10*3/mm3 10.70  --     HEMOGLOBIN g/dL 10.8* 11.6*   HEMATOCRIT % 34.3 36.6   PLATELETS 10*3/mm3 166  --      Results from last 7 days   Lab Units 01/29/25  0608 01/28/25  0633   SODIUM mmol/L 141 140   POTASSIUM mmol/L 4.4 4.2   CHLORIDE mmol/L 108* 107   CO2 mmol/L 23.0 23.0   BUN mg/dL 19 16   CREATININE mg/dL 0.89 0.99   CALCIUM mg/dL 8.5* 8.8   GLUCOSE mg/dL 130* 183*     I reviewed the patient's new imaging including images and reports.    All medications reviewed.   acetaminophen, 1,000 mg, Oral, Q8H  aspirin, 81 mg, Oral, Q12H  atorvastatin, 20 mg, Oral, Daily  desvenlafaxine, 100 mg, Oral, Daily  escitalopram, 20 mg, Oral, Daily  losartan, 25 mg, Oral, Q24H  meloxicam, 15 mg, Oral, Daily  metoprolol succinate XL, 25 mg, Oral, Nightly  pantoprazole, 40 mg, Oral, Q AM  sodium chloride, 3 mL, Intravenous, Q12H  traZODone, 150 mg, Oral, Nightly        Assessment & Plan     S/P TKR (total knee replacement), left    Mild depression    GERD without esophagitis    Primary hypertension    Arthritis of knee    Hyperlipidemia      Plan  1. PT/OT- WBAT LLE  2. Pain control-prns, AC nerve block  3. IS-encouraged  4. DVT proph- mechs/ASA  5. Bowel regimen  6. Monitor post-op labs  7. DC planning for home    - Hypertension:  Resume home medications as appropriate, formulary substitution when indicated.  Holding parameters.  PRN medications for elevated blood pressure.     -Dyslipidemia:  Resume home regimen statin , ( formulary substitution when appropriate).     -Depression: Resume home regimen.      -GERD:  Resume PPI.  Formulary substitution when indicated.      Marcelina Fierro, MARCOS  01/29/25  14:51 EST

## 2025-01-29 NOTE — THERAPY TREATMENT NOTE
Patient Name: Sarah Howard  : 1955    MRN: [id number]                              Today's Date: 2025       Admit Date: 2025    Visit Dx:     ICD-10-CM ICD-9-CM   1. S/P TKR (total knee replacement), left  Z96.652 V43.65   2. Primary osteoarthritis of left knee  M17.12 715.16     Patient Active Problem List   Diagnosis    Mild depression    Osteopenia    GERD without esophagitis    Coronary artery disease involving native coronary artery    History of breast cancer    Glaucoma    Primary hypertension    Alcohol use disorder    Chronic cough    Class 1 obesity due to excess calories without serious comorbidity with body mass index (BMI) of 33.0 to 33.9 in adult    Age-related nuclear cataract of both eyes    Arthritis of knee    Hyperlipidemia    S/P TKR (total knee replacement), left     Past Medical History:   Diagnosis Date    Anxiety     Breast cancer     3/2010, right breast    Cancer Breast    2010    Cervical disc disorder ACDF 2020    Coronary artery disease 10/2020    Depression     Drug therapy     2010    Fracture, foot     bilateral    GERD (gastroesophageal reflux disease) 2016    Glaucoma 2016    Hard to intubate     Hx of radiation therapy     2010    Hyperlipidemia     Hypertension 2020    Knee swelling     Osteopenia 2009    Tear of meniscus of knee     left     Past Surgical History:   Procedure Laterality Date    ADENOIDECTOMY  1972    BACK SURGERY  2018    BREAST BIOPSY Right     3/2010    BREAST LUMPECTOMY Right     x2, 2010    BREAST SURGERY  2 x 2010    CARDIAC CATHETERIZATION  2020    COLONOSCOPY  2018    COSMETIC SURGERY  breast reduction    2010    EYE SURGERY  laser iridotomies    2019    HYSTERECTOMY  2011    NECK SURGERY      OOPHORECTOMY      REDUCTION MAMMAPLASTY Bilateral     2010 at time of lumpectomy    SPINE SURGERY  2019    TONSILLECTOMY  1972    TOTAL KNEE ARTHROPLASTY Left 2025    Procedure: TOTAL KNEE ARTHROPLASTY WITH EMELINA ROBOT LEFT;   Surgeon: Praful Baker MD;  Location: [facility];  Service: Robotics - Ortho;  Laterality: Left;    TUBAL ABDOMINAL LIGATION  1982      General Information       Row Name 01/29/25 1031          Physical Therapy Time and Intention    Document Type therapy note (daily note)  -     Mode of Treatment physical therapy  -       Row Name 01/29/25 1031          General Information    Patient Profile Reviewed yes  -     Existing Precautions/Restrictions fall;left;weight bearing;other (see comments)  WBAT s/p L TKA, adductor canal nerve block  -       Row Name 01/29/25 1031          Cognition    Orientation Status (Cognition) oriented x 4  -       Row Name 01/29/25 1031          Safety Issues/Impairments Affecting Functional Mobility    Safety Issues Affecting Function (Mobility) awareness of need for assistance;insight into deficits/self-awareness;judgment;positioning of assistive device;problem-solving;safety precaution awareness;safety precautions follow-through/compliance;sequencing abilities  -     Impairments Affecting Function (Mobility) balance;coordination;endurance/activity tolerance;pain;range of motion (ROM);strength  -               User Key  (r) = Recorded By, (t) = Taken By, (c) = Cosigned By      Initials Name Provider Type     Felicia Moran PT Physical Therapist                   Mobility       Row Name 01/29/25 1032          Bed Mobility    Bed Mobility scooting/bridging;supine-sit  -SS     Scooting/Bridging [place] (Bed Mobility) standby assist;verbal cues  -     Supine-Sit [place] (Bed Mobility) standby assist;verbal cues  -     Assistive Device (Bed Mobility) bed rails  -     Comment, (Bed Mobility) VC for sequencing  -       Row Name 01/29/25 1032          Sit-Stand Transfer    Sit-Stand [place] (Transfers) contact guard;verbal cues  -     Assistive Device (Sit-Stand Transfers) walker, front-wheeled  -     Comment, (Sit-Stand Transfer) VC for hand  placement, stepping out LLE, appropriate alignment, emphasis on lowering with eccentric control  -       Row Name 01/29/25 1032          Gait/Stairs (Locomotion)    [place] Level (Gait) contact guard;verbal cues;1 person assist;1 person to manage equipment  -     Assistive Device (Gait) walker, front-wheeled  -     Patient was able to Ambulate yes  -     Distance in Feet (Gait) 40  -     Deviations/Abnormal Patterns (Gait) bilateral deviations;antalgic;base of support, narrow;pennie decreased;gait speed decreased;stride length decreased;weight shifting decreased  -     Bilateral Gait Deviations forward flexed posture;heel strike decreased  -     Comment, (Gait/Stairs) Pt. ambulated with a step through gait pattern. VC for upright posture, body positioning within walker, continuous forward progression of AD. Activity limited by pain - pt. tearful and stating 10/10 pain. RN aware. Deferred stair navigation at this time.  -       Row Name 01/29/25 1032          Mobility    Extremity Weight-bearing Status left lower extremity  -     Left Lower Extremity (Weight-bearing Status) weight-bearing as tolerated (WBAT)  -               User Key  (r) = Recorded By, (t) = Taken By, (c) = Cosigned By      Initials Name Provider Type     Felicia Moran, PT Physical Therapist                   Obj/Interventions       Row Name 01/29/25 1034          Range of Motion Comprehensive    Comment, General Range of Motion L knee AROM: -8-80  -       Row Name 01/29/25 1034          Motor Skills    Therapeutic Exercise knee;ankle  -       Row Name 01/29/25 1034          Knee (Therapeutic Exercise)    Knee (Therapeutic Exercise) AROM (active range of motion);isometric exercises;strengthening exercise  -     Knee AROM (Therapeutic Exercise) left;sitting;flexion;10 repetitions  -     Knee Isometrics (Therapeutic Exercise) left;quad sets;10 repetitions  -     Knee Strengthening (Therapeutic Exercise) left;heel  slides;SLR (straight leg raise);SAQ (short arc quad);LAQ (long arc quad);10 repetitions  -       Row Name 01/29/25 1034          Ankle (Therapeutic Exercise)    Ankle (Therapeutic Exercise) AROM (active range of motion)  -     Ankle AROM (Therapeutic Exercise) bilateral;plantarflexion;dorsiflexion;10 repetitions  -       Row Name 01/29/25 1034          Balance    Balance Assessment sitting static balance;sitting dynamic balance;standing static balance;standing dynamic balance  -     Static Sitting Balance standby assist  -     Dynamic Sitting Balance standby assist  -SS     Position, Sitting Balance unsupported;sitting edge of bed  -     Static Standing Balance contact guard  -     Dynamic Standing Balance contact guard  -     Position/Device Used, Standing Balance supported;walker, front-wheeled  -     Balance Interventions sitting;standing;sit to stand;supported;static;dynamic  -               User Key  (r) = Recorded By, (t) = Taken By, (c) = Cosigned By      Initials Name Provider Type     Felicia Moran PT Physical Therapist                   Goals/Plan    No documentation.                  Clinical Impression       Row Name 01/29/25 1038          Pain    Pretreatment Pain Rating 4/10  -SS     Posttreatment Pain Rating 10/10  -SS     Pain Location knee  -     Pain Side/Orientation left;posterior  -     Pain Management Interventions activity modification encouraged;breathing exercises utilized;cold applied;complementary health approaches promoted;diversional activity provided;movement retraining implemented;nursing notified;exercise or physical activity utilized;premedicated for activity;positioning techniques utilized  -     Response to Pain Interventions activity participation with increased pain;nonverbal indicators present  -       Row Name 01/29/25 1038          Plan of Care Review    Plan of Care Reviewed With patient  -     Progress no change  -     Outcome Evaluation  Pt. continues to present below baseline function w/decreased strength and AROM of L knee and acute pain affecting her ability to safely participate in functional mobility. She performed bed mobility, transfers and ambulated 40' w/front wheeled walker, contact guard assist. Activity limited by pain - pt. tearful and reporting 10/10 pain. Pt. tolerated ther-ex well. Continue acute PT POC to progress as able.  -       Row Name 01/29/25 1038          Therapy Assessment/Plan (PT)    Rehab Potential (PT) good  -     Criteria for Skilled Interventions Met (PT) yes;meets criteria;skilled treatment is necessary  -     Therapy Frequency (PT) 2 times/day  -       Row Name 01/29/25 1038          Vital Signs    Pre Systolic BP Rehab 110  -SS     Pre Treatment Diastolic BP 58  -SS     Pretreatment Heart Rate (beats/min) 62  -SS     Pre SpO2 (%) 96  -SS     O2 Delivery Pre Treatment room air  -     Pre Patient Position Supine  -       Row Name 01/29/25 1038          Positioning and Restraints    Pre-Treatment Position in bed  -SS     Post Treatment Position chair  -SS     In Chair notified nsg;reclined;call light within reach;encouraged to call for assist;exit alarm on;legs elevated;waffle cushion  -               User Key  (r) = Recorded By, (t) = Taken By, (c) = Cosigned By      Initials Name Provider Type    SS Felicia Moran, PT Physical Therapist                   Outcome Measures       Row Name 01/29/25 1043          How much help from another person do you currently need...    Turning from your back to your side while in flat bed without using bedrails? 3  -SS     Moving from lying on back to sitting on the side of a flat bed without bedrails? 3  -SS     Moving to and from a bed to a chair (including a wheelchair)? 3  -SS     Standing up from a chair using your arms (e.g., wheelchair, bedside chair)? 3  -SS     Climbing 3-5 steps with a railing? 2  -SS     To walk in hospital room? 3  -SS     AM-PAC 6 Clicks  Score (PT) 17  -SS     Highest Level of Mobility Goal 5 --> Static standing  -       Row Name 01/29/25 1043          Functional Assessment    Outcome Measure Options AM-PAC 6 Clicks Basic Mobility (PT)  -               User Key  (r) = Recorded By, (t) = Taken By, (c) = Cosigned By      Initials Name Provider Type    Felicia Chapman, PT Physical Therapist                                 Physical Therapy Education       Title: PT OT SLP Therapies (In Progress)       Topic: Physical Therapy (Done)       Point: Mobility training (Done)       Learning Progress Summary            Patient Eager, E,H, VU,DU,NR by  at 1/29/2025 1043    Comment: Reviewed safety/technique w/bed mobility, transfers, ambulation, HEP, PT POC    Acceptance, E,D, VU,NR by  at 1/28/2025 1450    Acceptance, E,TB,H, VU,DU by  at 1/28/2025 1053    Acceptance, E,H, VU,DU by  at 1/27/2025 1832   Significant Other Acceptance, E,TB,H, VU,DU by  at 1/28/2025 1053    Acceptance, E,H, VU,DU by  at 1/27/2025 1832                      Point: Home exercise program (Done)       Learning Progress Summary            Patient Eager, E,H, VU,DU,NR by  at 1/29/2025 1043    Comment: Reviewed safety/technique w/bed mobility, transfers, ambulation, HEP, PT POC    Acceptance, E,D, VU,NR by  at 1/28/2025 1450    Acceptance, E,TB,H, VU,DU by  at 1/28/2025 1053    Acceptance, E,H, VU,DU by  at 1/27/2025 1832   Significant Other Acceptance, E,TB,H, VU,DU by AP at 1/28/2025 1053    Acceptance, E,H, VU,DU by  at 1/27/2025 1832                      Point: Body mechanics (Done)       Learning Progress Summary            Patient Eager, E,H, VU,DU,NR by  at 1/29/2025 1043    Comment: Reviewed safety/technique w/bed mobility, transfers, ambulation, HEP, PT POC    Acceptance, E,D, VU,NR by  at 1/28/2025 1450    Acceptance, E,TB,H, VU,DU by AP at 1/28/2025 1053    Acceptance, E,HROBIN DU by AP at 1/27/2025 1832   Significant Other Acceptance, E,TB,H,  VU,DU by AP at 1/28/2025 1053    Acceptance, E,H, VU,DU by AP at 1/27/2025 1832                      Point: Precautions (Done)       Learning Progress Summary            Patient Eager, E,H, VU,DU,NR by  at 1/29/2025 1043    Comment: Reviewed safety/technique w/bed mobility, transfers, ambulation, HEP, PT POC    Acceptance, E,D, VU,NR by  at 1/28/2025 1450    Acceptance, E,TB,H, VU,DU by AP at 1/28/2025 1053    Acceptance, E,H, VU,DU by AP at 1/27/2025 1832   Significant Other Acceptance, E,TB,H, VU,DU by AP at 1/28/2025 1053    Acceptance, E,H, VU,DU by AP at 1/27/2025 1832                                      User Key       Initials Effective Dates Name Provider Type Discipline     12/15/23 -  Mary Lou Rojo, PT Physical Therapist PT     06/01/21 -  Felicia Moran PT Physical Therapist PT     01/09/25 -  Silva Kelly PT Student PT Student PT                  PT Recommendation and Plan     Progress: no change  Outcome Evaluation: Pt. continues to present below baseline function w/decreased strength and AROM of L knee and acute pain affecting her ability to safely participate in functional mobility. She performed bed mobility, transfers and ambulated 40' w/front wheeled walker, contact guard assist. Activity limited by pain - pt. tearful and reporting 10/10 pain. Pt. tolerated ther-ex well. Continue acute PT POC to progress as able.     Time Calculation:         PT Charges       Row Name 01/29/25 1044             Time Calculation    Start Time 0905  -SS      PT Received On 01/29/25  -SS         Timed Charges    23927 - PT Therapeutic Exercise Minutes 12  -SS      51497 - Gait Training Minutes  12  -SS      69813 - PT Therapeutic Activity Minutes 6  -SS         Total Minutes    Timed Charges Total Minutes 30  -SS       Total Minutes 30  -SS                User Key  (r) = Recorded By, (t) = Taken By, (c) = Cosigned By      Initials Name Provider Type    SS Felicia Moran, PT Physical Therapist                   Therapy Charges for Today       Code Description Service Date Service Provider Modifiers Qty    20589540809 HC PT THER PROC EA 15 MIN 1/29/2025 Felicia Moran, PT GP 1    52654019536 HC GAIT TRAINING EA 15 MIN 1/29/2025 Felicia Moran, PT GP 1    36518756502 HC PT THER SUPP EA 15 MIN 1/29/2025 Felicia Moran, PT GP 2            PT G-Codes  Outcome Measure Options: AM-PAC 6 Clicks Basic Mobility (PT)  AM-PAC 6 Clicks Score (PT): 17  AM-PAC 6 Clicks Score (OT): 18       Felicia Moran PT  1/29/2025

## 2025-01-29 NOTE — PLAN OF CARE
Goal Outcome Evaluation:  Plan of Care Reviewed With: spouse, patient        Progress: improving  Outcome Evaluation: Pt. continues to present below baseline function w/decreased strength and AROM of L knee and acute pain affecting her ability to safely participate in functional mobility. She performed transfers, ambulated 100' and navigated 3 steps w/min-mod assist of 2. Activity limited by pain. PM session pain at 8/10 after mobility. Pt. tolerated ther-ex well. Continue acute PT POC to progress as able.

## 2025-01-29 NOTE — PLAN OF CARE
Goal Outcome Evaluation:   VSS. A & O x 4. Pain 9/10, had to received IV Dilaudid to get pain under control. Offering oral PRN when time for better pain control. Left knee ace wrap CDI. InfuBlock intact and infusing. Patient uncomfortable with discharging home today r/t pain and fatigue. CM- home with home health PT/OT when medically ready.

## 2025-01-29 NOTE — PLAN OF CARE
Goal Outcome Evaluation:  Plan of Care Reviewed With: patient        Progress: no change  Outcome Evaluation: Pt. continues to present below baseline function w/decreased strength and AROM of L knee and acute pain affecting her ability to safely participate in functional mobility. She performed bed mobility, transfers and ambulated 40' w/front wheeled walker, contact guard assist. Activity limited by pain - pt. tearful and reporting 10/10 pain. Pt. tolerated ther-ex well. Continue acute PT POC to progress as able.

## 2025-01-29 NOTE — PROGRESS NOTES
[facility]    Acute pain service Inpatient Progress Note    Patient Name: Saarh Howard  :  1955  MRN:  [id number]        Acute Pain  Service Inpatient Progress Note:    Analgesia:Excellent  Pain Score:10  LOC: alert and awake  Resp Status: room air  Cardiac: VS stable  Side Effects:None  Catheter Site:clean, dressing intact and dry  Cath type: peripheral nerve cath(InfuSystem)  Infusion rate: Fem/ Add: Basal: 1ml/hr, PIB: 8ml q 8h, PCA: 8ml q 30 min (1mL,8ml, 8ml InfuSystem Pump)  Dosing/Volume: ropivacaine 0.2%  Catheter Plan:Catheter to remain Insitu and Continue catheter infusion rate unchanged  Comments:    1/10 anterior knee  5/10 hip pain. Educated pt nerve block does not cover hip dermatome. Pt states has chronic hip pain

## 2025-01-30 PROCEDURE — 63710000001 DESVENLAFAXINE 50 MG TABLET SUSTAINED-RELEASE 24 HOUR: Performed by: INTERNAL MEDICINE

## 2025-01-30 PROCEDURE — A9270 NON-COVERED ITEM OR SERVICE: HCPCS | Performed by: INTERNAL MEDICINE

## 2025-01-30 PROCEDURE — 63710000001 PANTOPRAZOLE 40 MG TABLET DELAYED-RELEASE: Performed by: INTERNAL MEDICINE

## 2025-01-30 PROCEDURE — 99024 POSTOP FOLLOW-UP VISIT: CPT | Performed by: ORTHOPAEDIC SURGERY

## 2025-01-30 PROCEDURE — 63710000001 OXYCODONE 10 MG TABLET: Performed by: ORTHOPAEDIC SURGERY

## 2025-01-30 PROCEDURE — 63710000001 ESCITALOPRAM 20 MG TABLET: Performed by: INTERNAL MEDICINE

## 2025-01-30 PROCEDURE — A9270 NON-COVERED ITEM OR SERVICE: HCPCS | Performed by: ORTHOPAEDIC SURGERY

## 2025-01-30 PROCEDURE — 63710000001 ACETAMINOPHEN EXTRA STRENGTH 500 MG TABLET: Performed by: ORTHOPAEDIC SURGERY

## 2025-01-30 PROCEDURE — 97116 GAIT TRAINING THERAPY: CPT

## 2025-01-30 PROCEDURE — 63710000001 MELOXICAM 15 MG TABLET: Performed by: ORTHOPAEDIC SURGERY

## 2025-01-30 PROCEDURE — 97110 THERAPEUTIC EXERCISES: CPT

## 2025-01-30 PROCEDURE — 63710000001 LOSARTAN 25 MG TABLET: Performed by: INTERNAL MEDICINE

## 2025-01-30 PROCEDURE — 63710000001 ASPIRIN 81 MG TABLET DELAYED-RELEASE: Performed by: ORTHOPAEDIC SURGERY

## 2025-01-30 PROCEDURE — 63710000001 ATORVASTATIN 20 MG TABLET: Performed by: INTERNAL MEDICINE

## 2025-01-30 PROCEDURE — 97530 THERAPEUTIC ACTIVITIES: CPT

## 2025-01-30 RX ADMIN — OXYCODONE HYDROCHLORIDE 10 MG: 10 TABLET ORAL at 05:32

## 2025-01-30 RX ADMIN — MELOXICAM 15 MG: 15 TABLET ORAL at 08:48

## 2025-01-30 RX ADMIN — OXYCODONE HYDROCHLORIDE 10 MG: 10 TABLET ORAL at 00:16

## 2025-01-30 RX ADMIN — OXYCODONE HYDROCHLORIDE 10 MG: 10 TABLET ORAL at 10:34

## 2025-01-30 RX ADMIN — DESVENLAFAXINE 100 MG: 50 TABLET, FILM COATED, EXTENDED RELEASE ORAL at 08:48

## 2025-01-30 RX ADMIN — ATORVASTATIN CALCIUM 20 MG: 20 TABLET, FILM COATED ORAL at 08:48

## 2025-01-30 RX ADMIN — PANTOPRAZOLE SODIUM 40 MG: 40 TABLET, DELAYED RELEASE ORAL at 05:32

## 2025-01-30 RX ADMIN — LOSARTAN POTASSIUM 25 MG: 25 TABLET, FILM COATED ORAL at 08:48

## 2025-01-30 RX ADMIN — ESCITALOPRAM OXALATE 20 MG: 20 TABLET, FILM COATED ORAL at 08:48

## 2025-01-30 RX ADMIN — ACETAMINOPHEN 1000 MG: 500 TABLET, FILM COATED ORAL at 05:32

## 2025-01-30 RX ADMIN — ASPIRIN 81 MG: 81 TABLET, COATED ORAL at 08:48

## 2025-01-30 RX ADMIN — Medication 3 ML: at 08:49

## 2025-01-30 NOTE — PROGRESS NOTES
CHIEF COMPLAINT: Follow-up left total knee arthroplasty.    SUBJECTIVE  Patient resting comfortably.  States she is feeling much better pain wise to this morning.    PHYSICAL THERAPY PROGRESS  Outcome Evaluation: Pt. continues to present below baseline function w/decreased strength and AROM of L knee and acute pain affecting her ability to safely participate in functional mobility. She performed transfers, ambulated 100' and navigated 3 steps w/min-mod assist of 2. Activity limited by pain. PM session pain at 8/10 after mobility. Pt. tolerated ther-ex well. Continue acute PT POC to progress as able. ([id number])     OBJECTIVE  Temp (24hrs), Av.6 °F (36.4 °C), Min:97.5 °F (36.4 °C), Max:97.7 °F (36.5 °C)    Blood pressure 112/57, pulse 68, temperature 97.7 °F (36.5 °C), temperature source Axillary, resp. rate 16, SpO2 92%.    Lab Results (last 24 hours)       ** No results found for the last 24 hours. **              PHYSICAL EXAM  Lower extremity: Dressing clean, dry and intact.  Able to perform straight leg raise without assist.  Intact EHL, FHL, tibialis anterior, and gastrocsoleus. Sensation intact to light touch to deep peroneal, superficial peroneal, sural, saphenous, tibial nerves. 2+ palpable DP and PT pulses.         S/P TKR (total knee replacement), left    Mild depression    GERD without esophagitis    Primary hypertension    Arthritis of knee    Hyperlipidemia      PLAN / DISPOSITION:  3 Days Post-Op left total knee arthroplasty    Protected weight bearing as tolerated left lower extremity, knee range of motion as tolerated  Pain control  PT/OT for post op mobilization and medical equipment needs    SCD's bilateral lower extremities   Aspirin for DVT prophylaxis   Social work for discharge planning.  Anticipate discharge home today if cleared by PT.  Dressing to remain in place for 7 days. May remove on POD#7. If no drainage, may shower on POD#10. No submerging wound in water. If drainage is noted,  sterile dressing should be placed and wound checked daily. No showering until wound has remained dry for 72 consecutive hours.   Follow up in 3 weeks for re-assessment.      Future Appointments   Date Time Provider Department Center   1/30/2025  9:00 AM Osmany Ko, PT [facility]   2/11/2025  1:00 PM Mayuri Covington APRN [facility]   2/18/2025  2:30 PM Soila Patino PA-C [facility]   3/10/2025  2:30 PM Chanelle Goodson LCSW [facility]   5/5/2025 10:00 AM Dao Gomez MD [facility]       Praful Baker MD  01/30/25  07:54 EST

## 2025-01-30 NOTE — THERAPY TREATMENT NOTE
Patient Name: Sarah Howard  : 1955    MRN: [id number]                              Today's Date: 2025       Admit Date: 2025    Visit Dx:     ICD-10-CM ICD-9-CM   1. S/P TKR (total knee replacement), left  Z96.652 V43.65   2. Primary osteoarthritis of left knee  M17.12 715.16     Patient Active Problem List   Diagnosis    Mild depression    Osteopenia    GERD without esophagitis    Coronary artery disease involving native coronary artery    History of breast cancer    Glaucoma    Primary hypertension    Alcohol use disorder    Chronic cough    Class 1 obesity due to excess calories without serious comorbidity with body mass index (BMI) of 33.0 to 33.9 in adult    Age-related nuclear cataract of both eyes    Arthritis of knee    Hyperlipidemia    S/P TKR (total knee replacement), left     Past Medical History:   Diagnosis Date    Anxiety     Breast cancer     3/2010, right breast    Cancer Breast    2010    Cervical disc disorder ACDF 2020    Coronary artery disease 10/2020    Depression     Drug therapy     2010    Fracture, foot     bilateral    GERD (gastroesophageal reflux disease) 2016    Glaucoma 2016    Hard to intubate     Hx of radiation therapy     2010    Hyperlipidemia     Hypertension 2020    Knee swelling     Osteopenia 2009    Tear of meniscus of knee     left     Past Surgical History:   Procedure Laterality Date    ADENOIDECTOMY  1972    BACK SURGERY  2018    BREAST BIOPSY Right     3/2010    BREAST LUMPECTOMY Right     x2, 2010    BREAST SURGERY  2 x 2010    CARDIAC CATHETERIZATION  2020    COLONOSCOPY  2018    COSMETIC SURGERY  breast reduction    2010    EYE SURGERY  laser iridotomies    2019    HYSTERECTOMY  2011    NECK SURGERY      OOPHORECTOMY      REDUCTION MAMMAPLASTY Bilateral     2010 at time of lumpectomy    SPINE SURGERY  2019    TONSILLECTOMY  1972    TOTAL KNEE ARTHROPLASTY Left 2025    Procedure: TOTAL KNEE ARTHROPLASTY WITH EMELINA ROBOT LEFT;   Surgeon: Praful Baker MD;  Location: [facility];  Service: Robotics - Ortho;  Laterality: Left;    TUBAL ABDOMINAL LIGATION  1982      General Information       Row Name 01/30/25 0951          Physical Therapy Time and Intention    Document Type therapy note (daily note)  -     Mode of Treatment physical therapy  -       Row Name 01/30/25 0951          General Information    Patient Profile Reviewed yes  -     Existing Precautions/Restrictions fall;left;weight bearing;other (see comments)  WBAT s/p L TKA, adductor canal nerve block  -     Barriers to Rehab medically complex  -SS       Row Name 01/30/25 0951          Cognition    Orientation Status (Cognition) oriented x 4  -SS       Row Name 01/30/25 0951          Safety Issues/Impairments Affecting Functional Mobility    Safety Issues Affecting Function (Mobility) awareness of need for assistance;insight into deficits/self-awareness;judgment;positioning of assistive device;problem-solving;safety precaution awareness;safety precautions follow-through/compliance;sequencing abilities  -     Impairments Affecting Function (Mobility) balance;coordination;endurance/activity tolerance;pain;range of motion (ROM);strength;postural/trunk control  -               User Key  (r) = Recorded By, (t) = Taken By, (c) = Cosigned By      Initials Name Provider Type    SS Felicia Moran PT Physical Therapist                   Mobility       Row Name 01/30/25 1016          Bed Mobility    Bed Mobility scooting/bridging;supine-sit  -SS     Scooting/Bridging [place] (Bed Mobility) standby assist;verbal cues  -     Supine-Sit [place] (Bed Mobility) standby assist;verbal cues  -SS     Comment, (Bed Mobility) VC for sequencing  -       Row Name 01/30/25 1016          Sit-Stand Transfer    Sit-Stand [place] (Transfers) contact guard;verbal cues  -SS     Assistive Device (Sit-Stand Transfers) walker, front-wheeled  -     Comment, (Sit-Stand Transfer) VC  for hand placement, stepping out LLE, lowering with eccentric control  -       Row Name 01/30/25 1016          Gait/Stairs (Locomotion)    [place] Level (Gait) contact guard;verbal cues  -     Assistive Device (Gait) walker, front-wheeled  -     Patient was able to Ambulate yes  -     Distance in Feet (Gait) 60  -SS     Deviations/Abnormal Patterns (Gait) bilateral deviations;base of support, narrow;pennie decreased;gait speed decreased;stride length decreased;weight shifting decreased  -     Bilateral Gait Deviations forward flexed posture;heel strike decreased  -     Left Sided Gait Deviations weight shift ability decreased  -     [place] Level (Stairs) contact guard;1 person assist;1 person to manage equipment  -     Assistive Device (Stairs) cane, straight  -     Handrail Location (Stairs) other (see comments)  HHA \  -SS     Number of Steps (Stairs) 5  -SS     Ascending Technique (Stairs) step-to-step  -SS     Descending Technique (Stairs) step-to-step  -SS     Comment, (Gait/Stairs) Pt. ambulated with a step through gait pattern. VC for upright posture, continuous forward progression of AD, hip/knee flexion w/heel toe gait pattern, activation of quads to encourage knee extension during stance phase.  Pt. navigated steps w/cueing for leading w/RLE in ascending and LLE in descending and AD management. No buckling or LOB noted. Activity limited by pain and fatigue.  -       Row Name 01/30/25 1016          Mobility    Extremity Weight-bearing Status left lower extremity  -     Left Lower Extremity (Weight-bearing Status) weight-bearing as tolerated (WBAT)  -               User Key  (r) = Recorded By, (t) = Taken By, (c) = Cosigned By      Initials Name Provider Type     Felicia Moran PT Physical Therapist                   Obj/Interventions       Row Name 01/30/25 1018          Range of Motion Comprehensive    Comment, General Range of Motion L knee AROM: -10-75  -        Row Name 01/30/25 1018          Motor Skills    Therapeutic Exercise knee;ankle  -SS       Row Name 01/30/25 1018          Knee (Therapeutic Exercise)    Knee (Therapeutic Exercise) AROM (active range of motion);isometric exercises;strengthening exercise  -     Knee AROM (Therapeutic Exercise) left;sitting;flexion;10 repetitions  -     Knee Isometrics (Therapeutic Exercise) left;quad sets;10 repetitions  -     Knee Strengthening (Therapeutic Exercise) left;heel slides;SLR (straight leg raise);SAQ (short arc quad);LAQ (long arc quad);10 repetitions  -SS       Row Name 01/30/25 1018          Ankle (Therapeutic Exercise)    Ankle (Therapeutic Exercise) AROM (active range of motion)  -     Ankle AROM (Therapeutic Exercise) bilateral;dorsiflexion;plantarflexion;10 repetitions  -       Row Name 01/30/25 1018          Balance    Balance Assessment sitting static balance;sitting dynamic balance;standing static balance;standing dynamic balance  -     Static Sitting Balance independent  -     Dynamic Sitting Balance supervision  -     Position, Sitting Balance unsupported;sitting edge of bed  -     Static Standing Balance contact guard  -     Dynamic Standing Balance contact guard  -SS     Position/Device Used, Standing Balance supported;walker, front-wheeled  -     Balance Interventions sitting;standing;sit to stand;supported;static;dynamic  -               User Key  (r) = Recorded By, (t) = Taken By, (c) = Cosigned By      Initials Name Provider Type    SS Felicia Moran PT Physical Therapist                   Goals/Plan    No documentation.                  Clinical Impression       Row Name 01/30/25 1020          Pain    Pretreatment Pain Rating 3/10  -SS     Posttreatment Pain Rating 8/10  -SS     Pain Location knee  -SS     Pain Side/Orientation left  -SS     Pain Management Interventions activity modification encouraged;cold applied;complementary health approaches promoted;movement retraining  implemented;exercise or physical activity utilized;positioning techniques utilized  -     Response to Pain Interventions activity level improved;mobility function improved;movement patterns improved;activity participation with increased pain  -       Row Name 01/30/25 1020          Plan of Care Review    Plan of Care Reviewed With patient;spouse  -     Progress improving  -     Outcome Evaluation Pt. continues to present below baseline function w/decreased strength and AROM of L knee and acute pain affecting her ability to safely participate in functional mobility. She performed transfers, ambulated 60' and navigated 3 steps w/contact guard assist of 2. Activity limited by pain and fatigue. Pt. tolerated ther-ex well. Pt./spouse educated for assist to be provided, car transfers and knee precautions. Continue acute PT POC to progress as able.  -       Row Name 01/30/25 1020          Therapy Assessment/Plan (PT)    Rehab Potential (PT) good  -     Criteria for Skilled Interventions Met (PT) yes;meets criteria;skilled treatment is necessary  -     Therapy Frequency (PT) 2 times/day  -       Row Name 01/30/25 1020          Vital Signs    Pre Systolic BP Rehab 116  -SS     Pre Treatment Diastolic BP 61  -       Row Name 01/30/25 1020          Positioning and Restraints    Pre-Treatment Position in bed  -     Post Treatment Position chair  -SS     In Chair notified nsg;reclined;call light within reach;encouraged to call for assist;exit alarm on;with family/caregiver;waffle cushion;legs elevated  -               User Key  (r) = Recorded By, (t) = Taken By, (c) = Cosigned By      Initials Name Provider Type     Felicia Moran, PT Physical Therapist                   Outcome Measures       Row Name 01/30/25 1022          How much help from another person do you currently need...    Turning from your back to your side while in flat bed without using bedrails? 3  -SS     Moving from lying on back to  sitting on the side of a flat bed without bedrails? 3  -SS     Moving to and from a bed to a chair (including a wheelchair)? 3  -SS     Standing up from a chair using your arms (e.g., wheelchair, bedside chair)? 3  -SS     Climbing 3-5 steps with a railing? 3  -SS     To walk in hospital room? 3  -SS     AM-PAC 6 Clicks Score (PT) 18  -SS     Highest Level of Mobility Goal 6 --> Walk 10 steps or more  -       Row Name 01/30/25 1022          Functional Assessment    Outcome Measure Options AM-PAC 6 Clicks Basic Mobility (PT)  -               User Key  (r) = Recorded By, (t) = Taken By, (c) = Cosigned By      Initials Name Provider Type     Felicia Moran PT Physical Therapist                                 Physical Therapy Education       Title: PT OT SLP Therapies (In Progress)       Topic: Physical Therapy (Done)       Point: Mobility training (Done)       Learning Progress Summary            Patient Eager, E,H, VU,DU,NR by  at 1/30/2025 1022    Comment: Reviewed safety/technique w/bed mobility, transfers, ambulation, stair navigation, HEP, PT POC, car transfers, spouse assist, use of gait belt, knee precautions, no pivoting on LLE    Eager, E,H, VU,DU,NR by  at 1/29/2025 1523    Comment: Reviewed safety/technique w/transfers, ambulation, HEP, PT POC    Eager, E,H, VU,DU,NR by  at 1/29/2025 1043    Comment: Reviewed safety/technique w/bed mobility, transfers, ambulation, HEP, PT POC    Acceptance, E,D, VU,NR by  at 1/28/2025 1450    Acceptance, E,TB,H, VU,DU by AP at 1/28/2025 1053    Acceptance, E,H, VU,DU by AP at 1/27/2025 1832   Family Eager, E,H, VU,DU,NR by  at 1/29/2025 1523    Comment: Reviewed safety/technique w/transfers, ambulation, HEP, PT POC   Significant Other Eager, E,H, VU,DU,NR by  at 1/30/2025 1022    Comment: Reviewed safety/technique w/bed mobility, transfers, ambulation, stair navigation, HEP, PT POC, car transfers, spouse assist, use of gait belt, knee precautions, no  pivoting on LLE    Acceptance, E,TB,H, VU,DU by AP at 1/28/2025 1053    Acceptance, E,H, VU,DU by AP at 1/27/2025 1832                      Point: Home exercise program (Done)       Learning Progress Summary            Patient Eager, E,H, VU,DU,NR by SS at 1/30/2025 1022    Comment: Reviewed safety/technique w/bed mobility, transfers, ambulation, stair navigation, HEP, PT POC, car transfers, spouse assist, use of gait belt, knee precautions, no pivoting on LLE    Eager, E,H, VU,DU,NR by SS at 1/29/2025 1523    Comment: Reviewed safety/technique w/transfers, ambulation, HEP, PT POC    Eager, E,H, VU,DU,NR by SS at 1/29/2025 1043    Comment: Reviewed safety/technique w/bed mobility, transfers, ambulation, HEP, PT POC    Acceptance, E,D, VU,NR by  at 1/28/2025 1450    Acceptance, E,TB,H, VU,DU by AP at 1/28/2025 1053    Acceptance, E,H, VU,DU by AP at 1/27/2025 1832   Family Eager, E,H, VU,DU,NR by SS at 1/29/2025 1523    Comment: Reviewed safety/technique w/transfers, ambulation, HEP, PT POC   Significant Other Eager, E,H, VU,DU,NR by SS at 1/30/2025 1022    Comment: Reviewed safety/technique w/bed mobility, transfers, ambulation, stair navigation, HEP, PT POC, car transfers, spouse assist, use of gait belt, knee precautions, no pivoting on LLE    Acceptance, E,TB,H, VU,DU by AP at 1/28/2025 1053    Acceptance, E,H, VU,DU by AP at 1/27/2025 1832                      Point: Body mechanics (Done)       Learning Progress Summary            Patient Eager, E,H, VU,DU,NR by SS at 1/30/2025 1022    Comment: Reviewed safety/technique w/bed mobility, transfers, ambulation, stair navigation, HEP, PT POC, car transfers, spouse assist, use of gait belt, knee precautions, no pivoting on LLE    Eager, E,H, VU,DU,NR by  at 1/29/2025 1523    Comment: Reviewed safety/technique w/transfers, ambulation, HEP, PT POC    DOUG Lafleur H, VU, DU,NR by  at 1/29/2025 1043    Comment: Reviewed safety/technique w/bed mobility, transfers,  ambulation, HEP, PT POC    Acceptance, E,D, VU,NR by  at 1/28/2025 1450    Acceptance, E,TB,H, VU,DU by AP at 1/28/2025 1053    Acceptance, E,H, VU,DU by AP at 1/27/2025 1832   Family Eager, E,H, VU,DU,NR by  at 1/29/2025 1523    Comment: Reviewed safety/technique w/transfers, ambulation, HEP, PT POC   Significant Other Eager, E,H, VU,DU,NR by SS at 1/30/2025 1022    Comment: Reviewed safety/technique w/bed mobility, transfers, ambulation, stair navigation, HEP, PT POC, car transfers, spouse assist, use of gait belt, knee precautions, no pivoting on LLE    Acceptance, E,TB,H, VU,DU by AP at 1/28/2025 1053    Acceptance, E,H, VU,DU by AP at 1/27/2025 1832                      Point: Precautions (Done)       Learning Progress Summary            Patient Eager, E,H, VU,DU,NR by  at 1/30/2025 1022    Comment: Reviewed safety/technique w/bed mobility, transfers, ambulation, stair navigation, HEP, PT POC, car transfers, spouse assist, use of gait belt, knee precautions, no pivoting on LLE    Eager, E,H, VU,DU,NR by  at 1/29/2025 1523    Comment: Reviewed safety/technique w/transfers, ambulation, HEP, PT POC    Eager, E,H, VU,DU,NR by  at 1/29/2025 1043    Comment: Reviewed safety/technique w/bed mobility, transfers, ambulation, HEP, PT POC    Acceptance, E,D, VU,NR by  at 1/28/2025 1450    Acceptance, E,TB,H, VU,DU by AP at 1/28/2025 1053    Acceptance, E,H, VU,DU by AP at 1/27/2025 1832   Family Eager, E,H, VU,DU,NR by  at 1/29/2025 1523    Comment: Reviewed safety/technique w/transfers, ambulation, HEP, PT POC   Significant Other Eager, E,H, VU,DU,NR by  at 1/30/2025 1022    Comment: Reviewed safety/technique w/bed mobility, transfers, ambulation, stair navigation, HEP, PT POC, car transfers, spouse assist, use of gait belt, knee precautions, no pivoting on LLE    Acceptance, E,TB,H, VU,DU by AP at 1/28/2025 1053    Acceptance, E,H, VU,DU by AP at 1/27/2025 1832                                      User  Key       Initials Effective Dates Name Provider Type Discipline     12/15/23 -  Mary Lou Rojo, PT Physical Therapist PT    SS 06/01/21 -  Felicia Moran PT Physical Therapist PT    AP 01/09/25 -  Silva Kelly, PT Student PT Student PT                  PT Recommendation and Plan     Progress: improving  Outcome Evaluation: Pt. continues to present below baseline function w/decreased strength and AROM of L knee and acute pain affecting her ability to safely participate in functional mobility. She performed transfers, ambulated 60' and navigated 3 steps w/contact guard assist of 2. Activity limited by pain and fatigue. Pt. tolerated ther-ex well. Pt./spouse educated for assist to be provided, car transfers and knee precautions. Continue acute PT POC to progress as able.     Time Calculation:         PT Charges       Row Name 01/30/25 1023             Time Calculation    Start Time 0917  -SS      PT Received On 01/30/25  -SS         Timed Charges    74087 - PT Therapeutic Exercise Minutes 10  -SS      32635 - Gait Training Minutes  18  -SS      86966 - PT Therapeutic Activity Minutes 10  -SS         Total Minutes    Timed Charges Total Minutes 38  -SS       Total Minutes 38  -SS                User Key  (r) = Recorded By, (t) = Taken By, (c) = Cosigned By      Initials Name Provider Type    SS Felicia Moran, PT Physical Therapist                  Therapy Charges for Today       Code Description Service Date Service Provider Modifiers Qty    92733274262 HC PT THER PROC EA 15 MIN 1/29/2025 Felicia Moran, PT GP 1    59032297933 HC GAIT TRAINING EA 15 MIN 1/29/2025 Felicia Moran, PT GP 1    04341611895 HC PT THER SUPP EA 15 MIN 1/29/2025 Felicia Moran, PT GP 2    06538331003 HC PT THER PROC EA 15 MIN 1/29/2025 Felicia Moran, PT GP 1    60026944871 HC GAIT TRAINING EA 15 MIN 1/29/2025 Felicia Moran, PT GP 1    42727824476 HC PT THER SUPP EA 15 MIN 1/29/2025 Felicia Moran, PT GP 2    17928978340 HC PT THER PROC  EA 15 MIN 1/30/2025 Felicia Moran, PT GP 1    66518459427 HC GAIT TRAINING EA 15 MIN 1/30/2025 Felicia Moran, PT GP 1    54180804616 HC PT THERAPEUTIC ACT EA 15 MIN 1/30/2025 Felicia Moran, PT GP 1    06527941474 HC PT THER SUPP EA 15 MIN 1/30/2025 Felicia Moran, PT GP 2            PT G-Codes  Outcome Measure Options: AM-PAC 6 Clicks Basic Mobility (PT)  AM-PAC 6 Clicks Score (PT): 18  AM-PAC 6 Clicks Score (OT): 18  PT Discharge Summary  Anticipated Discharge Disposition (PT): home with outpatient therapy services, home with assist    Felicia Moran, PT  1/30/2025

## 2025-01-30 NOTE — PLAN OF CARE
Problem: Adult Inpatient Plan of Care  Goal: Plan of Care Review  Outcome: Adequate for Care Transition  Goal: Patient-Specific Goal (Individualized)  Outcome: Adequate for Care Transition  Goal: Absence of Hospital-Acquired Illness or Injury  Outcome: Adequate for Care Transition  Intervention: Identify and Manage Fall Risk  Recent Flowsheet Documentation  Taken 1/30/2025 1209 by Marta Moore RN  Safety Promotion/Fall Prevention:   activity supervised   clutter free environment maintained   assistive device/personal items within reach   elopement precautions   gait belt   fall prevention program maintained   lighting adjusted   mobility aid in reach   muscle strengthening facilitated   nonskid shoes/slippers when out of bed   room organization consistent   safety round/check completed   toileting scheduled  Taken 1/30/2025 1014 by Marta Moore RN  Safety Promotion/Fall Prevention:   activity supervised   assistive device/personal items within reach   clutter free environment maintained   elopement precautions   fall prevention program maintained   gait belt   lighting adjusted   mobility aid in reach   muscle strengthening facilitated   nonskid shoes/slippers when out of bed   room organization consistent   safety round/check completed   toileting scheduled  Taken 1/30/2025 0845 by Marta Moore RN  Safety Promotion/Fall Prevention:   activity supervised   assistive device/personal items within reach   clutter free environment maintained   elopement precautions   fall prevention program maintained   gait belt   lighting adjusted   mobility aid in reach   muscle strengthening facilitated   nonskid shoes/slippers when out of bed   room organization consistent   safety round/check completed   toileting scheduled  Intervention: Prevent Skin Injury  Recent Flowsheet Documentation  Taken 1/30/2025 1209 by Marta Moore RN  Body Position: position maintained  Skin Protection: incontinence pads utilized  Taken  1/30/2025 1014 by Marta Moore RN  Body Position: position changed independently  Skin Protection: incontinence pads utilized  Taken 1/30/2025 0845 by Marta Moore RN  Body Position: position changed independently  Skin Protection: incontinence pads utilized  Intervention: Prevent and Manage VTE (Venous Thromboembolism) Risk  Recent Flowsheet Documentation  Taken 1/30/2025 1209 by Marta Moore RN  VTE Prevention/Management:   right   SCDs (sequential compression devices) on  Taken 1/30/2025 1014 by Marta Moore RN  VTE Prevention/Management:   right   SCDs (sequential compression devices) on  Taken 1/30/2025 0845 by Marta Moore RN  VTE Prevention/Management:   right   SCDs (sequential compression devices) on  Intervention: Prevent Infection  Recent Flowsheet Documentation  Taken 1/30/2025 1209 by Marta Moore RN  Infection Prevention:   environmental surveillance performed   equipment surfaces disinfected   hand hygiene promoted   personal protective equipment utilized   rest/sleep promoted   single patient room provided  Taken 1/30/2025 1014 by Marta Moore RN  Infection Prevention:   environmental surveillance performed   equipment surfaces disinfected   hand hygiene promoted   personal protective equipment utilized   rest/sleep promoted   single patient room provided  Taken 1/30/2025 0845 by Marta Moore RN  Infection Prevention:   environmental surveillance performed   hand hygiene promoted   equipment surfaces disinfected   personal protective equipment utilized   rest/sleep promoted   single patient room provided  Goal: Optimal Comfort and Wellbeing  Outcome: Adequate for Care Transition  Intervention: Provide Person-Centered Care  Recent Flowsheet Documentation  Taken 1/30/2025 0845 by Marta Moore RN  Trust Relationship/Rapport:   care explained   choices provided   emotional support provided   empathic listening provided   questions answered   questions encouraged   reassurance  provided   thoughts/feelings acknowledged  Goal: Readiness for Transition of Care  Outcome: Adequate for Care Transition     Problem: Comorbidity Management  Goal: Blood Pressure in Desired Range  Outcome: Adequate for Care Transition  Intervention: Maintain Blood Pressure Management  Recent Flowsheet Documentation  Taken 1/30/2025 1209 by Marta Moore RN  Medication Review/Management: medications reviewed  Taken 1/30/2025 1014 by Marta Moore RN  Medication Review/Management: medications reviewed  Taken 1/30/2025 0845 by Marta Moore RN  Medication Review/Management: medications reviewed  Goal: Maintenance of Osteoarthritis Symptom Control  Outcome: Adequate for Care Transition  Intervention: Maintain Osteoarthritis Symptom Control  Recent Flowsheet Documentation  Taken 1/30/2025 1209 by Marta Moore RN  Medication Review/Management: medications reviewed  Taken 1/30/2025 1014 by Marta Moore RN  Medication Review/Management: medications reviewed  Taken 1/30/2025 0845 by Marta Moore RN  Medication Review/Management: medications reviewed     Problem: Fall Injury Risk  Goal: Absence of Fall and Fall-Related Injury  Outcome: Adequate for Care Transition  Intervention: Identify and Manage Contributors  Recent Flowsheet Documentation  Taken 1/30/2025 1209 by Marta Moore RN  Medication Review/Management: medications reviewed  Taken 1/30/2025 1014 by Marta Moore RN  Medication Review/Management: medications reviewed  Taken 1/30/2025 0845 by Marta Moore RN  Medication Review/Management: medications reviewed  Intervention: Promote Injury-Free Environment  Recent Flowsheet Documentation  Taken 1/30/2025 1209 by Marta Moore RN  Safety Promotion/Fall Prevention:   activity supervised   clutter free environment maintained   assistive device/personal items within reach   elopement precautions   gait belt   fall prevention program maintained   lighting adjusted   mobility aid in reach   muscle  strengthening facilitated   nonskid shoes/slippers when out of bed   room organization consistent   safety round/check completed   toileting scheduled  Taken 1/30/2025 1014 by Marta Moore RN  Safety Promotion/Fall Prevention:   activity supervised   assistive device/personal items within reach   clutter free environment maintained   elopement precautions   fall prevention program maintained   gait belt   lighting adjusted   mobility aid in reach   muscle strengthening facilitated   nonskid shoes/slippers when out of bed   room organization consistent   safety round/check completed   toileting scheduled  Taken 1/30/2025 0845 by Marta Moore, RN  Safety Promotion/Fall Prevention:   activity supervised   assistive device/personal items within reach   clutter free environment maintained   elopement precautions   fall prevention program maintained   gait belt   lighting adjusted   mobility aid in reach   muscle strengthening facilitated   nonskid shoes/slippers when out of bed   room organization consistent   safety round/check completed   toileting scheduled     Problem: Knee Arthroplasty  Goal: Optimal Coping  Outcome: Adequate for Care Transition  Goal: Absence of Bleeding  Outcome: Adequate for Care Transition  Goal: Effective Bowel Elimination  Outcome: Adequate for Care Transition  Goal: Fluid and Electrolyte Balance  Outcome: Adequate for Care Transition  Goal: Optimal Functional Ability  Outcome: Adequate for Care Transition  Goal: Absence of Infection Signs and Symptoms  Outcome: Adequate for Care Transition  Goal: Intact Neurovascular Status  Outcome: Adequate for Care Transition  Goal: Optimal Pain Control and Function  Outcome: Adequate for Care Transition  Intervention: Prevent or Manage Pain  Recent Flowsheet Documentation  Taken 1/30/2025 0845 by Marta Moore RN  Diversional Activities:   smartphone   television   Goal Outcome Evaluation:

## 2025-01-30 NOTE — PROGRESS NOTES
[facility]    Acute pain service Inpatient Progress Note    Patient Name: Sarah Howard  :  1955  MRN:  [id number]        Acute Pain  Service Inpatient Progress Note:    Analgesia:Good  Pain Score:4/10  LOC: alert and awake  Resp Status: room air  Cardiac: VS stable  Side Effects:None  Catheter Site:clean, dressing intact and dry  Cath type: peripheral nerve cath(InfuSystem)  Volume: 1mL,8ml, 8ml InfuSystem Pump.  Catheter Plan:Catheter to remain Insitu and Continue catheter infusion rate unchanged  Comments:

## 2025-01-30 NOTE — DISCHARGE SUMMARY
Patient Name: Sarah Howard  MRN: [id number]  : 1955  DOS: 2025    Attending: Praful Baker MD    Primary Care Provider: Dao Gomez MD    Date of Admission:.2025  7:39 AM    Date of Discharge:  2025    Discharge Diagnosis:   S/P TKR (total knee replacement), left    Mild depression    GERD without esophagitis    Primary hypertension    Arthritis of knee    Hyperlipidemia      Hospital Course    At admit:  Patient is a pleasant 69 y.o. female presented for scheduled surgery by Dr. Baker.     Per his note: (The patient has a long history of progressive knee pain, arthritis, and degeneration resulting in deformity in the left knee from predominantly medial wear and bone loss. Non-operative treatment and conservative therapeutic measures have been attempted, but have not improved or controlled the symptoms and pain that occurs during normal daily activities. Knee motion has also become limited and is restricting the patient. Total knee arthroplasty was recommended. )     Patient underwent left total knee arthroplasty under spinal anesthesia, tolerated surgery well.  Adductor canal nerve block catheter was placed by acute pain service, and patient was admitted for further management.     Seen postoperatively, doing well with good pain control, no complains of nausea, vomiting, or shortness of breath.  Patient has no history of DVT or PE.     Reviewed with patient past medical history and home medications     After admit:    Patient was provided pain medications as needed for pain control, along with adductor canal nerve block infusion of Ropivacaine.      Adjustments were made to pain medications to optimize postop pain management. Risks and benefits of opiate medications discussed with patient. UMAIR report was reviewed.    She was seen by PT   and has progressed well over her stay.    Patient used an IS for atelectasis prophylaxis and aspirin along with mechanicals for DVT  prophylaxis.    Home medications were resumed as appropriate, and labs were monitored and remained fairly stable.     With the progress she has made, Ms. Howard is ready for DC home today.      She will have an Infupump ( instructed on it during this admit).    Discussed with patient regarding plan and she shows understanding and agreement.      Patient will have PT following discharge.        Procedures Performed  Procedure(s):  TOTAL KNEE ARTHROPLASTY WITH EMELINA ROBOT LEFT       Pertinent Test Results:    I reviewed the patient's new clinical results.   Results from last 7 days   Lab Units 01/29/25  0608 01/28/25  0633   WBC 10*3/mm3 10.70  --    HEMOGLOBIN g/dL 10.8* 11.6*   HEMATOCRIT % 34.3 36.6   PLATELETS 10*3/mm3 166  --      Results from last 7 days   Lab Units 01/29/25  0608 01/28/25  0633   SODIUM mmol/L 141 140   POTASSIUM mmol/L 4.4 4.2   CHLORIDE mmol/L 108* 107   CO2 mmol/L 23.0 23.0   BUN mg/dL 19 16   CREATININE mg/dL 0.89 0.99   CALCIUM mg/dL 8.5* 8.8   GLUCOSE mg/dL 130* 183*     I reviewed the patient's new imaging including images and reports.      Physical therapy  Felicia Moran, PT   Physical Therapist  Physical Therapy     Plan of Care     Signed     Date of Service: 01/30/25 0917  Creation Time: 01/30/25 1023     Signed         Goal Outcome Evaluation:  Plan of Care Reviewed With: patient, spouse  Progress: improving  Outcome Evaluation: Pt. continues to present below baseline function w/decreased strength and AROM of L knee and acute pain affecting her ability to safely participate in functional mobility. She performed transfers, ambulated 60' and navigated 3 steps w/contact guard assist of 2. Activity limited by pain and fatigue. Pt. tolerated ther-ex well. Pt./spouse educated for assist to be provided, car transfers and knee precautions. Continue acute PT POC to progress as able.     Anticipated Discharge Disposition (PT): home with outpatient therapy services, home with assist                  Discharge Assessment:       Visit Vitals  /57   Pulse 68   Temp 97.7 °F (36.5 °C) (Axillary)   Resp 16   SpO2 92%     Temp (24hrs), Av.6 °F (36.4 °C), Min:97.5 °F (36.4 °C), Max:97.7 °F (36.5 °C)      General Appearance:    Alert, cooperative, in no acute distress   Lungs:     Clear to auscultation,respirations regular, even and                   unlabored    Heart:    Regular rhythm and normal rate, normal S1 and S2   Abdomen:     Normal bowel sounds, no masses, no organomegaly, soft        non-tender, non-distended, no guarding, no rebound                 tenderness   Extremities:   CDI dressing surgical knee . ACB cath present, infupump.   Pulses:   Pulses palpable and equal bilaterally   Skin:   No bleeding, bruising or rash   Neurologic:   Cranial nerves 2 - 12 grossly intact, sensation intact, Flexion and dorsiflexion intact bilateral feet.         Discharge Disposition: Home        Discharge Medications        New Medications        Instructions Start Date   acetaminophen 500 MG tablet  Commonly known as: TYLENOL   Take 2 tablets by mouth EVERY 8 (Eight) Hours 7 days. THEN take only AS NEEDED thereafter      Aspirin Low Dose 81 MG EC tablet  Generic drug: aspirin   81 mg, Oral, 2 Times Daily      meloxicam 15 MG tablet  Commonly known as: MOBIC   15 mg, Oral, Daily      oxyCODONE 5 MG immediate release tablet  Commonly known as: Roxicodone   5 mg, Oral, Every 4 Hours PRN      ropivacaine 0.2 % infusion (INFUSYSTEM)  Commonly known as: NAROPIN   2 mL/hr (4 mg/hr), Peripheral Nerve, Continuous      Stool Softener 100 MG capsule  Generic drug: docusate sodium   100 mg, Oral, 2 Times Daily             Continue These Medications        Instructions Start Date   atorvastatin 20 MG tablet  Commonly known as: LIPITOR   20 mg, Oral, Daily      desvenlafaxine 100 MG 24 hr tablet  Commonly known as: Pristiq   100 mg, Oral, Daily      escitalopram 20 MG tablet  Commonly known as: LEXAPRO   20 mg, Oral,  Daily      fluticasone 50 MCG/ACT nasal spray  Commonly known as: FLONASE   2 sprays, Nasal, Daily      hydrocortisone 1 % cream   1 Application, Topical, 2 Times Daily      latanoprost 0.005 % ophthalmic solution  Commonly known as: XALATAN   No dose, route, or frequency recorded.      metoprolol succinate XL 25 MG 24 hr tablet  Commonly known as: Toprol XL   25 mg, Oral, Daily      olmesartan 20 MG tablet  Commonly known as: BENICAR   20 mg, Oral, Daily      pantoprazole 40 MG EC tablet  Commonly known as: PROTONIX   40 mg, Oral, Daily      traZODone 150 MG tablet  Commonly known as: DESYREL   150 mg, Oral, Daily             Stop These Medications      Chlorhexidine Gluconate 4 % solution                Diet Instructions     - Regular diet            Activity at Discharge:   Activity Instructions     - Weight bearing as tolerated    - Do not place pillow under knee, only under calf. Knee is to be fully extended and elevated while sitting.    - Please use a walker, instructions provided further back in this packet.               Future Appointments   Date Time Provider Department Center   2/11/2025  1:00 PM Mayuri Covington APRN [facility]   2/18/2025  2:30 PM Soila Patino PA-C [facility]   3/10/2025  2:30 PM Chanelle Goodson LCSW [facility]   5/5/2025 10:00 AM Dao Gomez MD [facility]         Dragon disclaimer:  Part of this encounter note is an electronic transcription/translation of spoken language to printed text. The electronic translation of spoken language may permit erroneous, or at times, nonsensical words or phrases to be inadvertently transcribed; Although I have reviewed the note for such errors, some may still exist.       Liz Smyth MD  01/30/25  12:53 EST

## 2025-01-30 NOTE — CASE MANAGEMENT/SOCIAL WORK
Continued Stay Note  [facility]     Patient Name: Sarah Howard  MRN: [id number]  Today's Date: 1/30/2025    Admit Date: 1/27/2025    Plan: Home with 's asssistance, and [facility] PT2[facility]   Discharge Plan       Row Name 01/30/25 1606       Plan    Plan Home with 's asssistance, and [facility] PT2[facility]    Patient/Family in Agreement with Plan yes    Plan Comments Ms. Howard requested home PT visits.  Contacted Asya at Dr. Baker's office and she will arrange for Ms. Howard to be seen in the home by PT2U.    Final Discharge Disposition Code 01 - home or self-care      Row Name 01/30/25 2226       Plan    Plan Home with 's assistance and [facility] Outpatient PT    Patient/Family in Agreement with Plan yes    Plan Comments Followed up with Ms. Howard, by phone, for discharge planning.  The patient was a pre referral to [facility] Outpatient PT at [facility].  She now requests PT at home.  Left messages for Prabhjot at Dr. Baker's office to request PT2U be arranged by their office.  CM will follow up.    Final Discharge Disposition Code 01 - home or self-care                      Expected Discharge Date and Time       Expected Discharge Date Expected Discharge Time    Jan 30, 2025               Vanessa Boo RN

## 2025-01-30 NOTE — CASE MANAGEMENT/SOCIAL WORK
Discharge Planning Assessment  [facility]     Patient Name: Sarah Howard  MRN: [id number]  Today's Date: 1/30/2025    Admit Date: 1/27/2025    Plan: Home with 's assistance and [facility] PT at home       Discharge Plan       Row Name 01/30/25 1336       Plan    Plan Home with 's assistance and [facility] PT at home    Patient/Family in Agreement with Plan yes    Plan Comments Followed up with Ms. Howard, by phone, for discharge planning.    The patient was a pre referral to [facility] Outpatient PT at [facility].  She now requests PT at home.  Left messages for Prabhjot at Dr. Baker's office to request PT2U be arranged by their office.    CM will follow up.    Final Discharge Disposition Code 01 - home or self-care                  Continued Care and Services - Admitted Since 1/27/2025       Durable Medical Equipment       Service Provider Request Status Services Address Phone Fax Patient Preferred    [facility]  Selected Durable Medical Equipment [address] [facility] [address] [id number] [phone] --                  Expected Discharge Date and Time       Expected Discharge Date Expected Discharge Time    Jan 30, 2025             Vanessa Boo, RN

## 2025-01-30 NOTE — PLAN OF CARE
Goal Outcome Evaluation:  Plan of Care Reviewed With: patient, spouse        Progress: improving  Outcome Evaluation: Pt. continues to present below baseline function w/decreased strength and AROM of L knee and acute pain affecting her ability to safely participate in functional mobility. She performed transfers, ambulated 60' and navigated 3 steps w/contact guard assist of 2. Activity limited by pain and fatigue. Pt. tolerated ther-ex well. Pt./spouse educated for assist to be provided, car transfers and knee precautions. Continue acute PT POC to progress as able.    Anticipated Discharge Disposition (PT): home with outpatient therapy services, home with assist

## 2025-01-30 NOTE — PLAN OF CARE
Goal Outcome Evaluation:            Patient ambulates to the bathroom with gait belt, walker and assist x1. C/o pain treated with prn medications. VSS on RA. A&Ox4. Neurovascular checks WNL.

## 2025-01-30 NOTE — DISCHARGE INSTR - ACTIVITY
- Weight bearing as tolerated    - Do not place pillow under knee, only under calf. Knee is to be fully extended and elevated while sitting.    - Please use a walker, instructions provided further back in this packet.

## 2025-01-31 ENCOUNTER — TREATMENT (OUTPATIENT)
Age: 70
End: 2025-01-31
Payer: MEDICARE

## 2025-01-31 DIAGNOSIS — Z47.89 ORTHOPEDIC AFTERCARE: ICD-10-CM

## 2025-01-31 DIAGNOSIS — M17.12 OSTEOARTHRITIS OF LEFT KNEE, UNSPECIFIED OSTEOARTHRITIS TYPE: Primary | ICD-10-CM

## 2025-01-31 DIAGNOSIS — Z96.652 STATUS POST TOTAL KNEE REPLACEMENT, LEFT: ICD-10-CM

## 2025-02-01 NOTE — PROGRESS NOTES
Physical Therapy Initial Evaluation and Plan of Care  PT2U In-Home Evaluation        Patient gives consent to treatment to be performed in their home. Will be transitioned to outpatient facility as appropriate and as functional mobility allows.     Patient: Sarah Howard   : 1955  Diagnosis/ICD-10 Code:  Osteoarthritis of left knee, unspecified osteoarthritis type [M17.12]  Referring practitioner: Soila Patino, *  Date of Initial Visit: 2025  Today's Date: 2025  Patient seen for 1 session         Visit Diagnoses:    ICD-10-CM ICD-9-CM   1. Osteoarthritis of left knee, unspecified osteoarthritis type  M17.12 715.96   2. Status post total knee replacement, left  Z96.652 V43.65   3. Orthopedic aftercare  Z47.89 V54.9       Patient Active Problem List    Diagnosis Date Noted    Arthritis of knee 2025    Hyperlipidemia 2025    S/P TKR (total knee replacement), left 2025    Age-related nuclear cataract of both eyes 2024    Class 1 obesity due to excess calories without serious comorbidity with body mass index (BMI) of 33.0 to 33.9 in adult 2023    Chronic cough 2023     Note Last Updated: 2023     Present since . Treated with PPI, failed albuterol trial, treated with zpack. Optimized allergies with xyzal and flonase 2023.  - CXR wnl on 23  - Saw ENT (Dr. House) CT sinus normal, negative IV AT allergy testing, Mycoplasma Ab pending. Recommended changing to atrovent nasal spray  - felt symptoms worsened after stopping protonix by accident      Primary hypertension 2023     Note Last Updated: 2023     Lisinopril stopped 23 due to cough      Alcohol use disorder 2023    History of breast cancer 2023    Glaucoma 2023    Mild depression     Coronary artery disease involving native coronary artery 10/2020    GERD without esophagitis 2016    Osteopenia 2009     Past Medical History:   Diagnosis Date     Anxiety     Breast cancer     3/2010, right breast    Cancer Breast    2010    Cervical disc disorder ACDF 5/2020    Coronary artery disease 10/2020    Depression     Drug therapy     2010    Fracture, foot     bilateral    GERD (gastroesophageal reflux disease) 2016    Glaucoma 2016    Hard to intubate     Hx of radiation therapy     2010    Hyperlipidemia 2022    Hypertension 2020    Knee swelling     Osteopenia 2009    Tear of meniscus of knee     left     Past Surgical History:   Procedure Laterality Date    ADENOIDECTOMY  1972    BACK SURGERY  2018    BREAST BIOPSY Right     3/2010    BREAST LUMPECTOMY Right     x2, 2010    BREAST SURGERY  2 x 2010    CARDIAC CATHETERIZATION  2020    COLONOSCOPY  2018    COSMETIC SURGERY  breast reduction    2010    EYE SURGERY  laser iridotomies    2019    HYSTERECTOMY  2011    NECK SURGERY      OOPHORECTOMY      REDUCTION MAMMAPLASTY Bilateral     2010 at time of lumpectomy    SPINE SURGERY  2019    TONSILLECTOMY  1972    TOTAL KNEE ARTHROPLASTY Left 1/27/2025    Procedure: TOTAL KNEE ARTHROPLASTY WITH EMELINA ROBOT LEFT;  Surgeon: Praful Baker MD;  Location: [facility];  Service: Robotics - Ortho;  Laterality: Left;    TUBAL ABDOMINAL LIGATION  1982       Subjective Questionnaire: LEFS: 8 out of 80      Subjective Evaluation    History of Present Illness  Date of surgery: 1/27/2025  Mechanism of injury: History of chronic left knee pain with previous meniscus injury without surgery      Does not need to climb steps while in the house  Enters and exits the house through the front door for several wide steps that she can use her walker around, the last 2 steps are normal steps that require her to have assistance from her spouse and use a cane but she is not having any trouble with this    Patient is icing on a regular basis    Subjective comment: Patient stated couple extra days in the hospital due to uncontrolled pain; got home last night and had no difficulty getting in  the house up 6 or 7 steps to get from the driveway using a cane; using rolling walker for ambulation in the house without trouble; slept in recliner last night  Patient Occupation: Retired Quality of life: good    Pain  Current pain rating: 3  At worst pain rating: 10  Quality: discomfort, dull ache and tight  Relieving factors: ice, medications and rest  Aggravating factors: prolonged positioning, sleeping, standing, movement, stairs and ambulation  Progression: no change    Social Support  Lives in: multiple-level home  Lives with: spouse    Patient Goals  Patient goals for therapy: decreased pain, decreased edema, increased motion, improved balance, increased strength, independence with ADLs/IADLs and return to sport/leisure activities             Objective          Observations   Left Knee   Positive for edema. Negative for drainage.     Additional Knee Observation Details  Patient is incision covered about the normal Tegaderm, which is allowed to be removed 7 days per Dr. Baker's protocol    Ace wrap and surgical wrap removed to inspect skin around incision and allow for greater mobility with knee flexion  Skin is clean and dry, no signs or symptoms of infection    Active Range of Motion   Left Knee   Flexion: 45 degrees   Extension: 0 degrees     Passive Range of Motion   Left Knee   Flexion: 75 degrees   Extension: 0 degrees     Additional Passive Range of Motion Details  Patient form seated heel slides in chair with minimal discomfort and fair plus range of motion    Strength/Myotome Testing     Left Knee   Flexion: 3-  Extension: 3-  Quadriceps contraction: fair    Right Knee   Normal strength    Additional Strength Details  Fair quad set, limited by pain    Tests     Additional Tests Details  Negative Homans     General Comments     Knee Comments  Gait training with patient and spouse out the front door and down her steps towards the driveway, using cane and/or walker along with gait belt for increased  safety; patient and spouse voiced confidence getting to the car for therapy         Assessment & Plan       Assessment  Impairments: abnormal gait, abnormal muscle firing, abnormal muscle tone, abnormal or restricted ROM, activity intolerance, impaired balance, impaired physical strength, lacks appropriate home exercise program, pain with function and weight-bearing intolerance   Functional limitations: carrying objects, lifting, sleeping, walking, pulling, pushing, uncomfortable because of pain, moving in bed, sitting, standing and stooping   Assessment details: Patient presents to Physical Therapy s/p TKA; they display decreased mobility, strength, balance and gait, as expected, secondary to recent surgery; they are an excellent candidate for PT to improve functional mobility and strength in order return to full ADL's and/or work duties  Barriers to therapy: co-morbidities  Prognosis: fair    Goals  Plan Goals: 4 weeks:    1. Patient to display independence in HEP for knee mobility and strength    2. Patient to display knee ROM 0-110 degrees for mobility for ADL's and gait    3. Patient to ambulate on level surfaces with LRAD without antalgia for functional distances to attend medical appointments and social activities     4. Patient to display good quad tone with SLR without extensor lag x 10 repetitions     12 weeks:    1. Patient to display 1 MCID improvement on LEFS score    2. Patient to ambulate functional distances on even/uneven surfaces with good quad control for ADL's     3. Patient to display > 4/5 MMT strength bilateral LE    4. Patient to display knee ROM 0-130 for good functional mobility with ADL's    Plan  Therapy options: will be seen for skilled therapy services  Planned modality interventions: TENS, thermotherapy (hydrocollator packs), ultrasound, high voltage pulsed current (pain management), cryotherapy, dry needling, iontophoresis and electrical stimulation/Russian stimulation  Planned  therapy interventions: manual therapy, neuromuscular re-education, postural training, soft tissue mobilization, spinal/joint mobilization, strengthening, stretching, therapeutic activities, joint mobilization, home exercise program, gait training, functional ROM exercises, flexibility, balance/weight-bearing training and body mechanics training  Frequency: 3x week  Duration in weeks: 12  Treatment plan discussed with: patient and caregiver  Plan details: Will see patient for 1-3 visits per week, for up to 12 weeks as needed to accomplish goals    Manager in  and assisted patient with scheduling in the clinic for next visit    Encourage patient to focus on extension and flexion mobility as well as quad activation over the weekend; her range of motion is off to a good start and her pain is much better controlled; she has minimal edema and is ambulating safely through the house into the driveway        Timed:         Manual Therapy:         mins  39646;     Therapeutic Exercise:    15     mins  84876;     Neuromuscular Trista:        mins  38512;    Therapeutic Activity:     15     mins  99950;     Gait Training:      15     mins  48939;     Ultrasound:          mins  52079;    Ionto                                   mins   22051  Self Care                       10     mins   32874  Canalith Repos         mins 05960      Un-Timed:  Electrical Stimulation:         mins  77646 ( );  Dry Needling          mins self-pay  Traction          mins 92051  Low Eval     20     Mins  00183  Mod Eval          Mins  89082  High Eval                            Mins  78647        Timed Treatment:   55   mins   Total Treatment:     75   mins          PT: NICK Mcdonnell PT     License Number: [id number]  Electronically signed by NICK Mcdonnell, PT, 01/31/25, 8:28 PM EST    Certification Period: 1/31/2025 thru 4/30/2025  I certify that the therapy services are furnished while this patient is under my care.  The  services outlined above are required by this patient, and will be reviewed every 90 days.         Physician Signature:__________________________________________________    PHYSICIAN: Soila Patino PA-C  NPI: [id number]                                      DATE:      Please sign and return via fax to [email or web address] . Thank you, [facility].

## 2025-02-03 ENCOUNTER — TREATMENT (OUTPATIENT)
Dept: PHYSICAL THERAPY | Facility: CLINIC | Age: 70
End: 2025-02-03
Payer: MEDICARE

## 2025-02-03 DIAGNOSIS — M17.12 OSTEOARTHRITIS OF LEFT KNEE, UNSPECIFIED OSTEOARTHRITIS TYPE: Primary | ICD-10-CM

## 2025-02-03 DIAGNOSIS — Z96.652 STATUS POST TOTAL KNEE REPLACEMENT, LEFT: ICD-10-CM

## 2025-02-03 NOTE — PROGRESS NOTES
Physical Therapy Daily Treatment Note  [facility] PHYSICAL THERAPY [place]  [address], [address]  [place] [place] [place] [facility] [place] [facility] [address]      Patient: Sarah Howard   : 1955  Diagnosis/ICD-10 Code:  Osteoarthritis of left knee, unspecified osteoarthritis type [M17.12]  Referring practitioner: Soila Patino, *  Date of Initial Visit: Type: THERAPY  Noted: 2025  Today's Date: 2/3/2025  Patient seen for 1 sessions         Visit Diagnoses:    ICD-10-CM ICD-9-CM   1. Osteoarthritis of left knee, unspecified osteoarthritis type  M17.12 715.96   2. Status post total knee replacement, left  Z96.652 V43.65       Subjective Evaluation    Pain  Current pain ratin  At worst pain ratin  Location: Feels very stiff when walkiing.  Knee is very stiff.           Objective          Observations   Left Knee   Positive for edema and incision (Healing well).       Active Range of Motion   Left Knee   Flexion: 62 degrees   Extensor lag: 3 degrees     Passive Range of Motion   Left Knee   Flexion: 83 degrees     Patellar Mobility   Left Knee Hypomobile in the left medial, left lateral, left superior and left inferior patellar tendon(s).     Ambulation   Weight-Bearing Status   Weight-Bearing Status (Left): weight-bearing as tolerated   Assistive device used: front-wheeled walker    Observational Gait   Gait: antalgic and circumduction         See Exercise, Manual, and Modality Logs for complete treatment.       Assessment/Plan  Approximately 1 week status post left knee arthroplasty.  Had postsurgical complications.  Knee flexion range of motion increased to 83 degrees today.  Only minimal extension lag.  Still early in the rehab, overall progressing well.  Progress per Plan of Care and Progress strengthening /stabilization /functional activity           Timed:         Manual Therapy:    13     mins  81484;     Therapeutic Exercise:    25     mins  01083;     Neuromuscular Trista:        mins  92569;    Therapeutic  Activity:     18     mins  95903;     Gait Training:           mins  35463;     Ultrasound:          mins  13353;    Ionto                                   mins   93135  Self Care                            mins   33019  Canalith Repos         mins 31732      Un-Timed:  Electrical Stimulation:         mins  31265 ( );  Dry Needling          mins self-pay  Traction          mins 73216      Timed Treatment:   61   mins   Total Treatment:     61   mins    Osmany Ko, PT  [place] License: [id number]

## 2025-02-04 DIAGNOSIS — Z96.652 S/P TKR (TOTAL KNEE REPLACEMENT), LEFT: ICD-10-CM

## 2025-02-04 RX ORDER — OXYCODONE HYDROCHLORIDE 5 MG/1
5 TABLET ORAL EVERY 4 HOURS PRN
Qty: 30 TABLET | Refills: 0 | Status: SHIPPED | OUTPATIENT
Start: 2025-02-04

## 2025-02-04 NOTE — TELEPHONE ENCOUNTER
Dr. Bakre-please advise oxycodone refill for pt; She is 8 days s/p TKA. Pharmacy verified and correct in system. Thanks!      Called pt in regards to a voicemail received by a  from [facility] ([place]) from 1/30/25; She stated that the pt was set up with post-op PT at [facility] but the patient was interested in Home Health PT.    I asked the patient how she was doing with the outpatient PT and she reports that it is going well. I explained that Dr. Baker prefers his patients to do outpatient PT vs Home Health so as long as things were going well, I would continue with the outpatient PT; She was agreeable to this.    Also wanted to see how patient was doing otherwise 8 days s/p TKA; She reports that she is having quite a bit of pain and swelling; She reports she has been icing, taking oxycodone about every 5 hours, and taking 1,000mg of Tylenol BID. She admits that she has probably not been elevating higher than the heart so I strongly encouraged her to focus on that, as well as, icing to help decrease her swelling and pain. I also told her she could add a third dose of 1,000mg Tylenol if she feels that she needs it. She does report that she is down to 8 tablets of the oxycodone which means she will be out tomorrow and is requesting a refill be sent to [facility].    I also asked if she had removed her dressing as of yesterday but she had not; I instructed her to remove it today, assess the incision, and if no drainage-leave open to the air over the next 3 days; If after the 3 days, it remains dry she can shower and get it wet; If there is drainage upon removal, I instructed her to cover with a dry dressing and assess daily; If after 3 days, she were to continue to notice drainage, I instructed her to contact the clinic. She verbalized understanding.    Amita SANDERS CMA ([facility]), ROT

## 2025-02-05 ENCOUNTER — TREATMENT (OUTPATIENT)
Dept: PHYSICAL THERAPY | Facility: CLINIC | Age: 70
End: 2025-02-05
Payer: MEDICARE

## 2025-02-05 DIAGNOSIS — Z47.89 ORTHOPEDIC AFTERCARE: ICD-10-CM

## 2025-02-05 DIAGNOSIS — M17.12 OSTEOARTHRITIS OF LEFT KNEE, UNSPECIFIED OSTEOARTHRITIS TYPE: Primary | ICD-10-CM

## 2025-02-05 DIAGNOSIS — Z96.652 STATUS POST TOTAL KNEE REPLACEMENT, LEFT: ICD-10-CM

## 2025-02-05 NOTE — PROGRESS NOTES
Physical Therapy Daily Treatment Note  [facility] PHYSICAL THERAPY [place]  [address], [address]  [place] [address]      Patient: Sarah Howard   : 1955  Diagnosis/ICD-10 Code:  Osteoarthritis of left knee, unspecified osteoarthritis type [M17.12]  Referring practitioner: Soila Patino, *  Date of Initial Visit: Type: THERAPY  Noted: 2025  Today's Date: 2025  Patient seen for 2 sessions         Visit Diagnoses:    ICD-10-CM ICD-9-CM   1. Osteoarthritis of left knee, unspecified osteoarthritis type  M17.12 715.96   2. Status post total knee replacement, left  Z96.652 V43.65   3. Orthopedic aftercare  Z47.89 V54.9       Subjective   Sarah Howard reports: still painful through the left knee.    Objective          Active Range of Motion   Left Knee   Extensor lag: 3 degrees     Passive Range of Motion   Left Knee   Flexion: 91 degrees   Extension: 2 degrees     Patellar Mobility   Left Knee Hypomobile in the left medial, left lateral, left superior and left inferior patellar tendon(s).     Strength/Myotome Testing     Left Knee   Quadriceps contraction: fair    Ambulation   Weight-Bearing Status   Weight-Bearing Status (Left): weight-bearing as tolerated   Weight-Bearing Status (Right): full weight-bearing    Assistive device used: front-wheeled walker    Observational Gait   Gait: antalgic and circumduction         See Exercise, Manual, and Modality Logs for complete treatment.       Assessment/Plan  S/P left knee arthroplasty. Knee extension progressing well. Flexion improving. Quad still weak, gait abnormal.  Progress per Plan of Care and Progress strengthening /stabilization /functional activity           Timed:         Manual Therapy:    15     mins  51454;     Therapeutic Exercise:    25     mins  93050;     Neuromuscular Trista:        mins  89060;    Therapeutic Activity:     15     mins  07107;     Gait Training:           mins  43195;     Ultrasound:          mins   87701;    Ionto                                   mins   58578  Self Care                            mins   81824  Canalith Repos         mins 29343      Un-Timed:  Electrical Stimulation:         mins  20746 ( );  Dry Needling          mins self-pay  Traction          mins 65743      Timed Treatment:   60   mins   Total Treatment:     60   mins    Osmany Ko, PT  [place] License: [id number]

## 2025-02-10 ENCOUNTER — TREATMENT (OUTPATIENT)
Dept: PHYSICAL THERAPY | Facility: CLINIC | Age: 70
End: 2025-02-10
Payer: MEDICARE

## 2025-02-10 DIAGNOSIS — Z96.652 STATUS POST TOTAL KNEE REPLACEMENT, LEFT: ICD-10-CM

## 2025-02-10 DIAGNOSIS — M17.12 OSTEOARTHRITIS OF LEFT KNEE, UNSPECIFIED OSTEOARTHRITIS TYPE: Primary | ICD-10-CM

## 2025-02-10 DIAGNOSIS — Z47.89 ORTHOPEDIC AFTERCARE: ICD-10-CM

## 2025-02-10 PROCEDURE — 97140 MANUAL THERAPY 1/> REGIONS: CPT | Performed by: PHYSICAL THERAPIST

## 2025-02-10 PROCEDURE — 97110 THERAPEUTIC EXERCISES: CPT | Performed by: PHYSICAL THERAPIST

## 2025-02-10 PROCEDURE — 97530 THERAPEUTIC ACTIVITIES: CPT | Performed by: PHYSICAL THERAPIST

## 2025-02-11 ENCOUNTER — TELEMEDICINE (OUTPATIENT)
Dept: PSYCHIATRY | Facility: CLINIC | Age: 70
End: 2025-02-11
Payer: MEDICARE

## 2025-02-11 DIAGNOSIS — F41.1 GENERALIZED ANXIETY DISORDER: ICD-10-CM

## 2025-02-11 DIAGNOSIS — F33.1 MAJOR DEPRESSIVE DISORDER, RECURRENT EPISODE, MODERATE: Primary | ICD-10-CM

## 2025-02-11 DIAGNOSIS — F51.04 PSYCHOPHYSIOLOGICAL INSOMNIA: ICD-10-CM

## 2025-02-11 RX ORDER — ESCITALOPRAM OXALATE 20 MG/1
20 TABLET ORAL DAILY
Qty: 90 TABLET | Refills: 1 | Status: SHIPPED | OUTPATIENT
Start: 2025-02-11

## 2025-02-11 RX ORDER — TRAZODONE HYDROCHLORIDE 50 MG/1
50 TABLET, FILM COATED ORAL NIGHTLY
Qty: 90 TABLET | Refills: 1 | Status: SHIPPED | OUTPATIENT
Start: 2025-02-11

## 2025-02-11 RX ORDER — TRAZODONE HYDROCHLORIDE 150 MG/1
150 TABLET ORAL DAILY
Qty: 90 TABLET | Refills: 1 | Status: SHIPPED | OUTPATIENT
Start: 2025-02-11

## 2025-02-11 RX ORDER — DESVENLAFAXINE 100 MG/1
100 TABLET, EXTENDED RELEASE ORAL DAILY
Qty: 90 TABLET | Refills: 1 | Status: SHIPPED | OUTPATIENT
Start: 2025-02-11

## 2025-02-11 NOTE — PROGRESS NOTES
Patient Name: Sarah Howard  MRN: [id number]   :  1955     This provider is located at her home office through the [facility] (through [facility]), [address], [address] using a secure [employer] Video Visit through [employer]. Patient is being seen remotely via telehealth at their home address in [place], and stated they are in a secure environment for this session. The patient's condition being diagnosed/treated is appropriate for telemedicine. The provider identified herself as well as her credentials.   The patient, and/or patients guardian, consent to be seen remotely, and when consent is given they understand that the consent allows for patient identifiable information to be sent to a third party as needed.   They may refuse to be seen remotely at any time. The electronic data is encrypted and password protected, and the patient and/or guardian has been advised of the potential risks to privacy not withstanding such measures.    You have chosen to receive care through a telehealth visit.  Do you consent to use a video/audio connection for your medical care today? Yes    Chief Complaint:      ICD-10-CM ICD-9-CM   1. Major depressive disorder, recurrent episode, moderate  F33.1 296.32   2. Generalized anxiety disorder  F41.1 300.02   3. Psychophysiological insomnia  F51.04 307.42       History of Present Illness: Sarah Howard is a 69 y.o. female is here today for medication management follow up. She reports some depressive episodes lately related to her recent total knee replacement. She states she has been experiencing pain and episodes of crying related to this. She is currently sleeping in a recliner for comfort and feels that increasing her trazadone would help her.    The following portions of the patient's history were reviewed and updated as appropriate: allergies, current medications, past family history, past medical history, past  social history, past surgical history, and problem list.    Review of Systems;;  Review of Systems   Constitutional:  Negative for activity change, appetite change, fatigue, unexpected weight gain and unexpected weight loss.   Respiratory:  Negative for shortness of breath and wheezing.    Gastrointestinal:  Negative for constipation, diarrhea, nausea and vomiting.   Musculoskeletal:  Negative for gait problem.   Skin:  Negative for dry skin and rash.   Neurological:  Negative for dizziness, speech difficulty, weakness, light-headedness, headache, memory problem and confusion.   Psychiatric/Behavioral:  Negative for agitation, behavioral problems, decreased concentration, dysphoric mood, hallucinations, self-injury, sleep disturbance, suicidal ideas, negative for hyperactivity, depressed mood and stress. The patient is not nervous/anxious.        Physical Exam;;  Physical Exam  Constitutional:       General: She is not in acute distress.     Appearance: She is well-developed. She is not diaphoretic.   HENT:      Head: Normocephalic and atraumatic.   Eyes:      Conjunctiva/sclera: Conjunctivae normal.   Pulmonary:      Effort: Pulmonary effort is normal. No respiratory distress.   Musculoskeletal:         General: Normal range of motion.      Cervical back: Full passive range of motion without pain and normal range of motion.   Neurological:      Mental Status: She is alert and oriented to person, place, and time.   Psychiatric:         Mood and Affect: Mood is not anxious or depressed. Affect is not labile, blunt, angry or inappropriate.         Speech: Speech is not rapid and pressured or tangential.         Behavior: Behavior normal. Behavior is not agitated, slowed, aggressive, withdrawn, hyperactive or combative. Behavior is cooperative.         Thought Content: Thought content normal. Thought content is not paranoid or delusional. Thought content does not include homicidal or suicidal ideation. Thought content  does not include homicidal or suicidal plan.         Judgment: Judgment normal.       There were no vitals taken for this visit.  There is no height or weight on file to calculate BMI. Video appt unable to obtain.     Current Medications;;    Current Outpatient Medications:     desvenlafaxine (Pristiq) 100 MG 24 hr tablet, Take 1 tablet by mouth Daily., Disp: 90 tablet, Rfl: 1    escitalopram (LEXAPRO) 20 MG tablet, Take 1 tablet by mouth Daily., Disp: 90 tablet, Rfl: 1    traZODone (DESYREL) 150 MG tablet, Take 1 tablet by mouth Daily. WITH 150 MG TO EQUAL 200 MG, Disp: 90 tablet, Rfl: 1    acetaminophen (TYLENOL) 500 MG tablet, Take 2 tablets by mouth EVERY 8 (Eight) Hours 7 days. THEN take only AS NEEDED thereafter, Disp: 60 tablet, Rfl: 0    aspirin (ASPIR) 81 MG EC tablet, Take 1 tablet by mouth 2 (Two) Times a Day., Disp: 60 tablet, Rfl: 0    atorvastatin (LIPITOR) 20 MG tablet, Take 1 tablet by mouth Daily., Disp: 90 tablet, Rfl: 0    docusate sodium (COLACE) 100 MG capsule, Take 1 capsule by mouth 2 (Two) Times a Day for 15 days., Disp: 30 capsule, Rfl: 0    fluticasone (FLONASE) 50 MCG/ACT nasal spray, Administer 2 sprays into the nostril(s) as directed by provider Daily., Disp: 16 g, Rfl: 3    hydrocortisone 1 % cream, Apply 1 Application topically to the appropriate area as directed 2 (Two) Times a Day., Disp: 45 g, Rfl: 1    latanoprost (XALATAN) 0.005 % ophthalmic solution, , Disp: , Rfl:     meloxicam (MOBIC) 15 MG tablet, Take 1 tablet by mouth Daily for 15 days., Disp: 15 tablet, Rfl: 0    metoprolol succinate XL (Toprol XL) 25 MG 24 hr tablet, Take 1 tablet by mouth Daily., Disp: 90 tablet, Rfl: 1    olmesartan (BENICAR) 20 MG tablet, TAKE 1 TABLET BY MOUTH DAILY, Disp: 90 tablet, Rfl: 0    oxyCODONE (Roxicodone) 5 MG immediate release tablet, Take 1 tablet by mouth Every 4 (Four) Hours As Needed for Moderate Pain., Disp: 30 tablet, Rfl: 0    pantoprazole (PROTONIX) 40 MG EC tablet, Take 1 tablet by  mouth Daily., Disp: 90 tablet, Rfl: 3    ropivacaine (NAROPIN) 0.2 % infusion (INFUSYSTEM), 4 mg/hr by Peripheral Nerve route Continuous., Disp: , Rfl:     traZODone (DESYREL) 50 MG tablet, Take 1 tablet by mouth Every Night. WITH 150 MG TO EQUAL 200 MG, Disp: 90 tablet, Rfl: 1    Lab Results:   Admission on 01/27/2025, Discharged on 01/30/2025   Component Date Value Ref Range Status    Glucose 01/27/2025 78  70 - 130 mg/dL Final    Glucose 01/28/2025 183 (H)  65 - 99 mg/dL Final    BUN 01/28/2025 16  8 - 23 mg/dL Final    Creatinine 01/28/2025 0.99  0.57 - 1.00 mg/dL Final    Sodium 01/28/2025 140  136 - 145 mmol/L Final    Potassium 01/28/2025 4.2  3.5 - 5.2 mmol/L Final    Chloride 01/28/2025 107  98 - 107 mmol/L Final    CO2 01/28/2025 23.0  22.0 - 29.0 mmol/L Final    Calcium 01/28/2025 8.8  8.6 - 10.5 mg/dL Final    BUN/Creatinine Ratio 01/28/2025 16.2  7.0 - 25.0 Final    Anion Gap 01/28/2025 10.0  5.0 - 15.0 mmol/L Final    eGFR 01/28/2025 61.5  >60.0 mL/min/1.73 Final    Hemoglobin 01/28/2025 11.6 (L)  12.0 - 15.9 g/dL Final    Hematocrit 01/28/2025 36.6  34.0 - 46.6 % Final    Glucose 01/29/2025 130 (H)  65 - 99 mg/dL Final    BUN 01/29/2025 19  8 - 23 mg/dL Final    Creatinine 01/29/2025 0.89  0.57 - 1.00 mg/dL Final    Sodium 01/29/2025 141  136 - 145 mmol/L Final    Potassium 01/29/2025 4.4  3.5 - 5.2 mmol/L Final    Chloride 01/29/2025 108 (H)  98 - 107 mmol/L Final    CO2 01/29/2025 23.0  22.0 - 29.0 mmol/L Final    Calcium 01/29/2025 8.5 (L)  8.6 - 10.5 mg/dL Final    BUN/Creatinine Ratio 01/29/2025 21.3  7.0 - 25.0 Final    Anion Gap 01/29/2025 10.0  5.0 - 15.0 mmol/L Final    eGFR 01/29/2025 69.8  >60.0 mL/min/1.73 Final    WBC 01/29/2025 10.70  3.40 - 10.80 10*3/mm3 Final    RBC 01/29/2025 3.28 (L)  3.77 - 5.28 10*6/mm3 Final    Hemoglobin 01/29/2025 10.8 (L)  12.0 - 15.9 g/dL Final    Hematocrit 01/29/2025 34.3  34.0 - 46.6 % Final    MCV 01/29/2025 104.6 (H)  79.0 - 97.0 fL Final    MCH  01/29/2025 32.9  26.6 - 33.0 pg Final    MCHC 01/29/2025 31.5  31.5 - 35.7 g/dL Final    RDW 01/29/2025 11.9 (L)  12.3 - 15.4 % Final    RDW-SD 01/29/2025 45.4  37.0 - 54.0 fl Final    MPV 01/29/2025 12.8 (H)  6.0 - 12.0 fL Final    Platelets 01/29/2025 166  140 - 450 10*3/mm3 Final    Neutrophil % 01/29/2025 49.5  42.7 - 76.0 % Final    Lymphocyte % 01/29/2025 34.1  19.6 - 45.3 % Final    Monocyte % 01/29/2025 14.2 (H)  5.0 - 12.0 % Final    Eosinophil % 01/29/2025 1.5  0.3 - 6.2 % Final    Basophil % 01/29/2025 0.4  0.0 - 1.5 % Final    Immature Grans % 01/29/2025 0.3  0.0 - 0.5 % Final    Neutrophils, Absolute 01/29/2025 5.30  1.70 - 7.00 10*3/mm3 Final    Lymphocytes, Absolute 01/29/2025 3.65 (H)  0.70 - 3.10 10*3/mm3 Final    Monocytes, Absolute 01/29/2025 1.52 (H)  0.10 - 0.90 10*3/mm3 Final    Eosinophils, Absolute 01/29/2025 0.16  0.00 - 0.40 10*3/mm3 Final    Basophils, Absolute 01/29/2025 0.04  0.00 - 0.20 10*3/mm3 Final    Immature Grans, Absolute 01/29/2025 0.03  0.00 - 0.05 10*3/mm3 Final    nRBC 01/29/2025 0.0  0.0 - 0.2 /100 WBC Final   Pre-Admission Testing on 01/13/2025   Component Date Value Ref Range Status    QT Interval 01/13/2025 402  ms Final    QTC Interval 01/13/2025 411  ms Final    Glucose 01/13/2025 119 (H)  65 - 99 mg/dL Final    BUN 01/13/2025 17  8 - 23 mg/dL Final    Creatinine 01/13/2025 1.05 (H)  0.57 - 1.00 mg/dL Final    Sodium 01/13/2025 139  136 - 145 mmol/L Final    Potassium 01/13/2025 4.6  3.5 - 5.2 mmol/L Final    Slight hemolysis detected by analyzer. Result may be falsely elevated.    Chloride 01/13/2025 102  98 - 107 mmol/L Final    CO2 01/13/2025 23.0  22.0 - 29.0 mmol/L Final    Calcium 01/13/2025 10.1  8.6 - 10.5 mg/dL Final    Total Protein 01/13/2025 7.1  6.0 - 8.5 g/dL Final    Albumin 01/13/2025 4.5  3.5 - 5.2 g/dL Final    ALT (SGPT) 01/13/2025 35 (H)  1 - 33 U/L Final    AST (SGOT) 01/13/2025 29  1 - 32 U/L Final    Alkaline Phosphatase 01/13/2025 82  39 - 117  U/L Final    Total Bilirubin 01/13/2025 0.6  0.0 - 1.2 mg/dL Final    Globulin 01/13/2025 2.6  gm/dL Final    Calculated Result    A/G Ratio 01/13/2025 1.7  g/dL Final    BUN/Creatinine Ratio 01/13/2025 16.2  7.0 - 25.0 Final    Anion Gap 01/13/2025 14.0  5.0 - 15.0 mmol/L Final    eGFR 01/13/2025 57.3 (L)  >60.0 mL/min/1.73 Final    Protime 01/13/2025 13.4  12.2 - 14.5 Seconds Final    INR 01/13/2025 1.01  0.89 - 1.12 Final    PTT 01/13/2025 25.3  22.0 - 39.0 seconds Final    Hemoglobin A1C 01/13/2025 5.40  4.80 - 5.60 % Final    Nicotine 01/13/2025 <1.0  ng/mL Final    This test was developed and its performance characteristics  determined by [employer]. It has not been cleared or approved  by the Food and Drug Administration.  Nicotine levels greater than 2.0 are consistent with the use of  tobacco or tobacco cessation products.    Cotinine 01/13/2025 <1.0  ng/mL Final    This test was developed and its performance characteristics  determined by [facility]. It has not been cleared or approved  by the Food and Drug Administration.  Cotinine levels greater than 20.0 are consistent with the use of  tobacco or tobacco cessation products.    WBC 01/13/2025 7.03  3.40 - 10.80 10*3/mm3 Final    RBC 01/13/2025 4.21  3.77 - 5.28 10*6/mm3 Final    Hemoglobin 01/13/2025 13.7  12.0 - 15.9 g/dL Final    Hematocrit 01/13/2025 42.6  34.0 - 46.6 % Final    MCV 01/13/2025 101.2 (H)  79.0 - 97.0 fL Final    MCH 01/13/2025 32.5  26.6 - 33.0 pg Final    MCHC 01/13/2025 32.2  31.5 - 35.7 g/dL Final    RDW 01/13/2025 11.6 (L)  12.3 - 15.4 % Final    RDW-SD 01/13/2025 43.0  37.0 - 54.0 fl Final    MPV 01/13/2025 12.3 (H)  6.0 - 12.0 fL Final    Platelets 01/13/2025 241  140 - 450 10*3/mm3 Final    Neutrophil % 01/13/2025 39.5 (L)  42.7 - 76.0 % Final    Lymphocyte % 01/13/2025 47.1 (H)  19.6 - 45.3 % Final    Monocyte % 01/13/2025 10.1  5.0 - 12.0 % Final    Eosinophil % 01/13/2025 2.0  0.3 - 6.2 % Final    Basophil % 01/13/2025 1.0  0.0 -  1.5 % Final    Immature Grans % 01/13/2025 0.3  0.0 - 0.5 % Final    Neutrophils, Absolute 01/13/2025 2.78  1.70 - 7.00 10*3/mm3 Final    Lymphocytes, Absolute 01/13/2025 3.31 (H)  0.70 - 3.10 10*3/mm3 Final    Monocytes, Absolute 01/13/2025 0.71  0.10 - 0.90 10*3/mm3 Final    Eosinophils, Absolute 01/13/2025 0.14  0.00 - 0.40 10*3/mm3 Final    Basophils, Absolute 01/13/2025 0.07  0.00 - 0.20 10*3/mm3 Final    Immature Grans, Absolute 01/13/2025 0.02  0.00 - 0.05 10*3/mm3 Final    nRBC 01/13/2025 0.0  0.0 - 0.2 /100 WBC Final       Mental Status Exam:   Hygiene:   good  Cooperation:  Cooperative  Eye Contact:  Good  Psychomotor Behavior:  Appropriate  Mood:dysthymic  Affect:  Appropriate  Hopelessness: Denies  Speech:  Normal  Thought Process:  Goal directed  Thought Content:  Normal  Suicidal:  None  Homicidal:  None  Hallucinations:  None  Delusion:  None  Memory:  Intact  Orientation:  Person, Place, Time, and Situation  Reliability:  good  Insight:  Good  Judgement:  Good  Impulse Control:  Good    PHQ-9 Depression Screening  Little interest or pleasure in doing things? (Patient-Rptd) Several days   Feeling down, depressed, or hopeless? (Patient-Rptd) Several days   PHQ-2 Total Score (Patient-Rptd) 2   Trouble falling or staying asleep, or sleeping too much? (Patient-Rptd) Several days   Feeling tired or having little energy? (Patient-Rptd) Several days   Poor appetite or overeating? (Patient-Rptd) Not at all   Feeling bad about yourself - or that you are a failure or have let yourself or your family down? (Patient-Rptd) Not at all   Trouble concentrating on things, such as reading the newspaper or watching television? (Patient-Rptd) Not at all   Moving or speaking so slowly that other people could have noticed? Or the opposite - being so fidgety or restless that you have been moving around a lot more than usual? (Patient-Rptd) Not at all   Thoughts that you would be better off dead, or of hurting yourself in  some way? (Patient-Rptd) Not at all   PHQ-9 Total Score (Patient-Rptd) 4   If you checked off any problems, how difficult have these problems made it for you to do your work, take care of things at home, or get along with other people? (Patient-Rptd) Somewhat difficult         Assessment/Plan:  Diagnoses and all orders for this visit:    1. Major depressive disorder, recurrent episode, moderate (Primary)  -     desvenlafaxine (Pristiq) 100 MG 24 hr tablet; Take 1 tablet by mouth Daily.  Dispense: 90 tablet; Refill: 1  -     escitalopram (LEXAPRO) 20 MG tablet; Take 1 tablet by mouth Daily.  Dispense: 90 tablet; Refill: 1    2. Generalized anxiety disorder  -     escitalopram (LEXAPRO) 20 MG tablet; Take 1 tablet by mouth Daily.  Dispense: 90 tablet; Refill: 1    3. Psychophysiological insomnia  -     traZODone (DESYREL) 50 MG tablet; Take 1 tablet by mouth Every Night. WITH 150 MG TO EQUAL 200 MG  Dispense: 90 tablet; Refill: 1  -     traZODone (DESYREL) 150 MG tablet; Take 1 tablet by mouth Daily. WITH 150 MG TO EQUAL 200 MG  Dispense: 90 tablet; Refill: 1      Patient is managing mood and anxiety symptoms well with current medications. However, she is having some depressive episodes related to recent physical stressors including left knee replacement. She is otherwise managing her symptoms with medications appropriately. We will increase Trazadone to 200 mg today and follow up in 3 months.    A psychological evaluation was conducted in order to assess past and current level of functioning. Areas assessed included, but were not limited to: perception of social support, perception of ability to face and deal with challenges in life (positive functioning), anxiety symptoms, depressive symptoms, perspective on beliefs/belief system, coping skills for stress, intelligence level,  Therapeutic rapport was established. Interventions conducted today were geared towards incorporating medication management along with support  for continued therapy. Education was also provided as to the med management with this provider and what to expect in subsequent sessions.    We discussed risks, benefits,goals and side effects of the above medication and the patient was agreeable with the plan.Patient was educated on the importance of compliance with treatment and follow-up appointments. Patient is aware to contact the [facility] [phone] with any worsening of symptoms. To call for questions or concerns and return early if necessary. Patent is agreeable to go to the Emergency Department or call 911 should they begin SI/HI.     Part of this note may be an electronic transcription/translation of spoken language to printed text using the Dragon Dictation System.    Return in about 3 months (around 5/11/2025) for Follow Up 30 min, Video visit.    MARCOS Smart    This note has been transcribed by Erika POLANCO, RN [facility] NP student. I have reviewed the note and concur with the transcription.

## 2025-02-12 ENCOUNTER — TREATMENT (OUTPATIENT)
Dept: PHYSICAL THERAPY | Facility: CLINIC | Age: 70
End: 2025-02-12
Payer: MEDICARE

## 2025-02-12 DIAGNOSIS — Z47.89 ORTHOPEDIC AFTERCARE: ICD-10-CM

## 2025-02-12 DIAGNOSIS — Z96.652 STATUS POST TOTAL KNEE REPLACEMENT, LEFT: ICD-10-CM

## 2025-02-12 DIAGNOSIS — M17.12 OSTEOARTHRITIS OF LEFT KNEE, UNSPECIFIED OSTEOARTHRITIS TYPE: Primary | ICD-10-CM

## 2025-02-12 NOTE — PROGRESS NOTES
Physical Therapy Daily Treatment Note  [facility] [place]  [address], [address]  [place] [address]      Patient: Sarah Howard   : 1955  Diagnosis/ICD-10 Code:  Osteoarthritis of left knee, unspecified osteoarthritis type [M17.12]  Referring practitioner: Soila Patino, *  Date of Initial Visit: Type: THERAPY  Noted: 2025  Today's Date: 2025  Patient seen for 4 sessions         Visit Diagnoses:    ICD-10-CM ICD-9-CM   1. Osteoarthritis of left knee, unspecified osteoarthritis type  M17.12 715.96   2. Status post total knee replacement, left  Z96.652 V43.65   3. Orthopedic aftercare  Z47.89 V54.9       Subjective   Sarah Howard reports: left knee hurts but it is improving.    Objective          Active Range of Motion   Left Knee   Flexion: 85 degrees   Extensor la degrees     Passive Range of Motion   Left Knee   Flexion: 95 degrees   Extension: 0 degrees       See Exercise, Manual, and Modality Logs for complete treatment.       Assessment/Plan  Extension peers to be maintaining within acceptable limits even when not working on at home and she has been concentrating more on flexion.  Flexion range of motion slowly improving.  Has some strength deficits which need to be addressed as expected at this point in time.  Progress per Plan of Care and Progress strengthening /stabilization /functional activity           Timed:         Manual Therapy:    18     mins  96845;     Therapeutic Exercise:    28     mins  10706;     Neuromuscular Trista:        mins  46763;    Therapeutic Activity:     10     mins  39990;     Gait Training:           mins  29109;     Ultrasound:          mins  02686;    Ionto                                   mins   51995  Self Care                            mins   14115  Canalith Repos         mins 16744      Un-Timed:  Electrical Stimulation:         mins  94648 ( );  Dry Needling          mins self-pay  Traction          mins  48055      Timed Treatment:   56   mins   Total Treatment:     56   mins    Osmany Ko, PT  [place] License: [id number]

## 2025-02-14 DIAGNOSIS — Z96.652 S/P TKR (TOTAL KNEE REPLACEMENT), LEFT: ICD-10-CM

## 2025-02-14 RX ORDER — OXYCODONE HYDROCHLORIDE 5 MG/1
5 TABLET ORAL EVERY 4 HOURS PRN
Qty: 30 TABLET | Refills: 0 | Status: SHIPPED | OUTPATIENT
Start: 2025-02-14

## 2025-02-14 RX ORDER — LATANOPROST 50 UG/ML
SOLUTION/ DROPS OPHTHALMIC
OUTPATIENT
Start: 2025-02-14

## 2025-02-14 NOTE — PROGRESS NOTES
I have reviewed the notes, assessments, and/or procedures performed by Mayuri Covington, I concur with her/his documentation of Sarah Howard.

## 2025-02-14 NOTE — TELEPHONE ENCOUNTER
Refill refused.  Patient should contact her ophthalmologist for refills on this medication.    Dr. Gomez

## 2025-02-17 ENCOUNTER — TREATMENT (OUTPATIENT)
Dept: PHYSICAL THERAPY | Facility: CLINIC | Age: 70
End: 2025-02-17
Payer: MEDICARE

## 2025-02-17 DIAGNOSIS — M17.12 OSTEOARTHRITIS OF LEFT KNEE, UNSPECIFIED OSTEOARTHRITIS TYPE: Primary | ICD-10-CM

## 2025-02-17 DIAGNOSIS — Z96.652 STATUS POST TOTAL KNEE REPLACEMENT, LEFT: ICD-10-CM

## 2025-02-17 DIAGNOSIS — Z47.89 ORTHOPEDIC AFTERCARE: ICD-10-CM

## 2025-02-17 PROCEDURE — 97530 THERAPEUTIC ACTIVITIES: CPT | Performed by: PHYSICAL THERAPIST

## 2025-02-17 PROCEDURE — 97140 MANUAL THERAPY 1/> REGIONS: CPT | Performed by: PHYSICAL THERAPIST

## 2025-02-17 PROCEDURE — 97110 THERAPEUTIC EXERCISES: CPT | Performed by: PHYSICAL THERAPIST

## 2025-02-17 NOTE — LETTER
Progress Note  2/17/2025  Soila Patino PA-C    Re: Sarah Howard  ________________________________________________________________     Sarah Howard, has attended 5/5 PT sessions.  Treatment has consisted of: AAROM/PROM/AROM, strengthening, jt and patella mobilization, soft tissue mobilization, HEP     S:  Sarah Howard states: Knee pain is less, still having minimal stiffness that is severe in the morning decreases some as the day progresses. Feels like she is improving.     O:   OBSERVATION: Edema decreasing.  Gait improving full weightbearing, still using the walker.    AROM: Extension= 2 deg lag, Flexion=85 deg  PROM: Extension= 0 deg, Flexion =100 deg    MMT: Quad=3+/5, Hamstring 4-/5, Hip abd/ext=4/5    A:   7 weeks postsurgical left knee arthroplasty.  Knee extension range of motion is very good, flexion range of motion slowly improving; no concerns at this point about her regaining full range of motion.  Still presents with weakness throughout the left lower extremity.  Gait slowly improving.  Overall progressing well.     P: Please advise after your exam.    Thank you for this referral.    Osmany Ko, PT

## 2025-02-17 NOTE — PROGRESS NOTES
Physical Therapy Daily Treatment Note  [facility] [place]  [address]  [place] [facility] [address]      Patient: Sarah Howard   : 1955  Diagnosis/ICD-10 Code:  Osteoarthritis of left knee, unspecified osteoarthritis type [M17.12]  Referring practitioner: Soila Patino, *  Date of Initial Visit: Type: THERAPY  Noted: 2025  Today's Date: 2025  Patient seen for 5 sessions         Visit Diagnoses:    ICD-10-CM ICD-9-CM   1. Osteoarthritis of left knee, unspecified osteoarthritis type  M17.12 715.96   2. Status post total knee replacement, left  Z96.652 V43.65   3. Orthopedic aftercare  Z47.89 V54.9   APPOINTMENT AT ORTHOPEDIC OFFICE 2024    Subjective   Sarah Howard reports: Knee pain is less, still having minimal stiffness that is severe in the morning decreases some as the day progresses.  Feels like she is improving.    Objective          Observations   Left Knee   Positive for edema (moderate, decreasing.) and incision (Healing well).       Palpation   Left   Hypertonic in the rectus femoris, vastus lateralis and vastus medialis.     Tenderness   Left Knee   Tenderness in the quadriceps tendon.     Active Range of Motion   Left Knee   Flexion: 85 degrees   Extensor la degrees     Passive Range of Motion   Left Knee   Flexion: 100 degrees   Extension: 0 degrees     Strength/Myotome Testing     Left Knee   Flexion: 4-  Extension: 3+  Quadriceps contraction: fair    Ambulation   Weight-Bearing Status   Weight-Bearing Status (Left): full weight bearing   Weight-Bearing Status (Right): full weight-bearing    Assistive device used: front-wheeled walker    Observational Gait   Gait: within functional limits   Walking speed and stride length within functional limits.         See Exercise, Manual, and Modality Logs for complete treatment.       Assessment/Plan  7 weeks postsurgical left knee arthroplasty.  Knee extension range of motion is very  good, flexion range of motion slowly improving; no concerns at this point about her regaining full range of motion.  Still presents with weakness throughout the left lower extremity.  Gait slowly improving.  Overall progressing well.  Progress per Plan of Care and Progress strengthening /stabilization /functional activity           Timed:         Manual Therapy:    16     mins  83642;     Therapeutic Exercise:    25     mins  53638;     Neuromuscular Trisat:        mins  11064;    Therapeutic Activity:     13     mins  94746;     Gait Training:           mins  67988;     Ultrasound:          mins  91347;    Ionto                                   mins   51449  Self Care                            mins   05132  Canalith Repos         mins 62309      Un-Timed:  Electrical Stimulation:         mins  86715 ( );  Dry Needling          mins self-pay  Traction          mins 32102      Timed Treatment:   54   mins   Total Treatment:     54   mins    Osmany Ko, PT  [place] License: [id number]

## 2025-02-18 ENCOUNTER — OFFICE VISIT (OUTPATIENT)
Dept: ORTHOPEDIC SURGERY | Facility: CLINIC | Age: 70
End: 2025-02-18
Payer: MEDICARE

## 2025-02-18 VITALS — TEMPERATURE: 97.2 F

## 2025-02-18 DIAGNOSIS — Z96.652 S/P TOTAL KNEE ARTHROPLASTY, LEFT: Primary | ICD-10-CM

## 2025-02-18 PROCEDURE — 99024 POSTOP FOLLOW-UP VISIT: CPT | Performed by: PHYSICIAN ASSISTANT

## 2025-02-18 PROCEDURE — 1160F RVW MEDS BY RX/DR IN RCRD: CPT | Performed by: PHYSICIAN ASSISTANT

## 2025-02-18 PROCEDURE — 1159F MED LIST DOCD IN RCRD: CPT | Performed by: PHYSICIAN ASSISTANT

## 2025-02-18 RX ORDER — MELOXICAM 15 MG/1
TABLET ORAL
Qty: 30 TABLET | Refills: 0 | Status: SHIPPED | OUTPATIENT
Start: 2025-02-18

## 2025-02-18 NOTE — PROGRESS NOTES
[facility] [place] [facility] [place]        Subjective     Post-op (3 weeks status post Left total knee arthroplasty 01/27/2025)       RENATE Howard is a 69 y.o. female.  Patient presents for their initial postop visit following left TKA with Angelo robot performed on the above date by Dr. Baker.    Pain scale: 7/10. Associated symptoms pain, swelling. Pain with walking, standing, stairs, sleeping, lying on the affected side, rising from a seated position, movement of joint. Pain eased by resting but not much. Using walker to assist with ambulation. Attending OPPT.  Patient is struggling with pain.  She reports only icing a couple times a day.  She is trying to elevate is much as possible.    Patient denies any fever, chills, night sweats or other constitutional symptoms.          Objective      Physical Exam  Temp 97.2 °F (36.2 °C)     There is no height or weight on file to calculate BMI.        Ortho Exam  Integument:   Left knee: Incision is healing well without redness, warmth, drainage or signs of infection.    Lower Extremities:   Left knee:    Tenderness:  Generalized pain typical following TKA     Effusion:  1+    Swelling: Positive with soft calf    Crepitus:  None    Range of motion:  Extension: 0°       Flexion: 90° passively.  Yesterday at PT 0-100.  Instability:  No varus laxity, no valgus laxity, negative anterior drawer  Deformities:  None      Imaging Reviewed and Interpreted:  Ordered left knee plain films.  Imaging read/interpreted by Dr. Baker.    Imaging Results (Last 24 Hours)       Procedure Component Value Units Date/Time    XR Knee 3+ View With [place] Left [[id number]] Resulted: 02/18/25 1436     Updated: 02/18/25 1436    Narrative:      Indication: Status post left total knee arthroplasty    Comparison: Todays xrays were compared to previous xrays from 1/27/2025      Impression:           Left Knee: Demonstrate well positioned knee arthroplasty components in   satisfactory  alignment without evidence of wear, loosening, subsidence,   fracture, or osteolysis and No significant changes compared to prior   radiographs.              Assessment:  1. S/P total knee arthroplasty, left        Plan:  Status post left TKA with Angelo robot, stable.  Reviewed imaging with patient.  Continue with outpatient PT.  Continue taking Tylenol as directed.  Prescribed meloxicam to help with inflammation and swelling.  Encourage patient to increase icing to up to 5 times daily along with elevation.  Knee needs to be higher than the heart.  Continue with ASA as directed for DVT prophylaxis.  No dental procedures until minimum of 3 months out from surgery.  Patient will require indefinite antibiotic prophylaxis before dental procedures.  Follow up with Dr. Baker in 3 weeks for repeat evaluation. Needs knee imaging.  ROM goal by their next appointment 0-120 degrees.  Questions and concerns answered.    Answers submitted by the patient for this visit:  Post Operative Visit (Submitted on 2/16/2025)  Chief Complaint: Follow-up  Pain Control: poorly controlled  Fever: no fever  Diet: adequate intake  Activity: impaired due to pain  Operative Site Issues: Yes  Drainage: none  Redness: improved  Swelling: improved  Operative site comments:: Painful and stiff      Soila Patino PA-C  02/18/25  16:09 EST      Dictated Utilizing Dragon Dictation.

## 2025-02-19 ENCOUNTER — TREATMENT (OUTPATIENT)
Dept: PHYSICAL THERAPY | Facility: CLINIC | Age: 70
End: 2025-02-19
Payer: MEDICARE

## 2025-02-19 DIAGNOSIS — M17.12 OSTEOARTHRITIS OF LEFT KNEE, UNSPECIFIED OSTEOARTHRITIS TYPE: Primary | ICD-10-CM

## 2025-02-19 DIAGNOSIS — Z47.89 ORTHOPEDIC AFTERCARE: ICD-10-CM

## 2025-02-19 DIAGNOSIS — Z96.652 STATUS POST TOTAL KNEE REPLACEMENT, LEFT: ICD-10-CM

## 2025-02-20 NOTE — PROGRESS NOTES
Physical Therapy Daily Treatment Note  [facility] [place] [facility] [place]  [address]  [place], located within [facility] [address]      Patient: Sarah Howard   : 1955  Diagnosis/ICD-10 Code:  Osteoarthritis of left knee, unspecified osteoarthritis type [M17.12]  Referring practitioner: Soila Patino, *  Date of Initial Visit: Type: THERAPY  Noted: 2025  Today's Date: 2025  Patient seen for 6 sessions         Visit Diagnoses:    ICD-10-CM ICD-9-CM   1. Osteoarthritis of left knee, unspecified osteoarthritis type  M17.12 715.96   2. Status post total knee replacement, left  Z96.652 V43.65   3. Orthopedic aftercare  Z47.89 V54.9       Subjective   Sarah Howard reports: Orthopedic provider pleased with progress.  Patient is pleased with progress.    Objective          Passive Range of Motion   Left Knee   Flexion: 100 degrees with pain  Extension: 0 degrees     Patellar Mobility   Left Knee Hypomobile in the left medial and left lateral patellar tendon(s).     Strength/Myotome Testing     Left Knee   Extension: 4  Quadriceps contraction: fair        See Exercise, Manual, and Modality Logs for complete treatment.       Assessment/Plan  Status post left knee arthroplasty.  Extension range of motion is very good.  Knee flexion of 100 degrees is available but is not active lady tolerated by patient.  She does tolerate about 90 degrees of flexion.  Overall progressing well.  Some strength deficits persist and we are addressing those.  Progress per Plan of Care and Progress strengthening /stabilization /functional activity           Timed:         Manual Therapy:    17     mins  07503;     Therapeutic Exercise:    25     mins  92738;     Neuromuscular Trista:        mins  90080;    Therapeutic Activity:     14     mins  31567;     Gait Training:           mins  35753;     Ultrasound:          mins  02060;    Ionto                                   mins   87684  Self Care                             mins   91409  [facility]         mins 65224      Un-Timed:  Electrical Stimulation:         mins  37212 ( );  Dry Needling          mins self-pay  Traction          mins 84860      Timed Treatment:   56   mins   Total Treatment:     56   mins    Osmany Ko, PT  [place] License: [id number]

## 2025-02-24 ENCOUNTER — TREATMENT (OUTPATIENT)
Dept: PHYSICAL THERAPY | Facility: CLINIC | Age: 70
End: 2025-02-24
Payer: MEDICARE

## 2025-02-24 DIAGNOSIS — Z96.652 STATUS POST TOTAL KNEE REPLACEMENT, LEFT: ICD-10-CM

## 2025-02-24 DIAGNOSIS — M17.12 OSTEOARTHRITIS OF LEFT KNEE, UNSPECIFIED OSTEOARTHRITIS TYPE: Primary | ICD-10-CM

## 2025-02-24 DIAGNOSIS — Z47.89 ORTHOPEDIC AFTERCARE: ICD-10-CM

## 2025-02-24 PROCEDURE — 97530 THERAPEUTIC ACTIVITIES: CPT | Performed by: PHYSICAL THERAPIST

## 2025-02-24 PROCEDURE — 97140 MANUAL THERAPY 1/> REGIONS: CPT | Performed by: PHYSICAL THERAPIST

## 2025-02-24 PROCEDURE — 97110 THERAPEUTIC EXERCISES: CPT | Performed by: PHYSICAL THERAPIST

## 2025-02-25 NOTE — PROGRESS NOTES
Physical Therapy Daily Treatment Note  [facility] [place] [facility] [place]  [address]  [facility] [address]      Patient: Sarah Howard   : 1955  Diagnosis/ICD-10 Code:  Osteoarthritis of left knee, unspecified osteoarthritis type [M17.12]  Referring practitioner: Soila Patino, *  Date of Initial Visit: Type: THERAPY  Noted: 2025  Today's Date: 2025  Patient seen for 7 sessions         Visit Diagnoses:    ICD-10-CM ICD-9-CM   1. Osteoarthritis of left knee, unspecified osteoarthritis type  M17.12 715.96   2. Status post total knee replacement, left  Z96.652 V43.65   3. Orthopedic aftercare  Z47.89 V54.9       Subjective   Sarah Howard reports: Knee is significantly less painful.  Is getting easier to walk.  Would like to know if she can use a cane.    Objective   OBSERVATION: Patient ambulating with front wheel walker.  But she is more or less carrying it.  Demonstrated to the patient how to use a cane in the right hand.  She seemed to have good understanding.  PALPATION: Generalized tenderness around the anterior portion of the knee at quadricep tendon.  ROM: Left knee extension= 0 degrees, flexion= 106 degrees.  STRENGTH: Hip extension 4+/5, quadricep 4+/5, hip flexor 4+/5.  OTHER: Independent with all transfers.    See Exercise, Manual, and Modality Logs for complete treatment.       Assessment/Plan  Patient is safe to start ambulating with single-point cane.  Range of motion continues to improve.  Once the patient has 120 degrees of knee flexion, she should do well with just a home program at that point.  Progress per Plan of Care and Progress strengthening /stabilization /functional activity           Timed:         Manual Therapy:    13     mins  38344;     Therapeutic Exercise:    25     mins  50912;     Neuromuscular Trista:        mins  42655;    Therapeutic Activity:     20     mins  90705;     Gait Training:           mins  85897;     Ultrasound:           mins  05768;    Ionto                                   mins   01070  Self Care                            mins   05265  Canalith Repos         mins 69738      Un-Timed:  Electrical Stimulation:         mins  88291 ( );  Dry Needling          mins self-pay  Traction          mins 43239      Timed Treatment:   53   mins   Total Treatment:     53   mins    Osmany Ko, PT  [place] License: [id number]

## 2025-02-26 ENCOUNTER — TREATMENT (OUTPATIENT)
Dept: PHYSICAL THERAPY | Facility: CLINIC | Age: 70
End: 2025-02-26
Payer: MEDICARE

## 2025-02-26 DIAGNOSIS — Z96.652 S/P TKR (TOTAL KNEE REPLACEMENT), LEFT: ICD-10-CM

## 2025-02-26 DIAGNOSIS — Z96.652 STATUS POST TOTAL KNEE REPLACEMENT, LEFT: ICD-10-CM

## 2025-02-26 DIAGNOSIS — M17.12 OSTEOARTHRITIS OF LEFT KNEE, UNSPECIFIED OSTEOARTHRITIS TYPE: Primary | ICD-10-CM

## 2025-02-26 DIAGNOSIS — Z47.89 ORTHOPEDIC AFTERCARE: ICD-10-CM

## 2025-02-27 RX ORDER — OXYCODONE HYDROCHLORIDE 5 MG/1
5 TABLET ORAL EVERY 4 HOURS PRN
Qty: 30 TABLET | Refills: 0 | Status: SHIPPED | OUTPATIENT
Start: 2025-02-27

## 2025-02-28 RX ORDER — ATORVASTATIN CALCIUM 20 MG/1
20 TABLET, FILM COATED ORAL DAILY
Qty: 90 TABLET | Refills: 0 | Status: SHIPPED | OUTPATIENT
Start: 2025-02-28

## 2025-02-28 NOTE — PROGRESS NOTES
Physical Therapy Daily Treatment Note  [facility] [place] [facility] [place]  [address]  [facility] [address]      Patient: Sarah Howard   : 1955  Diagnosis/ICD-10 Code:  Osteoarthritis of left knee, unspecified osteoarthritis type [M17.12]  Referring practitioner: Soila Patino, *  Date of Initial Visit: Type: THERAPY  Noted: 2025  Today's Date: 2025  Patient seen for 8 sessions         Visit Diagnoses:    ICD-10-CM ICD-9-CM   1. Osteoarthritis of left knee, unspecified osteoarthritis type  M17.12 715.96   2. Orthopedic aftercare  Z47.89 V54.9   3. Status post total knee replacement, left  Z96.652 V43.65         Subjective   Sarah Howard reports: Has been using cane last couple days.  Feels like she is doing well.    Objective   OBSERVATION: Patient using single-point cane in right hand.  Has good technique and appropriate step sequence.  PALPATION: Generalized tenderness around the anterior portion of the knee at quadricep tendon.  ROM: Left knee extension= 0 degrees, flexion= 110 degrees with significant endrange pain.  STRENGTH: Hip extension 4+/5, quadricep 4+/5, hip flexor 4+/5.  OTHER: Independent with all transfers.    See Exercise, Manual, and Modality Logs for complete treatment.       Assessment/Plan  Patient gait is very good.  Seems to be walking much more fluid and quicker with the single-point cane.  Knee range of motion improved to 110 degrees today.  We would like to get the patient to 120 degrees.  Strength still has some deficits but is improved significantly compared to even 2 weeks ago.  Progress per Plan of Care and Progress strengthening /stabilization /functional activity           Timed:         Manual Therapy:    16     mins  16764;     Therapeutic Exercise:    25     mins  24082;     Neuromuscular Trista:        mins  02973;    Therapeutic Activity:     18     mins  69419;     Gait Training:           mins  40500;     Ultrasound:           mins  52760;    Ionto                                   mins   26777  Self Care                            mins   46071  Canalith Repos         mins 79697      Un-Timed:  Electrical Stimulation:         mins  37279 ( );  Dry Needling          mins self-pay  Traction          mins 69094      Timed Treatment:   59   mins   Total Treatment:     59   mins    Osmany Ko, PT  [place] License: [id number]

## 2025-03-06 ENCOUNTER — TREATMENT (OUTPATIENT)
Dept: PHYSICAL THERAPY | Facility: CLINIC | Age: 70
End: 2025-03-06
Payer: MEDICARE

## 2025-03-06 DIAGNOSIS — M17.12 OSTEOARTHRITIS OF LEFT KNEE, UNSPECIFIED OSTEOARTHRITIS TYPE: Primary | ICD-10-CM

## 2025-03-06 DIAGNOSIS — Z47.89 ORTHOPEDIC AFTERCARE: ICD-10-CM

## 2025-03-06 DIAGNOSIS — Z96.652 STATUS POST TOTAL KNEE REPLACEMENT, LEFT: ICD-10-CM

## 2025-03-06 DIAGNOSIS — I25.10 CORONARY ARTERY DISEASE INVOLVING NATIVE HEART WITHOUT ANGINA PECTORIS, UNSPECIFIED VESSEL OR LESION TYPE: ICD-10-CM

## 2025-03-06 NOTE — PROGRESS NOTES
Physical Therapy Daily Treatment Note  [facility] [place] [facility] [place]  [address]  [facility] [address]      Patient: Sarah Howard   : 1955  Diagnosis/ICD-10 Code:  Osteoarthritis of left knee, unspecified osteoarthritis type [M17.12]  Referring practitioner: Soila Patino, *  Date of Initial Visit: Type: THERAPY  Noted: 2025  Today's Date: 3/6/2025  Patient seen for 9 sessions         Visit Diagnoses:    ICD-10-CM ICD-9-CM   1. Osteoarthritis of left knee, unspecified osteoarthritis type  M17.12 715.96   2. Orthopedic aftercare  Z47.89 V54.9   3. Status post total knee replacement, left  Z96.652 V43.65       Subjective   Sarah Howard reports: Walking is becoming so much easier.  It is easier to go sit to stand now.    Objective   OBSERVATION: Patient ambulating with single-point cane even step length.  No signs of antalgic gait.  Does not get a full terminal knee extension during gait.  PALPATION: Still somewhat tender around the patella.  ROM: Left knee extension 0 degrees, flexion 114 degrees  STRENGTH: Quadricep strength 4/5, hip extension strength 4+/5.  OTHER: Surgical wound closing, edema  decreasing.    See Exercise, Manual, and Modality Logs for complete treatment.       Assessment/Plan  Status post left knee arthroplasty.  Knee flexion range of motion continues to improve.  120s her biggest goal and she appears to be heading towards that.  Gait is much improved.  Will continue with current treatment plan progressing as tolerated.  Progress per Plan of Care and Progress strengthening /stabilization /functional activity           Timed:         Manual Therapy:    20     mins  80311;     Therapeutic Exercise:    25     mins  21886;     Neuromuscular Trista:        mins  55899;    Therapeutic Activity:     10     mins  08741;     Gait Training:           mins  39841;     Ultrasound:          mins  15965;    Ionto                                   mins    08322  Self Care                            mins   48240  Canalith Repos         mins 59470      Un-Timed:  Electrical Stimulation:         mins  12216 ( );  Dry Needling          mins self-pay  Traction          mins 54174      Timed Treatment:   55   mins   Total Treatment:     55   mins    Osmany Ko, PT  [place] License: [id number]

## 2025-03-07 RX ORDER — METOPROLOL SUCCINATE 25 MG/1
25 TABLET, EXTENDED RELEASE ORAL DAILY
Qty: 90 TABLET | Refills: 1 | Status: SHIPPED | OUTPATIENT
Start: 2025-03-07

## 2025-03-10 ENCOUNTER — TELEMEDICINE (OUTPATIENT)
Dept: PSYCHIATRY | Facility: CLINIC | Age: 70
End: 2025-03-10
Payer: MEDICARE

## 2025-03-10 DIAGNOSIS — F33.1 MAJOR DEPRESSIVE DISORDER, RECURRENT EPISODE, MODERATE: Primary | ICD-10-CM

## 2025-03-10 DIAGNOSIS — F41.1 GENERALIZED ANXIETY DISORDER: ICD-10-CM

## 2025-03-10 PROCEDURE — 90834 PSYTX W PT 45 MINUTES: CPT | Performed by: SOCIAL WORKER

## 2025-03-10 NOTE — PROGRESS NOTES
Date: March 10, 2025  Time In: 14:31 EDT  Time out: 15:14 EDT      This provider is located at [facility], [facility] [place], [place], [facility], using a secure SnapYetihart Video Visit through [employer]. Patient is being seen remotely via telehealth at their home address is located in [place]. Patient stated they are in a secure environment for this session. The patient's condition being diagnosed and treated is appropriate for telemedicine. The provider identified themself as well as their credentials. The patient, or  patient's legal guardian consent to be seen remotely, and when consent is given they understand that the consent allows for patient identifiable information to be sent to a third party as needed. They may refuse to be seen remotely at any time. The electronic data is encrypted and password protected, and the patient's or  legal guardian has been advised of the potential risks to privacy not withstanding such measures.   PT Identifiers used: Name and .    You have chosen to receive care through a telehealth visit.  Do you consent to use a video/audio connection for your medical care today? Yes     Subjective   Sarah Howard is a 69 y.o. female who presents today for follow up    Chief Complaint: Depression, and anxiety      History of Present Illness: Client discussed how things have been since last session.  She has had her knee surgery since the last session.  She ended up being in the hospital for 4 days following the surgery because they had a hard time getting her pain under control. She has been going to Physical Therapy for that. She did graduate from a walker to a cane. She states that the recovery has been harder than she thought. Client continuing to work on boundaries and setting them       Clinical Maneuvering/Intervention:    Sleep:  disrupted    Appetite:  No current changes reported at this time      Assisted patient in processing above session content;  acknowledged and normalized patient’s thoughts, feelings, and concerns.  Rationalized patient thought process regarding concerns presented at session.  Discussed triggers associated with patient's  anxiety  and depression  Also discussed coping skills for patient to implement such as self care  and positive self talk     Allowed patient to freely discuss issues without interruption or judgment. Provided safe, confidential environment to facilitate the development of positive therapeutic relationship and encourage open, honest communication. Assisted patient in identifying risk factors which would indicate the need for higher level of care including thoughts to harm self or others and/or self-harming behavior and encouraged patient to contact this office, call 911, or present to the nearest emergency room should any of these events occur. Discussed crisis intervention services and means to access. Patient adamantly and convincingly denies current suicidal or homicidal ideation or perceptual disturbance.    Assessment:     Patient appears to maintain relative stability as compared to their baseline.  However, patient continues to struggle with Anxiety and depression  which continues to cause impairment in important areas of functioning.  A result, they can be reasonably expected to continue to benefit from treatment and would likely be at increased risk for decompensation otherwise.         Mental Status Exam:   Hygiene:   good  Cooperation:  Cooperative  Eye Contact:  Good  Psychomotor Behavior:  Appropriate  Affect:  Restricted  Mood: normal  Speech:  Normal  Thought Process:  Goal directed and Linear  Thought Content:  Mood congruent  Suicidal:  None  Homicidal:  None  Hallucinations:  None  Delusion:  None  Memory:   No current changes  Orientation:  Person, Place, Time, and Situation  Reliability:  good  Insight:  Good  Judgement:  Good  Impulse Control:  Good  Physical/Medical Issues:   Recovery from knee  surgery        Patient's Support Network Includes:  , daughter, son, and extended family    Functional Status: Moderate impairment     Progress toward goal: Not at goal    Prognosis: Fair with Ongoing Treatment         Plan:    Patient will continue in individual outpatient therapy with focus on improved functioning and coping skills, maintaining stability, and avoiding decompensation and the need for higher level of care.    Patient will adhere to medication regimen as prescribed and report any side effects. Patient will contact this office, call 911 or present to the nearest emergency room should suicidal or homicidal ideations occur. Provide Cognitive Behavioral Therapy and Solution Focused Therapy to improve functioning, maintain stability, and avoid decompensation and the need for higher level of care.     Return in about 1 month (around 4/10/2025).      VISIT DIAGNOSIS:    Diagnosis Plan   1. Major depressive disorder, recurrent episode, moderate        2. Generalized anxiety disorder               This document has been electronically signed by Chanelle Goodson LCSW  March 10, 2025      Part of this note may be an electronic transcription/translation of spoken language to printed text using the Dragon Dictation System.

## 2025-03-11 ENCOUNTER — TREATMENT (OUTPATIENT)
Dept: PHYSICAL THERAPY | Facility: CLINIC | Age: 70
End: 2025-03-11
Payer: MEDICARE

## 2025-03-11 DIAGNOSIS — M17.12 OSTEOARTHRITIS OF LEFT KNEE, UNSPECIFIED OSTEOARTHRITIS TYPE: Primary | ICD-10-CM

## 2025-03-11 DIAGNOSIS — Z96.652 STATUS POST TOTAL KNEE REPLACEMENT, LEFT: ICD-10-CM

## 2025-03-11 DIAGNOSIS — Z47.89 ORTHOPEDIC AFTERCARE: ICD-10-CM

## 2025-03-11 NOTE — LETTER
Progress Note  3/11/2025  Praful Baker MD    Re: Sarah Ashley Howard  ________________________________________________________________  TO SEE MD FRIDAY 3/14/2025   Sarah Ashley Howard, has attended 10/10 PT sessions.  Treatment has consisted of: AROM/AAROM/PROM, gait, scar and soft tissue mobilization, strengthening, HEP.     S:  Sarah Ashley Howard states: Improving, Left knee still hurts most of the time, medication helps a rest, but night time is most painful time.   Pain 0/10 at best, 6/10 at worst.    O:  OBSERVATION: ambulating with cane now.  AROM: Extension=0 deg, Wobevcy=535 deg  PROM:  Extension=0 deg, Rulvdln=869 deg  MMT: Left quad=4/5 improving, hamstring= 4+/5 improving.  ACTIVITY TOLERANCE: Able to stay on her feet for longer periods of time.  Sit to stand improved.     A: About 6 weeks postop left knee arthroplasty.  Range of motion improving, strength improving, general function improving.    P: Please advise after your exam.    Thank you for this referral.    Osmany Ko, PT

## 2025-03-11 NOTE — PROGRESS NOTES
Physical Therapy Progress Note/Daily Treatment Note  [facility] [place]  [address]  [facility] [address]      Patient: Sarah Howard   : 1955  Diagnosis/ICD-10 Code:  Osteoarthritis of left knee, unspecified osteoarthritis type [M17.12]  Referring practitioner: Soila Patino, *  Date of Initial Visit: Type: THERAPY  Noted: 2025  Today's Date: 3/11/2025  Patient seen for 10 sessions         Visit Diagnoses:    ICD-10-CM ICD-9-CM   1. Osteoarthritis of left knee, unspecified osteoarthritis type  M17.12 715.96   2. Orthopedic aftercare  Z47.89 V54.9   3. Status post total knee replacement, left  Z96.652 V43.65       Subjective Evaluation    Pain  Current pain ratin  At best pain ratin  At worst pain ratin       Sarah Howard reports: Improving, Left knee still hurts most of the time, medication helps a rest, but night time is most painful time.    Objective          Observations   Left Knee   Positive for adhesive scar and edema.       Palpation   Left   Hypertonic in the vastus lateralis and vastus medialis.   Tenderness of the vastus lateralis and vastus medialis.     Tenderness   Left Knee   Tenderness in the quadriceps tendon.     Active Range of Motion   Left Knee   Flexion: 104 degrees with pain  Extension: 0 degrees     Passive Range of Motion   Left Knee   Flexion: 109 degrees with pain  Extension: 0 degrees     Patellar Static Positioning   Left Knee: WFL    Strength/Myotome Testing     Left Knee   Flexion: 4+  Extension: 4  Quadriceps contraction: fair    Ambulation   Weight-Bearing Status   Weight-Bearing Status (Left): full weight bearing   Weight-Bearing Status (Right): full weight-bearing    Assistive device used: single point cane    Ambulation: Level Surfaces   Ambulation with assistive device: independent    Observational Gait   Decreased walking speed and stride length.     Functional Assessment     Comments  LEFS:   Improving        See Exercise, Manual, and Modality Logs for complete treatment.       Assessment & Plan       Assessment  Impairments: abnormal or restricted ROM, activity intolerance, impaired balance, impaired physical strength and pain with function   Functional limitations: sleeping, walking, uncomfortable because of pain and standing   Assessment details: 69-year-old active female who is status post left knee arthroplasty due to degenerative joint disease.  She is approximately 5-6 weeks post op.  She has been seen 10 times through today, range of motion is improving but is not at desired flexion yet.  Extension range of motion is doing well.  There are still significant strength deficits and some scar adhesions that need to be addressed.  Prognosis: good    Goals  Plan Goals: Goal progress   4 weeks:    1. Patient to display independence in HEP for knee mobility and strength  MET    2. Patient to display knee ROM 0-110 degrees for mobility for ADL's and gait  NOT MET    3. Patient to ambulate on level surfaces with LRAD without antalgia for functional distances to attend medical appointments and social activities  MET    4. Patient to display good quad tone with SLR without extensor lag x 10 repetitions   MET    12 weeks:    1. Patient to display 1 MCID improvement on LEFS score. NOT MET    2. Patient to ambulate functional distances on even/uneven surfaces with good quad control for ADL's   NOT MET    3. Patient to display > 4/5 MMT strength bilateral LE  NOT MET    4. Patient to display knee ROM 0-130 for good functional mobility with ADL's  NOT MET    Plan  Therapy options: will be seen for skilled therapy services  Planned modality interventions: ultrasound  Planned therapy interventions: ADL retraining, balance/weight-bearing training, body mechanics training, manual therapy, neuromuscular re-education, soft tissue mobilization, flexibility, functional ROM exercises, strengthening, stretching,  therapeutic activities, gait training, home exercise program and joint mobilization  Frequency: 1x week  Duration in weeks: 6                 Timed:         Manual Therapy:    25     mins  43518;     Therapeutic Exercise:    20     mins  91408;     Neuromuscular Trista:        mins  52268;    Therapeutic Activity:     17     mins  06333;     Gait Training:           mins  76608;     Ultrasound:          mins  43515;    Ionto                                   mins   96510  Self Care                            mins   33724  Canalith Repos         mins 52246      Un-Timed:  Electrical Stimulation:         mins  10416 ( );  Dry Needling          mins self-pay  Traction          mins 92603      Timed Treatment:   62   mins   Total Treatment:     62   mins    Osmany Ko, PT  [place] License: [id number]

## 2025-03-14 ENCOUNTER — OFFICE VISIT (OUTPATIENT)
Dept: ORTHOPEDIC SURGERY | Facility: CLINIC | Age: 70
End: 2025-03-14
Payer: MEDICARE

## 2025-03-14 DIAGNOSIS — Z96.652 S/P TKR (TOTAL KNEE REPLACEMENT), LEFT: Primary | ICD-10-CM

## 2025-03-14 PROCEDURE — 1160F RVW MEDS BY RX/DR IN RCRD: CPT | Performed by: ORTHOPAEDIC SURGERY

## 2025-03-14 PROCEDURE — 1159F MED LIST DOCD IN RCRD: CPT | Performed by: ORTHOPAEDIC SURGERY

## 2025-03-14 PROCEDURE — 99024 POSTOP FOLLOW-UP VISIT: CPT | Performed by: ORTHOPAEDIC SURGERY

## 2025-03-14 RX ORDER — KETOCONAZOLE 20 MG/G
CREAM TOPICAL
COMMUNITY
Start: 2024-11-25

## 2025-03-14 NOTE — PROGRESS NOTES
Orthopaedic Clinic Note:  Knee Post Op    Chief Complaint   Patient presents with    Post-op     3 weeks follow up --  6 weeks status post Left total knee arthroplasty (DOS 1/27/2025)        HPI    Ms. Howard is 6  week(s) s/p left total knee arthroplasty. Rates pain 3/10. She is ambulating with a cane and is taking Oxycodone, Motrin/Ibuprofen/Meloxicam/Naproxen/Diclofenac, and Tylenol for pain control. She denies fevers, chills, or constitutional symptoms.  She is continuing outpatient PT. Patient is improving overall.      Past Medical History:   Diagnosis Date    Alcohol abuse     Allergic     Anxiety     Arthritis     Breast cancer     3/2010, right breast    Cancer Breast    2010    Cervical disc disorder ACDF 5/2020    Coronary artery disease 10/2020    Depression     Drug therapy     2010    Fracture, foot     bilateral    GERD (gastroesophageal reflux disease) 2016    Glaucoma 2016    Hard to intubate     Hx of radiation therapy     2010    Hyperlipidemia 2022    Hypertension 2020    Knee swelling     Osteopenia 2009    Suicide attempt 2008    Tear of meniscus of knee     left      Past Surgical History:   Procedure Laterality Date    ABDOMINAL SURGERY  2011    hysterectomy    ADENOIDECTOMY  1972    BACK SURGERY  2018    BREAST BIOPSY Right     3/2010    BREAST LUMPECTOMY Right     x2, 2010    BREAST SURGERY  2 x 2010    CARDIAC CATHETERIZATION  2020    COLONOSCOPY  2018    COSMETIC SURGERY  breast reduction    2010    EYE SURGERY  laser iridotomies    2019    HYSTERECTOMY  2011    JOINT REPLACEMENT  1/ 2025    NECK SURGERY      OOPHORECTOMY      REDUCTION MAMMAPLASTY Bilateral     2010 at time of lumpectomy    SPINE SURGERY  2019    TONSILLECTOMY  1972    TOTAL KNEE ARTHROPLASTY Left 01/27/2025    Procedure: TOTAL KNEE ARTHROPLASTY WITH EMELINA ROBOT LEFT;  Surgeon: Praful Baker MD;  Location: [facility];  Service: Robotics - Ortho;  Laterality: Left;    TUBAL ABDOMINAL LIGATION  1982     Family  History   Problem Relation Age of Onset    Colon cancer Mother 48    Stroke Mother     Thyroid disease Mother     Anxiety disorder Mother     Depression Mother     Cancer Mother     Diabetes Mother     Heart disease Father         mitral valve disease    Heart failure Father     Atrial fibrillation Brother     Alcohol abuse Maternal Grandfather     Breast cancer Neg Hx     Ovarian cancer Neg Hx       Social History     Socioeconomic History    Marital status:    Tobacco Use    Smoking status: Never     Passive exposure: Never    Smokeless tobacco: Never   Vaping Use    Vaping status: Never Used   Substance and Sexual Activity    Alcohol use: Yes     Alcohol/week: 8.0 standard drinks of alcohol     Types: 4 Glasses of wine, 4 Drinks containing 0.5 oz of alcohol per week     Comment: drinking alcohol makes me feel happier    Drug use: Yes     Types: Marijuana     Comment: gummies    Sexual activity: Not Currently     Partners: Male     Birth control/protection: Post-menopausal, Tubal ligation, Hysterectomy      Current Outpatient Medications on File Prior to Visit   Medication Sig Dispense Refill    acetaminophen (TYLENOL) 500 MG tablet Take 2 tablets by mouth EVERY 8 (Eight) Hours 7 days. THEN take only AS NEEDED thereafter 60 tablet 0    atorvastatin (LIPITOR) 20 MG tablet Take 1 tablet by mouth Daily. 90 tablet 0    desvenlafaxine (Pristiq) 100 MG 24 hr tablet Take 1 tablet by mouth Daily. 90 tablet 1    escitalopram (LEXAPRO) 20 MG tablet Take 1 tablet by mouth Daily. 90 tablet 1    fluticasone (FLONASE) 50 MCG/ACT nasal spray Administer 2 sprays into the nostril(s) as directed by provider Daily. 16 g 3    hydrocortisone 1 % cream Apply 1 Application topically to the appropriate area as directed 2 (Two) Times a Day. 45 g 1    ketoconazole (NIZORAL) 2 % cream APPLY TO THE AFFECTED AREA(S) BY TOPICAL ROUTE WICE DAILY UNTIL RESOLUTION THEN 2-3 TIMES A WEEK AS NEEDED. MIX WITH HYDROCORTISONE CREAM       latanoprost (XALATAN) 0.005 % ophthalmic solution       meloxicam (MOBIC) 15 MG tablet 1 PO Daily with food.  Stop if you have upset stomach/stomach irritation. 30 tablet 0    metoprolol succinate XL (Toprol XL) 25 MG 24 hr tablet Take 1 tablet by mouth Daily. 90 tablet 1    olmesartan (BENICAR) 20 MG tablet TAKE 1 TABLET BY MOUTH DAILY 90 tablet 0    oxyCODONE (Roxicodone) 5 MG immediate release tablet Take 1 tablet by mouth Every 4 (Four) Hours As Needed for Moderate Pain. 30 tablet 0    pantoprazole (PROTONIX) 40 MG EC tablet Take 1 tablet by mouth Daily. 90 tablet 3    traZODone (DESYREL) 150 MG tablet Take 1 tablet by mouth Daily. WITH 150 MG TO EQUAL 200 MG 90 tablet 1    traZODone (DESYREL) 50 MG tablet Take 1 tablet by mouth Every Night. WITH 150 MG TO EQUAL 200 MG 90 tablet 1    [DISCONTINUED] aspirin (ASPIR) 81 MG EC tablet Take 1 tablet by mouth 2 (Two) Times a Day. 60 tablet 0    [DISCONTINUED] ropivacaine (NAROPIN) 0.2 % infusion (INFUSYSTEM) 4 mg/hr by Peripheral Nerve route Continuous.       No current facility-administered medications on file prior to visit.      No Known Allergies     Review of Systems   Constitutional: Negative.    HENT: Negative.     Eyes: Negative.    Respiratory: Negative.     Cardiovascular: Negative.    Gastrointestinal: Negative.    Endocrine: Negative.    Genitourinary: Negative.    Musculoskeletal:  Positive for arthralgias.   Skin: Negative.    Allergic/Immunologic: Negative.    Neurological: Negative.    Hematological: Negative.    Psychiatric/Behavioral: Negative.          Physical Exam  There were no vitals taken for this visit.    There is no height or weight on file to calculate BMI.    GENERAL APPEARANCE: awake, alert, oriented, in no acute distress and well developed, well nourished  LUNGS:  breathing nonlabored  EXTREMITIES: no clubbing, cyanosis  PERIPHERAL PULSES: palpable dorsalis pedis and posterior tibial pulses bilaterally.    GAIT:  Normal          Left Knee  Exam:  ----------  ALIGNMENT: neutral  ----------  RANGE OF MOTION:  Decreased (0 - 110 degrees) with no extensor lag  LIGAMENTOUS STABILITY:   stable to varus and valgus stress at terminal extension and 30 degrees without any evidence of laxity  ----------  STRENGTH:  KNEE FLEXION 5/5  KNEE EXTENSION  5/5  ANKLE DORSIFLEXION  5/5  ANKLE PLANTARFLEXION  5/5  ----------  PAIN WITH PALPATION:denies tenderness to palpation about the knee  KNEE EFFUSION: yes, trace effusion  PAIN WITH KNEE ROM: no  PATELLAR CREPITUS:  no  ----------  SENSATION TO LIGHT TOUCH:  DEEP PERONEAL/SUPERFICIAL PERONEAL/SURAL/SAPHENOUS/TIBIAL:    intact  ----------  EDEMA:  no  ERYTHEMA:    no  WOUNDS/INCISIONS:   yes, well healed surgical incision without evidence of erythema or drainage  _____________________________________________________________________  _____________________________________________________________________    RADIOGRAPHIC FINDINGS:   Indication: Status post left total knee arthroplasty    Comparison: Todays xrays were compared to previous xrays from 2/18/2025    Knee films: Demonstrate well positioned knee arthroplasty components in satisfactory alignment without evidence of wear, loosening, subsidence, fracture, or osteolysis and No significant changes compared to prior radiographs.       Assessment/Plan:   Diagnosis Plan   1. S/P TKR (total knee replacement), left  XR Knee 3+ View With Garibaldi Left        Patient is doing well 6 weeks status post left total knee arthroplasty.  I encouraged her to continue working on range of motion exercises to achieve terminal knee flexion of 120 degrees.  I will refill her oxycodone.  She is to wean off of this and transition to meloxicam and Tylenol as her primary means of pain control.  I discussed that her inflammation is primary source of her discomfort and this will improve once the level of aggressiveness and therapy can decrease after she has achieved the goal of 120 degrees of  flexion.  She expresses understanding.  I will see her back in 2 months for repeat evaluation with x-ray 3 views of her left knee on return.  Patient is welcome follow-up sooner should problems arise.    Praful Baker MD  03/14/25  12:09 EDT  Answers submitted by the patient for this visit:  Post Operative Visit (Submitted on 3/10/2025)  Chief Complaint: Follow-up  Pain Control: partially controlled  Fever: no fever  Diet: adequate intake  Activity: impaired due to pain  Operative Site Issues: No

## 2025-03-17 ENCOUNTER — TELEPHONE (OUTPATIENT)
Dept: ORTHOPEDIC SURGERY | Facility: CLINIC | Age: 70
End: 2025-03-17
Payer: MEDICARE

## 2025-03-17 DIAGNOSIS — Z96.652 S/P TKR (TOTAL KNEE REPLACEMENT), LEFT: ICD-10-CM

## 2025-03-17 RX ORDER — OXYCODONE HYDROCHLORIDE 5 MG/1
5 TABLET ORAL EVERY 4 HOURS PRN
Qty: 30 TABLET | Refills: 0 | Status: SHIPPED | OUTPATIENT
Start: 2025-03-17

## 2025-03-17 NOTE — TELEPHONE ENCOUNTER
Patient called stating she is needing a refill of her oxyCODONE medication and needs to be sent to [facility] [id number] - [place], [place] - [address] AT [facility] [address] - [phone]  - [phone] FX     Please advise, thank you

## 2025-03-19 ENCOUNTER — TREATMENT (OUTPATIENT)
Dept: PHYSICAL THERAPY | Facility: CLINIC | Age: 70
End: 2025-03-19
Payer: MEDICARE

## 2025-03-19 DIAGNOSIS — M17.12 OSTEOARTHRITIS OF LEFT KNEE, UNSPECIFIED OSTEOARTHRITIS TYPE: Primary | ICD-10-CM

## 2025-03-19 DIAGNOSIS — Z96.652 STATUS POST TOTAL KNEE REPLACEMENT, LEFT: ICD-10-CM

## 2025-03-19 DIAGNOSIS — Z47.89 ORTHOPEDIC AFTERCARE: ICD-10-CM

## 2025-03-22 NOTE — PROGRESS NOTES
Physical Therapy Daily Treatment Note  [facility] [place]  [address]  [facility] [address]      Patient: Sarah Howard   : 1955  Diagnosis/ICD-10 Code:  Osteoarthritis of left knee, unspecified osteoarthritis type [M17.12]  Referring practitioner: Soila Patino, *  Date of Initial Visit: Type: THERAPY  Noted: 2025  Today's Date: 3/19/2025  Patient seen for 11 sessions         Visit Diagnoses:    ICD-10-CM ICD-9-CM   1. Osteoarthritis of left knee, unspecified osteoarthritis type  M17.12 715.96   2. Orthopedic aftercare  Z47.89 V54.9   3. Status post total knee replacement, left  Z96.652 V43.65       Subjective   Sarah Howard reports: Saw the MD.  He would like for her to have slightly better flexion.    Objective   OBSERVATION: Patient ambulating with single-point cane even step length.  Still slight limitation in terminal knee extension.  PALPATION: Minimal tenderness around the patella.  ROM: Left knee extension 0 degrees, flexion 115 degrees  STRENGTH: Quadricep strength 4+/5, hip extension strength 4+/5.    See Exercise, Manual, and Modality Logs for complete treatment.       Assessment/Plan  Knee flexion range of motion best to spend to date.  Very painful at end range but ranges available.  Pleased with the knee extension.  She is independent with all transfers.  Still some weakness in the quadricep and around the hip.  Overall progressing very well for timeframe postsurgery.  Progress per Plan of Care and Progress strengthening /stabilization /functional activity           Timed:         Manual Therapy:    13     mins  39537;     Therapeutic Exercise:    28     mins  85836;     Neuromuscular Trista:        mins  64592;    Therapeutic Activity:     17     mins  93587;     Gait Training:           mins  83630;     Ultrasound:          mins  11887;    Ionto                                   mins   76116  Self Care                            mins    14844  [facility]         mins 16942      Un-Timed:  Electrical Stimulation:         mins  19175 ( );  Dry Needling          mins self-pay  Traction          mins 01605      Timed Treatment:   58   mins   Total Treatment:     58   mins    Osmany Ko, PT  [place] License: [id number]

## 2025-03-26 ENCOUNTER — TREATMENT (OUTPATIENT)
Dept: PHYSICAL THERAPY | Facility: CLINIC | Age: 70
End: 2025-03-26
Payer: MEDICARE

## 2025-03-26 DIAGNOSIS — Z47.89 ORTHOPEDIC AFTERCARE: ICD-10-CM

## 2025-03-26 DIAGNOSIS — M17.12 OSTEOARTHRITIS OF LEFT KNEE, UNSPECIFIED OSTEOARTHRITIS TYPE: Primary | ICD-10-CM

## 2025-03-26 DIAGNOSIS — Z96.652 STATUS POST TOTAL KNEE REPLACEMENT, LEFT: ICD-10-CM

## 2025-03-26 NOTE — PROGRESS NOTES
Physical Therapy Daily Treatment Note  [facility] [place]  [address]  [facility] [address]      Patient: Sarah Howard   : 1955  Diagnosis/ICD-10 Code:  Osteoarthritis of left knee, unspecified osteoarthritis type [M17.12]  Referring practitioner: Soila Patino, *  Date of Initial Visit: Type: THERAPY  Noted: 2025  Today's Date: 3/26/2025  Patient seen for 12 sessions         Visit Diagnoses:    ICD-10-CM ICD-9-CM   1. Osteoarthritis of left knee, unspecified osteoarthritis type  M17.12 715.96   2. Orthopedic aftercare  Z47.89 V54.9   3. Status post total knee replacement, left  Z96.652 V43.65       Subjective   Sarah Howard reports:      Objective          Active Range of Motion   Left Knee   Flexion: 113 degrees   Extension: 0 degrees     Passive Range of Motion   Left Knee   Flexion: 118 degrees with pain  Extension: 0 degrees     Ambulation   Weight-Bearing Status   Weight-Bearing Status (Left): full weight bearing   Weight-Bearing Status (Right): full weight-bearing    Assistive device used: single point cane    Observational Gait   Gait: within functional limits         See Exercise, Manual, and Modality Logs for complete treatment.       Assessment/Plan  Status post left knee arthroplasty.  Passive range of motion is almost within normal limits now for what we want postsurgically.  Active range of motion still has some limitations.  Her strength is progressing slight limitation still present that needs to be addressed.  Progress per Plan of Care and Progress strengthening /stabilization /functional activity           Timed:         Manual Therapy:    15     mins  99899;     Therapeutic Exercise:    25     mins  40746;     Neuromuscular Trista:        mins  46374;    Therapeutic Activity:     16     mins  83570;     Gait Training:           mins  99645;     Ultrasound:          mins  77162;    Ionto                                   mins    69122  Self Care                            mins   70233  Canalith Repos         mins 66896      Un-Timed:  Electrical Stimulation:         mins  46047 ( );  Dry Needling          mins self-pay  Traction          mins 41056      Timed Treatment:   56   mins   Total Treatment:     56   mins    Osmany Ko, PT  [place] License: [id number]

## 2025-03-30 DIAGNOSIS — I10 PRIMARY HYPERTENSION: ICD-10-CM

## 2025-03-31 RX ORDER — OLMESARTAN MEDOXOMIL 20 MG/1
20 TABLET ORAL DAILY
Qty: 90 TABLET | Refills: 0 | Status: SHIPPED | OUTPATIENT
Start: 2025-03-31

## 2025-04-02 ENCOUNTER — TREATMENT (OUTPATIENT)
Dept: PHYSICAL THERAPY | Facility: CLINIC | Age: 70
End: 2025-04-02
Payer: MEDICARE

## 2025-04-02 DIAGNOSIS — Z96.652 STATUS POST TOTAL KNEE REPLACEMENT, LEFT: ICD-10-CM

## 2025-04-02 DIAGNOSIS — M17.12 OSTEOARTHRITIS OF LEFT KNEE, UNSPECIFIED OSTEOARTHRITIS TYPE: Primary | ICD-10-CM

## 2025-04-02 DIAGNOSIS — Z47.89 ORTHOPEDIC AFTERCARE: ICD-10-CM

## 2025-04-02 NOTE — PROGRESS NOTES
Physical Therapy Daily Treatment Note  [facility] [place]  [address], [address]  [facility] [address]      Patient: Sarah Howard   : 1955  Diagnosis/ICD-10 Code:  Osteoarthritis of left knee, unspecified osteoarthritis type [M17.12]  Referring practitioner: Soila Patino, *  Date of Initial Visit: Type: THERAPY  Noted: 2025  Today's Date: 2025  Patient seen for 13 sessions         Visit Diagnoses:    ICD-10-CM ICD-9-CM   1. Osteoarthritis of left knee, unspecified osteoarthritis type  M17.12 715.96   2. Orthopedic aftercare  Z47.89 V54.9   3. Status post total knee replacement, left  Z96.652 V43.65       Subjective   Sarah Howard reports: knee has been very sore and painful, especially at night. Not taking any medication    Objective          Active Range of Motion   Left Knee   Flexion: 105 degrees   Extension: 0 degrees     Passive Range of Motion   Left Knee   Flexion: 120 degrees   Extension: 0 degrees     Ambulation   Weight-Bearing Status   Weight-Bearing Status (Left): full weight bearing   Weight-Bearing Status (Right): full weight-bearing    Assistive device used: single point cane    Observational Gait   Gait: antalgic         See Exercise, Manual, and Modality Logs for complete treatment.       Assessment/Plan  S/P left knee arthroplasty. ROM is painful but progressing well.  She should be OK to take OTC NSAIDS, this may help with her discomfort.  Progress per Plan of Care and Progress strengthening /stabilization /functional activity           Timed:         Manual Therapy:    15     mins  11069;     Therapeutic Exercise:    25     mins  46074;     Neuromuscular Trista:        mins  48496;    Therapeutic Activity:     16     mins  61391;     Gait Training:           mins  43295;     Ultrasound:          mins  96445;    Ionto                                   mins   53403  Self Care                            mins   63722  Canalith Repos         mins  93677      Un-Timed:  Electrical Stimulation:         mins  55288 ( );  Dry Needling          mins self-pay  Traction          mins 63854      Timed Treatment:   56   mins   Total Treatment:     56   mins    Osmany Ko, PT  [place] License: [id number]

## 2025-04-09 ENCOUNTER — TREATMENT (OUTPATIENT)
Dept: PHYSICAL THERAPY | Facility: CLINIC | Age: 70
End: 2025-04-09
Payer: MEDICARE

## 2025-04-09 DIAGNOSIS — M17.12 OSTEOARTHRITIS OF LEFT KNEE, UNSPECIFIED OSTEOARTHRITIS TYPE: Primary | ICD-10-CM

## 2025-04-09 DIAGNOSIS — Z47.89 ORTHOPEDIC AFTERCARE: ICD-10-CM

## 2025-04-09 DIAGNOSIS — Z96.652 STATUS POST TOTAL KNEE REPLACEMENT, LEFT: ICD-10-CM

## 2025-04-11 NOTE — PROGRESS NOTES
Physical Therapy Daily Treatment Note  [facility] [place]  [address], [address]  [place] [facility] [address]      Patient: Sarah Howard   : 1955  Diagnosis/ICD-10 Code:  Osteoarthritis of left knee, unspecified osteoarthritis type [M17.12]  Referring practitioner: Soila Patino, *  Date of Initial Visit: Type: THERAPY  Noted: 2025  Today's Date: 2025  Patient seen for 14 sessions         Visit Diagnoses:    ICD-10-CM ICD-9-CM   1. Osteoarthritis of left knee, unspecified osteoarthritis type  M17.12 715.96   2. Orthopedic aftercare  Z47.89 V54.9   3. Status post total knee replacement, left  Z96.652 V43.65       Subjective   Sarah Howard reports: Knee still little tight at times but overall improving.  Driving herself, getting around the house as needed.    Objective   OBSERVATION: Patient ambulating without any assistive devices today.  Slightly antalgic, avoiding terminal knee extension at heel strike.  PALPATION: Minimal tenderness around the anterior knee.  ROM: AROM  Flexion: 105 degrees   Extension: 0 degrees   PROM  Flexion: 120 degrees   Extension: 0 degrees   STRENGTH: quad 4+/5, hamstring 5-/5      See Exercise, Manual, and Modality Logs for complete treatment.       Assessment/Plan  Patient is progressing well through rehab.  Her endrange pain is still significant into flexion.  The flexion range of motion is available but uncomfortable for the patient.  Her balance is much improved, able to ambulate without assistive devices slightly abnormal gait, this should improve with time.  Progress per Plan of Care and Progress strengthening /stabilization /functional activity           Timed:         Manual Therapy:         mins  10897;     Therapeutic Exercise:    29     mins  33588;     Neuromuscular Trista:        mins  13729;    Therapeutic Activity:     25     mins  08835;     Gait Training:           mins  46826;     Ultrasound:          mins  70662;    Ionto                                    mins   52981  Self Care                            mins   86820  Canalith Repos         mins 92752      Un-Timed:  Electrical Stimulation:         mins  31827 ( );  Dry Needling          mins self-pay  Traction          mins 95771      Timed Treatment:   54   mins   Total Treatment:     54   mins    Osmany Ko, PT  [place] License: [id number]

## 2025-04-14 ENCOUNTER — TREATMENT (OUTPATIENT)
Dept: PHYSICAL THERAPY | Facility: CLINIC | Age: 70
End: 2025-04-14
Payer: MEDICARE

## 2025-04-14 DIAGNOSIS — M17.12 OSTEOARTHRITIS OF LEFT KNEE, UNSPECIFIED OSTEOARTHRITIS TYPE: Primary | ICD-10-CM

## 2025-04-14 DIAGNOSIS — Z96.652 STATUS POST TOTAL KNEE REPLACEMENT, LEFT: ICD-10-CM

## 2025-04-14 DIAGNOSIS — Z47.89 ORTHOPEDIC AFTERCARE: ICD-10-CM

## 2025-04-14 NOTE — PROGRESS NOTES
Physical Therapy Daily Treatment Note  [facility] [place]  [address]  [facility] [address]      Patient: Sarah Howard   : 1955  Diagnosis/ICD-10 Code:  Osteoarthritis of left knee, unspecified osteoarthritis type [M17.12]  Referring practitioner: Soila Patino, *  Date of Initial Visit: Type: THERAPY  Noted: 2025  Today's Date: 2025  Patient seen for 15 sessions         Visit Diagnoses:    ICD-10-CM ICD-9-CM   1. Osteoarthritis of left knee, unspecified osteoarthritis type  M17.12 715.96   2. Orthopedic aftercare  Z47.89 V54.9   3. Status post total knee replacement, left  Z96.652 V43.65       Subjective   Sarah Howard reports: Knee was doing some better, went walking around the mall with the weekend and then went to [employer] yesterday.  By yesterday afternoon he pain with significant anterior and lateral portions of the knee.  Today the pain is the same way.  It is very stiff.  She expresses frustration with the ease of setbacks.    Objective   OBSERVATION: Ambulating without assistive devices.  There does appear to be edema left lateral knee.  PALPATION: Moderately tender around the joint line area of the lateral knee and into the IT band.  Moderate plus tenderness along the superior portion of the patella midline.  ROM: Left knee extension 0 degrees, flexion 118 degrees.  STRENGTH: Quad strength 5-/5.    See Exercise, Manual, and Modality Logs for complete treatment.       Assessment/Plan  Patient has had a setback of mild proportion.  She could have spent extended period time on concrete which may have irritated the soft tissue structures around the knee.  Prolonged time on concrete can take up to 6 months to adjust to ambulating on concrete surfaces.  Progress per Plan of Care and Progress strengthening /stabilization /functional activity           Timed:         Manual Therapy:    35     mins  73052;     Therapeutic Exercise:    15     mins   02941;     Neuromuscular Trista:        mins  57278;    Therapeutic Activity:          mins  45747;     Gait Training:           mins  73305;     Ultrasound:          mins  79194;    Ionto                                   mins   55137  Self Care                            mins   70056  Canalith Repos         mins 40248      Un-Timed:  Electrical Stimulation:    20     mins  78283 ( );  Dry Needling          mins self-pay  Traction          mins 97522      Timed Treatment:   50   mins   Total Treatment:     70   mins    Osmany Ko, PT  [place] License: [id number]

## 2025-04-28 ENCOUNTER — TELEMEDICINE (OUTPATIENT)
Dept: PSYCHIATRY | Facility: CLINIC | Age: 70
End: 2025-04-28
Payer: MEDICARE

## 2025-04-28 ENCOUNTER — TREATMENT (OUTPATIENT)
Dept: PHYSICAL THERAPY | Facility: CLINIC | Age: 70
End: 2025-04-28
Payer: MEDICARE

## 2025-04-28 DIAGNOSIS — F51.04 PSYCHOPHYSIOLOGICAL INSOMNIA: ICD-10-CM

## 2025-04-28 DIAGNOSIS — Z96.652 STATUS POST TOTAL KNEE REPLACEMENT, LEFT: ICD-10-CM

## 2025-04-28 DIAGNOSIS — F41.1 GENERALIZED ANXIETY DISORDER: ICD-10-CM

## 2025-04-28 DIAGNOSIS — M17.12 OSTEOARTHRITIS OF LEFT KNEE, UNSPECIFIED OSTEOARTHRITIS TYPE: Primary | ICD-10-CM

## 2025-04-28 DIAGNOSIS — F33.1 MAJOR DEPRESSIVE DISORDER, RECURRENT EPISODE, MODERATE: Primary | ICD-10-CM

## 2025-04-28 DIAGNOSIS — Z47.89 ORTHOPEDIC AFTERCARE: ICD-10-CM

## 2025-04-28 PROCEDURE — 1159F MED LIST DOCD IN RCRD: CPT | Performed by: NURSE PRACTITIONER

## 2025-04-28 PROCEDURE — 97140 MANUAL THERAPY 1/> REGIONS: CPT | Performed by: PHYSICAL THERAPIST

## 2025-04-28 PROCEDURE — 1160F RVW MEDS BY RX/DR IN RCRD: CPT | Performed by: NURSE PRACTITIONER

## 2025-04-28 PROCEDURE — 99214 OFFICE O/P EST MOD 30 MIN: CPT | Performed by: NURSE PRACTITIONER

## 2025-04-28 PROCEDURE — 97110 THERAPEUTIC EXERCISES: CPT | Performed by: PHYSICAL THERAPIST

## 2025-04-28 PROCEDURE — 96127 BRIEF EMOTIONAL/BEHAV ASSMT: CPT | Performed by: NURSE PRACTITIONER

## 2025-04-28 RX ORDER — VILAZODONE HYDROCHLORIDE 20 MG/1
20 TABLET ORAL DAILY
Qty: 30 TABLET | Refills: 1 | Status: SHIPPED | OUTPATIENT
Start: 2025-04-28

## 2025-04-28 NOTE — PROGRESS NOTES
Patient Name: Sarah Howard  MRN: [id number]   :  1955     This provider is located at her home office through the [facility] (through [facility]), [address], [address] using a secure [employer] Video Visit through [employer]. Patient is being seen remotely via telehealth at their home address in [place], and stated they are in a secure environment for this session. The patient's condition being diagnosed/treated is appropriate for telemedicine. The provider identified herself as well as her credentials.   The patient, and/or patients guardian, consent to be seen remotely, and when consent is given they understand that the consent allows for patient identifiable information to be sent to a third party as needed.   They may refuse to be seen remotely at any time. The electronic data is encrypted and password protected, and the patient and/or guardian has been advised of the potential risks to privacy not withstanding such measures.    You have chosen to receive care through a telehealth visit.  Do you consent to use a video/audio connection for your medical care today? Yes    Chief Complaint:      ICD-10-CM ICD-9-CM   1. Major depressive disorder, recurrent episode, moderate  F33.1 296.32   2. Generalized anxiety disorder  F41.1 300.02   3. Psychophysiological insomnia  F51.04 307.42       History of Present Illness: Sarah Howard is a 69 y.o. female is here today for medication management follow up.  Patient not doing well.  Continues to have a lot of knee pain after surgery.  Patient states depression is much worse.  Crying a lot.  Not always keeping up with hygiene.  Cannot sleep in her bedroom.  Has to sleep in a recliner in the living room.    The following portions of the patient's history were reviewed and updated as appropriate: allergies, current medications, past family history, past medical history, past social history, past surgical  history, and problem list.    Review of Systems;;  Review of Systems   Constitutional:  Negative for activity change, appetite change, fatigue, unexpected weight gain and unexpected weight loss.   Respiratory:  Negative for shortness of breath and wheezing.    Gastrointestinal:  Negative for constipation, diarrhea, nausea and vomiting.   Musculoskeletal:  Positive for myalgias. Negative for gait problem.        Pain in left knee. Had surgery.   Skin:  Negative for dry skin and rash.   Neurological:  Negative for dizziness, speech difficulty, weakness, light-headedness, headache, memory problem and confusion.   Psychiatric/Behavioral:  Positive for dysphoric mood, sleep disturbance, depressed mood and stress. Negative for agitation, behavioral problems, decreased concentration, hallucinations, self-injury, suicidal ideas and negative for hyperactivity. The patient is not nervous/anxious.        Physical Exam;;  Physical Exam  Constitutional:       General: She is not in acute distress.     Appearance: Normal appearance. She is well-developed. She is not diaphoretic.   HENT:      Head: Normocephalic and atraumatic.   Eyes:      Conjunctiva/sclera: Conjunctivae normal.   Pulmonary:      Effort: Pulmonary effort is normal. No respiratory distress.   Musculoskeletal:         General: Normal range of motion.      Cervical back: Full passive range of motion without pain and normal range of motion.   Neurological:      Mental Status: She is alert and oriented to person, place, and time.   Psychiatric:         Mood and Affect: Mood is depressed. Mood is not anxious. Affect is not labile, blunt, angry or inappropriate.         Speech: Speech is not rapid and pressured or tangential.         Behavior: Behavior normal. Behavior is not agitated, slowed, aggressive, withdrawn, hyperactive or combative. Behavior is cooperative.         Thought Content: Thought content normal. Thought content is not paranoid or delusional. Thought  content does not include homicidal or suicidal ideation. Thought content does not include homicidal or suicidal plan.         Judgment: Judgment normal.       There were no vitals taken for this visit.  There is no height or weight on file to calculate BMI. Video appt unable to obtain.    Current Medications;;    Current Outpatient Medications:     acetaminophen (TYLENOL) 500 MG tablet, Take 2 tablets by mouth EVERY 8 (Eight) Hours 7 days. THEN take only AS NEEDED thereafter, Disp: 60 tablet, Rfl: 0    atorvastatin (LIPITOR) 20 MG tablet, Take 1 tablet by mouth Daily., Disp: 90 tablet, Rfl: 0    desvenlafaxine (Pristiq) 100 MG 24 hr tablet, Take 1 tablet by mouth Daily., Disp: 90 tablet, Rfl: 1    fluticasone (FLONASE) 50 MCG/ACT nasal spray, Administer 2 sprays into the nostril(s) as directed by provider Daily., Disp: 16 g, Rfl: 3    hydrocortisone 1 % cream, Apply 1 Application topically to the appropriate area as directed 2 (Two) Times a Day., Disp: 45 g, Rfl: 1    ketoconazole (NIZORAL) 2 % cream, APPLY TO THE AFFECTED AREA(S) BY TOPICAL ROUTE WICE DAILY UNTIL RESOLUTION THEN 2-3 TIMES A WEEK AS NEEDED. MIX WITH HYDROCORTISONE CREAM, Disp: , Rfl:     latanoprost (XALATAN) 0.005 % ophthalmic solution, , Disp: , Rfl:     meloxicam (MOBIC) 15 MG tablet, 1 PO Daily with food.  Stop if you have upset stomach/stomach irritation., Disp: 30 tablet, Rfl: 0    metoprolol succinate XL (Toprol XL) 25 MG 24 hr tablet, Take 1 tablet by mouth Daily., Disp: 90 tablet, Rfl: 1    olmesartan (BENICAR) 20 MG tablet, TAKE 1 TABLET BY MOUTH DAILY, Disp: 90 tablet, Rfl: 0    oxyCODONE (Roxicodone) 5 MG immediate release tablet, Take 1 tablet by mouth Every 4 (Four) Hours As Needed for Moderate Pain., Disp: 30 tablet, Rfl: 0    pantoprazole (PROTONIX) 40 MG EC tablet, Take 1 tablet by mouth Daily., Disp: 90 tablet, Rfl: 3    traZODone (DESYREL) 150 MG tablet, Take 1 tablet by mouth Daily. WITH 150 MG TO EQUAL 200 MG, Disp: 90 tablet,  Rfl: 1    traZODone (DESYREL) 50 MG tablet, Take 1 tablet by mouth Every Night. WITH 150 MG TO EQUAL 200 MG, Disp: 90 tablet, Rfl: 1    vilazodone (Viibryd) 20 MG tablet tablet, Take 1 tablet by mouth Daily. Take po q am with food., Disp: 30 tablet, Rfl: 1    Lab Results:   Admission on 01/27/2025, Discharged on 01/30/2025   Component Date Value Ref Range Status    Glucose 01/27/2025 78  70 - 130 mg/dL Final    Glucose 01/28/2025 183 (H)  65 - 99 mg/dL Final    BUN 01/28/2025 16  8 - 23 mg/dL Final    Creatinine 01/28/2025 0.99  0.57 - 1.00 mg/dL Final    Sodium 01/28/2025 140  136 - 145 mmol/L Final    Potassium 01/28/2025 4.2  3.5 - 5.2 mmol/L Final    Chloride 01/28/2025 107  98 - 107 mmol/L Final    CO2 01/28/2025 23.0  22.0 - 29.0 mmol/L Final    Calcium 01/28/2025 8.8  8.6 - 10.5 mg/dL Final    BUN/Creatinine Ratio 01/28/2025 16.2  7.0 - 25.0 Final    Anion Gap 01/28/2025 10.0  5.0 - 15.0 mmol/L Final    eGFR 01/28/2025 61.5  >60.0 mL/min/1.73 Final    Hemoglobin 01/28/2025 11.6 (L)  12.0 - 15.9 g/dL Final    Hematocrit 01/28/2025 36.6  34.0 - 46.6 % Final    Glucose 01/29/2025 130 (H)  65 - 99 mg/dL Final    BUN 01/29/2025 19  8 - 23 mg/dL Final    Creatinine 01/29/2025 0.89  0.57 - 1.00 mg/dL Final    Sodium 01/29/2025 141  136 - 145 mmol/L Final    Potassium 01/29/2025 4.4  3.5 - 5.2 mmol/L Final    Chloride 01/29/2025 108 (H)  98 - 107 mmol/L Final    CO2 01/29/2025 23.0  22.0 - 29.0 mmol/L Final    Calcium 01/29/2025 8.5 (L)  8.6 - 10.5 mg/dL Final    BUN/Creatinine Ratio 01/29/2025 21.3  7.0 - 25.0 Final    Anion Gap 01/29/2025 10.0  5.0 - 15.0 mmol/L Final    eGFR 01/29/2025 69.8  >60.0 mL/min/1.73 Final    WBC 01/29/2025 10.70  3.40 - 10.80 10*3/mm3 Final    RBC 01/29/2025 3.28 (L)  3.77 - 5.28 10*6/mm3 Final    Hemoglobin 01/29/2025 10.8 (L)  12.0 - 15.9 g/dL Final    Hematocrit 01/29/2025 34.3  34.0 - 46.6 % Final    MCV 01/29/2025 104.6 (H)  79.0 - 97.0 fL Final    MCH 01/29/2025 32.9  26.6 - 33.0  pg Final    MCHC 01/29/2025 31.5  31.5 - 35.7 g/dL Final    RDW 01/29/2025 11.9 (L)  12.3 - 15.4 % Final    RDW-SD 01/29/2025 45.4  37.0 - 54.0 fl Final    MPV 01/29/2025 12.8 (H)  6.0 - 12.0 fL Final    Platelets 01/29/2025 166  140 - 450 10*3/mm3 Final    Neutrophil % 01/29/2025 49.5  42.7 - 76.0 % Final    Lymphocyte % 01/29/2025 34.1  19.6 - 45.3 % Final    Monocyte % 01/29/2025 14.2 (H)  5.0 - 12.0 % Final    Eosinophil % 01/29/2025 1.5  0.3 - 6.2 % Final    Basophil % 01/29/2025 0.4  0.0 - 1.5 % Final    Immature Grans % 01/29/2025 0.3  0.0 - 0.5 % Final    Neutrophils, Absolute 01/29/2025 5.30  1.70 - 7.00 10*3/mm3 Final    Lymphocytes, Absolute 01/29/2025 3.65 (H)  0.70 - 3.10 10*3/mm3 Final    Monocytes, Absolute 01/29/2025 1.52 (H)  0.10 - 0.90 10*3/mm3 Final    Eosinophils, Absolute 01/29/2025 0.16  0.00 - 0.40 10*3/mm3 Final    Basophils, Absolute 01/29/2025 0.04  0.00 - 0.20 10*3/mm3 Final    Immature Grans, Absolute 01/29/2025 0.03  0.00 - 0.05 10*3/mm3 Final    nRBC 01/29/2025 0.0  0.0 - 0.2 /100 WBC Final       Mental Status Exam:   Hygiene:   good  Cooperation:  Cooperative  Eye Contact:  Good  Psychomotor Behavior:  Restless  Mood:depressed, dysthymic, and sad  Affect:  Appropriate  Hopelessness: Denies  Speech:  Normal  Thought Process:  Goal directed  Thought Content:  Normal  Suicidal:  None  Homicidal:  None  Hallucinations:  None  Delusion:  None  Memory:  Intact  Orientation:  Person, Place, Time, and Situation  Reliability:  good  Insight:  Good  Judgement:  Good  Impulse Control:  Good    PHQ-9 Depression Screening  Little interest or pleasure in doing things? (Patient-Rptd) Over half   Feeling down, depressed, or hopeless? (Patient-Rptd) Over half   PHQ-2 Total Score (Patient-Rptd) 4   Trouble falling or staying asleep, or sleeping too much? (Patient-Rptd) Over half   Feeling tired or having little energy? (Patient-Rptd) Over half   Poor appetite or overeating? (Patient-Rptd) Several days    Feeling bad about yourself - or that you are a failure or have let yourself or your family down? (Patient-Rptd) Several days   Trouble concentrating on things, such as reading the newspaper or watching television? (Patient-Rptd) Several days   Moving or speaking so slowly that other people could have noticed? Or the opposite - being so fidgety or restless that you have been moving around a lot more than usual? (Patient-Rptd) Over half   Thoughts that you would be better off dead, or of hurting yourself in some way? (Patient-Rptd) Not at all   PHQ-9 Total Score (Patient-Rptd) 13   If you checked off any problems, how difficult have these problems made it for you to do your work, take care of things at home, or get along with other people? (Patient-Rptd) Very difficult     NEXT UDS DUE: NA      Assessment/Plan:  Diagnoses and all orders for this visit:    1. Major depressive disorder, recurrent episode, moderate (Primary)  -     vilazodone (Viibryd) 20 MG tablet tablet; Take 1 tablet by mouth Daily. Take po q am with food.  Dispense: 30 tablet; Refill: 1    2. Generalized anxiety disorder    3. Psychophysiological insomnia      Patient struggling with an increase in depression.  We will wean from Lexapro and start Viibryd 20 mg every morning.      We discussed risks, benefits,goals and side effects of the above medication and the patient was agreeable with the plan.Patient was educated on the importance of compliance with treatment and follow-up appointments. Patient is aware to contact the [facility] [phone] with any worsening of symptoms. To call for questions or concerns and return early if necessary. Patent is agreeable to go to the Emergency Department or call 911 should they begin SI/HI.     Part of this note may be an electronic transcription/translation of spoken language to printed text using the Dragon Dictation System.    Return in about 4 weeks (around 5/26/2025) for  Follow Up 30 min, Video visit.    Mayuri Covington, APRN

## 2025-04-28 NOTE — PROGRESS NOTES
Physical Therapy Daily Treatment Note  [facility] [place]  [address], [address]  [place] [address]      Patient: Sarah Howard   : 1955  Diagnosis/ICD-10 Code:  Osteoarthritis of left knee, unspecified osteoarthritis type [M17.12]  Referring practitioner: Soila Patino, *  Date of Initial Visit: Type: THERAPY  Noted: 2025  Today's Date: 2025  Patient seen for 16 sessions         Visit Diagnoses:    ICD-10-CM ICD-9-CM   1. Osteoarthritis of left knee, unspecified osteoarthritis type  M17.12 715.96   2. Orthopedic aftercare  Z47.89 V54.9   3. Status post total knee replacement, left  Z96.652 V43.65       Subjective   Sarah Howard reports: Admits that she has not been doing home exercise program daily.  Knee is very stiff in the morning.  As of late has been staying stiff throughout the day.  No pain hip occasional intermittent pain left lateral knee.    Objective          Tenderness     Additional Tenderness Details  No tenderness.    Active Range of Motion   Left Knee   Flexion: 110 degrees   Extension: 0 degrees     Passive Range of Motion   Left Knee   Flexion: 122 degrees   Extension: 0 degrees     Patellar Mobility   Left Knee Patellar tendons within functional limits include the medial, lateral, superior and inferior.     Patellar Static Positioning   Left Knee: WFL    Strength/Myotome Testing     Left Knee   Flexion: 5  Extension: 5  Quadriceps contraction: good    Ambulation   Weight-Bearing Status   Weight-Bearing Status (Left): full weight bearing   Weight-Bearing Status (Right): full weight-bearing    Assistive device used: none    Observational Gait   Gait: within functional limits   Walking speed and stride length within functional limits.     Functional Assessment     Comments  LEFS improved from 29/80 to 42/80.        See Exercise, Manual, and Modality Logs for complete treatment.       Assessment/Plan  Patient has been encouraged to follow  through with daily home exercise program for range of motion at least.  I felt her strength is functional.  Her gait is within normal limits.  She will need to continue range of motion exercises for at least another month, maybe 2 months.  She is independent with home exercise program she states follow through with that.  No longer needs skilled physical therapy.  Functional goals have been met.  Other: If we do not hear from the patient next 30 days should be considered discharged from our services with goals being met.           Timed:         Manual Therapy:    16     mins  90028;     Therapeutic Exercise:    38     mins  86835;     Neuromuscular Trista:        mins  13532;    Therapeutic Activity:          mins  41719;     Gait Training:           mins  02805;     Ultrasound:          mins  62305;    Ionto                                   mins   89071  Self Care                            mins   54102  Canalith Repos         mins 93833      Un-Timed:  Electrical Stimulation:         mins  93178 ( );  Dry Needling          mins self-pay  Traction          mins 75573      Timed Treatment:   54   mins   Total Treatment:     54   mins    Osmany Ko, PT  [place] License: [id number]

## 2025-05-05 ENCOUNTER — OFFICE VISIT (OUTPATIENT)
Dept: INTERNAL MEDICINE | Facility: CLINIC | Age: 70
End: 2025-05-05
Payer: MEDICARE

## 2025-05-05 VITALS
SYSTOLIC BLOOD PRESSURE: 122 MMHG | HEIGHT: 64 IN | HEART RATE: 81 BPM | OXYGEN SATURATION: 97 % | WEIGHT: 197.25 LBS | RESPIRATION RATE: 20 BRPM | BODY MASS INDEX: 33.67 KG/M2 | DIASTOLIC BLOOD PRESSURE: 70 MMHG | TEMPERATURE: 98 F

## 2025-05-05 DIAGNOSIS — F10.90 ALCOHOL USE DISORDER: ICD-10-CM

## 2025-05-05 DIAGNOSIS — I25.10 CORONARY ARTERY DISEASE INVOLVING NATIVE HEART WITHOUT ANGINA PECTORIS, UNSPECIFIED VESSEL OR LESION TYPE: ICD-10-CM

## 2025-05-05 DIAGNOSIS — Z96.652 S/P TKR (TOTAL KNEE REPLACEMENT), LEFT: ICD-10-CM

## 2025-05-05 DIAGNOSIS — Z78.0 ASYMPTOMATIC POSTMENOPAUSAL STATE: ICD-10-CM

## 2025-05-05 DIAGNOSIS — R05.3 CHRONIC COUGH: ICD-10-CM

## 2025-05-05 DIAGNOSIS — E66.09 CLASS 1 OBESITY DUE TO EXCESS CALORIES WITH SERIOUS COMORBIDITY AND BODY MASS INDEX (BMI) OF 34.0 TO 34.9 IN ADULT: ICD-10-CM

## 2025-05-05 DIAGNOSIS — I10 PRIMARY HYPERTENSION: ICD-10-CM

## 2025-05-05 DIAGNOSIS — Z00.00 ENCOUNTER FOR MEDICARE ANNUAL WELLNESS EXAM: Primary | ICD-10-CM

## 2025-05-05 DIAGNOSIS — E66.811 CLASS 1 OBESITY DUE TO EXCESS CALORIES WITH SERIOUS COMORBIDITY AND BODY MASS INDEX (BMI) OF 34.0 TO 34.9 IN ADULT: ICD-10-CM

## 2025-05-05 DIAGNOSIS — Z12.83 SKIN CANCER SCREENING: ICD-10-CM

## 2025-05-05 DIAGNOSIS — K21.9 GERD WITHOUT ESOPHAGITIS: ICD-10-CM

## 2025-05-05 DIAGNOSIS — F41.9 ANXIETY DISORDER, UNSPECIFIED TYPE: ICD-10-CM

## 2025-05-05 DIAGNOSIS — R71.8 ELEVATED MCV: ICD-10-CM

## 2025-05-05 DIAGNOSIS — R79.9 ABNORMAL FINDING OF BLOOD CHEMISTRY, UNSPECIFIED: ICD-10-CM

## 2025-05-05 DIAGNOSIS — M85.80 OSTEOPENIA, UNSPECIFIED LOCATION: ICD-10-CM

## 2025-05-05 DIAGNOSIS — E78.5 HYPERLIPIDEMIA, UNSPECIFIED HYPERLIPIDEMIA TYPE: ICD-10-CM

## 2025-05-05 LAB
25(OH)D3 SERPL-MCNC: 34.4 NG/ML (ref 30–100)
ALBUMIN SERPL-MCNC: 4.4 G/DL (ref 3.5–5.2)
ALBUMIN/GLOB SERPL: 1.4 G/DL
ALP SERPL-CCNC: 96 U/L (ref 39–117)
ALT SERPL W P-5'-P-CCNC: 27 U/L (ref 1–33)
ANION GAP SERPL CALCULATED.3IONS-SCNC: 12.9 MMOL/L (ref 5–15)
AST SERPL-CCNC: 27 U/L (ref 1–32)
BASOPHILS # BLD AUTO: 0.06 10*3/MM3 (ref 0–0.2)
BASOPHILS NFR BLD AUTO: 1.2 % (ref 0–1.5)
BILIRUB SERPL-MCNC: 0.4 MG/DL (ref 0–1.2)
BUN SERPL-MCNC: 13 MG/DL (ref 8–23)
BUN/CREAT SERPL: 13.3 (ref 7–25)
CALCIUM SPEC-SCNC: 9.5 MG/DL (ref 8.6–10.5)
CHLORIDE SERPL-SCNC: 103 MMOL/L (ref 98–107)
CHOLEST SERPL-MCNC: 227 MG/DL (ref 0–200)
CO2 SERPL-SCNC: 24.1 MMOL/L (ref 22–29)
CREAT SERPL-MCNC: 0.98 MG/DL (ref 0.57–1)
DEPRECATED RDW RBC AUTO: 42.4 FL (ref 37–54)
EGFRCR SERPLBLD CKD-EPI 2021: 62.2 ML/MIN/1.73
EOSINOPHIL # BLD AUTO: 0.14 10*3/MM3 (ref 0–0.4)
EOSINOPHIL NFR BLD AUTO: 2.8 % (ref 0.3–6.2)
ERYTHROCYTE [DISTWIDTH] IN BLOOD BY AUTOMATED COUNT: 11.9 % (ref 12.3–15.4)
FOLATE SERPL-MCNC: 8.74 NG/ML (ref 4.78–24.2)
GLOBULIN UR ELPH-MCNC: 3.2 GM/DL
GLUCOSE SERPL-MCNC: 104 MG/DL (ref 65–99)
HBA1C MFR BLD: 5.4 % (ref 4.8–5.6)
HCT VFR BLD AUTO: 40.6 % (ref 34–46.6)
HDLC SERPL-MCNC: 63 MG/DL (ref 40–60)
HGB BLD-MCNC: 13.7 G/DL (ref 12–15.9)
IMM GRANULOCYTES # BLD AUTO: 0.02 10*3/MM3 (ref 0–0.05)
IMM GRANULOCYTES NFR BLD AUTO: 0.4 % (ref 0–0.5)
LDLC SERPL CALC-MCNC: 134 MG/DL (ref 0–100)
LDLC/HDLC SERPL: 2.07 {RATIO}
LYMPHOCYTES # BLD AUTO: 2.39 10*3/MM3 (ref 0.7–3.1)
LYMPHOCYTES NFR BLD AUTO: 48.4 % (ref 19.6–45.3)
MCH RBC QN AUTO: 32.6 PG (ref 26.6–33)
MCHC RBC AUTO-ENTMCNC: 33.7 G/DL (ref 31.5–35.7)
MCV RBC AUTO: 96.7 FL (ref 79–97)
MONOCYTES # BLD AUTO: 0.57 10*3/MM3 (ref 0.1–0.9)
MONOCYTES NFR BLD AUTO: 11.5 % (ref 5–12)
NEUTROPHILS NFR BLD AUTO: 1.76 10*3/MM3 (ref 1.7–7)
NEUTROPHILS NFR BLD AUTO: 35.7 % (ref 42.7–76)
NRBC BLD AUTO-RTO: 0 /100 WBC (ref 0–0.2)
PLATELET # BLD AUTO: 270 10*3/MM3 (ref 140–450)
PMV BLD AUTO: 11.8 FL (ref 6–12)
POTASSIUM SERPL-SCNC: 3.9 MMOL/L (ref 3.5–5.2)
PROT SERPL-MCNC: 7.6 G/DL (ref 6–8.5)
RBC # BLD AUTO: 4.2 10*6/MM3 (ref 3.77–5.28)
SODIUM SERPL-SCNC: 140 MMOL/L (ref 136–145)
TRIGL SERPL-MCNC: 168 MG/DL (ref 0–150)
TSH SERPL DL<=0.05 MIU/L-ACNC: 4 UIU/ML (ref 0.27–4.2)
VIT B12 BLD-MCNC: 230 PG/ML (ref 211–946)
VLDLC SERPL-MCNC: 30 MG/DL (ref 5–40)
WBC NRBC COR # BLD AUTO: 4.94 10*3/MM3 (ref 3.4–10.8)

## 2025-05-05 PROCEDURE — 85025 COMPLETE CBC W/AUTO DIFF WBC: CPT | Performed by: STUDENT IN AN ORGANIZED HEALTH CARE EDUCATION/TRAINING PROGRAM

## 2025-05-05 PROCEDURE — 80061 LIPID PANEL: CPT | Performed by: STUDENT IN AN ORGANIZED HEALTH CARE EDUCATION/TRAINING PROGRAM

## 2025-05-05 PROCEDURE — 84443 ASSAY THYROID STIM HORMONE: CPT | Performed by: STUDENT IN AN ORGANIZED HEALTH CARE EDUCATION/TRAINING PROGRAM

## 2025-05-05 PROCEDURE — 82607 VITAMIN B-12: CPT | Performed by: STUDENT IN AN ORGANIZED HEALTH CARE EDUCATION/TRAINING PROGRAM

## 2025-05-05 PROCEDURE — 82746 ASSAY OF FOLIC ACID SERUM: CPT | Performed by: STUDENT IN AN ORGANIZED HEALTH CARE EDUCATION/TRAINING PROGRAM

## 2025-05-05 PROCEDURE — 82306 VITAMIN D 25 HYDROXY: CPT | Performed by: STUDENT IN AN ORGANIZED HEALTH CARE EDUCATION/TRAINING PROGRAM

## 2025-05-05 PROCEDURE — 83036 HEMOGLOBIN GLYCOSYLATED A1C: CPT | Performed by: STUDENT IN AN ORGANIZED HEALTH CARE EDUCATION/TRAINING PROGRAM

## 2025-05-05 PROCEDURE — 80053 COMPREHEN METABOLIC PANEL: CPT | Performed by: STUDENT IN AN ORGANIZED HEALTH CARE EDUCATION/TRAINING PROGRAM

## 2025-05-05 RX ORDER — OLMESARTAN MEDOXOMIL 20 MG/1
20 TABLET ORAL DAILY
Qty: 90 TABLET | Refills: 3 | Status: SHIPPED | OUTPATIENT
Start: 2025-05-05

## 2025-05-05 RX ORDER — PANTOPRAZOLE SODIUM 40 MG/1
40 TABLET, DELAYED RELEASE ORAL DAILY
Qty: 90 TABLET | Refills: 3 | Status: SHIPPED | OUTPATIENT
Start: 2025-05-05

## 2025-05-05 RX ORDER — ATORVASTATIN CALCIUM 20 MG/1
20 TABLET, FILM COATED ORAL DAILY
Qty: 90 TABLET | Refills: 3 | Status: SHIPPED | OUTPATIENT
Start: 2025-05-05 | End: 2025-05-06

## 2025-05-05 RX ORDER — METOPROLOL SUCCINATE 25 MG/1
25 TABLET, EXTENDED RELEASE ORAL DAILY
Qty: 90 TABLET | Refills: 3 | Status: SHIPPED | OUTPATIENT
Start: 2025-05-05

## 2025-05-05 NOTE — PROGRESS NOTES
Subjective   The ABCs of the Annual Wellness Visit  Medicare Wellness Visit      Sarah Howard is a 69 y.o. patient with history of right sided breast cancer (3/2010), anxiety, CAD, HTN, HLD, GERD, depression who presents for a Medicare Wellness Visit.    The following portions of the patient's history were reviewed and   updated as appropriate: allergies, current medications, past family history, past medical history, past social history, past surgical history, and problem list.      Compared to one year ago, the patient's physical   health is worse, knee.   Compared to one year ago, the patient's mental   health is worse.    Recent Hospitalizations:  She was admitted within the past 365 days at [facility] for [facility] replacement.     Current Medical Providers:  Patient Care Team:  Dao Gomez MD as PCP - General (Internal Medicine)  Yrn Acuña MD as Consulting Physician (Ophthalmology)  Mayuri Covington APRN as Nurse Practitioner (Behavioral Health)  Chanelle Goodson LCSW as  (Behavioral Health)  Praful Baker MD as Consulting Physician (Orthopedic Surgery)    Outpatient Medications Prior to Visit   Medication Sig Dispense Refill    desvenlafaxine (Pristiq) 100 MG 24 hr tablet Take 1 tablet by mouth Daily. 90 tablet 1    fluticasone (FLONASE) 50 MCG/ACT nasal spray Administer 2 sprays into the nostril(s) as directed by provider Daily. 16 g 3    latanoprost (XALATAN) 0.005 % ophthalmic solution       traZODone (DESYREL) 150 MG tablet Take 1 tablet by mouth Daily. WITH 150 MG TO EQUAL 200 MG 90 tablet 1    traZODone (DESYREL) 50 MG tablet Take 1 tablet by mouth Every Night. WITH 150 MG TO EQUAL 200 MG 90 tablet 1    vilazodone (Viibryd) 20 MG tablet tablet Take 1 tablet by mouth Daily. Take po q am with food. 30 tablet 1    atorvastatin (LIPITOR) 20 MG tablet Take 1 tablet by mouth Daily. 90 tablet 0    metoprolol succinate XL (Toprol XL) 25 MG 24 hr tablet  Take 1 tablet by mouth Daily. 90 tablet 1    olmesartan (BENICAR) 20 MG tablet TAKE 1 TABLET BY MOUTH DAILY 90 tablet 0    pantoprazole (PROTONIX) 40 MG EC tablet Take 1 tablet by mouth Daily. 90 tablet 3    acetaminophen (TYLENOL) 500 MG tablet Take 2 tablets by mouth EVERY 8 (Eight) Hours 7 days. THEN take only AS NEEDED thereafter 60 tablet 0    hydrocortisone 1 % cream Apply 1 Application topically to the appropriate area as directed 2 (Two) Times a Day. 45 g 1    ketoconazole (NIZORAL) 2 % cream APPLY TO THE AFFECTED AREA(S) BY TOPICAL ROUTE WICE DAILY UNTIL RESOLUTION THEN 2-3 TIMES A WEEK AS NEEDED. MIX WITH HYDROCORTISONE CREAM      meloxicam (MOBIC) 15 MG tablet 1 PO Daily with food.  Stop if you have upset stomach/stomach irritation. 30 tablet 0    oxyCODONE (Roxicodone) 5 MG immediate release tablet Take 1 tablet by mouth Every 4 (Four) Hours As Needed for Moderate Pain. 30 tablet 0     No facility-administered medications prior to visit.     No opioid medication identified on active medication list. I have reviewed chart for other potential  high risk medication/s and harmful drug interactions in the elderly.      Aspirin is not on active medication list.  Aspirin use is not indicated based on review of current medical condition/s. Risk of harm outweighs potential benefits.  .    Patient Active Problem List   Diagnosis    Mild depression    Osteopenia    GERD without esophagitis    Coronary artery disease involving native coronary artery    History of breast cancer    Glaucoma    Primary hypertension    Alcohol use disorder    Chronic cough    Class 1 obesity due to excess calories without serious comorbidity with body mass index (BMI) of 33.0 to 33.9 in adult    Age-related nuclear cataract of both eyes    Arthritis of knee    Hyperlipidemia    S/P TKR (total knee replacement), left     Advance Care Planning Advance Directive is not on file.  ACP discussion was held with the patient during this visit.  "Patient does not have an advance directive, information provided.            Objective   Vitals:    25 1001   BP: 122/70   Pulse: 81   Resp: 20   Temp: 98 °F (36.7 °C)   TempSrc: Temporal   SpO2: 97%   Weight: 89.5 kg (197 lb 4 oz)   Height: 162 cm (63.78\")   PainSc: 0-No pain       Estimated body mass index is 34.09 kg/m² as calculated from the following:    Height as of this encounter: 162 cm (63.78\").    Weight as of this encounter: 89.5 kg (197 lb 4 oz).            Does the patient have evidence of cognitive impairment? No                                                                                                Health  Risk Assessment    Smoking Status:  Social History     Tobacco Use   Smoking Status Never    Passive exposure: Never   Smokeless Tobacco Never     Alcohol Consumption:  Social History     Substance and Sexual Activity   Alcohol Use Not Currently    Alcohol/week: 10.0 standard drinks of alcohol    Types: 10 Glasses of wine per week    Comment: drinking alcohol makes me feel happier       Fall Risk Screen  STEADI Fall Risk Assessment was completed, and patient is at LOW risk for falls.Assessment completed on:2025    Depression Screening   Little interest or pleasure in doing things? Not at all   Feeling down, depressed, or hopeless? Not at all   PHQ-2 Total Score 0      Health Habits and Functional and Cognitive Screenin/28/2025     9:08 AM   Functional & Cognitive Status   Do you have difficulty preparing food and eating? No   Do you have difficulty bathing yourself, getting dressed or grooming yourself? No   Do you have difficulty using the toilet? No   Do you have difficulty moving around from place to place? No   Do you have trouble with steps or getting out of a bed or a chair? No   Current Diet Well Balanced Diet   Dental Exam Up to date   Eye Exam Up to date   Exercise (times per week) 1 times per week   Current Exercises Include Stationary Bicycling/Spin Class   Do " you need help using the phone?  No   Are you deaf or do you have serious difficulty hearing?  No   Do you need help to go to places out of walking distance? No   Do you need help shopping? No   Do you need help preparing meals?  No   Do you need help with housework?  No   Do you need help with laundry? No   Do you need help taking your medications? No   Do you need help managing money? No   Do you ever drive or ride in a car without wearing a seat belt? No   Have you felt unusual stress, anger or loneliness in the last month? Yes   Who do you live with? Spouse   If you need help, do you have trouble finding someone available to you? No   Have you been bothered in the last four weeks by sexual problems? No   Do you have difficulty concentrating, remembering or making decisions? No           Age-appropriate Screening Schedule:  Refer to the list below for future screening recommendations based on patient's age, sex and/or medical conditions. Orders for these recommended tests are listed in the plan section. The patient has been provided with a written plan.    Health Maintenance List  Health Maintenance   Topic Date Due    DXA SCAN  04/19/2025    LIPID PANEL  04/29/2025    COVID-19 Vaccine (5 - 2024-25 season) 10/29/2025 (Originally 9/1/2024)    INFLUENZA VACCINE  07/01/2025    MAMMOGRAM  01/08/2026    ANNUAL WELLNESS VISIT  05/05/2026    COLORECTAL CANCER SCREENING  05/18/2028    TDAP/TD VACCINES (2 - Tdap) 07/17/2032    HEPATITIS C SCREENING  Completed    Pneumococcal Vaccine 50+  Completed    ZOSTER VACCINE  Discontinued                                                                                                                                                CMS Preventative Services Quick Reference  Risk Factors Identified During Encounter  Alcohol Misuse: Patient encouraged to limit alcohol use to no more than 1 standard alcoholic beverage per day. (12 ounce beer, 6 ounce wine, one shot liquor)  Chronic Pain:  Natural history and expected course discussed. Questions answered.  OTC analgesics as needed. Proper dosing schedule discussed.   Inactivity/Sedentary: Patient was advised to exercise at least 150 minutes a week per CDC recommendations.  Polypharmacy: Medication List reviewed and Medications are appropriate for patient  Dental Screening Recommended  Vision Screening Recommended  Dermatology for skin cancer check    The above risks/problems have been discussed with the patient.  Pertinent information has been shared with the patient in the After Visit Summary.  An After Visit Summary and PPPS were made available to the patient.    Follow Up:   Next Medicare Wellness visit to be scheduled in 1 year.         Additional E&M Note during same encounter follows:  Patient has additional, significant, and separately identifiable condition(s)/problem(s) that require work above and beyond the Medicare Wellness Visit     Chief Complaint  Medicare Wellness-subsequent    Subjective    HPI  Sarah is also being seen today for additional medical problem/s.    Left Total Knee Arthroplasty  - I personally reviewed note by Dr. Praful Baker of orthopedic surgery dated 3/14/25. At that time was 6 weeks post op. F/u in 2 months    HTN  - olmesartan 20mg daily  - metoprolol 25mg daily    CAD (no stent or MI)  - atorvastatin 20mg daily     The patient presents for evaluation of knee pain, depression, mole, and health maintenance.    She is currently 14 weeks post-surgery for left TKA and reports a challenging recovery process. She continues to experience mild swelling in her knee but has noticed an improvement in her ability to sit for extended periods with her knee in a dependent position. She is scheduled for a follow-up appointment with Dr. Baker in the coming weeks, during which she anticipates receiving clearance to resume walking. She has been advised to walk at least a mile daily in preparation for an upcoming cruise in October  "2025. She reports that her physical health has declined compared to the previous year, attributing this to her inability to engage in activities such as shopping or walking. She is not currently on any pain medication and avoids even Tylenol.    She has gained weight and is experiencing significant depression due to her limited mobility and inability to participate in volunteer activities. She has been prescribed Viibryd by Myauri Covington, which she reports as beneficial. She also reports an increased alcohol intake, consuming approximately a bottle of wine daily, and has recently started using gummies.    She has a long-standing mole that has remained stable in size and color. She does not currently have a dermatologist.    She is on Benicar for blood pressure management. She is uncertain about the necessity of continuing her current medication regimen, which includes Lipitor, metoprolol XL, and Benicar. She is not taking baby aspirin.           Objective   Vital Signs:  /70   Pulse 81   Temp 98 °F (36.7 °C) (Temporal)   Resp 20   Ht 162 cm (63.78\")   Wt 89.5 kg (197 lb 4 oz)   SpO2 97%   BMI 34.09 kg/m²   Physical Exam  Vitals reviewed.   Constitutional:       General: She is not in acute distress.     Appearance: Normal appearance. She is obese. She is not ill-appearing or toxic-appearing.   HENT:      Head: Normocephalic and atraumatic.      Right Ear: External ear normal.      Left Ear: External ear normal.      Nose: Nose normal. No congestion.      Mouth/Throat:      Mouth: Mucous membranes are moist.   Eyes:      General: No scleral icterus.     Extraocular Movements: Extraocular movements intact.   Neck:      Vascular: No carotid bruit.   Cardiovascular:      Rate and Rhythm: Normal rate and regular rhythm.      Pulses: Normal pulses.      Heart sounds: Normal heart sounds.   Pulmonary:      Effort: Pulmonary effort is normal.      Breath sounds: Normal breath sounds.   Abdominal:      " General: Abdomen is flat. There is no distension.   Musculoskeletal:      Cervical back: Normal range of motion. No rigidity or tenderness.      Comments: Well healed linear scar over left knee. Antalgic gait   Lymphadenopathy:      Cervical: No cervical adenopathy.   Skin:     General: Skin is warm and dry.      Capillary Refill: Capillary refill takes less than 2 seconds.      Comments: Hyperkeratotic lesion on left superior breast, hyperpigmented   Neurological:      General: No focal deficit present.      Mental Status: She is alert and oriented to person, place, and time.   Psychiatric:         Mood and Affect: Mood normal.         Behavior: Behavior normal.         Judgment: Judgment normal.                       Results  Labs   - MCV: Slightly high           Assessment and Plan     Problem List Items Addressed This Visit       Osteopenia    Relevant Orders    DEXA Bone Density Axial    Vitamin D 25 hydroxy    GERD without esophagitis    Relevant Medications    pantoprazole (PROTONIX) 40 MG EC tablet    Primary hypertension    Overview   Lisinopril stopped 6/13/23 due to cough         Current Assessment & Plan   Hypertension is improving with treatment.  Continue current treatment regimen.  Weight loss.  Regular aerobic exercise.  Blood pressure will be reassessed at the next regular appointment.  Continue metoprolol 25mg daily and olmesartan 20mg daily.          Relevant Medications    metoprolol succinate XL (Toprol XL) 25 MG 24 hr tablet    olmesartan (BENICAR) 20 MG tablet    Other Relevant Orders    CBC w AUTO Differential    Comprehensive metabolic panel    Chronic cough    Overview   Present since 1-2/2023. Treated with PPI, failed albuterol trial, treated with zpack. Optimized allergies with xyzal and flonase 5/2023.  - CXR wnl on 6/13/23  - Saw ENT (Dr. House) CT sinus normal, negative IV AT allergy testing, Mycoplasma Ab pending. Recommended changing to atrovent nasal spray  - felt symptoms  worsened after stopping protonix by accident         Relevant Medications    pantoprazole (PROTONIX) 40 MG EC tablet    Hyperlipidemia    Current Assessment & Plan   Chronic, improving. Continue lipitor 20mg daily. Repeat lipid today         Relevant Medications    atorvastatin (LIPITOR) 20 MG tablet    Other Relevant Orders    Lipid panel    S/P TKR (total knee replacement), left    Relevant Orders    CBC w AUTO Differential     Other Visit Diagnoses         Encounter for Medicare annual wellness exam    -  Primary      Coronary artery disease involving native heart without angina pectoris, unspecified vessel or lesion type        Relevant Medications    atorvastatin (LIPITOR) 20 MG tablet    metoprolol succinate XL (Toprol XL) 25 MG 24 hr tablet    Other Relevant Orders    CBC w AUTO Differential    Comprehensive metabolic panel      Asymptomatic postmenopausal state        Relevant Orders    DEXA Bone Density Axial    Vitamin D 25 hydroxy      Class 1 obesity due to excess calories with serious comorbidity and body mass index (BMI) of 34.0 to 34.9 in adult        Relevant Orders    Hemoglobin A1c    TSH Rfx On Abnormal To Free T4      Elevated MCV        Relevant Orders    Vitamin B12    Folate      Abnormal finding of blood chemistry, unspecified        Relevant Orders    Hemoglobin A1c      Anxiety disorder, unspecified type        Relevant Orders    TSH Rfx On Abnormal To Free T4      Skin cancer screening        Relevant Orders    Ambulatory Referral to Dermatology               Alcohol use.  - Reports drinking a bottle of wine a day to cope with her situation. Counseled on limiting to 1 drink daily. Recommend discussion with mental health provider to optimize her depression medications. Recommend regular exercise once released by ortho  - Advised to limit alcohol intake and discuss this issue with Mayuri Covington for further management.  - Acknowledges the difficulty in controlling alcohol consumption.      . Seborrheic keratosis.  - Referral to dermatology will be made for a comprehensive skin examination.  - Reports mole has been present for quite a while, slightly larger but no significant changes.  - Likely benign seborrheic keratosis.     Health maintenance.  - Blood pressure and heart rate are within normal limits.  - Routine laboratory tests will be conducted today to assess vitamin D levels, cholesterol profile, kidney function, and B12 levels.  - Bone density scan will be scheduled as it has been a couple of years since the last one, and she had previously exhibited slightly low bone density.  - Current medication regimen, including Lipitor, metoprolol XL, and Benicar, will be maintained.    Follow-up  The patient will follow up in 6 months.         Follow Up   Return in about 6 months (around 11/5/2025) for Recheck blood pressure, alcohol use.  Patient was given instructions and counseling regarding her condition or for health maintenance advice. Please see specific information pulled into the AVS if appropriate.  Patient or patient representative verbalized consent for the use of Ambient Listening during the visit with  Dao Gomez MD for chart documentation. 5/5/2025  10:40 EDT    Dao Gomez MD

## 2025-05-05 NOTE — PATIENT INSTRUCTIONS
Health Maintenance, Female  Adopting a healthy lifestyle and getting preventive care can go a long way to promote health and wellness. Talk with your health care provider about what schedule of regular examinations is right for you. This is a good chance for you to check in with your provider about disease prevention and staying healthy.  In between checkups, there are plenty of things you can do on your own. Experts have done a lot of research about which lifestyle changes and preventive measures are most likely to keep you healthy. Ask your health care provider for more information.  Weight and diet  Eat a healthy diet  Be sure to include plenty of vegetables, fruits, low-fat dairy products, and lean protein.  Do not eat a lot of foods high in solid fats, added sugars, or salt.  Get regular exercise. This is one of the most important things you can do for your health.  Most adults should exercise for at least 150 minutes each week. The exercise should increase your heart rate and make you sweat (moderate-intensity exercise).  Most adults should also do strengthening exercises at least twice a week. This is in addition to the moderate-intensity exercise.     Maintain a healthy weight  Body mass index (BMI) is a measurement that can be used to identify possible weight problems. It estimates body fat based on height and weight. Your health care provider can help determine your BMI and help you achieve or maintain a healthy weight.  For females 20 years of age and older:  A BMI below 18.5 is considered underweight.  A BMI of 18.5 to 24.9 is normal.  A BMI of 25 to 29.9 is considered overweight.  A BMI of 30 and above is considered obese.     Watch levels of cholesterol and blood lipids  You should start having your blood tested for lipids and cholesterol at 20 years of age, then have this test every 5 years.  You may need to have your cholesterol levels checked more often if:  Your lipid or cholesterol levels are  high.  You are older than 50 years of age.  You are at high risk for heart disease.     Cancer screening  Lung Cancer  Lung cancer screening is recommended for adults 55-80 years old who are at high risk for lung cancer because of a history of smoking.  A yearly low-dose CT scan of the lungs is recommended for people who:  Currently smoke.  Have quit within the past 15 years.  Have at least a 30-pack-year history of smoking. A pack year is smoking an average of one pack of cigarettes a day for 1 year.  Yearly screening should continue until it has been 15 years since you quit.  Yearly screening should stop if you develop a health problem that would prevent you from having lung cancer treatment.     Breast Cancer  Practice breast self-awareness. This means understanding how your breasts normally appear and feel.  It also means doing regular breast self-exams. Let your health care provider know about any changes, no matter how small.  If you are in your 20s or 30s, you should have a clinical breast exam (CBE) by a health care provider every 1-3 years as part of a regular health exam.  If you are 40 or older, have a CBE every year. Also consider having a breast X-ray (mammogram) every year.  If you have a family history of breast cancer, talk to your health care provider about genetic screening.  If you are at high risk for breast cancer, talk to your health care provider about having an MRI and a mammogram every year.  Breast cancer gene (BRCA) assessment is recommended for women who have family members with BRCA-related cancers. BRCA-related cancers include:  Breast.  Ovarian.  Tubal.  Peritoneal cancers.  Results of the assessment will determine the need for genetic counseling and BRCA1 and BRCA2 testing.     Cervical Cancer  Your health care provider may recommend that you be screened regularly for cancer of the pelvic organs (ovaries, uterus, and vagina). This screening involves a pelvic examination, including  checking for microscopic changes to the surface of your cervix (Pap test). You may be encouraged to have this screening done every 3 years, beginning at age 21.  For women ages 30-65, health care providers may recommend pelvic exams and Pap testing every 3 years, or they may recommend the Pap and pelvic exam, combined with testing for human papilloma virus (HPV), every 5 years. Some types of HPV increase your risk of cervical cancer. Testing for HPV may also be done on women of any age with unclear Pap test results.  Other health care providers may not recommend any screening for nonpregnant women who are considered low risk for pelvic cancer and who do not have symptoms. Ask your health care provider if a screening pelvic exam is right for you.  If you have had past treatment for cervical cancer or a condition that could lead to cancer, you need Pap tests and screening for cancer for at least 20 years after your treatment. If Pap tests have been discontinued, your risk factors (such as having a new sexual partner) need to be reassessed to determine if screening should resume. Some women have medical problems that increase the chance of getting cervical cancer. In these cases, your health care provider may recommend more frequent screening and Pap tests.     Colorectal Cancer  This type of cancer can be detected and often prevented.  Routine colorectal cancer screening usually begins at 50 years of age and continues through 75 years of age.  Your health care provider may recommend screening at an earlier age if you have risk factors for colon cancer.  Your health care provider may also recommend using home test kits to check for hidden blood in the stool.  A small camera at the end of a tube can be used to examine your colon directly (sigmoidoscopy or colonoscopy). This is done to check for the earliest forms of colorectal cancer.  Routine screening usually begins at age 50.  Direct examination of the colon should  be repeated every 5-10 years through 75 years of age. However, you may need to be screened more often if early forms of precancerous polyps or small growths are found.     Skin Cancer  Check your skin from head to toe regularly.  Tell your health care provider about any new moles or changes in moles, especially if there is a change in a mole's shape or color.  Also tell your health care provider if you have a mole that is larger than the size of a pencil eraser.  Always use sunscreen. Apply sunscreen liberally and repeatedly throughout the day.  Protect yourself by wearing long sleeves, pants, a wide-brimmed hat, and sunglasses whenever you are outside.     Heart disease, diabetes, and high blood pressure  High blood pressure causes heart disease and increases the risk of stroke. High blood pressure is more likely to develop in:  People who have blood pressure in the high end of the normal range (130-139/85-89 mm Hg).  People who are overweight or obese.  People who are .  If you are 18-39 years of age, have your blood pressure checked every 3-5 years. If you are 40 years of age or older, have your blood pressure checked every year. You should have your blood pressure measured twice--once when you are at a hospital or clinic, and once when you are not at a hospital or clinic. Record the average of the two measurements. To check your blood pressure when you are not at a hospital or clinic, you can use:  An automated blood pressure machine at a pharmacy.  A home blood pressure monitor.  If you are between 55 years and 79 years old, ask your health care provider if you should take aspirin to prevent strokes.  Have regular diabetes screenings. This involves taking a blood sample to check your fasting blood sugar level.  If you are at a normal weight and have a low risk for diabetes, have this test once every three years after 45 years of age.  If you are overweight and have a high risk for diabetes,  consider being tested at a younger age or more often.  Preventing infection  Hepatitis B  If you have a higher risk for hepatitis B, you should be screened for this virus. You are considered at high risk for hepatitis B if:  You were born in a country where hepatitis B is common. Ask your health care provider which countries are considered high risk.  Your parents were born in a high-risk country, and you have not been immunized against hepatitis B (hepatitis B vaccine).  You have HIV or AIDS.  You use needles to inject street drugs.  You live with someone who has hepatitis B.  You have had sex with someone who has hepatitis B.  You get hemodialysis treatment.  You take certain medicines for conditions, including cancer, organ transplantation, and autoimmune conditions.     Hepatitis C  Blood testing is recommended for:  Everyone born from 1945 through 1965.  Anyone with known risk factors for hepatitis C.     Sexually transmitted infections (STIs)  You should be screened for sexually transmitted infections (STIs) including gonorrhea and chlamydia if:  You are sexually active and are younger than 24 years of age.  You are older than 24 years of age and your health care provider tells you that you are at risk for this type of infection.  Your sexual activity has changed since you were last screened and you are at an increased risk for chlamydia or gonorrhea. Ask your health care provider if you are at risk.  If you do not have HIV, but are at risk, it may be recommended that you take a prescription medicine daily to prevent HIV infection. This is called pre-exposure prophylaxis (PrEP). You are considered at risk if:  You are sexually active and do not regularly use condoms or know the HIV status of your partner(s).  You take drugs by injection.  You are sexually active with a partner who has HIV.     Talk with your health care provider about whether you are at high risk of being infected with HIV. If you choose to  begin PrEP, you should first be tested for HIV. You should then be tested every 3 months for as long as you are taking PrEP.  Pregnancy  If you are premenopausal and you may become pregnant, ask your health care provider about preconception counseling.  If you may become pregnant, take 400 to 800 micrograms (mcg) of folic acid every day.  If you want to prevent pregnancy, talk to your health care provider about birth control (contraception).  Osteoporosis and menopause  Osteoporosis is a disease in which the bones lose minerals and strength with aging. This can result in serious bone fractures. Your risk for osteoporosis can be identified using a bone density scan.  If you are 65 years of age or older, or if you are at risk for osteoporosis and fractures, ask your health care provider if you should be screened.  Ask your health care provider whether you should take a calcium or vitamin D supplement to lower your risk for osteoporosis.  Menopause may have certain physical symptoms and risks.  Hormone replacement therapy may reduce some of these symptoms and risks.  Talk to your health care provider about whether hormone replacement therapy is right for you.  Follow these instructions at home:  Schedule regular health, dental, and eye exams.  Stay current with your immunizations.  Do not use any tobacco products including cigarettes, chewing tobacco, or electronic cigarettes.  If you are pregnant, do not drink alcohol.  If you are breastfeeding, limit how much and how often you drink alcohol.  Limit alcohol intake to no more than 1 drink per day for nonpregnant women. One drink equals 12 ounces of beer, 5 ounces of wine, or 1½ ounces of hard liquor.  Do not use street drugs.  Do not share needles.  Ask your health care provider for help if you need support or information about quitting drugs.  Tell your health care provider if you often feel depressed.  Tell your health care provider if you have ever been abused or do  not feel safe at home.  This information is not intended to replace advice given to you by your health care provider. Make sure you discuss any questions you have with your health care provider.  Document Released: 07/02/2012 Document Revised: 05/25/2017 Document Reviewed: 09/20/2016  [employer] Patient Education © 2018 [employer] Healthy Eating  Following a healthy eating pattern may help you to achieve and maintain a healthy body weight, reduce the risk of chronic disease, and live a long and productive life. It is important to follow a healthy eating pattern at an appropriate calorie level for your body. Your nutritional needs should be met primarily through food by choosing a variety of nutrient-rich foods.  What are tips for following this plan?  Reading food labels  Read labels and choose the following:  Reduced or low sodium.  Juices with 100% fruit juice.  Foods with low saturated fats and high polyunsaturated and monounsaturated fats.  Foods with whole grains, such as whole wheat, cracked wheat, brown rice, and wild rice.  Whole grains that are fortified with folic acid. This is recommended for women who are pregnant or who want to become pregnant.  Read labels and avoid the following:  Foods with a lot of added sugars. These include foods that contain brown sugar, corn sweetener, corn syrup, dextrose, fructose, glucose, high-fructose corn syrup, honey, invert sugar, lactose, malt syrup, maltose, molasses, raw sugar, sucrose, trehalose, or turbinado sugar.  Do not eat more than the following amounts of added sugar per day:  6 teaspoons (25 g) for women.  9 teaspoons (38 g) for men.  Foods that contain processed or refined starches and grains.  Refined grain products, such as white flour, degermed cornmeal, white bread, and white rice.  Shopping  Choose nutrient-rich snacks, such as vegetables, whole fruits, and nuts. Avoid high-calorie and high-sugar snacks, such as potato chips, fruit snacks,  "and candy.  Use oil-based dressings and spreads on foods instead of solid fats such as butter, stick margarine, or cream cheese.  Limit pre-made sauces, mixes, and \"instant\" products such as flavored rice, instant noodles, and ready-made pasta.  Try more plant-protein sources, such as tofu, tempeh, black beans, edamame, lentils, nuts, and seeds.  Explore eating plans such as the Mediterranean diet or vegetarian diet.  Cooking  Use oil to sauté or stir-martinez foods instead of solid fats such as butter, stick margarine, or lard.  Try baking, boiling, grilling, or broiling instead of frying.  Remove the fatty part of meats before cooking.  Steam vegetables in water or broth.  Meal planning    At meals, imagine dividing your plate into fourths:  One-half of your plate is fruits and vegetables.  One-fourth of your plate is whole grains.  One-fourth of your plate is protein, especially lean meats, poultry, eggs, tofu, beans, or nuts.  Include low-fat dairy as part of your daily diet.     Lifestyle  Choose healthy options in all settings, including home, work, school, restaurants, or stores.  Prepare your food safely:  Wash your hands after handling raw meats.  Keep food preparation surfaces clean by regularly washing with hot, soapy water.  Keep raw meats separate from ready-to-eat foods, such as fruits and vegetables.  , meat, poultry, and eggs to the recommended internal temperature.  Store foods at safe temperatures. In general:  Keep cold foods at 40°F (4.4°C) or below.  Keep hot foods at 140°F (60°C) or above.  Keep your freezer at 0°F (-17.8°C) or below.  Foods are no longer safe to eat when they have been between the temperatures of 40°-140°F (4.4-60°C) for more than 2 hours.  What foods should I eat?  Fruits  Aim to eat 2 cup-equivalents of fresh, canned (in natural juice), or frozen fruits each day. Examples of 1 cup-equivalent of fruit include 1 small apple, 8 large strawberries, 1 cup canned fruit, ½ " cup dried fruit, or 1 cup 100% juice.  Vegetables  Aim to eat 2½-3 cup-equivalents of fresh and frozen vegetables each day, including different varieties and colors. Examples of 1 cup-equivalent of vegetables include 2 medium carrots, 2 cups raw, leafy greens, 1 cup chopped vegetable (raw or cooked), or 1 medium baked potato.  Grains  Aim to eat 6 ounce-equivalents of whole grains each day. Examples of 1 ounce-equivalent of grains include 1 slice of bread, 1 cup ready-to-eat cereal, 3 cups popcorn, or ½ cup cooked rice, pasta, or cereal.  Meats and other proteins  Aim to eat 5-6 ounce-equivalents of protein each day. Examples of 1 ounce-equivalent of protein include 1 egg, 1/2 cup nuts or seeds, or 1 tablespoon (16 g) peanut butter. A cut of meat or fish that is the size of a deck of cards is about 3-4 ounce-equivalents.  Of the protein you eat each week, try to have at least 8 ounces come from seafood. This includes salmon, trout, herring, and anchovies.  Dairy  Aim to eat 3 cup-equivalents of fat-free or low-fat dairy each day. Examples of 1 cup-equivalent of dairy include 1 cup (240 mL) milk, 8 ounces (250 g) yogurt, 1½ ounces (44 g) natural cheese, or 1 cup (240 mL) fortified soy milk.  Fats and oils  Aim for about 5 teaspoons (21 g) per day. Choose monounsaturated fats, such as canola and olive oils, avocados, peanut butter, and most nuts, or polyunsaturated fats, such as sunflower, corn, and soybean oils, walnuts, pine nuts, sesame seeds, sunflower seeds, and flaxseed.  Beverages  Aim for six 8-oz glasses of water per day. Limit coffee to three to five 8-oz cups per day.  Limit caffeinated beverages that have added calories, such as soda and energy drinks.  Limit alcohol intake to no more than 1 drink a day for nonpregnant women and 2 drinks a day for men. One drink equals 12 oz of beer (355 mL), 5 oz of wine (148 mL), or 1½ oz of hard liquor (44 mL).  Seasoning and other foods  Avoid adding excess amounts of  salt to your foods. Try flavoring foods with herbs and spices instead of salt.  Avoid adding sugar to foods.  Try using oil-based dressings, sauces, and spreads instead of solid fats.  This information is based on general [place] nutrition guidelines. For more information, visit choosemyplate.gov. Exact amounts may vary based on your nutrition needs.  Summary  A healthy eating plan may help you to maintain a healthy weight, reduce the risk of chronic diseases, and stay active throughout your life.  Plan your meals. Make sure you eat the right portions of a variety of nutrient-rich foods.  Try baking, boiling, grilling, or broiling instead of frying.  Choose healthy options in all settings, including home, work, school, restaurants, or stores.  This information is not intended to replace advice given to you by your health care provider. Make sure you discuss any questions you have with your health care provider.  Document Revised: 04/01/2019 Document Reviewed: 04/01/2019  [employer] Patient Education © 2021 [employer]    Exercising to Stay Healthy  To become healthy and stay healthy, it is recommended that you do moderate-intensity and vigorous-intensity exercise. You can tell that you are exercising at a moderate intensity if your heart starts beating faster and you start breathing faster but can still hold a conversation. You can tell that you are exercising at a vigorous intensity if you are breathing much harder and faster and cannot hold a conversation while exercising.  Exercising regularly is important. It has many health benefits, such as:  Improving overall fitness, flexibility, and endurance.  Increasing bone density.  Helping with weight control.  Decreasing body fat.  Increasing muscle strength.  Reducing stress and tension.  Improving overall health.  How often should I exercise?  Choose an activity that you enjoy, and set realistic goals. Your health care provider can help you make an activity plan that  works for you.  Exercise regularly as told by your health care provider. This may include:  Doing strength training two times a week, such as:  Lifting weights.  Using resistance bands.  Push-ups.  Sit-ups.  Yoga.  Doing a certain intensity of exercise for a given amount of time. Choose from these options:  A total of 150 minutes of moderate-intensity exercise every week.  A total of 75 minutes of vigorous-intensity exercise every week.  A mix of moderate-intensity and vigorous-intensity exercise every week.  Children, pregnant women, people who have not exercised regularly, people who are overweight, and older adults may need to talk with a health care provider about what activities are safe to do. If you have a medical condition, be sure to talk with your health care provider before you start a new exercise program.  What are some exercise ideas?    Moderate-intensity exercise ideas include:  Walking 1 mile (1.6 km) in about 15 minutes.  Biking.  Hiking.  Golfing.  Dancing.  Water aerobics.  Vigorous-intensity exercise ideas include:  Walking 4.5 miles (7.2 km) or more in about 1 hour.  Jogging or running 5 miles (8 km) in about 1 hour.  Biking 10 miles (16.1 km) or more in about 1 hour.  Lap swimming.  Roller-skating or in-line skating.  Cross-country skiing.  Vigorous competitive sports, such as football, basketball, and soccer.  Jumping rope.  Aerobic dancing.  What are some everyday activities that can help me to get exercise?  Yard work, such as:  Pushing a .  Raking and bagging leaves.  Washing your car.  Pushing a stroller.  Shoveling snow.  Gardening.  Washing windows or floors.  How can I be more active in my day-to-day activities?  Use stairs instead of an elevator.  Take a walk during your lunch break.  If you drive, park your car farther away from your work or school.  If you take public transportation, get off one stop early and walk the rest of the way.  Stand up or walk around during all  of your indoor phone calls.  Get up, stretch, and walk around every 30 minutes throughout the day.  Enjoy exercise with a friend. Support to continue exercising will help you keep a regular routine of activity.  What guidelines can I follow while exercising?  Before you start a new exercise program, talk with your health care provider.  Do not exercise so much that you hurt yourself, feel dizzy, or get very short of breath.  Wear comfortable clothes and wear shoes with good support.  Drink plenty of water while you exercise to prevent dehydration or heat stroke.  Work out until your breathing and your heartbeat get faster.  Where to find more information  [place] Department of Health and Human Services: www.hhs.gov  Centers for Disease Control and Prevention (CDC): www.cdc.gov  Summary  Exercising regularly is important. It will improve your overall fitness, flexibility, and endurance.  Regular exercise also will improve your overall health. It can help you control your weight, reduce stress, and improve your bone density.  Do not exercise so much that you hurt yourself, feel dizzy, or get very short of breath.  Before you start a new exercise program, talk with your health care provider.  This information is not intended to replace advice given to you by your health care provider. Make sure you discuss any questions you have with your health care provider.  Document Revised: 11/30/2018 Document Reviewed: 11/08/2018  [employer] Patient Education © 2021 [employer]       Advance Care Planning and Advance Directives      You make decisions on a daily basis - decisions about where you want to live, your career, your home, your life. Perhaps one of the most important decisions you face is your choice for future medical care. Take time to talk with your family and your healthcare team and start planning today.  Advance Care Planning is a process that can help you:  Understand possible future healthcare decisions in light of  your own experiences  Reflect on those decision in light of your goals and values  Discuss your decisions with those closest to you and the healthcare professionals that care for you  Make a plan by creating a document that reflects your wishes     Surrogate Decision Maker  In the event of a medical emergency, which has left you unable to communicate or to make your own decisions, you would need someone to make decisions for you.  It is important to discuss your preferences for medical treatment with this person while you are in good health.      Qualities of a surrogate decision maker:  Willing to take on this role and responsibility  Knows what you want for future medical care  Willing to follow your wishes even if they don't agree with them  Able to make difficult medical decisions under stressful circumstances     Advance Directives  These are legal documents you can create that will guide your healthcare team and decision maker(s) when needed. These documents can be stored in the electronic medical record.     Living Will - a legal document to guide your care if you have a terminal condition or a serious illness and are unable to communicate. States vary by statute in document names/types, but most forms may include one or more of the following:              -  Directions regarding life-prolonging treatments              -  Directions regarding artificially provided nutrition/hydration              -  Choosing a healthcare decision maker              -  Direction regarding organ/tissue donation     Durable Power of  for Healthcare - this document names an -in-fact to make medical decisions for you, but it may also allow this person to make personal and financial decisions for you. Please seek the advice of an  if you need this type of document.     **Advance Directives are not required and no one may discriminate against you if you do not sign one.     Medical Orders  Many states allow  specific forms/orders signed by your physician to record your wishes for medical treatment in your current state of health. This form, signed in personal communication with your physician, addresses resuscitation and other medical interventions that you may or may not want.        For more information or to schedule a time with a [facility] Advance Care Planning Facilitator contact: [facility].[employer] or call [phone] and someone will contact you directly. Fall Prevention in the Home, Adult  Falls can cause injuries and can happen to people of all ages. There are many things you can do to make your home safe and to help prevent falls. Ask for help when making these changes.  What actions can I take to prevent falls?  General Instructions  Use good lighting in all rooms. Replace any light bulbs that burn out.  Turn on the lights in dark areas. Use night-lights.  Keep items that you use often in easy-to-reach places. Lower the shelves around your home if needed.  Set up your furniture so you have a clear path. Avoid moving your furniture around.  Do not have throw rugs or other things on the floor that can make you trip.  Avoid walking on wet floors.  If any of your floors are uneven, fix them.  Add color or contrast paint or tape to clearly brynn and help you see:  Grab bars or handrails.  First and last steps of staircases.  Where the edge of each step is.  If you use a stepladder:  Make sure that it is fully opened. Do not climb a closed stepladder.  Make sure the sides of the stepladder are locked in place.  Ask someone to hold the stepladder while you use it.  Know where your pets are when moving through your home.  What can I do in the bathroom?         Keep the floor dry. Clean up any water on the floor right away.  Remove soap buildup in the tub or shower.  Use nonskid mats or decals on the floor of the tub or shower.  Attach bath mats securely with double-sided, nonslip rug tape.  If you need to sit  down in the shower, use a plastic, nonslip stool.  Install grab bars by the toilet and in the tub and shower. Do not use towel bars as grab bars.  What can I do in the bedroom?  Make sure that you have a light by your bed that is easy to reach.  Do not use any sheets or blankets for your bed that hang to the floor.  Have a firm chair with side arms that you can use for support when you get dressed.  What can I do in the kitchen?  Clean up any spills right away.  If you need to reach something above you, use a step stool with a grab bar.  Keep electrical cords out of the way.  Do not use floor polish or wax that makes floors slippery.  What can I do with my stairs?  Do not leave any items on the stairs.  Make sure that you have a light switch at the top and the bottom of the stairs.  Make sure that there are handrails on both sides of the stairs. Fix handrails that are broken or loose.  Install nonslip stair treads on all your stairs.  Avoid having throw rugs at the top or bottom of the stairs.  Choose a carpet that does not hide the edge of the steps on the stairs.  Check carpeting to make sure that it is firmly attached to the stairs. Fix carpet that is loose or worn.  What can I do on the outside of my home?  Use bright outdoor lighting.  Fix the edges of walkways and driveways and fix any cracks.  Remove anything that might make you trip as you walk through a door, such as a raised step or threshold.  Trim any bushes or trees on paths to your home.  Check to see if handrails are loose or broken and that both sides of all steps have handrails.  Install guardrails along the edges of any raised decks and porches.  Clear paths of anything that can make you trip, such as tools or rocks.  Have leaves, snow, or ice cleared regularly.  Use sand or salt on paths during winter.  Clean up any spills in your garage right away. This includes grease or oil spills.  What other actions can I take?  Wear shoes that:  Have a low  heel. Do not wear high heels.  Have rubber bottoms.  Feel good on your feet and fit well.  Are closed at the toe. Do not wear open-toe sandals.  Use tools that help you move around if needed. These include:  Canes.  Walkers.  Scooters.  Crutches.  Review your medicines with your doctor. Some medicines can make you feel dizzy. This can increase your chance of falling.  Ask your doctor what else you can do to help prevent falls.  Where to find more information  Centers for Disease Control and Prevention, [place]: www.cdc.gov  National [place] on Aging: www.scott.nih.gov  Contact a doctor if:  You are afraid of falling at home.  You feel weak, drowsy, or dizzy at home.  You fall at home.  Summary  There are many simple things that you can do to make your home safe and to help prevent falls.  Ways to make your home safe include removing things that can make you trip and installing grab bars in the bathroom.  Ask for help when making these changes in your home.  This information is not intended to replace advice given to you by your health care provider. Make sure you discuss any questions you have with your health care provider.  Document Revised: 07/21/2021 Document Reviewed: 07/21/2021  Elsevier Patient Education © 2021 Elsevier Inc.

## 2025-05-05 NOTE — ASSESSMENT & PLAN NOTE
Chronic, worsening. Previously had improvement with naltrexone. Discussed healthy reduction in E[place], recommend limiting to 1 drink daily.

## 2025-05-06 ENCOUNTER — RESULTS FOLLOW-UP (OUTPATIENT)
Dept: INTERNAL MEDICINE | Facility: CLINIC | Age: 70
End: 2025-05-06
Payer: MEDICARE

## 2025-05-06 DIAGNOSIS — E53.8 B12 DEFICIENCY: Primary | ICD-10-CM

## 2025-05-06 DIAGNOSIS — E78.5 HYPERLIPIDEMIA, UNSPECIFIED HYPERLIPIDEMIA TYPE: ICD-10-CM

## 2025-05-06 DIAGNOSIS — I25.10 CORONARY ARTERY DISEASE INVOLVING NATIVE HEART WITHOUT ANGINA PECTORIS, UNSPECIFIED VESSEL OR LESION TYPE: ICD-10-CM

## 2025-05-06 RX ORDER — LANOLIN ALCOHOL/MO/W.PET/CERES
1000 CREAM (GRAM) TOPICAL DAILY
Qty: 90 TABLET | Refills: 1 | Status: SHIPPED | OUTPATIENT
Start: 2025-05-06

## 2025-05-06 RX ORDER — ATORVASTATIN CALCIUM 40 MG/1
40 TABLET, FILM COATED ORAL DAILY
Qty: 90 TABLET | Refills: 1 | Status: SHIPPED | OUTPATIENT
Start: 2025-05-06

## 2025-05-14 ENCOUNTER — TELEMEDICINE (OUTPATIENT)
Dept: PSYCHIATRY | Facility: CLINIC | Age: 70
End: 2025-05-14
Payer: MEDICARE

## 2025-05-14 DIAGNOSIS — F33.1 MAJOR DEPRESSIVE DISORDER, RECURRENT EPISODE, MODERATE: Primary | ICD-10-CM

## 2025-05-14 DIAGNOSIS — F41.1 GENERALIZED ANXIETY DISORDER: ICD-10-CM

## 2025-05-14 NOTE — PROGRESS NOTES
Date: May 14, 2025  Time In: 13:59 EDT  Time out: 14:53 EDT      This provider is located at [facility], [facility] [place], [place], [facility], using a secure [employer] Video Visit through [employer]. Patient is being seen remotely via telehealth at their home address is located in [place]. Patient stated they are in a secure environment for this session. The patient's condition being diagnosed and treated is appropriate for telemedicine. The provider identified themself as well as their credentials. The patient, or  patient's legal guardian consent to be seen remotely, and when consent is given they understand that the consent allows for patient identifiable information to be sent to a third party as needed. They may refuse to be seen remotely at any time. The electronic data is encrypted and password protected, and the patient's or  legal guardian has been advised of the potential risks to privacy not withstanding such measures.   PT Identifiers used: Name and .    You have chosen to receive care through a telehealth visit.  Do you consent to use a video/audio connection for your medical care today? YES    Subjective   Sarah Howard is a 69 y.o. female who presents today for follow up    Chief Complaint: Anxiety and Depression     History of Present Illness: Client discussed how things have been since last session.  Client discussed frustration that recovery from the knee surgery is taking longer than she had thought.  She states that she is beginning to be able to walk with out a cane, and feels hopeful, however, it has limited her ability to get out and do the things that she enjoys and finds meaningful like volunteering.  She states that she has completed physical therapy and they have given her some exercises to continue on her own.  Client shared increase in depression and discussed with therapist      Clinical Maneuvering/Intervention:    On a scale 0-10 ( with 10 being the  worst)  Anxiety: 5/10  Depression: 7/10    Sleep:  Disrupted, client still recovering from knee surgery and still unable to sleep in her bed, this ha caused some disruption in sleep for her    Appetite:  No current issues reported      Assisted patient in processing above session content; acknowledged and normalized patient’s thoughts, feelings, and concerns.  Rationalized patient thought process regarding concerns presented at session.  Discussed triggers associated with patient's  anxiety  and depression  Also discussed coping skills for patient to implement such as self care  and positive self talk     Allowed patient to freely discuss issues without interruption or judgment. Provided safe, confidential environment to facilitate the development of positive therapeutic relationship and encourage open, honest communication. Assisted patient in identifying risk factors which would indicate the need for higher level of care including thoughts to harm self or others and/or self-harming behavior and encouraged patient to contact this office, call 911, or present to the nearest emergency room should any of these events occur. Discussed crisis intervention services and means to access. Patient adamantly and convincingly denies current suicidal or homicidal ideation or perceptual disturbance.    Assessment:     Patient appears to maintain relative stability as compared to their baseline.  However, patient continues to struggle with anxiety and depression which continues to cause impairment in important areas of functioning.  A result, they can be reasonably expected to continue to benefit from treatment and would likely be at increased risk for decompensation otherwise.         Mental Status Exam:   Hygiene:   good  Cooperation:  Cooperative  Eye Contact:  Good  Psychomotor Behavior:  Appropriate  Affect:  Full range  Mood: normal  Speech:  Normal  Thought Process:  Goal directed and Linear  Thought Content:  Mood  congruent  Suicidal:  None  Homicidal:  None  Hallucinations:  None  Delusion:  None  Memory:  Intact  Orientation:  Person, Place, Time, and Situation  Reliability:  good  Insight:  Good  Judgement:  Good  Impulse Control:  Good  Physical/Medical Issues:   Client in the process of recovering from knee surgery        Patient's Support Network Includes:  , children, and extended family    Functional Status: Moderate impairment     Progress toward goal: Not at goal    Prognosis: Fair with Ongoing Treatment         Plan:    Patient will continue in individual outpatient therapy with focus on improved functioning and coping skills, maintaining stability, and avoiding decompensation and the need for higher level of care.    Patient will adhere to medication regimen as prescribed and report any side effects. Patient will contact this office, call 911 or present to the nearest emergency room should suicidal or homicidal ideations occur. Provide Cognitive Behavioral Therapy and Solution Focused Therapy to improve functioning, maintain stability, and avoid decompensation and the need for higher level of care.     Return in about 2 weeks (around 5/28/2025).      VISIT DIAGNOSIS:    Diagnosis Plan   1. Major depressive disorder, recurrent episode, moderate        2. Generalized anxiety disorder               This document has been electronically signed by Chanelle Goodson LCSW  May 14, 2025      Part of this note may be an electronic transcription/translation of spoken language to printed text using the Dragon Dictation System.

## 2025-05-15 ENCOUNTER — OFFICE VISIT (OUTPATIENT)
Dept: ORTHOPEDIC SURGERY | Facility: CLINIC | Age: 70
End: 2025-05-15
Payer: MEDICARE

## 2025-05-15 VITALS
SYSTOLIC BLOOD PRESSURE: 126 MMHG | BODY MASS INDEX: 33.67 KG/M2 | HEIGHT: 64 IN | WEIGHT: 197.2 LBS | DIASTOLIC BLOOD PRESSURE: 68 MMHG

## 2025-05-15 DIAGNOSIS — Z96.652 S/P TKR (TOTAL KNEE REPLACEMENT), LEFT: Primary | ICD-10-CM

## 2025-05-15 NOTE — PROGRESS NOTES
Orthopaedic Clinic Note: Knee Established Patient    Chief Complaint   Patient presents with    Follow-up     2 month follow up - 3.5 months S/P Total Knee Arthroplasty With Angelo Robot Left (DOS: 1/27/25)        HPI    It has been 2  month(s) since Ms. Howard's last visit. She returns to clinic today for follow-up left total knee arthroplasty.  Patient is 3 and half months out from surgery.  Rates her pain a 2/10 on the pain scale.  She is ambulating with no assistive device.  Denies fevers chills or constitutional symptoms.  Overall she is doing well.  She is happy with her outcome.      Past Medical History:   Diagnosis Date    Alcohol abuse     Allergic     Anxiety     Arthritis     Breast cancer     3/2010, right breast    Cancer Breast    2010    Cervical disc disorder ACDF 5/2020    Coronary artery disease 10/2020    Depression     Drug therapy     2010    Fracture, foot     bilateral    GERD (gastroesophageal reflux disease) 2016    Glaucoma 2016    Hard to intubate     Hx of radiation therapy     2010    Hyperlipidemia 2022    Hypertension 2020    Knee swelling     Osteopenia 2009    Substance abuse     Suicide attempt 2008    Tear of meniscus of knee     left      Past Surgical History:   Procedure Laterality Date    ABDOMINAL SURGERY  2011    hysterectomy    ADENOIDECTOMY  1972    BACK SURGERY  2018    BREAST BIOPSY Right     3/2010    BREAST LUMPECTOMY Right     x2, 2010    BREAST SURGERY  2 x 2010    CARDIAC CATHETERIZATION  2020    COLONOSCOPY  2018    COSMETIC SURGERY  breast reduction    2010    EYE SURGERY  laser iridotomies    2019    HYSTERECTOMY  2011    JOINT REPLACEMENT  1/ 2025    NECK SURGERY      OOPHORECTOMY      REDUCTION MAMMAPLASTY Bilateral     2010 at time of lumpectomy    SPINE SURGERY  2019    TONSILLECTOMY  1972    TOTAL KNEE ARTHROPLASTY Left 01/27/2025    Procedure: TOTAL KNEE ARTHROPLASTY WITH ANGELO ROBOT LEFT;  Surgeon: Praful Baker MD;  Location: [facility];  Service:  Robotics - Ortho;  Laterality: Left;    TUBAL ABDOMINAL LIGATION  1982      Family History   Problem Relation Age of Onset    Colon cancer Mother 48    Stroke Mother     Thyroid disease Mother     Anxiety disorder Mother     Depression Mother     Cancer Mother     Diabetes Mother     Heart disease Father         mitral valve disease    Heart failure Father     Atrial fibrillation Brother     Alcohol abuse Maternal Grandfather     Breast cancer Neg Hx     Ovarian cancer Neg Hx      Social History     Socioeconomic History    Marital status:    Tobacco Use    Smoking status: Never     Passive exposure: Never    Smokeless tobacco: Never   Vaping Use    Vaping status: Never Used   Substance and Sexual Activity    Alcohol use: Yes     Alcohol/week: 10.0 standard drinks of alcohol     Types: 10 Glasses of wine per week     Comment: drinking alcohol makes me feel happier    Drug use: Yes     Types: Marijuana     Comment: gummies    Sexual activity: Not Currently     Partners: Male     Birth control/protection: Post-menopausal, Tubal ligation, Hysterectomy      Current Outpatient Medications on File Prior to Visit   Medication Sig Dispense Refill    atorvastatin (LIPITOR) 40 MG tablet Take 1 tablet by mouth Daily. 90 tablet 1    desvenlafaxine (Pristiq) 100 MG 24 hr tablet Take 1 tablet by mouth Daily. 90 tablet 1    fluticasone (FLONASE) 50 MCG/ACT nasal spray Administer 2 sprays into the nostril(s) as directed by provider Daily. 16 g 3    latanoprost (XALATAN) 0.005 % ophthalmic solution       metoprolol succinate XL (Toprol XL) 25 MG 24 hr tablet Take 1 tablet by mouth Daily. 90 tablet 3    olmesartan (BENICAR) 20 MG tablet Take 1 tablet by mouth Daily. 90 tablet 3    pantoprazole (PROTONIX) 40 MG EC tablet Take 1 tablet by mouth Daily. 90 tablet 3    traZODone (DESYREL) 150 MG tablet Take 1 tablet by mouth Daily. WITH 150 MG TO EQUAL 200 MG 90 tablet 1    traZODone (DESYREL) 50 MG tablet Take 1 tablet by mouth  "Every Night. WITH 150 MG TO EQUAL 200 MG 90 tablet 1    vilazodone (Viibryd) 20 MG tablet tablet Take 1 tablet by mouth Daily. Take po q am with food. 30 tablet 1    vitamin B-12 (CYANOCOBALAMIN) 1000 MCG tablet Take 1 tablet by mouth Daily. 90 tablet 1     No current facility-administered medications on file prior to visit.      No Known Allergies     Review of Systems   Constitutional: Negative.    HENT: Negative.     Eyes: Negative.    Respiratory: Negative.     Cardiovascular: Negative.    Gastrointestinal: Negative.    Endocrine: Negative.    Genitourinary: Negative.    Musculoskeletal:  Positive for arthralgias.   Skin: Negative.    Allergic/Immunologic: Negative.    Neurological: Negative.    Hematological: Negative.    Psychiatric/Behavioral: Negative.          The patient's Review of Systems was personally reviewed and confirmed as accurate.    Physical Exam  Blood pressure 126/68, height 162 cm (63.78\"), weight 89.4 kg (197 lb 3.2 oz).    Body mass index is 34.08 kg/m².    GENERAL APPEARANCE: awake, alert, oriented, in no acute distress and well developed, well nourished  LUNGS:  breathing nonlabored  EXTREMITIES: no clubbing, cyanosis  PERIPHERAL PULSES: palpable dorsalis pedis and posterior tibial pulses bilaterally.    GAIT:  Normal        ----------  Left Knee Exam:  ----------  ALIGNMENT: neutral  ----------  RANGE OF MOTION:  Normal (0-120 degrees) with no extensor lag or flexion contracture  LIGAMENTOUS STABILITY:   stable to varus and valgus stress at terminal extension and 30 degrees without any evidence of laxity  ----------  STRENGTH:  KNEE FLEXION 5/5  KNEE EXTENSION  5/5  ANKLE DORSIFLEXION  5/5  ANKLE PLANTARFLEXION  5/5  ----------  PAIN WITH PALPATION:denies tenderness to palpation about the knee  KNEE EFFUSION: no  PAIN WITH KNEE ROM: no  PATELLAR CREPITUS:  no  ----------  SENSATION TO LIGHT TOUCH:  DEEP PERONEAL/SUPERFICIAL PERONEAL/SURAL/SAPHENOUS/TIBIAL:    intact  ----------  EDEMA:  " no  ERYTHEMA:    no  WOUNDS/INCISIONS:   yes, well healed surgical incision without evidence of erythema or drainage  _____________________________________________________________________  _____________________________________________________________________    RADIOGRAPHIC FINDINGS:   Indication: Status post left total knee arthroplasty    Comparison: Todays xrays were compared to previous xrays from 3/14/2025    Knee films: Demonstrate well positioned knee arthroplasty components in satisfactory alignment without evidence of wear, loosening, subsidence, fracture, or osteolysis and No significant changes compared to prior radiographs.    Assessment/Plan:   Diagnosis Plan   1. S/P TKR (total knee replacement), left  XR Knee 3+ View With [place] Left        Patient is doing well 3 and half month status post left total knee arthroplasty.  I recommend activity as tolerated without restrictions.  I will see the patient back in 9 months for 1 year virtually with x-ray 3 views left knee on return.  She is welcome to follow-up sooner should problems arise.    I recommend prophylactic antibiotics prior to invasive procedures indefinitely to minimize risk of prosthetic joint infection.  Amoxicillin 2 g orally 1 hour prior to procedure is recommended.        Praful Baker MD  05/15/25  15:24 EDT

## 2025-05-29 ENCOUNTER — TELEMEDICINE (OUTPATIENT)
Dept: PSYCHIATRY | Facility: CLINIC | Age: 70
End: 2025-05-29
Payer: MEDICARE

## 2025-05-29 DIAGNOSIS — F41.1 GENERALIZED ANXIETY DISORDER: ICD-10-CM

## 2025-05-29 DIAGNOSIS — F33.1 MAJOR DEPRESSIVE DISORDER, RECURRENT EPISODE, MODERATE: Primary | ICD-10-CM

## 2025-05-29 DIAGNOSIS — F51.04 PSYCHOPHYSIOLOGICAL INSOMNIA: ICD-10-CM

## 2025-05-29 RX ORDER — VILAZODONE HYDROCHLORIDE 20 MG/1
20 TABLET ORAL DAILY
Qty: 30 TABLET | Refills: 3 | Status: SHIPPED | OUTPATIENT
Start: 2025-05-29

## 2025-05-29 NOTE — PROGRESS NOTES
Patient Name: Sarah Howard  MRN: [id number]   :  1955     This provider is located at her home office through the [facility] (through [facility]), [address], [address] using a secure [employer] Video Visit through [employer]. Patient is being seen remotely via telehealth at their home address in [place], and stated they are in a secure environment for this session. The patient's condition being diagnosed/treated is appropriate for telemedicine. The provider identified herself as well as her credentials.   The patient, and/or patients guardian, consent to be seen remotely, and when consent is given they understand that the consent allows for patient identifiable information to be sent to a third party as needed.   They may refuse to be seen remotely at any time. The electronic data is encrypted and password protected, and the patient and/or guardian has been advised of the potential risks to privacy not withstanding such measures.    You have chosen to receive care through a telehealth visit.  Do you consent to use a video/audio connection for your medical care today? Yes    Chief Complaint:      ICD-10-CM ICD-9-CM   1. Major depressive disorder, recurrent episode, moderate  F33.1 296.32   2. Generalized anxiety disorder  F41.1 300.02   3. Psychophysiological insomnia  F51.04 307.42       History of Present Illness: Sarah Howard is a 69 y.o. female is here today for medication management follow up.  Patient states overall she is doing well however her daughter is miscarrying.  Patient is struggling with this.  Sleep is good.  Pain is getting better.    The following portions of the patient's history were reviewed and updated as appropriate: allergies, current medications, past family history, past medical history, past social history, past surgical history, and problem list.    Review of Systems;;  Review of Systems   Constitutional:  Negative  for activity change, appetite change, fatigue, unexpected weight gain and unexpected weight loss.   Respiratory:  Negative for shortness of breath and wheezing.    Gastrointestinal:  Negative for constipation, diarrhea, nausea and vomiting.   Musculoskeletal:  Negative for gait problem.   Skin:  Negative for dry skin and rash.   Neurological:  Negative for dizziness, speech difficulty, weakness, light-headedness, headache, memory problem and confusion.   Psychiatric/Behavioral:  Positive for depressed mood and stress. Negative for agitation, behavioral problems, decreased concentration, dysphoric mood, hallucinations, self-injury, sleep disturbance, suicidal ideas and negative for hyperactivity. The patient is nervous/anxious.        Physical Exam;;  Physical Exam  Constitutional:       General: She is not in acute distress.     Appearance: She is well-developed. She is not diaphoretic.   HENT:      Head: Normocephalic and atraumatic.   Eyes:      Conjunctiva/sclera: Conjunctivae normal.   Pulmonary:      Effort: Pulmonary effort is normal. No respiratory distress.   Musculoskeletal:         General: Normal range of motion.      Cervical back: Full passive range of motion without pain and normal range of motion.   Neurological:      Mental Status: She is alert and oriented to person, place, and time.   Psychiatric:         Attention and Perception: Attention and perception normal.         Mood and Affect: Affect normal. Mood is anxious and depressed. Affect is not labile, blunt, angry or inappropriate.         Speech: Speech normal. Speech is not rapid and pressured or tangential.         Behavior: Behavior normal. Behavior is not agitated, slowed, aggressive, withdrawn, hyperactive or combative. Behavior is cooperative.         Thought Content: Thought content normal. Thought content is not paranoid or delusional. Thought content does not include homicidal or suicidal ideation. Thought content does not include  homicidal or suicidal plan.         Cognition and Memory: Cognition and memory normal.         Judgment: Judgment normal.       There were no vitals taken for this visit.  There is no height or weight on file to calculate BMI. Video appt unable to obtain.      Current Medications;;    Current Outpatient Medications:     vilazodone (Viibryd) 20 MG tablet tablet, Take 1 tablet by mouth Daily. Take po q am with food., Disp: 30 tablet, Rfl: 3    atorvastatin (LIPITOR) 40 MG tablet, Take 1 tablet by mouth Daily., Disp: 90 tablet, Rfl: 1    desvenlafaxine (Pristiq) 100 MG 24 hr tablet, Take 1 tablet by mouth Daily., Disp: 90 tablet, Rfl: 1    fluticasone (FLONASE) 50 MCG/ACT nasal spray, Administer 2 sprays into the nostril(s) as directed by provider Daily., Disp: 16 g, Rfl: 3    latanoprost (XALATAN) 0.005 % ophthalmic solution, , Disp: , Rfl:     metoprolol succinate XL (Toprol XL) 25 MG 24 hr tablet, Take 1 tablet by mouth Daily., Disp: 90 tablet, Rfl: 3    olmesartan (BENICAR) 20 MG tablet, Take 1 tablet by mouth Daily., Disp: 90 tablet, Rfl: 3    pantoprazole (PROTONIX) 40 MG EC tablet, Take 1 tablet by mouth Daily., Disp: 90 tablet, Rfl: 3    traZODone (DESYREL) 150 MG tablet, Take 1 tablet by mouth Daily. WITH 150 MG TO EQUAL 200 MG, Disp: 90 tablet, Rfl: 1    traZODone (DESYREL) 50 MG tablet, Take 1 tablet by mouth Every Night. WITH 150 MG TO EQUAL 200 MG, Disp: 90 tablet, Rfl: 1    vitamin B-12 (CYANOCOBALAMIN) 1000 MCG tablet, Take 1 tablet by mouth Daily., Disp: 90 tablet, Rfl: 1    Lab Results:   Office Visit on 05/05/2025   Component Date Value Ref Range Status    Glucose 05/05/2025 104 (H)  65 - 99 mg/dL Final    BUN 05/05/2025 13  8 - 23 mg/dL Final    Creatinine 05/05/2025 0.98  0.57 - 1.00 mg/dL Final    Sodium 05/05/2025 140  136 - 145 mmol/L Final    Potassium 05/05/2025 3.9  3.5 - 5.2 mmol/L Final    Chloride 05/05/2025 103  98 - 107 mmol/L Final    CO2 05/05/2025 24.1  22.0 - 29.0 mmol/L Final     Calcium 05/05/2025 9.5  8.6 - 10.5 mg/dL Final    Total Protein 05/05/2025 7.6  6.0 - 8.5 g/dL Final    Albumin 05/05/2025 4.4  3.5 - 5.2 g/dL Final    ALT (SGPT) 05/05/2025 27  1 - 33 U/L Final    AST (SGOT) 05/05/2025 27  1 - 32 U/L Final    Alkaline Phosphatase 05/05/2025 96  39 - 117 U/L Final    Total Bilirubin 05/05/2025 0.4  0.0 - 1.2 mg/dL Final    Globulin 05/05/2025 3.2  gm/dL Final    A/G Ratio 05/05/2025 1.4  g/dL Final    BUN/Creatinine Ratio 05/05/2025 13.3  7.0 - 25.0 Final    Anion Gap 05/05/2025 12.9  5.0 - 15.0 mmol/L Final    eGFR 05/05/2025 62.2  >60.0 mL/min/1.73 Final    Total Cholesterol 05/05/2025 227 (H)  0 - 200 mg/dL Final    Triglycerides 05/05/2025 168 (H)  0 - 150 mg/dL Final    HDL Cholesterol 05/05/2025 63 (H)  40 - 60 mg/dL Final    LDL Cholesterol  05/05/2025 134 (H)  0 - 100 mg/dL Final    VLDL Cholesterol 05/05/2025 30  5 - 40 mg/dL Final    LDL/HDL Ratio 05/05/2025 2.07   Final    25 Hydroxy, Vitamin D 05/05/2025 34.4  30.0 - 100.0 ng/ml Final    Hemoglobin A1C 05/05/2025 5.40  4.80 - 5.60 % Final    TSH 05/05/2025 4.000  0.270 - 4.200 uIU/mL Final    Vitamin B-12 05/05/2025 230  211 - 946 pg/mL Final    Folate 05/05/2025 8.74  4.78 - 24.20 ng/mL Final    WBC 05/05/2025 4.94  3.40 - 10.80 10*3/mm3 Final    RBC 05/05/2025 4.20  3.77 - 5.28 10*6/mm3 Final    Hemoglobin 05/05/2025 13.7  12.0 - 15.9 g/dL Final    Hematocrit 05/05/2025 40.6  34.0 - 46.6 % Final    MCV 05/05/2025 96.7  79.0 - 97.0 fL Final    MCH 05/05/2025 32.6  26.6 - 33.0 pg Final    MCHC 05/05/2025 33.7  31.5 - 35.7 g/dL Final    RDW 05/05/2025 11.9 (L)  12.3 - 15.4 % Final    RDW-SD 05/05/2025 42.4  37.0 - 54.0 fl Final    MPV 05/05/2025 11.8  6.0 - 12.0 fL Final    Platelets 05/05/2025 270  140 - 450 10*3/mm3 Final    Neutrophil % 05/05/2025 35.7 (L)  42.7 - 76.0 % Final    Lymphocyte % 05/05/2025 48.4 (H)  19.6 - 45.3 % Final    Monocyte % 05/05/2025 11.5  5.0 - 12.0 % Final    Eosinophil % 05/05/2025 2.8  0.3 -  6.2 % Final    Basophil % 05/05/2025 1.2  0.0 - 1.5 % Final    Immature Grans % 05/05/2025 0.4  0.0 - 0.5 % Final    Neutrophils, Absolute 05/05/2025 1.76  1.70 - 7.00 10*3/mm3 Final    Lymphocytes, Absolute 05/05/2025 2.39  0.70 - 3.10 10*3/mm3 Final    Monocytes, Absolute 05/05/2025 0.57  0.10 - 0.90 10*3/mm3 Final    Eosinophils, Absolute 05/05/2025 0.14  0.00 - 0.40 10*3/mm3 Final    Basophils, Absolute 05/05/2025 0.06  0.00 - 0.20 10*3/mm3 Final    Immature Grans, Absolute 05/05/2025 0.02  0.00 - 0.05 10*3/mm3 Final    nRBC 05/05/2025 0.0  0.0 - 0.2 /100 WBC Final       Mental Status Exam:   Hygiene:   good  Cooperation:  Cooperative  Eye Contact:  Good  Psychomotor Behavior:  Appropriate  Mood:anxious and dysthymic  Affect:  Appropriate  Hopelessness: Denies  Speech:  Normal  Thought Process:  Goal directed  Thought Content:  Normal  Suicidal:  None  Homicidal:  None  Hallucinations:  None  Delusion:  None  Memory:  Intact  Orientation:  Person, Place, Time, and Situation  Reliability:  good  Insight:  Good  Judgement:  Good  Impulse Control:  Good          PHQ-9 Depression Screening  Little interest or pleasure in doing things? (Patient-Rptd) Several days   Feeling down, depressed, or hopeless? (Patient-Rptd) Several days   PHQ-2 Total Score (Patient-Rptd) 2   Trouble falling or staying asleep, or sleeping too much? (Patient-Rptd) Not at all   Feeling tired or having little energy? (Patient-Rptd) Several days   Poor appetite or overeating? (Patient-Rptd) Several days   Feeling bad about yourself - or that you are a failure or have let yourself or your family down? (Patient-Rptd) Not at all   Trouble concentrating on things, such as reading the newspaper or watching television? (Patient-Rptd) Not at all   Moving or speaking so slowly that other people could have noticed? Or the opposite - being so fidgety or restless that you have been moving around a lot more than usual?     Thoughts that you would be better  off dead, or of hurting yourself in some way? (Patient-Rptd) Not at all   PHQ-9 Total Score     If you checked off any problems, how difficult have these problems made it for you to do your work, take care of things at home, or get along with other people? (Patient-Rptd) Not difficult at all           Assessment/Plan:  Diagnoses and all orders for this visit:    1. Major depressive disorder, recurrent episode, moderate (Primary)  -     vilazodone (Viibryd) 20 MG tablet tablet; Take 1 tablet by mouth Daily. Take po q am with food.  Dispense: 30 tablet; Refill: 3    2. Generalized anxiety disorder    3. Psychophysiological insomnia      Patient overall is doing well with anxiety and depression.  Is struggling with her daughter miscarrying.      We discussed risks, benefits,goals and side effects of the above medication and the patient was agreeable with the plan.Patient was educated on the importance of compliance with treatment and follow-up appointments. Patient is aware to contact the [facility] [phone] with any worsening of symptoms. To call for questions or concerns and return early if necessary. Patent is agreeable to go to the Emergency Department or call 911 should they begin SI/HI.     Part of this note may be an electronic transcription/translation of spoken language to printed text using the Dragon Dictation System.    Return in about 2 months (around 7/29/2025) for Follow Up 30 min, Video visit.    MARCOS Smart

## 2025-06-20 DIAGNOSIS — F51.04 PSYCHOPHYSIOLOGICAL INSOMNIA: ICD-10-CM

## 2025-06-22 RX ORDER — TRAZODONE HYDROCHLORIDE 50 MG/1
50 TABLET ORAL NIGHTLY
Qty: 90 TABLET | Refills: 1 | Status: SHIPPED | OUTPATIENT
Start: 2025-06-22

## 2025-06-22 RX ORDER — TRAZODONE HYDROCHLORIDE 150 MG/1
150 TABLET ORAL DAILY
Qty: 90 TABLET | Refills: 1 | Status: SHIPPED | OUTPATIENT
Start: 2025-06-22

## 2025-07-01 NOTE — PLAN OF CARE
Goal Outcome Evaluation:  Plan of Care Reviewed With: patient        Progress: no change          Pt is alert and oriented. Room air. 1 L NC when sleeping. Up 1x assist to bathroom. PT/OT noted knee buckling when ambulating. Regular diet. PO prn pain medications given around the clock. INFU pump clean, dry, intact.        Problem: Adult Inpatient Plan of Care  Goal: Patient-Specific Goal (Individualized)  Outcome: Progressing     Problem: Adult Inpatient Plan of Care  Goal: Absence of Hospital-Acquired Illness or Injury  Intervention: Prevent Skin Injury  Recent Flowsheet Documentation  Taken 1/28/2025 1400 by Mike Winston RN  Body Position: legs elevated  Taken 1/28/2025 1200 by Mike Winston RN  Body Position: legs elevated  Taken 1/28/2025 1000 by Mike Winston RN  Body Position: position changed independently  Taken 1/28/2025 0808 by Mike Winston RN  Body Position:   position changed independently   neutral body alignment   sitting up in bed  Skin Protection: incontinence pads utilized     Problem: Adult Inpatient Plan of Care  Goal: Absence of Hospital-Acquired Illness or Injury  Intervention: Prevent and Manage VTE (Venous Thromboembolism) Risk  Recent Flowsheet Documentation  Taken 1/28/2025 1200 by Mike Winston RN  VTE Prevention/Management:   SCDs (sequential compression devices) on   bilateral  Taken 1/28/2025 0808 by Mike Winston RN  VTE Prevention/Management:   SCDs (sequential compression devices) on   bilateral                          Initial (On Arrival)

## 2025-07-23 DIAGNOSIS — F33.1 MAJOR DEPRESSIVE DISORDER, RECURRENT EPISODE, MODERATE: ICD-10-CM

## 2025-07-23 RX ORDER — DESVENLAFAXINE 100 MG/1
100 TABLET, EXTENDED RELEASE ORAL DAILY
Qty: 90 TABLET | Refills: 1 | Status: SHIPPED | OUTPATIENT
Start: 2025-07-23

## 2025-07-28 ENCOUNTER — HOSPITAL ENCOUNTER (OUTPATIENT)
Dept: BONE DENSITY | Facility: HOSPITAL | Age: 70
Discharge: HOME | End: 2025-07-28
Admitting: STUDENT IN AN ORGANIZED HEALTH CARE EDUCATION/TRAINING PROGRAM
Payer: MEDICARE

## 2025-07-28 DIAGNOSIS — M85.80 OSTEOPENIA, UNSPECIFIED LOCATION: ICD-10-CM

## 2025-07-28 DIAGNOSIS — Z78.0 ASYMPTOMATIC POSTMENOPAUSAL STATE: ICD-10-CM

## 2025-07-28 PROCEDURE — 77080 DXA BONE DENSITY AXIAL: CPT

## 2025-07-30 ENCOUNTER — TELEMEDICINE (OUTPATIENT)
Dept: PSYCHIATRY | Facility: CLINIC | Age: 70
End: 2025-07-30
Payer: MEDICARE

## 2025-07-30 DIAGNOSIS — F51.04 PSYCHOPHYSIOLOGICAL INSOMNIA: ICD-10-CM

## 2025-07-30 DIAGNOSIS — F33.1 MAJOR DEPRESSIVE DISORDER, RECURRENT EPISODE, MODERATE: Primary | ICD-10-CM

## 2025-07-30 DIAGNOSIS — F41.1 GENERALIZED ANXIETY DISORDER: ICD-10-CM

## 2025-07-30 RX ORDER — VILAZODONE HYDROCHLORIDE 20 MG/1
20 TABLET ORAL DAILY
Qty: 90 TABLET | Refills: 1 | Status: SHIPPED | OUTPATIENT
Start: 2025-07-30

## 2025-07-30 NOTE — PROGRESS NOTES
Patient Name: Sarah Howard  MRN: [id number]   :  1955     This provider is located at her home office through the [facility] (through [facility]), [address], [address] using a secure [employer] Video Visit through [employer]. Patient is being seen remotely via telehealth in [place], and stated they are in a secure environment for this session. The patient's condition being diagnosed/treated is appropriate for telemedicine. The provider identified herself as well as her credentials.   The patient, and/or patients guardian, consent to be seen remotely, and when consent is given they understand that the consent allows for patient identifiable information to be sent to a third party as needed.   They may refuse to be seen remotely at any time. The electronic data is encrypted and password protected, and the patient and/or guardian has been advised of the potential risks to privacy not withstanding such measures.    You have chosen to receive care through a telehealth visit.  Do you consent to use a video/audio connection for your medical care today? Yes    Chief Complaint:      ICD-10-CM ICD-9-CM   1. Major depressive disorder, recurrent episode, moderate  F33.1 296.32   2. Generalized anxiety disorder  F41.1 300.02   3. Psychophysiological insomnia  F51.04 307.42       History of Present Illness  The patient is a 69-year-old female who presents for a medication management follow-up.    She reports that her mood and anxiety levels are currently stable. Her sleep pattern is also satisfactory, aided by the use of trazodone. She expresses a desire to maintain her current treatment regimen.    The following portions of the patient's history were reviewed and updated as appropriate: allergies, current medications, past family history, past medical history, past social history, past surgical history, and problem list.    Review of Systems;;  Review of Systems    Constitutional:  Negative for activity change, appetite change, fatigue, unexpected weight gain and unexpected weight loss.   Respiratory:  Negative for shortness of breath and wheezing.    Gastrointestinal:  Negative for constipation, diarrhea, nausea and vomiting.   Musculoskeletal:  Negative for gait problem.   Skin:  Negative for dry skin and rash.   Neurological:  Negative for dizziness, speech difficulty, weakness, light-headedness, headache, memory problem and confusion.   Psychiatric/Behavioral:  Negative for agitation, behavioral problems, decreased concentration, dysphoric mood, hallucinations, self-injury, sleep disturbance, suicidal ideas, negative for hyperactivity, depressed mood and stress. The patient is not nervous/anxious.        Physical Exam;;  Physical Exam  Constitutional:       General: She is not in acute distress.     Appearance: She is well-developed. She is not diaphoretic.   HENT:      Head: Normocephalic and atraumatic.   Eyes:      Conjunctiva/sclera: Conjunctivae normal.   Pulmonary:      Effort: Pulmonary effort is normal. No respiratory distress.   Musculoskeletal:         General: Normal range of motion.      Cervical back: Full passive range of motion without pain and normal range of motion.   Neurological:      Mental Status: She is alert and oriented to person, place, and time.   Psychiatric:         Mood and Affect: Mood is not anxious or depressed. Affect is not labile, blunt, angry or inappropriate.         Speech: Speech is not rapid and pressured or tangential.         Behavior: Behavior normal. Behavior is not agitated, slowed, aggressive, withdrawn, hyperactive or combative. Behavior is cooperative.         Thought Content: Thought content normal. Thought content is not paranoid or delusional. Thought content does not include homicidal or suicidal ideation. Thought content does not include homicidal or suicidal plan.         Judgment: Judgment normal.       There were no  vitals taken for this visit.  There is no height or weight on file to calculate BMI. Video appt unable to obtain.      Current Medications;;    Current Outpatient Medications:     atorvastatin (LIPITOR) 40 MG tablet, Take 1 tablet by mouth Daily., Disp: 90 tablet, Rfl: 1    desvenlafaxine (Pristiq) 100 MG 24 hr tablet, Take 1 tablet by mouth Daily., Disp: 90 tablet, Rfl: 1    fluticasone (FLONASE) 50 MCG/ACT nasal spray, Administer 2 sprays into the nostril(s) as directed by provider Daily., Disp: 16 g, Rfl: 3    latanoprost (XALATAN) 0.005 % ophthalmic solution, , Disp: , Rfl:     metoprolol succinate XL (Toprol XL) 25 MG 24 hr tablet, Take 1 tablet by mouth Daily., Disp: 90 tablet, Rfl: 3    olmesartan (BENICAR) 20 MG tablet, Take 1 tablet by mouth Daily., Disp: 90 tablet, Rfl: 3    pantoprazole (PROTONIX) 40 MG EC tablet, Take 1 tablet by mouth Daily., Disp: 90 tablet, Rfl: 3    traZODone (DESYREL) 150 MG tablet, Take 1 tablet by mouth Daily. WITH 150 MG TO EQUAL 200 MG, Disp: 90 tablet, Rfl: 1    traZODone (DESYREL) 50 MG tablet, Take 1 tablet by mouth Every Night. WITH 150 MG TO EQUAL 200 MG, Disp: 90 tablet, Rfl: 1    vilazodone (Viibryd) 20 MG tablet tablet, Take 1 tablet by mouth Daily. Take po q am with food., Disp: 30 tablet, Rfl: 3    vitamin B-12 (CYANOCOBALAMIN) 1000 MCG tablet, Take 1 tablet by mouth Daily., Disp: 90 tablet, Rfl: 1    Lab Results:   Office Visit on 05/05/2025   Component Date Value Ref Range Status    Glucose 05/05/2025 104 (H)  65 - 99 mg/dL Final    BUN 05/05/2025 13  8 - 23 mg/dL Final    Creatinine 05/05/2025 0.98  0.57 - 1.00 mg/dL Final    Sodium 05/05/2025 140  136 - 145 mmol/L Final    Potassium 05/05/2025 3.9  3.5 - 5.2 mmol/L Final    Chloride 05/05/2025 103  98 - 107 mmol/L Final    CO2 05/05/2025 24.1  22.0 - 29.0 mmol/L Final    Calcium 05/05/2025 9.5  8.6 - 10.5 mg/dL Final    Total Protein 05/05/2025 7.6  6.0 - 8.5 g/dL Final    Albumin 05/05/2025 4.4  3.5 - 5.2 g/dL  Final    ALT (SGPT) 05/05/2025 27  1 - 33 U/L Final    AST (SGOT) 05/05/2025 27  1 - 32 U/L Final    Alkaline Phosphatase 05/05/2025 96  39 - 117 U/L Final    Total Bilirubin 05/05/2025 0.4  0.0 - 1.2 mg/dL Final    Globulin 05/05/2025 3.2  gm/dL Final    A/G Ratio 05/05/2025 1.4  g/dL Final    BUN/Creatinine Ratio 05/05/2025 13.3  7.0 - 25.0 Final    Anion Gap 05/05/2025 12.9  5.0 - 15.0 mmol/L Final    eGFR 05/05/2025 62.2  >60.0 mL/min/1.73 Final    Total Cholesterol 05/05/2025 227 (H)  0 - 200 mg/dL Final    Triglycerides 05/05/2025 168 (H)  0 - 150 mg/dL Final    HDL Cholesterol 05/05/2025 63 (H)  40 - 60 mg/dL Final    LDL Cholesterol  05/05/2025 134 (H)  0 - 100 mg/dL Final    VLDL Cholesterol 05/05/2025 30  5 - 40 mg/dL Final    LDL/HDL Ratio 05/05/2025 2.07   Final    25 Hydroxy, Vitamin D 05/05/2025 34.4  30.0 - 100.0 ng/ml Final    Hemoglobin A1C 05/05/2025 5.40  4.80 - 5.60 % Final    TSH 05/05/2025 4.000  0.270 - 4.200 uIU/mL Final    Vitamin B-12 05/05/2025 230  211 - 946 pg/mL Final    Folate 05/05/2025 8.74  4.78 - 24.20 ng/mL Final    WBC 05/05/2025 4.94  3.40 - 10.80 10*3/mm3 Final    RBC 05/05/2025 4.20  3.77 - 5.28 10*6/mm3 Final    Hemoglobin 05/05/2025 13.7  12.0 - 15.9 g/dL Final    Hematocrit 05/05/2025 40.6  34.0 - 46.6 % Final    MCV 05/05/2025 96.7  79.0 - 97.0 fL Final    MCH 05/05/2025 32.6  26.6 - 33.0 pg Final    MCHC 05/05/2025 33.7  31.5 - 35.7 g/dL Final    RDW 05/05/2025 11.9 (L)  12.3 - 15.4 % Final    RDW-SD 05/05/2025 42.4  37.0 - 54.0 fl Final    MPV 05/05/2025 11.8  6.0 - 12.0 fL Final    Platelets 05/05/2025 270  140 - 450 10*3/mm3 Final    Neutrophil % 05/05/2025 35.7 (L)  42.7 - 76.0 % Final    Lymphocyte % 05/05/2025 48.4 (H)  19.6 - 45.3 % Final    Monocyte % 05/05/2025 11.5  5.0 - 12.0 % Final    Eosinophil % 05/05/2025 2.8  0.3 - 6.2 % Final    Basophil % 05/05/2025 1.2  0.0 - 1.5 % Final    Immature Grans % 05/05/2025 0.4  0.0 - 0.5 % Final    Neutrophils, Absolute  05/05/2025 1.76  1.70 - 7.00 10*3/mm3 Final    Lymphocytes, Absolute 05/05/2025 2.39  0.70 - 3.10 10*3/mm3 Final    Monocytes, Absolute 05/05/2025 0.57  0.10 - 0.90 10*3/mm3 Final    Eosinophils, Absolute 05/05/2025 0.14  0.00 - 0.40 10*3/mm3 Final    Basophils, Absolute 05/05/2025 0.06  0.00 - 0.20 10*3/mm3 Final    Immature Grans, Absolute 05/05/2025 0.02  0.00 - 0.05 10*3/mm3 Final    nRBC 05/05/2025 0.0  0.0 - 0.2 /100 WBC Final       Mental Status Exam:   Hygiene:   good  Cooperation:  Cooperative  Eye Contact:  Good  Psychomotor Behavior:  Appropriate  Mood:within normal limits  Affect:  Appropriate  Hopelessness: Denies  Speech:  Normal  Thought Process:  Goal directed  Thought Content:  Normal  Suicidal:  None  Homicidal:  None  Hallucinations:  None  Delusion:  None  Memory:  Intact  Orientation:  Person, Place, Time, and Situation  Reliability:  good  Insight:  Good  Judgement:  Good  Impulse Control:  Good      [facility] Alex-7 Anxiety Behav Health    Question 7/24/2025  9:58 AM EDT - Filed by Patient   In the last 2 weeks, how often have you been bothered by the following problems?    Feeling nervous, anxious, or on edge? Not at all   Not being able to sleep or control worrying? Several days   Worrying too much about different things? Several days   Trouble relaxing? Several days   Being so restless that it is hard to sit still Not at all   Becoming easily annoyed or irritable? Not at all   Feeling afraid, as if something awful might happen? Not at all   If you checked any problems, how difficult have they made it for you to do your work, take care of things at home, or get along with other people? Not difficult at all   Over the last 2 weeks, how often have you been bothered by the following problems? (range: 0 - 28) 3         PHQ-9 Depression Screening  Little interest or pleasure in doing things? (Patient-Rptd) Several days   Feeling down, depressed, or hopeless? (Patient-Rptd) Several days   PHQ-2  Total Score (Patient-Rptd) 2   Trouble falling or staying asleep, or sleeping too much? (Patient-Rptd) Several days   Feeling tired or having little energy? (Patient-Rptd) Several days   Poor appetite or overeating? (Patient-Rptd) Several days   Feeling bad about yourself - or that you are a failure or have let yourself or your family down? (Patient-Rptd) Several days   Trouble concentrating on things, such as reading the newspaper or watching television? (Patient-Rptd) Several days   Moving or speaking so slowly that other people could have noticed? Or the opposite - being so fidgety or restless that you have been moving around a lot more than usual? (Patient-Rptd) Several days   Thoughts that you would be better off dead, or of hurting yourself in some way? (Patient-Rptd) Not at all   PHQ-9 Total Score (Patient-Rptd) 8   If you checked off any problems, how difficult have these problems made it for you to do your work, take care of things at home, or get along with other people? (Patient-Rptd) Somewhat difficult        Diagnoses and all orders for this visit:    1. Major depressive disorder, recurrent episode, moderate (Primary)  -     vilazodone (Viibryd) 20 MG tablet tablet; Take 1 tablet by mouth Daily. Take po q am with food.  Dispense: 90 tablet; Refill: 1    2. Generalized anxiety disorder    3. Psychophysiological insomnia         Assessment & Plan  Problems:  - Medication management    Content of Therapy:  - Discussed the stability of mood and anxiety.  - Addressed sleep quality and the effectiveness of trazodone.  - Reviewed current medications, including Viibryd and trazodone.    Clinical Impression:  The patient's mood and anxiety are reported to be stable. Sleep quality has improved with the use of trazodone. There are no new concerns or symptoms reported. The patient appears to be responding well to the current medication regimen.    Therapeutic Intervention:  - Medication review and management.  -  Decision to continue current medications, including trazodone and Viibryd.  - Plan to prescribe a 90-day supply of Viibryd.    Plan:  - Continue current medications, including trazodone and Viibryd.  - Prescribe a 90-day supply of Viibryd.  - Monitor thyroid function.    Follow-up:  - Follow-up appointment scheduled for 10/29/2025 at 3:00 PM.    Notes & Risk Factors:  - *    Time:      We discussed risks, benefits,goals and side effects of the above medication and the patient was agreeable with the plan.Patient was educated on the importance of compliance with treatment and follow-up appointments. Patient is aware to contact the [facility] [phone] with any worsening of symptoms. To call for questions or concerns and return early if necessary. Patent is agreeable to go to the Emergency Department or call 911 should they begin SI/HI.     Patient or patient representative verbalized consent for the use of Ambient Listening during the visit with  MARCOS Mooney for chart documentation. 7/30/2025  15:07 EDT    Part of this note may be an electronic transcription/translation of spoken language to printed text using the Dragon Dictation System.        MARCOS Smart

## 2025-07-31 ENCOUNTER — TELEPHONE (OUTPATIENT)
Dept: GASTROENTEROLOGY | Facility: CLINIC | Age: 70
End: 2025-07-31
Payer: MEDICARE

## 2025-07-31 ENCOUNTER — PATIENT MESSAGE (OUTPATIENT)
Dept: INTERNAL MEDICINE | Facility: CLINIC | Age: 70
End: 2025-07-31
Payer: MEDICARE

## 2025-07-31 DIAGNOSIS — K52.9 CHRONIC DIARRHEA: Primary | ICD-10-CM

## 2025-07-31 NOTE — TELEPHONE ENCOUNTER
Spoke back to patient and told her there are no sooner appts available. She understood and reached out to PCP.

## 2025-07-31 NOTE — TELEPHONE ENCOUNTER
This patient wants to know if you have any appts sooner than the one already scheduled on 9/23/2025. She says she is having diarrhea 5-6 times a day.

## (undated) DEVICE — KT TRAK CHECKPOINT 1FEM/1TIB MAKO SS 1P/U STRL

## (undated) DEVICE — CATHETER,URETHRAL,REDRUBBER,STERILE,22FR: Brand: MEDLINE

## (undated) DEVICE — DRSNG WND BORDR/ADHS NONADHR/GZ LF 4X4IN STRL

## (undated) DEVICE — DRESSING,GAUZE,XEROFORM,CURAD,1"X8",ST: Brand: CURAD

## (undated) DEVICE — TAPE,CLOTH/SILK,CURAD,3"X10YD,LF,40/CS: Brand: CURAD

## (undated) DEVICE — Device

## (undated) DEVICE — PK KN TOTL 10

## (undated) DEVICE — KT PUMP INFUBLOCK MDL 2100 PMKITSOLIS

## (undated) DEVICE — BLANKT WARM UPPR/BDY ARM/OUT 57X196CM

## (undated) DEVICE — DRSNG SURG AQUACEL AG/ADVNTGE 9X25CM 3.5X10IN

## (undated) DEVICE — STRYKER PERFORMANCE SERIES SAGITTAL BLADE: Brand: STRYKER PERFORMANCE SERIES

## (undated) DEVICE — UNDERGLV SURG BIOGEL INDICAT PI SZ8 BLU

## (undated) DEVICE — TRAP FLD MINIVAC MEGADYNE 100ML

## (undated) DEVICE — ANTIBACTERIAL UNDYED BRAIDED (POLYGLACTIN 910), SYNTHETIC ABSORBABLE SUTURE: Brand: COATED VICRYL

## (undated) DEVICE — GLV SURG SENSICARE PI ORTHO SZ8 LF STRL

## (undated) DEVICE — KT PROC KN MAKO VIZADISC TRACK 1P/U STRL

## (undated) DEVICE — UNDERCAST PADDING: Brand: DEROYAL

## (undated) DEVICE — BNDG ELAS CO-FLEX SLF ADHR 6IN 5YD LF STRL

## (undated) DEVICE — BNDG ELAS W/CLIP 6IN 10YD LF STRL

## (undated) DEVICE — GLV SURG SENSICARE PI MIC PF SZ9 LF STRL

## (undated) DEVICE — SUT VIC 1 CTX 36IN OBGYN VCP977H

## (undated) DEVICE — SPINAL TRAY/P: Brand: MEDLINE INDUSTRIES, INC.

## (undated) DEVICE — BLAD SAGITTAL MAKO NRW

## (undated) DEVICE — NEEDLE, QUINCKE 22GX3.5": Brand: MEDLINE INDUSTRIES, INC.

## (undated) DEVICE — PIN BONE MAKO PT 4X140MM SS 1P/U STRL

## (undated) DEVICE — SYR LUERLOK 30CC

## (undated) DEVICE — SOL PVPI 10PCT 0.75OZ PEEL/PCH/PACKET 1P/U STRL

## (undated) DEVICE — PATIENT RETURN ELECTRODE, SINGLE-USE, CONTACT QUALITY MONITORING, ADULT, WITH 9FT CORD, FOR PATIENTS WEIGING OVER 33LBS. (15KG): Brand: MEGADYNE

## (undated) DEVICE — ELECTRD BLD EZ CLN STD 2.5IN

## (undated) DEVICE — TB SXN FRAZIER 12F STRL

## (undated) DEVICE — PIN BONE MAKO PT 4X110MM SS 1P/U STRL

## (undated) DEVICE — SUT MONOCRYL PLS ANTIB UND 3/0  PS1 27IN

## (undated) DEVICE — PAD,ARMBOARD,CONV,FOAM,2X8X20",12PR/CS: Brand: MEDLINE

## (undated) DEVICE — MICRO HVTSA, 0.5G AND HVTSA SOURCEMARK PRODUCT CODE M1206 AND M1206-01: Brand: EXOFIN MICRO HVTSA, 0.5G